# Patient Record
Sex: FEMALE | Race: WHITE | NOT HISPANIC OR LATINO | Employment: OTHER | RURAL
[De-identification: names, ages, dates, MRNs, and addresses within clinical notes are randomized per-mention and may not be internally consistent; named-entity substitution may affect disease eponyms.]

---

## 2017-02-22 DIAGNOSIS — K76.0 STEATOSIS OF LIVER: Primary | ICD-10-CM

## 2017-02-22 LAB
ALBUMIN SERPL-MCNC: 4.4 G/DL (ref 3.5–5)
ALP SERPL-CCNC: 127 U/L (ref 24–120)
ALT SERPL-CCNC: 97 U/L (ref 0–54)
AST SERPL-CCNC: 45 U/L (ref 7–45)
BILIRUB DIRECT SERPL-MCNC: 0 MG/DL (ref 0–0.3)
BILIRUB SERPL-MCNC: 0.9 MG/DL (ref 0.1–1)
PROT SERPL-MCNC: 7.5 G/DL (ref 6.3–8.7)

## 2017-03-03 DIAGNOSIS — Z86.010 HX OF COLONIC POLYPS: Primary | ICD-10-CM

## 2017-03-06 RX ORDER — POLYETHYLENE GLYCOL 3350, SODIUM CHLORIDE, SODIUM BICARBONATE, POTASSIUM CHLORIDE 420; 11.2; 5.72; 1.48 G/4L; G/4L; G/4L; G/4L
4000 POWDER, FOR SOLUTION ORAL SEE ADMIN INSTRUCTIONS
Qty: 4000 ML | Refills: 0 | Status: ON HOLD | OUTPATIENT
Start: 2017-03-06 | End: 2017-04-04

## 2017-03-10 ENCOUNTER — TELEPHONE (OUTPATIENT)
Dept: FAMILY MEDICINE CLINIC | Facility: CLINIC | Age: 67
End: 2017-03-10

## 2017-03-10 ENCOUNTER — OFFICE VISIT (OUTPATIENT)
Dept: FAMILY MEDICINE CLINIC | Facility: CLINIC | Age: 67
End: 2017-03-10

## 2017-03-10 VITALS
HEART RATE: 96 BPM | WEIGHT: 194 LBS | SYSTOLIC BLOOD PRESSURE: 126 MMHG | BODY MASS INDEX: 32.28 KG/M2 | OXYGEN SATURATION: 98 % | DIASTOLIC BLOOD PRESSURE: 82 MMHG | RESPIRATION RATE: 16 BRPM

## 2017-03-10 DIAGNOSIS — J40 BRONCHITIS: Primary | ICD-10-CM

## 2017-03-10 PROCEDURE — 99213 OFFICE O/P EST LOW 20 MIN: CPT | Performed by: FAMILY MEDICINE

## 2017-03-10 RX ORDER — PRAVASTATIN SODIUM 20 MG
20 TABLET ORAL NIGHTLY
COMMUNITY
Start: 2017-01-20 | End: 2018-10-16

## 2017-03-10 RX ORDER — AMOXICILLIN 500 MG/1
500 CAPSULE ORAL 3 TIMES DAILY
Qty: 30 CAPSULE | Refills: 0 | Status: ON HOLD | OUTPATIENT
Start: 2017-03-10 | End: 2017-04-04

## 2017-03-10 RX ORDER — AZELASTINE 1 MG/ML
2 SPRAY, METERED NASAL 2 TIMES DAILY
Qty: 1 EACH | Refills: 2 | Status: SHIPPED | OUTPATIENT
Start: 2017-03-10 | End: 2017-10-16 | Stop reason: SDUPTHER

## 2017-03-10 RX ORDER — PREDNISONE 10 MG/1
10 TABLET ORAL DAILY
Qty: 15 TABLET | Refills: 0 | Status: ON HOLD | OUTPATIENT
Start: 2017-03-10 | End: 2017-04-04

## 2017-03-10 RX ORDER — LEVOTHYROXINE SODIUM 0.1 MG/1
100 TABLET ORAL DAILY
Qty: 30 TABLET | Refills: 2 | Status: SHIPPED | OUTPATIENT
Start: 2017-03-10 | End: 2017-06-13 | Stop reason: SDUPTHER

## 2017-03-10 NOTE — TELEPHONE ENCOUNTER
Says she has been sick with the flu for 1 week. Now it has gone down to her chest.. Would like to be seen.

## 2017-04-03 ENCOUNTER — ANESTHESIA EVENT (OUTPATIENT)
Dept: GASTROENTEROLOGY | Facility: HOSPITAL | Age: 67
End: 2017-04-03

## 2017-04-04 ENCOUNTER — HOSPITAL ENCOUNTER (OUTPATIENT)
Facility: HOSPITAL | Age: 67
Setting detail: HOSPITAL OUTPATIENT SURGERY
Discharge: HOME | End: 2017-04-04
Attending: INTERNAL MEDICINE | Admitting: INTERNAL MEDICINE

## 2017-04-04 ENCOUNTER — ANESTHESIA (OUTPATIENT)
Dept: GASTROENTEROLOGY | Facility: HOSPITAL | Age: 67
End: 2017-04-04

## 2017-04-04 VITALS
OXYGEN SATURATION: 95 % | HEART RATE: 75 BPM | RESPIRATION RATE: 15 BRPM | WEIGHT: 204 LBS | SYSTOLIC BLOOD PRESSURE: 109 MMHG | DIASTOLIC BLOOD PRESSURE: 51 MMHG | TEMPERATURE: 97.2 F | HEIGHT: 65 IN | BODY MASS INDEX: 33.99 KG/M2

## 2017-04-04 DIAGNOSIS — Z86.010 HX OF COLONIC POLYPS: ICD-10-CM

## 2017-04-04 PROCEDURE — 45385 COLONOSCOPY W/LESION REMOVAL: CPT | Performed by: INTERNAL MEDICINE

## 2017-04-04 PROCEDURE — 88305 TISSUE EXAM BY PATHOLOGIST: CPT | Performed by: INTERNAL MEDICINE

## 2017-04-04 PROCEDURE — 25010000002 ONDANSETRON PER 1 MG: Performed by: NURSE ANESTHETIST, CERTIFIED REGISTERED

## 2017-04-04 PROCEDURE — 25010000002 PROPOFOL 10 MG/ML EMULSION: Performed by: NURSE ANESTHETIST, CERTIFIED REGISTERED

## 2017-04-04 RX ORDER — PROPOFOL 10 MG/ML
VIAL (ML) INTRAVENOUS AS NEEDED
Status: DISCONTINUED | OUTPATIENT
Start: 2017-04-04 | End: 2017-04-04 | Stop reason: SURG

## 2017-04-04 RX ORDER — ONDANSETRON 2 MG/ML
INJECTION INTRAMUSCULAR; INTRAVENOUS AS NEEDED
Status: DISCONTINUED | OUTPATIENT
Start: 2017-04-04 | End: 2017-04-04 | Stop reason: SURG

## 2017-04-04 RX ORDER — SODIUM CHLORIDE 9 MG/ML
100 INJECTION, SOLUTION INTRAVENOUS CONTINUOUS
Status: DISCONTINUED | OUTPATIENT
Start: 2017-04-04 | End: 2017-04-04 | Stop reason: HOSPADM

## 2017-04-04 RX ORDER — SODIUM CHLORIDE 0.9 % (FLUSH) 0.9 %
1-10 SYRINGE (ML) INJECTION AS NEEDED
Status: DISCONTINUED | OUTPATIENT
Start: 2017-04-04 | End: 2017-04-04 | Stop reason: HOSPADM

## 2017-04-04 RX ORDER — LIDOCAINE HYDROCHLORIDE 20 MG/ML
INJECTION, SOLUTION INFILTRATION; PERINEURAL AS NEEDED
Status: DISCONTINUED | OUTPATIENT
Start: 2017-04-04 | End: 2017-04-04 | Stop reason: SURG

## 2017-04-04 RX ORDER — ONDANSETRON 2 MG/ML
4 INJECTION INTRAMUSCULAR; INTRAVENOUS ONCE AS NEEDED
Status: DISCONTINUED | OUTPATIENT
Start: 2017-04-04 | End: 2017-04-04 | Stop reason: HOSPADM

## 2017-04-04 RX ADMIN — PROPOFOL 50 MG: 10 INJECTION, EMULSION INTRAVENOUS at 08:32

## 2017-04-04 RX ADMIN — SODIUM CHLORIDE 100 ML/HR: 9 INJECTION, SOLUTION INTRAVENOUS at 07:13

## 2017-04-04 RX ADMIN — PROPOFOL 50 MG: 10 INJECTION, EMULSION INTRAVENOUS at 08:30

## 2017-04-04 RX ADMIN — ONDANSETRON HYDROCHLORIDE 4 MG: 2 SOLUTION INTRAMUSCULAR; INTRAVENOUS at 08:20

## 2017-04-04 RX ADMIN — PROPOFOL 200 MG: 10 INJECTION, EMULSION INTRAVENOUS at 08:26

## 2017-04-04 RX ADMIN — PROPOFOL 50 MG: 10 INJECTION, EMULSION INTRAVENOUS at 08:35

## 2017-04-04 RX ADMIN — PROPOFOL 50 MG: 10 INJECTION, EMULSION INTRAVENOUS at 08:34

## 2017-04-04 RX ADMIN — LIDOCAINE HYDROCHLORIDE 50 MG: 20 INJECTION, SOLUTION INFILTRATION; PERINEURAL at 08:26

## 2017-04-04 NOTE — ANESTHESIA PREPROCEDURE EVALUATION
Anesthesia Evaluation     no history of anesthetic complications:  NPO Status: > 4 hours   Airway   Mallampati: III  TM distance: >3 FB  possible difficult intubation  Dental    (+) poor dentition    Pulmonary - normal exam   (+) asthma, shortness of breath, recent URI,   Cardiovascular - normal exam  Exercise tolerance: good (4-7 METS)    Rhythm: regular  Rate: normal    (+) hypertension well controlled,       Neuro/Psych  (+) dizziness/light headedness, psychiatric history,    GI/Hepatic/Renal/Endo    (+)  liver disease, hypothyroidism,   (-) hyperthyroidism    Musculoskeletal     Abdominal  - normal exam   Substance History - negative use     OB/GYN negative ob/gyn ROS         Other   (+) arthritis     ROS/Med Hx Other: Inner ear   Fatty liver  Antibiotic two weeks ago for bronchitis  Almost over       Phys Exam Other: Partials on top and bottom in place                            Anesthesia Plan    ASA 2     MAC     intravenous induction   Anesthetic plan and risks discussed with patient.

## 2017-04-04 NOTE — PLAN OF CARE
Problem: GI Endoscopy (Adult)  Goal: Signs and Symptoms of Listed Potential Problems Will be Absent or Manageable (GI Endoscopy)  Outcome: Outcome(s) achieved Date Met:  04/04/17

## 2017-04-04 NOTE — ANESTHESIA POSTPROCEDURE EVALUATION
Patient: Hannah Maki    Procedure Summary     Date Anesthesia Start Anesthesia Stop Room / Location    04/04/17 0820 0844  [facility] /  [facility]       Procedure Diagnosis Surgeon Provider    COLONOSCOPY WITH ANESTHESIA (N/A ) Hx of colonic polyps  (Hx of colonic polyps [Z86.010]) MD Aiden Eckert CRNA          Anesthesia Type: MAC  Last vitals  BP      Temp      Pulse     Resp      SpO2        Post Anesthesia Care and Evaluation    Patient location during evaluation: PHASE II  Patient participation: complete - patient participated  Level of consciousness: awake and alert  Pain score: 0  Pain management: adequate  Airway patency: patent  Anesthetic complications: No anesthetic complications    Cardiovascular status: acceptable, hemodynamically stable and stable  Respiratory status: acceptable  Hydration status: acceptable

## 2017-04-04 NOTE — H&P
[facility] Gastroenterology  Pre Procedure History & Physical    Chief Complaint:   Colon polyps    Subjective     HPI:   The patient has a history of colon polyps who presents for exam.    Past Medical History:   Past Medical History:   Diagnosis Date   • Anxiety    • Arthritis    • Asthma    • Colon polyp    • Diverticulosis    • Dysuria    • GERD (gastroesophageal reflux disease)    • Hyperlipidemia    • Hypertension    • Hyperthyroidism    • Rhinitis    • Tinnitus    • Vertigo        Past Surgical History:  [unfilled]    Family History:  Family History   Problem Relation Age of Onset   • Diabetes Brother    • Colon cancer Sister    • Colon cancer Sister        Social History:   reports that she has never smoked. She has never used smokeless tobacco. She reports that she does not drink alcohol or use illicit drugs.    Medications:   Prior to Admission medications    Medication Sig Start Date End Date Taking? Authorizing Provider   levothyroxine (SYNTHROID, LEVOTHROID) 100 MCG tablet Take 1 tablet by mouth Daily. 3/10/17  Yes Deni Henry MD   azelastine (ASTELIN) 0.1 % nasal spray 2 sprays into each nostril 2 (Two) Times a Day. Use in each nostril as directed 3/10/17   Deni Henry MD   budesonide-formoterol (SYMBICORT) 160-4.5 MCG/ACT inhaler Inhale into the lungs    Historical Provider, MD   esomeprazole (NexIUM) 20 MG capsule Take 20 mg by mouth Every Morning Before Breakfast.    Historical Provider, MD   HYDROcod Polst-CPM Polst ER (TUSSIONEX PENNKINETIC ER) 10-8 MG/5ML ER suspension Take 5 mL by mouth Every 12 (Twelve) Hours As Needed for cough. 3/10/17   Deni Henry MD   irbesartan (AVAPRO) 300 MG tablet Take 1 tablet by mouth Daily. 11/14/16   Deni Henry MD   montelukast (SINGULAIR) 10 MG tablet Take 10 mg by mouth nightly.    Historical Provider, MD   pravastatin (PRAVACHOL) 20 MG tablet  1/20/17   Historical Provider, MD   amoxicillin (AMOXIL) 500 MG capsule Take 1  "capsule by mouth 3 (Three) Times a Day. 3/10/17 4/4/17  Deni Henry MD   ezetimibe (ZETIA) 10 MG tablet  3/12/16 4/4/17  Historical Provider, MD   polyethylene glycol-electrolytes (NULYTELY WITH FLAVOR PACKS) 420 G solution Take 4,000 mL by mouth See Admin Instructions. 3/6/17 4/4/17  FADI Levine   predniSONE (DELTASONE) 10 MG tablet Take 1 tablet by mouth Daily. 30mg x 2 dasy then 20mg x 2 days then 10mg x 2 days then 5mg x 2 days 3/10/17 4/4/17  eDni Henry MD       Allergies:  Azithromycin; Augmentin  [amoxicillin-pot clavulanate]; Bactrim  [sulfamethoxazole-trimethoprim]; Cefuroxime; Codeine; Erythromycin; Gatifloxacin; Latex; Meperidine; and Tetracyclines & related    Objective     Blood pressure 163/89, pulse 90, temperature 97.2 °F (36.2 °C), temperature source Temporal Artery , resp. rate 18, height 65\" (165.1 cm), weight 204 lb (92.5 kg), last menstrual period 10/04/1981, SpO2 95 %.    Physical Exam   Constitutional: Pt is oriented to person, place, and in no distress.   HENT: Mouth/Throat: Oropharynx is clear.   Cardiovascular: Normal rate, regular rhythm.    Pulmonary/Chest: Effort normal. No respiratory distress. No  wheezes.   Abdominal: Soft. Non-distended.  Skin: Skin is warm and dry.   Psychiatric: Mood, memory, affect and judgment appear normal.     Assessment/Plan     Diagnosis:  Colon polyps    Anticipated Surgical Procedure:  Colonoscopy    The risks, benefits, and alternatives of this procedure have been discussed with the patient or the responsible party- the patient understands and agrees to proceed.      EMR Dragon/transcription disclaimer:  Much of this encounter note is electronic transcription/translation of spoken language to printed text.  The electronic translation of spoken language may be erroneous, or at times, nonsensical words or phrases may be inadvertently transcribed.  Although I have reviewed the note for such errors, some may still exist.  "

## 2017-04-04 NOTE — PLAN OF CARE
Problem: Patient Care Overview (Adult)  Goal: Plan of Care Review  Outcome: Outcome(s) achieved Date Met:  04/04/17 04/04/17 0932   Outcome Evaluation   Outcome Summary/Follow up Plan d/c criteria met   Coping/Psychosocial Response Interventions   Plan Of Care Reviewed With patient;spouse

## 2017-04-04 NOTE — PLAN OF CARE
Problem: GI Endoscopy (Adult)  Goal: Signs and Symptoms of Listed Potential Problems Will be Absent or Manageable (GI Endoscopy)  Outcome: Ongoing (interventions implemented as appropriate)    04/04/17 0833   GI Endoscopy   Problems Assessed (GI Endoscopy) all   Problems Present (GI Endoscopy) none         Problem: Patient Care Overview (Adult)  Goal: Plan of Care Review  Outcome: Ongoing (interventions implemented as appropriate)    04/04/17 0833   Patient Care Overview   Progress improving   Outcome Evaluation   Outcome Summary/Follow up Plan pt tolerating procedure well

## 2017-04-05 LAB
CYTO UR: NORMAL
LAB AP CASE REPORT: NORMAL
LAB AP CLINICAL INFORMATION: NORMAL
Lab: NORMAL
PATH REPORT.FINAL DX SPEC: NORMAL
PATH REPORT.GROSS SPEC: NORMAL

## 2017-04-11 ENCOUNTER — OFFICE VISIT (OUTPATIENT)
Dept: FAMILY MEDICINE CLINIC | Facility: CLINIC | Age: 67
End: 2017-04-11

## 2017-04-11 VITALS
HEIGHT: 65 IN | HEART RATE: 76 BPM | DIASTOLIC BLOOD PRESSURE: 84 MMHG | WEIGHT: 195 LBS | BODY MASS INDEX: 32.49 KG/M2 | RESPIRATION RATE: 16 BRPM | OXYGEN SATURATION: 98 % | SYSTOLIC BLOOD PRESSURE: 126 MMHG

## 2017-04-11 DIAGNOSIS — K21.9 GASTROESOPHAGEAL REFLUX DISEASE WITHOUT ESOPHAGITIS: ICD-10-CM

## 2017-04-11 DIAGNOSIS — R22.2 ABDOMINAL WALL MASS: ICD-10-CM

## 2017-04-11 DIAGNOSIS — E03.9 ACQUIRED HYPOTHYROIDISM: ICD-10-CM

## 2017-04-11 DIAGNOSIS — J45.20 MILD INTERMITTENT ASTHMA WITHOUT COMPLICATION: ICD-10-CM

## 2017-04-11 DIAGNOSIS — E78.2 MIXED HYPERLIPIDEMIA: Primary | ICD-10-CM

## 2017-04-11 PROCEDURE — 99214 OFFICE O/P EST MOD 30 MIN: CPT | Performed by: FAMILY MEDICINE

## 2017-04-11 NOTE — PROGRESS NOTES
Subjective   Hannah Maki is a 66 y.o. female.     Chief Complaint   Patient presents with   • Follow-up    on medical issues    History of Present Illness     she notes gerd symtoms are stable --denies any dysphgia or odynphagia ...she is toelraing synthroid witout heat or cold intolaences...tolerating pravachol without myalogias or muscle weakness  She notes an abdominal mass present for several mos     Current Outpatient Prescriptions:   •  azelastine (ASTELIN) 0.1 % nasal spray, 2 sprays into each nostril 2 (Two) Times a Day. Use in each nostril as directed, Disp: 1 each, Rfl: 2  •  budesonide-formoterol (SYMBICORT) 160-4.5 MCG/ACT inhaler, Inhale into the lungs, Disp: , Rfl:   •  esomeprazole (NexIUM) 20 MG capsule, Take 20 mg by mouth Every Morning Before Breakfast., Disp: , Rfl:   •  HYDROcod Polst-CPM Polst ER (TUSSIONEX PENNKINETIC ER) 10-8 MG/5ML ER suspension, Take 5 mL by mouth Every 12 (Twelve) Hours As Needed for cough., Disp: 115 mL, Rfl: 0  •  irbesartan (AVAPRO) 300 MG tablet, Take 1 tablet by mouth Daily., Disp: 30 tablet, Rfl: 5  •  levothyroxine (SYNTHROID, LEVOTHROID) 100 MCG tablet, Take 1 tablet by mouth Daily., Disp: 30 tablet, Rfl: 2  •  montelukast (SINGULAIR) 10 MG tablet, Take 10 mg by mouth nightly., Disp: , Rfl:   •  pravastatin (PRAVACHOL) 20 MG tablet, , Disp: , Rfl:   Allergies   Allergen Reactions   • Azithromycin    • Augmentin  [Amoxicillin-Pot Clavulanate]    • Bactrim  [Sulfamethoxazole-Trimethoprim]    • Cefuroxime    • Codeine    • Erythromycin    • Gatifloxacin    • Latex    • Meperidine    • Tetracyclines & Related        Past Medical History:   Diagnosis Date   • Anxiety    • Arthritis    • Asthma    • Colon polyp    • Diverticulosis    • Dysuria    • GERD (gastroesophageal reflux disease)    • Hyperlipidemia    • Hypertension    • Hyperthyroidism    • Rhinitis    • Tinnitus    • Vertigo      Past Surgical History:   Procedure Laterality Date   • APPENDECTOMY     •  "CHOLECYSTECTOMY     • COLONOSCOPY  11/18/2013     six polyps removed or destroyed, Diverticulosis  Recall 3 years   • COLONOSCOPY  11/18/2013   • COLONOSCOPY N/A 4/4/2017    Procedure: COLONOSCOPY WITH ANESTHESIA;  Surgeon: Lew Orona MD;  Location: [facility] ENDOSCOPY;  Service:    • HEMORRHOIDECTOMY     • HYSTERECTOMY     • HYSTERECTOMY     • NECK SURGERY     • NOSE SURGERY      nose bleeding artery    • RECTAL FISSURE INCISION AND DRAINAGE         Review of Systems   Constitutional: Negative.    HENT: Negative.    Eyes: Negative.    Respiratory: Negative.    Cardiovascular: Negative.    Gastrointestinal: Negative.    Endocrine: Negative.    Genitourinary: Negative.    Musculoskeletal: Negative.    Skin: Negative.    Allergic/Immunologic: Negative.    Neurological: Negative.    Hematological: Negative.    Psychiatric/Behavioral: Negative.        Objective  /84  Pulse 76  Resp 16  Ht 65\" (165.1 cm)  Wt 195 lb (88.5 kg)  LMP 10/04/1981  SpO2 98%  BMI 32.45 kg/m2  Physical Exam   Constitutional: She is oriented to person, place, and time. She appears well-developed and well-nourished.   HENT:   Head: Normocephalic and atraumatic.   Right Ear: External ear normal.   Left Ear: External ear normal.   Nose: Nose normal.   Mouth/Throat: Oropharynx is clear and moist.   Eyes: Conjunctivae and EOM are normal. Pupils are equal, round, and reactive to light.   Neck: Normal range of motion. Neck supple.   Cardiovascular: Normal rate, regular rhythm, normal heart sounds and intact distal pulses.    Pulmonary/Chest: Effort normal and breath sounds normal.   Abdominal: Soft. Bowel sounds are normal. She exhibits mass (noted 1cm mass right upper abdomen).   Musculoskeletal: Normal range of motion.   Neurological: She is alert and oriented to person, place, and time. She has normal reflexes.   Skin: Skin is warm and dry.   Psychiatric: She has a normal mood and affect. Her behavior is normal. Judgment and thought " content normal.   Nursing note and vitals reviewed.      Assessment/Plan   Hannah was seen today for follow-up.    Diagnoses and all orders for this visit:    Mixed hyperlipidemia    Gastroesophageal reflux disease without esophagitis    Acquired hypothyroidism    Mild intermittent asthma without complication    Abdominal wall mass  -     Ambulatory Referral to General Surgery        She will get labs soon--  Her colon is up to date  She will get her next mammogram next friday       Orders Placed This Encounter   Procedures   • Ambulatory Referral to General Surgery       Follow up: 6 month(s)

## 2017-04-17 ENCOUNTER — OFFICE VISIT (OUTPATIENT)
Dept: OTOLARYNGOLOGY | Facility: CLINIC | Age: 67
End: 2017-04-17

## 2017-04-17 VITALS
DIASTOLIC BLOOD PRESSURE: 84 MMHG | HEART RATE: 77 BPM | WEIGHT: 201 LBS | SYSTOLIC BLOOD PRESSURE: 120 MMHG | HEIGHT: 65 IN | BODY MASS INDEX: 33.49 KG/M2 | TEMPERATURE: 98.4 F

## 2017-04-17 DIAGNOSIS — R09.82 POST-NASAL DRIP: ICD-10-CM

## 2017-04-17 DIAGNOSIS — J45.20 MILD INTERMITTENT ASTHMA WITHOUT COMPLICATION: ICD-10-CM

## 2017-04-17 DIAGNOSIS — K21.9 GASTROESOPHAGEAL REFLUX DISEASE WITHOUT ESOPHAGITIS: ICD-10-CM

## 2017-04-17 DIAGNOSIS — J32.4 CHRONIC PANSINUSITIS: ICD-10-CM

## 2017-04-17 DIAGNOSIS — E03.9 ACQUIRED HYPOTHYROIDISM: ICD-10-CM

## 2017-04-17 DIAGNOSIS — J30.89 PERENNIAL ALLERGIC RHINITIS, UNSPECIFIED ALLERGIC RHINITIS TRIGGER: Primary | ICD-10-CM

## 2017-04-17 PROCEDURE — 99213 OFFICE O/P EST LOW 20 MIN: CPT | Performed by: PHYSICIAN ASSISTANT

## 2017-04-17 RX ORDER — MONTELUKAST SODIUM 10 MG/1
10 TABLET ORAL NIGHTLY
Qty: 30 TABLET | Refills: 11 | Status: SHIPPED | OUTPATIENT
Start: 2017-04-17 | End: 2018-05-16 | Stop reason: SDUPTHER

## 2017-04-17 NOTE — PATIENT INSTRUCTIONS
ALL ABOUT HEARTBURN AND THE LIFESTYLE CHANGES THAT HELP    Lifestyle Changes Can Help Relieve The Burning Pain In Your Tummy    What is Heartburn?  Heartburn occurs when the lining of the esophagus (the tube that connects the mouth to the stomach) is exposed to and irritated by stomach acid.  Heartburn often feels like a burning pain in the middle of your chest that moves up your throat.  You may also have the sensation that food has come back into your mouth, leaving a sour or bitter taste.  Almost everyone has heartburn once in a while.  But if you have heartburn 2 or more times per week, it can be a sign of a more serious problem call gastroesophogeal reflux disease, or GERD.    What causes Heartburn?  Heartburn usually occurs when the valve at the anton of the stomach (the lower esophageal sphincter or LES) doesn’t close the way it should.  If the LES is weak or opens at the wrong time, stomach acid can reflux (flow back) into the esophagus and cause heartburn.  Factors that can affect the LES include:    • Eating or drinking certain foods and beverages such as chocolate, peppermint, fried or fatty foods, coffee, or drinks that contain alcohol  • Having a hiatal hernia - a common physical condition where part of the stomach protrudes up through the diaphragm  • Lying down  • Smoking cigarettes  • Taking certain medications (be sure to tell your doctor about all medications or supplements you take)    What foods can make heartburn worse?  All foods cause the stomach to produce acid, although foods can affect people in different ways.  Following is a list of foods and beverages that may aggravate your symptoms.  You may want to eat them less often.    • Fried or fatty foods  • Heavy seasoning and spicy foods  • Coffee  • Onions  • Orange juice, grapefruit juice, and tomato juice  • Alcoholic beverages  • Chocolate  • Peppermint and spearmint    What else can I do to help control my heartburn?  Try these lifestyle  changes:  • Stop Smoking  • Lose weight - if you’re overweight  • Exercise to help control your weight (talk to your doctor first before starting any exercise program).  • Eat small, well-balanced meals  • Reduce abdominal pressure-don’t wear tight belts or tight clothing  • Avoid eating within 2 hours before bedtime  • Elevate your bed so the head is 6 to 8 inches higher than the foot.  This can help reduce acid reflux at night  • Let your doctor know about all the drugs and supplements you are taking.  Some of them may contribute to your heartburn.    What if these things don’t work?  Talk to your doctor, who can discuss many treatments with you.  Although there are several possible causes of heartburn associated with GERD, they all involve stomach acids.  So no matter what the cause may be, the medicines to reduce stomach acid can prevent heartburn.

## 2017-04-17 NOTE — PROGRESS NOTES
Patient Care Team:  Deni Henry MD as PCP - General  Deni Henry MD as PCP - Family Medicine  KAMLESH Duncan as Physician Assistant (Otolaryngology)  Sesar Dacosta MD as Consulting Physician (Otolaryngology)    Chief Complaint   Patient presents with   • Follow-up     allergies, sinus        Subjective     Hannah Maki is a 66 y.o. female who presents for evaluation.  HPI Comments: Patient presents for follow-up evaluation of allergy problems. The patient was last seen in the office six months ago and was advised to continue Astelin and Singulair and start an OTC antihistamine and nasal steroid. The patient reports she is doing well and continues to take her medications as directed with good results. She does state that she has had night time reflux off and on. She reports two sinus infections over the last six months and one of them was in conjunction with the flu. These have resolved.      Review of Systems  Review of Systems   Constitutional: Negative for activity change, appetite change, chills, diaphoresis, fatigue, fever and unexpected weight change.   HENT: Positive for postnasal drip. Negative for congestion, dental problem, drooling, ear discharge, ear pain, facial swelling, hearing loss, mouth sores, nosebleeds, rhinorrhea, sinus pressure, sneezing, sore throat, tinnitus, trouble swallowing and voice change.    Eyes: Negative.    Respiratory: Negative.    Cardiovascular: Negative.    Gastrointestinal: Negative.    Endocrine: Negative.    Skin: Negative.    Allergic/Immunologic: Positive for environmental allergies. Negative for food allergies and immunocompromised state.   Neurological: Negative.    Hematological: Negative.    Psychiatric/Behavioral: Negative.        History  Past Medical History:   Diagnosis Date   • Anxiety    • Arthritis    • Asthma    • Colon polyp    • Diverticulosis    • Dysuria    • GERD (gastroesophageal reflux disease)    • Hyperlipidemia     • Hypertension    • Hyperthyroidism    • Rhinitis    • Tinnitus    • Vertigo      Past Surgical History:   Procedure Laterality Date   • APPENDECTOMY     • CHOLECYSTECTOMY     • COLONOSCOPY  11/18/2013     six polyps removed or destroyed, Diverticulosis  Recall 3 years   • COLONOSCOPY  11/18/2013   • COLONOSCOPY N/A 4/4/2017    Procedure: COLONOSCOPY WITH ANESTHESIA;  Surgeon: Lew Orona MD;  Location: [facility] ENDOSCOPY;  Service:    • HEMORRHOIDECTOMY     • HYSTERECTOMY     • HYSTERECTOMY     • NECK SURGERY     • NOSE SURGERY      nose bleeding artery    • RECTAL FISSURE INCISION AND DRAINAGE       Family History   Problem Relation Age of Onset   • Diabetes Brother    • Colon cancer Sister    • Colon cancer Sister      Social History   Substance Use Topics   • Smoking status: Never Smoker   • Smokeless tobacco: Never Used   • Alcohol use No       Current Outpatient Prescriptions:   •  azelastine (ASTELIN) 0.1 % nasal spray, 2 sprays into each nostril 2 (Two) Times a Day. Use in each nostril as directed, Disp: 1 each, Rfl: 2  •  budesonide-formoterol (SYMBICORT) 160-4.5 MCG/ACT inhaler, Inhale into the lungs, Disp: , Rfl:   •  Coenzyme Q10 (CO Q 10 PO), Take  by mouth., Disp: , Rfl:   •  esomeprazole (NexIUM) 20 MG capsule, Take 20 mg by mouth Every Morning Before Breakfast., Disp: , Rfl:   •  irbesartan (AVAPRO) 300 MG tablet, Take 1 tablet by mouth Daily., Disp: 30 tablet, Rfl: 5  •  levothyroxine (SYNTHROID, LEVOTHROID) 100 MCG tablet, Take 1 tablet by mouth Daily., Disp: 30 tablet, Rfl: 2  •  montelukast (SINGULAIR) 10 MG tablet, Take 10 mg by mouth nightly., Disp: , Rfl:   •  pravastatin (PRAVACHOL) 20 MG tablet, , Disp: , Rfl:   Allergies:  Azithromycin; Augmentin  [amoxicillin-pot clavulanate]; Bactrim  [sulfamethoxazole-trimethoprim]; Cefuroxime; Codeine; Erythromycin; Gatifloxacin; Latex; Meperidine; and Tetracyclines & related    Objective     Vital Signs  Temp:  [98.4 °F (36.9 °C)] 98.4 °F (36.9  °C)  Heart Rate:  [77] 77  BP: (120)/(84) 120/84    Physical Exam:  Physical Exam   Constitutional: She is oriented to person, place, and time. She appears well-developed and well-nourished. No distress.   HENT:   Head: Normocephalic and atraumatic.   Right Ear: Hearing, tympanic membrane, external ear and ear canal normal.   Left Ear: Hearing, tympanic membrane, external ear and ear canal normal.   Nose: Nose normal. No mucosal edema, rhinorrhea, nasal deformity or septal deviation.   Mouth/Throat: Uvula is midline, oropharynx is clear and moist and mucous membranes are normal. No oral lesions. No trismus in the jaw. No dental abscesses or uvula swelling. No oropharyngeal exudate, posterior oropharyngeal edema, posterior oropharyngeal erythema or tonsillar abscesses.   Eyes: Conjunctivae and EOM are normal. Pupils are equal, round, and reactive to light. No scleral icterus.   Pulmonary/Chest: Effort normal.   Neurological: She is alert and oriented to person, place, and time. No cranial nerve deficit.   Skin: Skin is warm and dry. No rash noted. She is not diaphoretic. No erythema. No pallor.   Psychiatric: She has a normal mood and affect. Her behavior is normal. Judgment and thought content normal.   Vitals reviewed.      Results Review:   I reviewed the patient's new clinical results.    Assessment/Plan     Problems Addressed this Visit        Respiratory    Mild intermittent asthma    Chronic pansinusitis    Perennial allergic rhinitis - Primary       Digestive    Gastroesophageal reflux disease without esophagitis       Endocrine    Acquired hypothyroidism       Other    Post-nasal drip          My findings and recommendations were discussed and questions were answered.     Return in about 6 months (around 10/17/2017) for Recheck allergy/sinus.    KAMLESH Duncan  04/17/17  9:08 AM

## 2017-04-19 ENCOUNTER — TELEPHONE (OUTPATIENT)
Dept: SURGERY | Age: 67
End: 2017-04-19

## 2017-04-20 ENCOUNTER — OFFICE VISIT (OUTPATIENT)
Dept: SURGERY | Age: 67
End: 2017-04-20
Payer: COMMERCIAL

## 2017-04-20 VITALS
HEIGHT: 65 IN | TEMPERATURE: 99.6 F | BODY MASS INDEX: 33.29 KG/M2 | DIASTOLIC BLOOD PRESSURE: 76 MMHG | WEIGHT: 199.8 LBS | SYSTOLIC BLOOD PRESSURE: 126 MMHG

## 2017-04-20 DIAGNOSIS — R10.11 RIGHT UPPER QUADRANT ABDOMINAL PAIN: Primary | ICD-10-CM

## 2017-04-20 PROCEDURE — 99213 OFFICE O/P EST LOW 20 MIN: CPT | Performed by: SURGERY

## 2017-04-20 RX ORDER — HYDROCODONE POLISTIREX AND CHLORPHENIRAMINE POLISTIREX 10; 8 MG/5ML; MG/5ML
SUSPENSION, EXTENDED RELEASE ORAL
COMMUNITY
Start: 2017-03-10 | End: 2017-04-20

## 2017-04-20 RX ORDER — AMOXICILLIN 500 MG/1
CAPSULE ORAL
COMMUNITY
Start: 2017-03-10 | End: 2017-04-20

## 2017-04-20 RX ORDER — AZELASTINE 1 MG/ML
2 SPRAY, METERED NASAL
COMMUNITY
Start: 2017-03-10 | End: 2017-04-20

## 2017-04-20 RX ORDER — POLYETHYLENE GLYCOL-3350, SODIUM CHLORIDE, POTASSIUM CHLORIDE AND SODIUM BICARBONATE 420; 11.2; 5.72; 1.48 G/438.4G; G/438.4G; G/438.4G; G/438.4G
POWDER, FOR SOLUTION ORAL
COMMUNITY
Start: 2017-03-27 | End: 2018-04-23 | Stop reason: ALTCHOICE

## 2017-04-20 RX ORDER — LEVOTHYROXINE SODIUM 0.1 MG/1
TABLET ORAL
COMMUNITY
Start: 2017-04-12 | End: 2017-04-20

## 2017-04-20 RX ORDER — IRBESARTAN 300 MG/1
300 TABLET ORAL
COMMUNITY
Start: 2016-11-14 | End: 2017-04-20

## 2017-04-20 RX ORDER — PREDNISONE 10 MG/1
TABLET ORAL
COMMUNITY
Start: 2017-03-10 | End: 2018-04-23 | Stop reason: ALTCHOICE

## 2017-04-20 RX ORDER — UBIDECARENONE 60 MG
CAPSULE ORAL
COMMUNITY
End: 2018-04-23 | Stop reason: DRUGHIGH

## 2017-04-20 RX ORDER — PRAVASTATIN SODIUM 20 MG
TABLET ORAL
COMMUNITY
Start: 2017-01-20 | End: 2022-07-05

## 2017-04-20 RX ORDER — LEVOTHYROXINE SODIUM 0.1 MG/1
100 TABLET ORAL
COMMUNITY
Start: 2017-03-10

## 2017-04-20 RX ORDER — BUDESONIDE AND FORMOTEROL FUMARATE DIHYDRATE 160; 4.5 UG/1; UG/1
AEROSOL RESPIRATORY (INHALATION)
COMMUNITY
End: 2017-04-20

## 2017-04-20 RX ORDER — MONTELUKAST SODIUM 10 MG/1
10 TABLET ORAL
COMMUNITY
Start: 2017-04-17 | End: 2017-04-20

## 2017-04-21 ENCOUNTER — HOSPITAL ENCOUNTER (OUTPATIENT)
Dept: WOMENS IMAGING | Age: 67
Discharge: HOME | End: 2017-04-21
Payer: COMMERCIAL

## 2017-04-21 DIAGNOSIS — Z12.31 VISIT FOR SCREENING MAMMOGRAM: ICD-10-CM

## 2017-04-21 PROCEDURE — 77063 BREAST TOMOSYNTHESIS BI: CPT

## 2017-04-21 RX ORDER — IRBESARTAN 300 MG/1
TABLET ORAL
Qty: 30 TABLET | Refills: 5 | Status: SHIPPED | OUTPATIENT
Start: 2017-04-21 | End: 2017-05-15 | Stop reason: SDUPTHER

## 2017-04-24 ENCOUNTER — TELEPHONE (OUTPATIENT)
Dept: WOMENS IMAGING | Age: 67
End: 2017-04-24

## 2017-04-26 ENCOUNTER — HOSPITAL ENCOUNTER (OUTPATIENT)
Dept: WOMENS IMAGING | Age: 67
Discharge: HOME | End: 2017-04-26
Payer: COMMERCIAL

## 2017-04-26 DIAGNOSIS — N64.89 BREAST ASYMMETRY: ICD-10-CM

## 2017-04-26 PROCEDURE — G0279 TOMOSYNTHESIS, MAMMO: HCPCS

## 2017-04-28 ENCOUNTER — OFFICE VISIT (OUTPATIENT)
Dept: SURGERY | Age: 67
End: 2017-04-28
Payer: COMMERCIAL

## 2017-04-28 VITALS
WEIGHT: 198 LBS | HEART RATE: 84 BPM | DIASTOLIC BLOOD PRESSURE: 80 MMHG | BODY MASS INDEX: 32.99 KG/M2 | HEIGHT: 65 IN | SYSTOLIC BLOOD PRESSURE: 128 MMHG

## 2017-04-28 DIAGNOSIS — Z12.31 VISIT FOR SCREENING MAMMOGRAM: Primary | ICD-10-CM

## 2017-04-28 PROCEDURE — 99212 OFFICE O/P EST SF 10 MIN: CPT | Performed by: PHYSICIAN ASSISTANT

## 2017-05-02 ENCOUNTER — HOSPITAL ENCOUNTER (OUTPATIENT)
Dept: CT IMAGING | Age: 67
Discharge: HOME | End: 2017-05-02
Payer: COMMERCIAL

## 2017-05-02 DIAGNOSIS — R10.11 RIGHT UPPER QUADRANT ABDOMINAL PAIN: ICD-10-CM

## 2017-05-02 LAB
GFR NON-AFRICAN AMERICAN: >60
PERFORMED ON: NORMAL
POC CREATININE: 0.9 MG/DL (ref 0.3–1.3)
POC SAMPLE TYPE: NORMAL

## 2017-05-02 PROCEDURE — 74177 CT ABD & PELVIS W/CONTRAST: CPT

## 2017-05-02 PROCEDURE — 6360000004 HC RX CONTRAST MEDICATION: Performed by: SURGERY

## 2017-05-02 PROCEDURE — 82565 ASSAY OF CREATININE: CPT

## 2017-05-02 RX ADMIN — IOVERSOL 90 ML: 741 INJECTION INTRA-ARTERIAL; INTRAVENOUS at 09:20

## 2017-05-09 ENCOUNTER — TELEPHONE (OUTPATIENT)
Dept: SURGERY | Age: 67
End: 2017-05-09

## 2017-05-15 RX ORDER — IRBESARTAN 300 MG/1
300 TABLET ORAL DAILY
Qty: 30 TABLET | Refills: 5 | Status: SHIPPED | OUTPATIENT
Start: 2017-05-15 | End: 2017-11-07 | Stop reason: SDUPTHER

## 2017-05-22 ENCOUNTER — LAB (OUTPATIENT)
Dept: FAMILY MEDICINE CLINIC | Facility: CLINIC | Age: 67
End: 2017-05-22

## 2017-05-22 DIAGNOSIS — E78.5 HYPERLIPIDEMIA, UNSPECIFIED HYPERLIPIDEMIA TYPE: ICD-10-CM

## 2017-05-22 DIAGNOSIS — R73.9 HYPERGLYCEMIA: Primary | ICD-10-CM

## 2017-05-22 DIAGNOSIS — E03.9 HYPOTHYROIDISM, UNSPECIFIED TYPE: ICD-10-CM

## 2017-05-22 LAB
ALBUMIN SERPL-MCNC: 4.3 G/DL (ref 3.5–5)
ALBUMIN/GLOB SERPL: 1.3 G/DL (ref 1.1–2.5)
ALP SERPL-CCNC: 137 U/L (ref 24–120)
ALT SERPL-CCNC: 80 U/L (ref 0–54)
AST SERPL-CCNC: 42 U/L (ref 7–45)
BILIRUB SERPL-MCNC: 0.9 MG/DL (ref 0.1–1)
BUN SERPL-MCNC: 13 MG/DL (ref 5–21)
BUN/CREAT SERPL: 17.3 (ref 7–25)
CALCIUM SERPL-MCNC: 9.9 MG/DL (ref 8.4–10.4)
CHLORIDE SERPL-SCNC: 101 MMOL/L (ref 98–110)
CHOLEST SERPL-MCNC: 229 MG/DL (ref 130–200)
CO2 SERPL-SCNC: 23 MMOL/L (ref 24–31)
CREAT SERPL-MCNC: 0.75 MG/DL (ref 0.5–1.4)
GLOBULIN SER CALC-MCNC: 3.3 GM/DL
GLUCOSE SERPL-MCNC: 102 MG/DL (ref 70–100)
HBA1C MFR BLD: 5.6 %
HDLC SERPL-MCNC: 53 MG/DL
LDLC SERPL CALC-MCNC: 146 MG/DL (ref 0–99)
POTASSIUM SERPL-SCNC: 5.3 MMOL/L (ref 3.5–5.3)
PROT SERPL-MCNC: 7.6 G/DL (ref 6.3–8.7)
SODIUM SERPL-SCNC: 140 MMOL/L (ref 135–145)
TRIGL SERPL-MCNC: 149 MG/DL (ref 0–149)
TSH SERPL DL<=0.005 MIU/L-ACNC: 2.64 MIU/ML (ref 0.47–4.68)
VLDLC SERPL CALC-MCNC: 29.8 MG/DL

## 2017-06-13 RX ORDER — LEVOTHYROXINE SODIUM 0.1 MG/1
100 TABLET ORAL DAILY
Qty: 30 TABLET | Refills: 5 | Status: SHIPPED | OUTPATIENT
Start: 2017-06-13 | End: 2018-01-17 | Stop reason: SDUPTHER

## 2017-08-31 ENCOUNTER — OFFICE VISIT (OUTPATIENT)
Dept: FAMILY MEDICINE CLINIC | Facility: CLINIC | Age: 67
End: 2017-08-31

## 2017-08-31 ENCOUNTER — TELEPHONE (OUTPATIENT)
Dept: FAMILY MEDICINE CLINIC | Facility: CLINIC | Age: 67
End: 2017-08-31

## 2017-08-31 VITALS
BODY MASS INDEX: 32.65 KG/M2 | HEART RATE: 104 BPM | RESPIRATION RATE: 18 BRPM | SYSTOLIC BLOOD PRESSURE: 120 MMHG | WEIGHT: 196 LBS | OXYGEN SATURATION: 97 % | HEIGHT: 65 IN | DIASTOLIC BLOOD PRESSURE: 80 MMHG

## 2017-08-31 DIAGNOSIS — K60.2 RECTAL FISSURE: Primary | ICD-10-CM

## 2017-08-31 DIAGNOSIS — J32.9 SINUSITIS, UNSPECIFIED CHRONICITY, UNSPECIFIED LOCATION: ICD-10-CM

## 2017-08-31 PROCEDURE — 99213 OFFICE O/P EST LOW 20 MIN: CPT | Performed by: FAMILY MEDICINE

## 2017-08-31 RX ORDER — LIDOCAINE 50 MG/G
OINTMENT TOPICAL
Qty: 30 G | Refills: 1 | Status: SHIPPED | OUTPATIENT
Start: 2017-08-31 | End: 2018-10-16

## 2017-08-31 RX ORDER — AMOXICILLIN 500 MG/1
500 CAPSULE ORAL 3 TIMES DAILY
Qty: 21 CAPSULE | Refills: 0 | Status: SHIPPED | OUTPATIENT
Start: 2017-08-31 | End: 2017-09-12

## 2017-09-05 ENCOUNTER — OFFICE VISIT (OUTPATIENT)
Dept: FAMILY MEDICINE CLINIC | Facility: CLINIC | Age: 67
End: 2017-09-05

## 2017-09-05 VITALS
OXYGEN SATURATION: 96 % | DIASTOLIC BLOOD PRESSURE: 88 MMHG | BODY MASS INDEX: 32.65 KG/M2 | SYSTOLIC BLOOD PRESSURE: 134 MMHG | RESPIRATION RATE: 16 BRPM | HEIGHT: 65 IN | WEIGHT: 196 LBS | HEART RATE: 84 BPM

## 2017-09-05 DIAGNOSIS — K60.2 RECTAL FISSURE: Primary | ICD-10-CM

## 2017-09-05 PROCEDURE — 99213 OFFICE O/P EST LOW 20 MIN: CPT | Performed by: FAMILY MEDICINE

## 2017-09-05 NOTE — PROGRESS NOTES
Subjective   Hannah Maki is a 66 y.o. female.     Chief Complaint   Patient presents with   • Follow-up     1 wk        History of Present Illness     Hannah is noting getting some relief with the xylocain ointnment but still having pain wiht defecation and sitting--denies any bleeding now      Current Outpatient Prescriptions:   •  amoxicillin (AMOXIL) 500 MG capsule, Take 1 capsule by mouth 3 (Three) Times a Day., Disp: 21 capsule, Rfl: 0  •  azelastine (ASTELIN) 0.1 % nasal spray, 2 sprays into each nostril 2 (Two) Times a Day. Use in each nostril as directed, Disp: 1 each, Rfl: 2  •  Coenzyme Q10 (CO Q 10 PO), Take  by mouth., Disp: , Rfl:   •  esomeprazole (NexIUM) 20 MG capsule, Take 20 mg by mouth Every Morning Before Breakfast., Disp: , Rfl:   •  irbesartan (AVAPRO) 300 MG tablet, Take 1 tablet by mouth Daily., Disp: 30 tablet, Rfl: 5  •  levothyroxine (SYNTHROID, LEVOTHROID) 100 MCG tablet, Take 1 tablet by mouth Daily., Disp: 30 tablet, Rfl: 5  •  lidocaine (XYLOCAINE) 5 % ointment, Apply  topically Every 2 (Two) Hours As Needed for Mild Pain ., Disp: 30 g, Rfl: 1  •  montelukast (SINGULAIR) 10 MG tablet, Take 1 tablet by mouth Every Night., Disp: 30 tablet, Rfl: 11  •  pravastatin (PRAVACHOL) 20 MG tablet, , Disp: , Rfl:   Allergies   Allergen Reactions   • Azithromycin    • Augmentin  [Amoxicillin-Pot Clavulanate]    • Bactrim  [Sulfamethoxazole-Trimethoprim]    • Cefuroxime    • Codeine    • Erythromycin    • Gatifloxacin    • Latex    • Meperidine    • Tetracyclines & Related        Past Medical History:   Diagnosis Date   • Anxiety    • Arthritis    • Asthma    • Colon polyp    • Diverticulosis    • Dysuria    • GERD (gastroesophageal reflux disease)    • Hyperlipidemia    • Hypertension    • Hyperthyroidism    • Rhinitis    • Tinnitus    • Vertigo      Past Surgical History:   Procedure Laterality Date   • APPENDECTOMY     • CHOLECYSTECTOMY     • COLONOSCOPY  11/18/2013     six polyps removed or  "destroyed, Diverticulosis  Recall 3 years   • COLONOSCOPY  11/18/2013   • COLONOSCOPY N/A 4/4/2017    Procedure: COLONOSCOPY WITH ANESTHESIA;  Surgeon: Lew Orona MD;  Location: [facility] ENDOSCOPY;  Service:    • HEMORRHOIDECTOMY     • HYSTERECTOMY     • HYSTERECTOMY     • NECK SURGERY     • NOSE SURGERY      nose bleeding artery    • RECTAL FISSURE INCISION AND DRAINAGE         Review of Systems   Constitutional: Negative.    HENT: Negative.    Eyes: Negative.    Respiratory: Negative.    Gastrointestinal: Positive for rectal pain. Negative for blood in stool.   Endocrine: Negative.    Genitourinary: Negative.        Objective  /88  Pulse 84  Resp 16  Ht 65\" (165.1 cm)  Wt 196 lb (88.9 kg)  LMP 10/04/1981  SpO2 96%  BMI 32.62 kg/m2  Physical Exam   Constitutional: She appears well-developed and well-nourished.   HENT:   Head: Normocephalic.   Eyes: Pupils are equal, round, and reactive to light.   Neck: Normal range of motion.   Cardiovascular: Normal rate and normal heart sounds.    Pulmonary/Chest: Effort normal and breath sounds normal.   Abdominal: Soft.   Musculoskeletal: Normal range of motion.   Neurological: She is alert. She has normal reflexes.   Skin: Skin is warm.   Psychiatric: She has a normal mood and affect. Her behavior is normal.   Nursing note and vitals reviewed.      Assessment/Plan   Hannah was seen today for follow-up.    Diagnoses and all orders for this visit:    Rectal fissure  -     Ambulatory Referral to General Surgery                 Orders Placed This Encounter   Procedures   • Ambulatory Referral to General Surgery     Referral Priority:   Routine     Referral Type:   Consultation     Referral Reason:   Specialty Services Required     Referred to Provider:   Akanksha Goode MD     Requested Specialty:   General Surgery     Number of Visits Requested:   1       Follow up: 3 mos  "

## 2017-09-12 ENCOUNTER — OFFICE VISIT (OUTPATIENT)
Dept: OTOLARYNGOLOGY | Facility: CLINIC | Age: 67
End: 2017-09-12

## 2017-09-12 VITALS
HEART RATE: 85 BPM | DIASTOLIC BLOOD PRESSURE: 82 MMHG | RESPIRATION RATE: 20 BRPM | SYSTOLIC BLOOD PRESSURE: 121 MMHG | HEIGHT: 65 IN | TEMPERATURE: 98.2 F | BODY MASS INDEX: 32.82 KG/M2 | WEIGHT: 197 LBS

## 2017-09-12 DIAGNOSIS — M26.609 TMJ (TEMPOROMANDIBULAR JOINT DISORDER): ICD-10-CM

## 2017-09-12 DIAGNOSIS — R59.0 LYMPHADENOPATHY, PERIAURICULAR: Primary | ICD-10-CM

## 2017-09-12 DIAGNOSIS — J01.00 ACUTE MAXILLARY SINUSITIS, RECURRENCE NOT SPECIFIED: ICD-10-CM

## 2017-09-12 DIAGNOSIS — J30.9 ALLERGIC RHINITIS, UNSPECIFIED ALLERGIC RHINITIS TRIGGER, UNSPECIFIED RHINITIS SEASONALITY: ICD-10-CM

## 2017-09-12 PROCEDURE — 99214 OFFICE O/P EST MOD 30 MIN: CPT | Performed by: NURSE PRACTITIONER

## 2017-09-12 RX ORDER — HYDROCORTISONE ACETATE 25 MG/1
SUPPOSITORY RECTAL
COMMUNITY
Start: 2017-09-08 | End: 2018-10-16

## 2017-09-12 RX ORDER — CHOLESTYRAMINE 4 G/9G
POWDER, FOR SUSPENSION ORAL
COMMUNITY
Start: 2017-09-08 | End: 2018-04-18

## 2017-09-12 RX ORDER — CLINDAMYCIN HYDROCHLORIDE 300 MG/1
300 CAPSULE ORAL 3 TIMES DAILY
Qty: 30 CAPSULE | Refills: 0 | Status: SHIPPED | OUTPATIENT
Start: 2017-09-12 | End: 2017-09-22

## 2017-09-12 RX ORDER — FLUCONAZOLE 150 MG/1
150 TABLET ORAL ONCE
Qty: 1 TABLET | Refills: 1 | Status: SHIPPED | OUTPATIENT
Start: 2017-09-12 | End: 2017-09-12

## 2017-09-12 NOTE — PROGRESS NOTES
"PRIMARY CARE PROVIDER: Deni Henry MD  REFERRING PROVIDER: No ref. provider found    Chief Complaint   Patient presents with   • Facial Swelling     left preauricular area has been swollen since sunday and is not getting any better.       Subjective   History of Present Illness:  Hannah Maki is a  66 y.o. female who complains of swelling. The symptoms are localized to the left preauricular area. The patient has had moderate symptoms. The symptoms have been present for the last 2 days. There have been no identified factors that aggravate the symptoms. There have been no factors that have improved the symptoms. She reports she noticed the swelling begin 2 days ago along with a \"knot.\" She has mild tenderness when palpating the knot. She denies fever, chills, fatigue, or weight loss.     She does report a ongoing sinus infection. This has been present for the last 2 months. She has associated headaches, green nasal drainage, nasal congestion, and left maxillary facial pain and pressure. She was treated with Amoxicillin without improvement in symptoms.     Review of Systems:  Review of Systems   Constitutional: Negative for chills, fatigue, fever and unexpected weight change.   HENT: Positive for congestion, facial swelling, postnasal drip, rhinorrhea and sinus pressure. Negative for ear discharge, sore throat, trouble swallowing and voice change.    Respiratory: Negative for cough and shortness of breath.    Gastrointestinal: Positive for rectal pain (fissure).   Allergic/Immunologic: Positive for environmental allergies.   Neurological: Positive for facial asymmetry.   Psychiatric/Behavioral: The patient is nervous/anxious.    All other systems reviewed and are negative.      Past History:  Past Medical History:   Diagnosis Date   • Anxiety    • Arthritis    • Asthma    • Colon polyp    • Diverticulosis    • Dysuria    • GERD (gastroesophageal reflux disease)    • Hyperlipidemia    • Hypertension    • " Hyperthyroidism    • Rhinitis    • Tinnitus    • Vertigo      Past Surgical History:   Procedure Laterality Date   • APPENDECTOMY     • CHOLECYSTECTOMY     • COLONOSCOPY  11/18/2013     six polyps removed or destroyed, Diverticulosis  Recall 3 years   • COLONOSCOPY  11/18/2013   • COLONOSCOPY N/A 4/4/2017    Procedure: COLONOSCOPY WITH ANESTHESIA;  Surgeon: Lew Orona MD;  Location: [facility] ENDOSCOPY;  Service:    • HEMORRHOIDECTOMY     • HYSTERECTOMY     • HYSTERECTOMY     • NECK SURGERY     • NOSE SURGERY      nose bleeding artery    • RECTAL FISSURE INCISION AND DRAINAGE       Family History   Problem Relation Age of Onset   • Diabetes Brother    • Colon cancer Sister    • Colon cancer Sister      Social History   Substance Use Topics   • Smoking status: Never Smoker   • Smokeless tobacco: Never Used   • Alcohol use No     Allergies:  Azithromycin; Augmentin  [amoxicillin-pot clavulanate]; Bactrim  [sulfamethoxazole-trimethoprim]; Cefuroxime; Codeine; Erythromycin; Gatifloxacin; Latex; Meperidine; and Tetracyclines & related    Current Outpatient Prescriptions:   •  azelastine (ASTELIN) 0.1 % nasal spray, 2 sprays into each nostril 2 (Two) Times a Day. Use in each nostril as directed, Disp: 1 each, Rfl: 2  •  cholestyramine (QUESTRAN) 4 GM/DOSE powder, , Disp: , Rfl:   •  Coenzyme Q10 (CO Q 10 PO), Take  by mouth., Disp: , Rfl:   •  esomeprazole (NexIUM) 20 MG capsule, Take 20 mg by mouth Every Morning Before Breakfast., Disp: , Rfl:   •  HEMMOREX-HC 25 MG suppository, , Disp: , Rfl:   •  irbesartan (AVAPRO) 300 MG tablet, Take 1 tablet by mouth Daily., Disp: 30 tablet, Rfl: 5  •  levothyroxine (SYNTHROID, LEVOTHROID) 100 MCG tablet, Take 1 tablet by mouth Daily., Disp: 30 tablet, Rfl: 5  •  lidocaine (XYLOCAINE) 5 % ointment, Apply  topically Every 2 (Two) Hours As Needed for Mild Pain ., Disp: 30 g, Rfl: 1  •  montelukast (SINGULAIR) 10 MG tablet, Take 1 tablet by mouth Every Night., Disp: 30 tablet, Rfl:  "11  •  pravastatin (PRAVACHOL) 20 MG tablet, , Disp: , Rfl:   •  clindamycin (CLEOCIN) 300 MG capsule, Take 1 capsule by mouth 3 (Three) Times a Day for 10 days., Disp: 30 capsule, Rfl: 0      Objective     Vital Signs:    /82  Pulse 85  Temp 98.2 °F (36.8 °C)  Resp 20  Ht 65\" (165.1 cm)  Wt 197 lb (89.4 kg)  LMP 10/04/1981  BMI 32.78 kg/m2    Physical Exam:  Physical Exam  CONSTITUTIONAL: well nourished, well-developed, alert, oriented, anxious, in no acute distress   COMMUNICATION AND VOICE: able to communicate normally, normal voice quality  HEAD: normocephalic, no lesions, atraumatic,    FACE: appearance normal, no lesions, no deformities, facial motion symmetric, bilateral TMJ clicking on palpation, tenderness over left maxillary sinus  SALIVARY GLANDS: parotid glands with no tenderness, no swelling, no masses, submandibular glands with normal size, nontender, Stensen's duct patent with clear secretions  EYES: ocular motility normal, eyelids normal, orbits normal, no proptosis, conjunctiva normal , pupils equal, round   EARS:  Hearing: response to conversational voice normal bilaterally   External Ears: auricles without lesions  Otoscopic: tympanic membrane appearance normal, no lesions, no perforation, normal mobility, no fluid  NOSE:  External Nose: structure normal, no tenderness on palpation, no nasal discharge, no lesions, no evidence of trauma, nostrils patent   Intranasal Exam: nasal mucosa appears allergic with mucosal stranding, vestibule within normal limits, inferior turbinate normal, nasal septum midline   ORAL:  Lips: upper and lower lips without lesion   Teeth: dentition within normal limits for age   Gums: gingivae healthy   Oral Mucosa: oral mucosa normal, no mucosal lesions,   Floor of Mouth: Warthin’s duct patent, mucosa normal  Tongue: lingual mucosa normal without lesions, normal tongue mobility   Palate: soft and hard palates with normal mucosa and structure  Oropharynx: " oropharyngeal mucosa normal, tonsils normal in appearance   NECK: neck appearance normal, no masses or tenderness  THYROID: no overt thyromegaly, no tenderness, nodules or mass present on palpation, position midline   LYMPH NODES: left preauricular with 1cm mobile lymph node, no other palpable lymphadenopathy  CHEST/RESPIRATORY: respiratory effort normal, normal breath sounds   CARDIOVASCULAR: rate and rhythm normal, extremities without cyanosis or edema    NEUROLOGIC/PSYCHIATRIC: oriented to time, place and person, mood normal, affect appropriate, CN II-XII intact grossly      Assessment   Assessment:  1. Lymphadenopathy, periauricular    2. Acute maxillary sinusitis, recurrence not specified    3. Allergic rhinitis, unspecified allergic rhinitis trigger, unspecified rhinitis seasonality    4. TMJ (temporomandibular joint disorder)        Plan   Plan:    New Medications Ordered This Visit   Medications   • clindamycin (CLEOCIN) 300 MG capsule     Sig: Take 1 capsule by mouth 3 (Three) Times a Day for 10 days.     Dispense:  30 capsule     Refill:  0   • fluconazole (DIFLUCAN) 150 MG tablet     Sig: Take 1 tablet by mouth 1 (One) Time for 1 dose.     Dispense:  1 tablet     Refill:  1     Start Clindamycin and continue nasal sprays. If no improvement, will order a CT scan of the neck.     TMJ precautions discussed- handout given. She is going on vacation in 3 weeks and would like to follow-up prior to this.     She was instructed to call or return to the office sooner if symptoms worsen or persist.     Return in about 2 weeks (around 9/26/2017) for Recheck.    My findings and recommendations were discussed and questions were answered.     FADI Go

## 2017-09-22 ENCOUNTER — OFFICE VISIT (OUTPATIENT)
Dept: OTOLARYNGOLOGY | Facility: CLINIC | Age: 67
End: 2017-09-22

## 2017-09-22 VITALS
TEMPERATURE: 97.3 F | BODY MASS INDEX: 33.32 KG/M2 | SYSTOLIC BLOOD PRESSURE: 142 MMHG | HEIGHT: 65 IN | DIASTOLIC BLOOD PRESSURE: 86 MMHG | WEIGHT: 200 LBS | HEART RATE: 83 BPM

## 2017-09-22 DIAGNOSIS — J32.4 CHRONIC PANSINUSITIS: ICD-10-CM

## 2017-09-22 DIAGNOSIS — J30.89 PERENNIAL ALLERGIC RHINITIS, UNSPECIFIED ALLERGIC RHINITIS TRIGGER: ICD-10-CM

## 2017-09-22 DIAGNOSIS — K11.8 PAROTID MASS: Primary | ICD-10-CM

## 2017-09-22 DIAGNOSIS — R09.82 POST-NASAL DRIP: ICD-10-CM

## 2017-09-22 DIAGNOSIS — H92.02 OTALGIA OF LEFT EAR: ICD-10-CM

## 2017-09-22 DIAGNOSIS — K21.9 GASTROESOPHAGEAL REFLUX DISEASE WITHOUT ESOPHAGITIS: ICD-10-CM

## 2017-09-22 DIAGNOSIS — E03.9 ACQUIRED HYPOTHYROIDISM: ICD-10-CM

## 2017-09-22 PROCEDURE — 99213 OFFICE O/P EST LOW 20 MIN: CPT | Performed by: PHYSICIAN ASSISTANT

## 2017-09-22 RX ORDER — METHYLPREDNISOLONE 4 MG/1
TABLET ORAL
Qty: 1 EACH | Refills: 0 | Status: SHIPPED | OUTPATIENT
Start: 2017-09-22 | End: 2017-10-16

## 2017-09-22 NOTE — PROGRESS NOTES
YOB: 1950  Location: [facility]  Location Address: [address] [place] [address], [facility], [address] [place] [address]  Location Phone: [phone]    Chief Complaint   Patient presents with   • Follow-up     earache, swollen lymph node       History of Present Illness  Hannah Maki is a 66 y.o. female.  Hannah Maki is here for follow up of ENT complaints. The patient has had problems with swelling and pain.  The symptoms are localized to the left parotid/preauricular area. The patient has had moderate symptoms. The symptoms have been present for the last several months The symptoms are aggravated by  no identifiable factors. The symptoms are improved by no identifiable factors. The patient has been on two rounds of antibiotics and just finished Cleocin. This did not improve her symptoms.       Past Medical History:   Diagnosis Date   • Anxiety    • Arthritis    • Asthma    • Colon polyp    • Diverticulosis    • Dysuria    • GERD (gastroesophageal reflux disease)    • Hyperlipidemia    • Hypertension    • Hyperthyroidism    • Rhinitis    • Tinnitus    • Vertigo        Past Surgical History:   Procedure Laterality Date   • APPENDECTOMY     • CHOLECYSTECTOMY     • COLONOSCOPY  2013     six polyps removed or destroyed, Diverticulosis  Recall 3 years   • COLONOSCOPY  2013   • COLONOSCOPY N/A 2017    Procedure: COLONOSCOPY WITH ANESTHESIA;  Surgeon: Lew Orona MD;  Location: [facility] ENDOSCOPY;  Service:    • HEMORRHOIDECTOMY     • HYSTERECTOMY     • HYSTERECTOMY     • NECK SURGERY     • NOSE SURGERY      nose bleeding artery    • RECTAL FISSURE INCISION AND DRAINAGE           Current Outpatient Prescriptions:   •  azelastine (ASTELIN) 0.1 % nasal spray, 2 sprays into each nostril 2 (Two) Times a Day. Use in each nostril as directed, Disp: 1 each, Rfl: 2  •  cholestyramine (QUESTRAN) 4 GM/DOSE powder, , Disp: , Rfl:   •  clindamycin (CLEOCIN) 300 MG capsule, Take 1 capsule by mouth 3 (Three)  Times a Day for 10 days., Disp: 30 capsule, Rfl: 0  •  Coenzyme Q10 (CO Q 10 PO), Take  by mouth., Disp: , Rfl:   •  esomeprazole (NexIUM) 20 MG capsule, Take 20 mg by mouth Every Morning Before Breakfast., Disp: , Rfl:   •  HEMMOREX-HC 25 MG suppository, , Disp: , Rfl:   •  irbesartan (AVAPRO) 300 MG tablet, Take 1 tablet by mouth Daily., Disp: 30 tablet, Rfl: 5  •  levothyroxine (SYNTHROID, LEVOTHROID) 100 MCG tablet, Take 1 tablet by mouth Daily., Disp: 30 tablet, Rfl: 5  •  lidocaine (XYLOCAINE) 5 % ointment, Apply  topically Every 2 (Two) Hours As Needed for Mild Pain ., Disp: 30 g, Rfl: 1  •  montelukast (SINGULAIR) 10 MG tablet, Take 1 tablet by mouth Every Night., Disp: 30 tablet, Rfl: 11  •  pravastatin (PRAVACHOL) 20 MG tablet, , Disp: , Rfl:   •  MethylPREDNISolone (MEDROL) 4 MG tablet, Take as directed on package instructions., Disp: 1 each, Rfl: 0    Azithromycin; Augmentin  [amoxicillin-pot clavulanate]; Bactrim  [sulfamethoxazole-trimethoprim]; Cefuroxime; Codeine; Erythromycin; Gatifloxacin; Latex; Meperidine; and Tetracyclines & related    Family History   Problem Relation Age of Onset   • Diabetes Brother    • Colon cancer Sister    • Colon cancer Sister        Social History     Social History   • Marital status:      Spouse name: N/A   • Number of children: N/A   • Years of education: N/A     Occupational History   • retired      Social History Main Topics   • Smoking status: Never Smoker   • Smokeless tobacco: Never Used   • Alcohol use No   • Drug use: No   • Sexual activity: Not on file     Other Topics Concern   • Not on file     Social History Narrative       Review of Systems   Constitutional: Negative for activity change, appetite change, chills, diaphoresis, fatigue, fever and unexpected weight change.   HENT: Positive for congestion, ear pain, facial swelling, postnasal drip and sinus pressure. Negative for dental problem, drooling, ear discharge, hearing loss, mouth sores,  nosebleeds, rhinorrhea, sneezing, sore throat, tinnitus, trouble swallowing and voice change.    Eyes: Negative.    Respiratory: Negative.    Cardiovascular: Negative.    Gastrointestinal: Negative.    Endocrine: Negative.    Skin: Negative.    Allergic/Immunologic: Positive for environmental allergies. Negative for food allergies and immunocompromised state.   Neurological: Negative.    Hematological: Negative.    Psychiatric/Behavioral: Negative.        Vitals:    09/22/17 1100   BP: 142/86   Pulse: 83   Temp: 97.3 °F (36.3 °C)       Objective     Physical Exam  CONSTITUTIONAL: well nourished, alert, oriented, in no acute distress     COMMUNICATION AND VOICE: able to communicate normally, normal voice quality    HEAD: normocephalic, no lesions, atraumatic, no tenderness, no masses     FACE: appearance normal, no lesions, no tenderness, no deformities, facial motion symmetric    SALIVARY GLANDS: Left parotid gland preauricular area with 2cm tender nodule     EYES: ocular motility normal, eyelids normal, orbits normal, no proptosis, conjunctiva normal , pupils equal, round     EARS:  Hearing: response to conversational voice normal bilaterally   External Ears: auricles without lesions  Otoscopic: tympanic membrane appearance normal, no lesions, no perforation, normal mobility, no fluid    NOSE:  External Nose: structure normal, no tenderness on palpation, no nasal discharge, no lesions, no evidence of trauma, nostrils patent   Intranasal Exam: nasal mucosa inflammation, edema and drainage, vestibule within normal limits, inferior turbinate normal, nasal septum midline   Nasopharynx:     ORAL:  Lips: upper and lower lips without lesion   Teeth: dentition within normal limits for age   Gums: gingivae healthy   Oral Mucosa: oral mucosa normal, no mucosal lesions   Floor of Mouth: Warthin’s duct patent, mucosa normal  Tongue: lingual mucosa normal without lesions, normal tongue mobility   Palate: soft and hard palates  with normal mucosa and structure  Oropharynx: oropharyngeal mucosa erythema and postnasal drainage    HYPOPHARYNX:   LARYNX:    NECK: neck appearance normal, no mass,  noted without erythema or tenderness    THYROID: no overt thyromegaly, no tenderness, nodules or mass present on palpation, position midline     LYMPH NODES: no lymphadenopathy    CHEST/RESPIRATORY: respiratory effort normal, normal breath sounds     CARDIOVASCULAR: rate and rhythm normal, extremities without cyanosis or edema      NEUROLOGIC/PSYCHIATRIC: oriented to time, place and person, mood normal, affect appropriate, CN II-XII intact grossly    Assessment/Plan   Problems Addressed this Visit        Respiratory    Chronic pansinusitis    Perennial allergic rhinitis       Digestive    Gastroesophageal reflux disease without esophagitis       Endocrine    Acquired hypothyroidism    Relevant Medications    MethylPREDNISolone (MEDROL) 4 MG tablet       Nervous and Auditory    Otalgia of left ear    Relevant Medications    MethylPREDNISolone (MEDROL) 4 MG tablet    Other Relevant Orders    CT Soft Tissue Neck With & Without Contrast    US Guided Fine Needle Aspiration       Other    Post-nasal drip    Parotid mass - Primary    Relevant Medications    MethylPREDNISolone (MEDROL) 4 MG tablet    Other Relevant Orders    CT Soft Tissue Neck With & Without Contrast    US Guided Fine Needle Aspiration        * Surgery not found *  Orders Placed This Encounter   Procedures   • CT Soft Tissue Neck With & Without Contrast     Order Specific Question:   Reason for Exam:     Answer:   left parotid swelling, pain with nodular mass not improved with antibiotics   • US Guided Fine Needle Aspiration     Order Specific Question:   Are labs needed on on this specimen?     Answer:   Yes     Order Specific Question:   Which labs are required?     Answer:   Cytology for Malignant Cells (LAB13)     Order Specific Question:   Reason for Exam:     Answer:   left parotid  mass/lymphnode no improvement with medications     Return for Follow-up with Dr. Dacosta after imaging and FNA is done.       Patient Instructions   Will start medrol pack to decrease inflammation and pain. Will get CT soft tissue of the neck and FNA. Patient is to follow-up after imaging and FNA is done with Dr. Dacosta in the event surgery is needed.

## 2017-09-22 NOTE — PATIENT INSTRUCTIONS
Will start medrol pack to decrease inflammation and pain. Will get CT soft tissue of the neck and FNA. Patient is to follow-up after imaging and FNA is done with Dr. Dacosta in the event surgery is needed.

## 2017-09-26 ENCOUNTER — TELEPHONE (OUTPATIENT)
Dept: OTOLARYNGOLOGY | Facility: CLINIC | Age: 67
End: 2017-09-26

## 2017-09-26 DIAGNOSIS — Z98.890 STATUS POST FINE NEEDLE ASPIRATION: Primary | ICD-10-CM

## 2017-09-27 ENCOUNTER — LAB (OUTPATIENT)
Dept: LAB | Facility: HOSPITAL | Age: 67
End: 2017-09-27

## 2017-09-27 ENCOUNTER — HOSPITAL ENCOUNTER (OUTPATIENT)
Dept: ULTRASOUND IMAGING | Facility: HOSPITAL | Age: 67
Discharge: HOME | End: 2017-09-27

## 2017-09-27 ENCOUNTER — HOSPITAL ENCOUNTER (OUTPATIENT)
Dept: CT IMAGING | Facility: HOSPITAL | Age: 67
Discharge: HOME | End: 2017-09-27
Admitting: PHYSICIAN ASSISTANT

## 2017-09-27 VITALS — DIASTOLIC BLOOD PRESSURE: 87 MMHG | SYSTOLIC BLOOD PRESSURE: 159 MMHG | OXYGEN SATURATION: 94 % | HEART RATE: 84 BPM

## 2017-09-27 DIAGNOSIS — Z98.890 STATUS POST FINE NEEDLE ASPIRATION: ICD-10-CM

## 2017-09-27 LAB
CREAT BLDA-MCNC: 0.9 MG/DL (ref 0.6–1.3)
INR PPP: 0.9 (ref 0.91–1.09)
PROTHROMBIN TIME: 11.3 SECONDS (ref 10–13.8)

## 2017-09-27 PROCEDURE — 85610 PROTHROMBIN TIME: CPT | Performed by: NURSE PRACTITIONER

## 2017-09-27 PROCEDURE — 76536 US EXAM OF HEAD AND NECK: CPT

## 2017-09-27 PROCEDURE — 0 IOPAMIDOL 61 % SOLUTION: Performed by: PHYSICIAN ASSISTANT

## 2017-09-27 PROCEDURE — 70492 CT SFT TSUE NCK W/O & W/DYE: CPT

## 2017-09-27 PROCEDURE — 82565 ASSAY OF CREATININE: CPT

## 2017-09-27 RX ADMIN — IOPAMIDOL 100 ML: 612 INJECTION, SOLUTION INTRAVENOUS at 12:00

## 2017-09-27 NOTE — PROGRESS NOTES
I met with the patient, Hannah Maki 1950 in ultrasound.  She had a fine needle aspiration procedure with ultrasound scheduled for enlarged left lymph node.  The patient had a markedly enlarged lymph node in ENT clinic that had shrunk to approximately 1 cm in size on steroid therapy.  The patient had associated left ear pain with the enlarged node.  My concern was that given the small size of the node at this time, recent therapy and associated pain; a procedure at this time would have low diagnostic yield and increased pain for the patient.  I spoke to Dr. Tobias about this and she concurred.  Dr. Tobias attempted to contact Shaq WHITLOCK for ENT but he was unavailable.  I counseled with the patient about my concerns.  Together we made a plan for her to return for a repeat ultrasound in approximately two weeks (after her vacation) and have the lymph node reassessed at that time.  If an FNA is warranted at that time, we will be happy to assess adequacy on site and follow through with diagnostic procedures.  Mariluz, in ultrasound, was going to arrange that appointment with the patient.    When I returned to my office, I spoke to Malou in ENT by telephone at 13:33 and apprised her of the above plan.  Radha Sevilla MD

## 2017-10-16 ENCOUNTER — OFFICE VISIT (OUTPATIENT)
Dept: OTOLARYNGOLOGY | Facility: CLINIC | Age: 67
End: 2017-10-16

## 2017-10-16 VITALS
DIASTOLIC BLOOD PRESSURE: 82 MMHG | TEMPERATURE: 98.9 F | HEIGHT: 65 IN | BODY MASS INDEX: 33.66 KG/M2 | HEART RATE: 95 BPM | WEIGHT: 202 LBS | SYSTOLIC BLOOD PRESSURE: 152 MMHG

## 2017-10-16 DIAGNOSIS — J32.4 CHRONIC PANSINUSITIS: Primary | ICD-10-CM

## 2017-10-16 DIAGNOSIS — R09.82 POST-NASAL DRIP: ICD-10-CM

## 2017-10-16 DIAGNOSIS — J45.20 MILD INTERMITTENT ASTHMA WITHOUT COMPLICATION: ICD-10-CM

## 2017-10-16 DIAGNOSIS — K11.8 PAROTID MASS: ICD-10-CM

## 2017-10-16 DIAGNOSIS — E03.9 ACQUIRED HYPOTHYROIDISM: ICD-10-CM

## 2017-10-16 DIAGNOSIS — J30.89 PERENNIAL ALLERGIC RHINITIS: ICD-10-CM

## 2017-10-16 PROBLEM — H92.02 OTALGIA OF LEFT EAR: Status: RESOLVED | Noted: 2017-09-22 | Resolved: 2017-10-16

## 2017-10-16 PROCEDURE — 99214 OFFICE O/P EST MOD 30 MIN: CPT | Performed by: PHYSICIAN ASSISTANT

## 2017-10-16 RX ORDER — AZELASTINE 1 MG/ML
2 SPRAY, METERED NASAL 2 TIMES DAILY
Qty: 1 EACH | Refills: 11 | Status: SHIPPED | OUTPATIENT
Start: 2017-10-16 | End: 2018-03-15

## 2017-10-16 NOTE — PROGRESS NOTES
YOB: 1950  Location: [facility]  Location Address: [address] [place] [address], [facility], [address] [place] [address]  Location Phone: [phone]    Chief Complaint   Patient presents with   • Follow-up     sinus, CT results       History of Present Illness  Hannah Maki is a 66 y.o. female.  Hannah Maki is here for follow up of ENT complaints. The patient has had problems with swelling and pain.  The symptoms are localized to the left parotid/preauricular area. The patient has had a resolution of symptoms. The symptoms have been resolved for the last several days The symptoms are aggravated by  no identifiable factors. The symptoms are improved by no identifiable factors.     Study Result   Examination: CT neck and nasopharynx with and without contrast dated  2017.      Indication: Left parotid mass       Comparison: CT neck dated 9/50/15       Technique: Volumetric data was acquired from the level of the ventricles  to the level of the aortic arch following the intravenous injection of  contrast and reconstructed at 3 mm intervals in the axial, coronal and  sagittal plane.               Findings:  A 9 mm enhancing nodule in the superficial lobe of the left parotid lobe  is unchanged in appearance since 09/15/2015. This enhancing nodule is  immediately deep to the radiopaque marker.      Pharyngeal mucosal space of the nasopharynx, oropharynx and hypopharynx  is unremarkable without evidence of fluid collection, abscess, discrete  or enhancing mass.   Supraglottic, glottic and subglottic larynx is unremarkable.  Parapharyngeal spaces, carotid,  , submandibular and  perivertebral spaces are unremarkable bilaterally.      No evidence of pathologically enlarged lymph nodes in the visualized  cervical levels. Scattered non pathologically enlarged lymph nodes are  noted bilaterally       Visualized lung apices are unremarkable bilaterally.      Osseous structures show no suspicious lytic or  blastic lesion.  Multilevel degenerative changes with disk osteophyte complexes, facet  arthrosis, uncovertebral arthrosis, without significant spinal canal or  foraminal stenosis.          Visualized intracranial contents, orbits, paranasal sinuses and nasal  cavity, remaining nasopharynx, oropharynx and oral cavity, hypopharynx  and larynx, thyroid and salivary glands are otherwise unremarkable.   Normal contrast opacification in the vessels of the neck.      IMPRESSION:  Impression:      9 mm enhancing nodule in the left parotid lobe, stable since the  09/15/2015.  Differential considerations includes primary parotid neoplasm including  BMT and lymph node           This report was finalized on 09/27/2017 13:32 by Dr. Cynthia Tobias MD.     Study Result   Exam:   US HEAD NECK SOFT TISSUE-        Date:  09/27/2017       History:  Female, age  66 years;left parotid mass/lymph node no  improvement with medications; R22.0-Localized swelling, mass and lump,  head; H92.02-Otalgia, left ear      COMPARISON:  CT neck dated 09/27/2017.  Technique: Routine grayscale and color Doppler images were obtained  through the left parotid region.  Findings :  1.1 cm benign-appearing left parotid lymph node is present. The cortical  thickness is 2 mm. The fatty hilum is well visualized.          IMPRESSION:  Impression:      Benign-appearing left parotid lymph node.  This report was finalized on 09/27/2017 14:38 by Dr. Cynthia Tobias MD.     I met with the patient, Hannah Maki 1950 in ultrasound.  She had a fine needle aspiration procedure with ultrasound scheduled for enlarged left lymph node.  The patient had a markedly enlarged lymph node in ENT clinic that had shrunk to approximately 1 cm in size on steroid therapy.  The patient had associated left ear pain with the enlarged node.  My concern was that given the small size of the node at this time, recent therapy and associated pain; a procedure at this time would have  low diagnostic yield and increased pain for the patient.  I spoke to Dr. Tobias about this and she concurred.  Dr. Tobias attempted to contact Shaq WHITLOCK for ENT but he was unavailable.  I counseled with the patient about my concerns.  Together we made a plan for her to return for a repeat ultrasound in approximately two weeks (after her vacation) and have the lymph node reassessed at that time.  If an FNA is warranted at that time, we will be happy to assess adequacy on site and follow through with diagnostic procedures.  Mariluz, in ultrasound, was going to arrange that appointment with the patient.    When I returned to my office, I spoke to Malou in ENT by telephone at 13:33 and apprised her of the above plan.  Radha Sevilla MD     Past Medical History:   Diagnosis Date   • Anxiety    • Arthritis    • Asthma    • Colon polyp    • Diverticulosis    • Dysuria    • GERD (gastroesophageal reflux disease)    • Hyperlipidemia    • Hypertension    • Hyperthyroidism    • Rhinitis    • Tinnitus    • Vertigo        Past Surgical History:   Procedure Laterality Date   • APPENDECTOMY     • CHOLECYSTECTOMY     • COLONOSCOPY  11/18/2013     six polyps removed or destroyed, Diverticulosis  Recall 3 years   • COLONOSCOPY  11/18/2013   • COLONOSCOPY N/A 4/4/2017    Procedure: COLONOSCOPY WITH ANESTHESIA;  Surgeon: Lew Orona MD;  Location: [facility] ENDOSCOPY;  Service:    • HEMORRHOIDECTOMY     • HYSTERECTOMY     • HYSTERECTOMY     • NECK SURGERY     • NOSE SURGERY      nose bleeding artery    • RECTAL FISSURE INCISION AND DRAINAGE           Current Outpatient Prescriptions:   •  azelastine (ASTELIN) 0.1 % nasal spray, 2 sprays into each nostril 2 (Two) Times a Day. Use in each nostril as directed, Disp: 1 each, Rfl: 11  •  cholestyramine (QUESTRAN) 4 GM/DOSE powder, , Disp: , Rfl:   •  Coenzyme Q10 (CO Q 10 PO), Take  by mouth., Disp: , Rfl:   •  esomeprazole (NexIUM) 20 MG capsule, Take 20 mg by mouth Every Morning  Before Breakfast., Disp: , Rfl:   •  HEMMOREX-HC 25 MG suppository, , Disp: , Rfl:   •  irbesartan (AVAPRO) 300 MG tablet, Take 1 tablet by mouth Daily., Disp: 30 tablet, Rfl: 5  •  levothyroxine (SYNTHROID, LEVOTHROID) 100 MCG tablet, Take 1 tablet by mouth Daily., Disp: 30 tablet, Rfl: 5  •  lidocaine (XYLOCAINE) 5 % ointment, Apply  topically Every 2 (Two) Hours As Needed for Mild Pain ., Disp: 30 g, Rfl: 1  •  montelukast (SINGULAIR) 10 MG tablet, Take 1 tablet by mouth Every Night., Disp: 30 tablet, Rfl: 11  •  pravastatin (PRAVACHOL) 20 MG tablet, , Disp: , Rfl:     Azithromycin; Augmentin  [amoxicillin-pot clavulanate]; Bactrim  [sulfamethoxazole-trimethoprim]; Cefuroxime; Codeine; Erythromycin; Gatifloxacin; Latex; Meperidine; and Tetracyclines & related    Family History   Problem Relation Age of Onset   • Diabetes Brother    • Colon cancer Sister    • Colon cancer Sister        Social History     Social History   • Marital status:      Spouse name: N/A   • Number of children: N/A   • Years of education: N/A     Occupational History   • retired      Social History Main Topics   • Smoking status: Never Smoker   • Smokeless tobacco: Never Used   • Alcohol use No   • Drug use: No   • Sexual activity: Not on file     Other Topics Concern   • Not on file     Social History Narrative       Review of Systems   Constitutional: Negative for activity change, appetite change, chills, diaphoresis, fatigue, fever and unexpected weight change.   HENT: Positive for congestion, ear pain, facial swelling, postnasal drip and sinus pressure. Negative for dental problem, drooling, ear discharge, hearing loss, mouth sores, nosebleeds, rhinorrhea, sneezing, sore throat, tinnitus, trouble swallowing and voice change.    Eyes: Negative.    Respiratory: Negative.    Cardiovascular: Negative.    Gastrointestinal: Negative.    Endocrine: Negative.    Skin: Negative.    Allergic/Immunologic: Positive for environmental  allergies. Negative for food allergies and immunocompromised state.   Neurological: Negative.    Hematological: Negative.    Psychiatric/Behavioral: Negative.        Vitals:    10/16/17 0920   BP: 152/82   Pulse: 95   Temp: 98.9 °F (37.2 °C)       Objective     Physical Exam  CONSTITUTIONAL: well nourished, alert, oriented, in no acute distress     COMMUNICATION AND VOICE: able to communicate normally, normal voice quality    HEAD: normocephalic, no lesions, atraumatic, no tenderness, no masses     FACE: appearance normal, no lesions, no tenderness, no deformities, facial motion symmetric    SALIVARY GLANDS: no masses or tenderness to palpation    EYES: ocular motility normal, eyelids normal, orbits normal, no proptosis, conjunctiva normal , pupils equal, round     EARS:  Hearing: response to conversational voice normal bilaterally   External Ears: auricles without lesions  Otoscopic: tympanic membrane appearance normal, no lesions, no perforation, normal mobility, no fluid    NOSE:  External Nose: structure normal, no tenderness on palpation, no nasal discharge, no lesions, no evidence of trauma, nostrils patent   Intranasal Exam: nasal mucosa inflammation, edema and drainage, vestibule within normal limits, inferior turbinate normal, nasal septum midline   Nasopharynx:     ORAL:  Lips: upper and lower lips without lesion   Teeth: dentition within normal limits for age   Gums: gingivae healthy   Oral Mucosa: oral mucosa normal, no mucosal lesions   Floor of Mouth: Warthin’s duct patent, mucosa normal  Tongue: lingual mucosa normal without lesions, normal tongue mobility   Palate: soft and hard palates with normal mucosa and structure  Oropharynx: oropharyngeal mucosa erythema and postnasal drainage    HYPOPHARYNX:   LARYNX:    NECK: neck appearance normal, no mass,  noted without erythema or tenderness    THYROID: no overt thyromegaly, no tenderness, nodules or mass present on palpation, position midline     LYMPH  NODES: no lymphadenopathy    CHEST/RESPIRATORY: respiratory effort normal, normal breath sounds     CARDIOVASCULAR: rate and rhythm normal, extremities without cyanosis or edema      NEUROLOGIC/PSYCHIATRIC: oriented to time, place and person, mood normal, affect appropriate, CN II-XII intact grossly    Assessment/Plan   Problems Addressed this Visit        Respiratory    Mild intermittent asthma    Chronic pansinusitis - Primary    Relevant Medications    azelastine (ASTELIN) 0.1 % nasal spray    Perennial allergic rhinitis       Endocrine    Acquired hypothyroidism       Other    Post-nasal drip    Relevant Medications    azelastine (ASTELIN) 0.1 % nasal spray    Parotid mass        * Surgery not found *  No orders of the defined types were placed in this encounter.    Return in about 6 months (around 4/16/2018) for Recheck sinus/left parotid.       Patient Instructions   Continue medications as directed. If left parotid area becomes swollen and tender again advised to call for sooner follow-up appointment and medications, otherwise follow-up as directed.

## 2017-10-16 NOTE — PATIENT INSTRUCTIONS
Continue medications as directed. If left parotid area becomes swollen and tender again advised to call for sooner follow-up appointment and medications, otherwise follow-up as directed.

## 2017-10-18 ENCOUNTER — OFFICE VISIT (OUTPATIENT)
Dept: FAMILY MEDICINE CLINIC | Facility: CLINIC | Age: 67
End: 2017-10-18

## 2017-10-18 VITALS
BODY MASS INDEX: 33.32 KG/M2 | HEIGHT: 65 IN | SYSTOLIC BLOOD PRESSURE: 136 MMHG | RESPIRATION RATE: 16 BRPM | WEIGHT: 200 LBS | DIASTOLIC BLOOD PRESSURE: 80 MMHG | HEART RATE: 77 BPM | OXYGEN SATURATION: 98 %

## 2017-10-18 DIAGNOSIS — E03.9 ACQUIRED HYPOTHYROIDISM: ICD-10-CM

## 2017-10-18 DIAGNOSIS — Z23 NEED FOR VACCINATION: ICD-10-CM

## 2017-10-18 DIAGNOSIS — K76.0 STEATOSIS OF LIVER: ICD-10-CM

## 2017-10-18 DIAGNOSIS — K21.9 GASTROESOPHAGEAL REFLUX DISEASE WITHOUT ESOPHAGITIS: Primary | ICD-10-CM

## 2017-10-18 DIAGNOSIS — K21.9 GASTROESOPHAGEAL REFLUX DISEASE WITHOUT ESOPHAGITIS: ICD-10-CM

## 2017-10-18 DIAGNOSIS — E78.2 MIXED HYPERLIPIDEMIA: ICD-10-CM

## 2017-10-18 DIAGNOSIS — E78.2 MIXED HYPERLIPIDEMIA: Primary | ICD-10-CM

## 2017-10-18 PROCEDURE — 90686 IIV4 VACC NO PRSV 0.5 ML IM: CPT | Performed by: FAMILY MEDICINE

## 2017-10-18 PROCEDURE — G0008 ADMIN INFLUENZA VIRUS VAC: HCPCS | Performed by: FAMILY MEDICINE

## 2017-10-18 PROCEDURE — 99214 OFFICE O/P EST MOD 30 MIN: CPT | Performed by: FAMILY MEDICINE

## 2017-10-18 NOTE — PROGRESS NOTES
Subjective   Hannah Maki is a 66 y.o. female.     Chief Complaint   Patient presents with   • Follow-up     6 mo       History of Present Illness     she notes good bp control without cp or headache..her gerd symptoms are stable  without dysphagiza--she will go back to [facility] lipid clinic again in dec..she is tolerating synthroid witout heat or cold intoleances..sheis tolerating pravachol wiutout myalgias       Current Outpatient Prescriptions:   •  azelastine (ASTELIN) 0.1 % nasal spray, 2 sprays into each nostril 2 (Two) Times a Day. Use in each nostril as directed, Disp: 1 each, Rfl: 11  •  cholestyramine (QUESTRAN) 4 GM/DOSE powder, , Disp: , Rfl:   •  Coenzyme Q10 (CO Q 10 PO), Take  by mouth., Disp: , Rfl:   •  esomeprazole (NexIUM) 20 MG capsule, Take 20 mg by mouth Every Morning Before Breakfast., Disp: , Rfl:   •  HEMMOREX-HC 25 MG suppository, , Disp: , Rfl:   •  irbesartan (AVAPRO) 300 MG tablet, Take 1 tablet by mouth Daily., Disp: 30 tablet, Rfl: 5  •  levothyroxine (SYNTHROID, LEVOTHROID) 100 MCG tablet, Take 1 tablet by mouth Daily., Disp: 30 tablet, Rfl: 5  •  lidocaine (XYLOCAINE) 5 % ointment, Apply  topically Every 2 (Two) Hours As Needed for Mild Pain ., Disp: 30 g, Rfl: 1  •  montelukast (SINGULAIR) 10 MG tablet, Take 1 tablet by mouth Every Night., Disp: 30 tablet, Rfl: 11  •  pravastatin (PRAVACHOL) 20 MG tablet, , Disp: , Rfl:   Allergies   Allergen Reactions   • Azithromycin    • Augmentin  [Amoxicillin-Pot Clavulanate]    • Bactrim  [Sulfamethoxazole-Trimethoprim]    • Cefuroxime    • Codeine    • Erythromycin    • Gatifloxacin    • Latex    • Meperidine    • Tetracyclines & Related        Past Medical History:   Diagnosis Date   • Anxiety    • Arthritis    • Asthma    • Colon polyp    • Diverticulosis    • Dysuria    • GERD (gastroesophageal reflux disease)    • Hyperlipidemia    • Hypertension    • Hyperthyroidism    • Rhinitis    • Tinnitus    • Vertigo      Past Surgical History:  "  Procedure Laterality Date   • APPENDECTOMY     • CHOLECYSTECTOMY     • COLONOSCOPY  11/18/2013     six polyps removed or destroyed, Diverticulosis  Recall 3 years   • COLONOSCOPY  11/18/2013   • COLONOSCOPY N/A 4/4/2017    Procedure: COLONOSCOPY WITH ANESTHESIA;  Surgeon: Lew Orona MD;  Location: [facility] ENDOSCOPY;  Service:    • HEMORRHOIDECTOMY     • HYSTERECTOMY     • HYSTERECTOMY     • NECK SURGERY     • NOSE SURGERY      nose bleeding artery    • RECTAL FISSURE INCISION AND DRAINAGE         Review of Systems   Constitutional: Negative.    HENT: Negative.    Eyes: Negative.    Respiratory: Negative.    Cardiovascular: Negative.    Gastrointestinal: Negative.    Endocrine: Negative.    Genitourinary: Negative.    Musculoskeletal: Negative.    Skin: Negative.    Allergic/Immunologic: Negative.    Neurological: Negative.    Hematological: Negative.    Psychiatric/Behavioral: Negative.        Objective  /80  Pulse 77  Resp 16  Ht 65\" (165.1 cm)  Wt 200 lb (90.7 kg)  LMP 10/04/1981  SpO2 98%  BMI 33.28 kg/m2  Physical Exam   Constitutional: She is oriented to person, place, and time. She appears well-developed and well-nourished.   HENT:   Head: Normocephalic and atraumatic.   Right Ear: External ear normal.   Left Ear: External ear normal.   Nose: Nose normal.   Mouth/Throat: Oropharynx is clear and moist.   Eyes: Conjunctivae and EOM are normal. Pupils are equal, round, and reactive to light.   Neck: Normal range of motion. Neck supple.   Cardiovascular: Normal rate, regular rhythm, normal heart sounds and intact distal pulses.    Pulmonary/Chest: Effort normal and breath sounds normal.   Abdominal: Soft. Bowel sounds are normal.   Musculoskeletal: Normal range of motion.   Neurological: She is alert and oriented to person, place, and time. She has normal reflexes.   Skin: Skin is warm and dry.   Psychiatric: She has a normal mood and affect. Her behavior is normal. Judgment and thought " content normal.   Nursing note and vitals reviewed.      Assessment/Plan   Hannah was seen today for follow-up.    Diagnoses and all orders for this visit:    Mixed hyperlipidemia    Gastroesophageal reflux disease without esophagitis    Steatosis of liver    Acquired hypothyroidism    She will get labs next month           No orders of the defined types were placed in this encounter.      Follow up: 6 month(s)

## 2017-11-01 ENCOUNTER — TELEPHONE (OUTPATIENT)
Dept: FAMILY MEDICINE CLINIC | Facility: CLINIC | Age: 67
End: 2017-11-01

## 2017-11-01 RX ORDER — AMOXICILLIN 250 MG/1
250 CAPSULE ORAL 3 TIMES DAILY
Qty: 30 CAPSULE | Refills: 0 | Status: SHIPPED | OUTPATIENT
Start: 2017-11-01 | End: 2017-11-11

## 2017-11-01 NOTE — TELEPHONE ENCOUNTER
Pt called she has sore throat coughing with nasal and chest congestion she asked if u will call in meds for her [facility]

## 2017-11-07 RX ORDER — IRBESARTAN 300 MG/1
300 TABLET ORAL DAILY
Qty: 30 TABLET | Refills: 5 | Status: SHIPPED | OUTPATIENT
Start: 2017-11-07 | End: 2018-03-02 | Stop reason: SDUPTHER

## 2017-11-27 LAB
ALBUMIN SERPL-MCNC: 4.4 G/DL (ref 3.5–5)
ALBUMIN/GLOB SERPL: 1.5 G/DL (ref 1.1–2.5)
ALP SERPL-CCNC: 128 U/L (ref 24–120)
ALT SERPL-CCNC: 89 U/L (ref 0–54)
AST SERPL-CCNC: 41 U/L (ref 7–45)
BASOPHILS # BLD AUTO: 0.06 10*3/MM3 (ref 0–0.2)
BASOPHILS NFR BLD AUTO: 0.6 % (ref 0–2)
BILIRUB SERPL-MCNC: 0.8 MG/DL (ref 0.1–1)
BUN SERPL-MCNC: 15 MG/DL (ref 5–21)
BUN/CREAT SERPL: 21.4 (ref 7–25)
CALCIUM SERPL-MCNC: 10 MG/DL (ref 8.4–10.4)
CHLORIDE SERPL-SCNC: 102 MMOL/L (ref 98–110)
CHOLEST SERPL-MCNC: 214 MG/DL (ref 130–200)
CO2 SERPL-SCNC: 28 MMOL/L (ref 24–31)
CREAT SERPL-MCNC: 0.7 MG/DL (ref 0.5–1.4)
EOSINOPHIL # BLD AUTO: 0.24 10*3/MM3 (ref 0–0.7)
EOSINOPHIL NFR BLD AUTO: 2.3 % (ref 0–4)
ERYTHROCYTE [DISTWIDTH] IN BLOOD BY AUTOMATED COUNT: 13.2 % (ref 12–15)
GFR SERPLBLD CREATININE-BSD FMLA CKD-EPI: 102 ML/MIN/1.73
GFR SERPLBLD CREATININE-BSD FMLA CKD-EPI: 84 ML/MIN/1.73
GLOBULIN SER CALC-MCNC: 2.9 GM/DL
GLUCOSE SERPL-MCNC: 102 MG/DL (ref 70–100)
HCT VFR BLD AUTO: 45.4 % (ref 37–47)
HDLC SERPL-MCNC: 58 MG/DL
HGB BLD-MCNC: 14.9 G/DL (ref 12–16)
IMM GRANULOCYTES # BLD: 0.05 10*3/MM3 (ref 0–0.03)
IMM GRANULOCYTES NFR BLD: 0.5 % (ref 0–5)
LDLC SERPL CALC-MCNC: 130 MG/DL (ref 0–99)
LYMPHOCYTES # BLD AUTO: 4.27 10*3/MM3 (ref 0.72–4.86)
LYMPHOCYTES NFR BLD AUTO: 41.6 % (ref 15–45)
MCH RBC QN AUTO: 30.5 PG (ref 28–32)
MCHC RBC AUTO-ENTMCNC: 32.8 G/DL (ref 33–36)
MCV RBC AUTO: 93 FL (ref 82–98)
MONOCYTES # BLD AUTO: 0.83 10*3/MM3 (ref 0.19–1.3)
MONOCYTES NFR BLD AUTO: 8.1 % (ref 4–12)
NEUTROPHILS # BLD AUTO: 4.82 10*3/MM3 (ref 1.87–8.4)
NEUTROPHILS NFR BLD AUTO: 46.9 % (ref 39–78)
PLATELET # BLD AUTO: 296 10*3/MM3 (ref 130–400)
POTASSIUM SERPL-SCNC: 5.1 MMOL/L (ref 3.5–5.3)
PROT SERPL-MCNC: 7.3 G/DL (ref 6.3–8.7)
RBC # BLD AUTO: 4.88 10*6/MM3 (ref 4.2–5.4)
SODIUM SERPL-SCNC: 142 MMOL/L (ref 135–145)
T4 FREE SERPL-MCNC: 2.03 NG/DL (ref 0.78–2.19)
TRIGL SERPL-MCNC: 129 MG/DL (ref 0–149)
TSH SERPL DL<=0.005 MIU/L-ACNC: 2.26 MIU/ML (ref 0.47–4.68)
VLDLC SERPL CALC-MCNC: 25.8 MG/DL
WBC # BLD AUTO: 10.27 10*3/MM3 (ref 4.8–10.8)

## 2017-12-05 ENCOUNTER — OFFICE VISIT (OUTPATIENT)
Dept: OTOLARYNGOLOGY | Facility: CLINIC | Age: 67
End: 2017-12-05

## 2017-12-05 VITALS
RESPIRATION RATE: 16 BRPM | SYSTOLIC BLOOD PRESSURE: 175 MMHG | WEIGHT: 203.6 LBS | HEART RATE: 64 BPM | HEIGHT: 65 IN | DIASTOLIC BLOOD PRESSURE: 89 MMHG | TEMPERATURE: 98.1 F | OXYGEN SATURATION: 98 % | BODY MASS INDEX: 33.92 KG/M2

## 2017-12-05 DIAGNOSIS — J32.9 CHRONIC SINUSITIS, UNSPECIFIED LOCATION: ICD-10-CM

## 2017-12-05 DIAGNOSIS — R04.0 EPISTAXIS: Primary | ICD-10-CM

## 2017-12-05 DIAGNOSIS — J30.9 ALLERGIC RHINITIS, UNSPECIFIED CHRONICITY, UNSPECIFIED SEASONALITY, UNSPECIFIED TRIGGER: ICD-10-CM

## 2017-12-05 PROCEDURE — 99213 OFFICE O/P EST LOW 20 MIN: CPT | Performed by: NURSE PRACTITIONER

## 2017-12-05 NOTE — PROGRESS NOTES
YOB: 1950  Location: [facility]  Location Address: [address] [place] [address], [facility], [address] [place] [address]  Location Phone: [phone]    Chief Complaint   Patient presents with   • Sinusitis       History of Present Illness  Nikole Mccoy is a 67 y.o. female.  Nikole Mccoy is here for evaluation of ENT complaints. The patient has had problems with sinusitis and sinus pressure  The symptoms are not localized to a particular location. The patient has had moderate symptoms. The symptoms have been present for the last several months The symptoms are aggravated by  no identifiable factors. The symptoms are improved by no identifiable factors.  Positive for sinus problems which seems to be improving with meds.  She has been concerned about having a recent nosebleed due to her history of nosebleeds.  She was prior packed x 5 with a ligation of a vessel for bleeding.  He has recently been evaluated by an opthamologist for eye problems.    Referring Physician: TIM LE   Patient Name: NIKOLE MCCOY   Patient ID: [id number]   YOB: 1950    Sex: Female   Accession: [id number]   Study Date: Sep 27, 2017 11:40 AM    Report Date: 2017    Report Status: Finalized   Author: MARIVEL CALIXTO   Interpretation Approver: MARIVEL CALIXTO   Findings  Examination: CT neck and nasopharynx with and without contrast dated  2017.    Indication: Left parotid mass     Comparison: CT neck dated 9/50/15     Technique: Volumetric data was acquired from the level of the ventricles  to the level of the aortic arch following the intravenous injection of  contrast and reconstructed at 3 mm intervals in the axial, coronal and  sagittal plane.         Findings:  A 9 mm enhancing nodule in the superficial lobe of the left parotid lobe  is unchanged in appearance since 09/15/2015. This enhancing nodule is  immediately deep to the radiopaque marker.    Pharyngeal mucosal space of the nasopharynx,  oropharynx and hypopharynx  is unremarkable without evidence of fluid collection, abscess, discrete  or enhancing mass.   Supraglottic, glottic and subglottic larynx is unremarkable.  Parapharyngeal spaces, carotid, , submandibular and  perivertebral spaces are unremarkable bilaterally.    No evidence of pathologically enlarged lymph nodes in the visualized  cervical levels. Scattered non pathologically enlarged lymph nodes are  noted bilaterally     Visualized lung apices are unremarkable bilaterally.    Osseous structures show no suspicious lytic or blastic lesion.  Multilevel degenerative changes with disk osteophyte complexes, facet  arthrosis, uncovertebral arthrosis, without significant spinal canal or  foraminal stenosis.      Visualized intracranial contents, orbits, paranasal sinuses and nasal  cavity, remaining nasopharynx, oropharynx and oral cavity, hypopharynx  and larynx, thyroid and salivary glands are otherwise unremarkable.   Normal contrast opacification in the vessels of the neck.   Report Conclusions  IMPRESSION:   Impression:    9 mm enhancing nodule in the left parotid lobe, stable since the  09/15/2015.  Differential considerations includes primary parotid neoplasm including  BMT and lymph node             Past Medical History:   Diagnosis Date   • Anxiety    • Arthritis    • Asthma    • Colon polyp    • Diverticulosis    • Dysuria    • GERD (gastroesophageal reflux disease)    • Hyperlipidemia    • Hypertension    • Hyperthyroidism    • Rhinitis    • Tinnitus    • Vertigo        Past Surgical History:   Procedure Laterality Date   • APPENDECTOMY     • CHOLECYSTECTOMY     • COLONOSCOPY  11/18/2013     six polyps removed or destroyed, Diverticulosis  Recall 3 years   • COLONOSCOPY  11/18/2013   • COLONOSCOPY N/A 4/4/2017    Procedure: COLONOSCOPY WITH ANESTHESIA;  Surgeon: Lew Orona MD;  Location: [facility] END[facility]COPY;  Service:    • HEMORRHOIDECTOMY     • HYSTERECTOMY     •  HYSTERECTOMY     • NECK SURGERY     • NOSE SURGERY      nose bleeding artery    • RECTAL FISSURE INCISION AND DRAINAGE           Current Outpatient Prescriptions:   •  azelastine (ASTELIN) 0.1 % nasal spray, 2 sprays into each nostril 2 (Two) Times a Day. Use in each nostril as directed, Disp: 1 each, Rfl: 11  •  cholestyramine (QUESTRAN) 4 GM/DOSE powder, , Disp: , Rfl:   •  Coenzyme Q10 (CO Q 10 PO), Take  by mouth., Disp: , Rfl:   •  esomeprazole (NexIUM) 20 MG capsule, Take 20 mg by mouth Every Morning Before Breakfast., Disp: , Rfl:   •  HEMMOREX-HC 25 MG suppository, , Disp: , Rfl:   •  irbesartan (AVAPRO) 300 MG tablet, Take 1 tablet by mouth Daily., Disp: 30 tablet, Rfl: 5  •  levothyroxine (SYNTHROID, LEVOTHROID) 100 MCG tablet, Take 1 tablet by mouth Daily., Disp: 30 tablet, Rfl: 5  •  lidocaine (XYLOCAINE) 5 % ointment, Apply  topically Every 2 (Two) Hours As Needed for Mild Pain ., Disp: 30 g, Rfl: 1  •  pravastatin (PRAVACHOL) 20 MG tablet, , Disp: , Rfl:   •  montelukast (SINGULAIR) 10 MG tablet, Take 1 tablet by mouth Every Night., Disp: 30 tablet, Rfl: 11  •  mupirocin (BACTROBAN) 2 % ointment, Apply intranasally bid, Disp: 22 g, Rfl: 3    Azithromycin; Augmentin  [amoxicillin-pot clavulanate]; Bactrim  [sulfamethoxazole-trimethoprim]; Cefuroxime; Codeine; Erythromycin; Gatifloxacin; Latex; Meperidine; and Tetracyclines & related    Family History   Problem Relation Age of Onset   • Diabetes Brother    • Colon cancer Sister    • Colon cancer Sister        Social History     Social History   • Marital status:      Spouse name: N/A   • Number of children: N/A   • Years of education: N/A     Occupational History   • retired      Social History Main Topics   • Smoking status: Never Smoker   • Smokeless tobacco: Never Used   • Alcohol use No   • Drug use: No   • Sexual activity: Not on file     Other Topics Concern   • Not on file     Social History Narrative       Review of Systems    Constitutional: Negative.    HENT:        SEE HPI   Eyes: Negative.    Respiratory: Negative.    Cardiovascular: Negative.    Gastrointestinal: Negative.    Endocrine: Negative.    Genitourinary: Negative.    Musculoskeletal: Negative.    Skin: Negative.    Allergic/Immunologic: Negative.    Neurological: Negative.    Hematological: Negative.    Psychiatric/Behavioral: Negative.        Vitals:    12/05/17 1203   BP: 175/89   Pulse: 64   Resp: 16   Temp: 98.1 °F (36.7 °C)   SpO2: 98%       Objective     Physical Exam  CONSTITUTIONAL: well nourished, alert, oriented, in no acute distress     COMMUNICATION AND VOICE: able to communicate normally, normal voice quality    HEAD: normocephalic, no lesions, atraumatic, no tenderness, no masses     FACE: appearance normal, no lesions, no tenderness, no deformities, facial motion symmetric    SALIVARY GLANDS: parotid glands with no tenderness, no swelling, no masses, submandibular glands with normal size, nontender    EYES: ocular motility normal, eyelids normal, orbits normal, no proptosis, conjunctiva normal , pupils equal, round     EARS:  Hearing: response to conversational voice normal bilaterally   External Ears: auricles without lesions  Otoscopic: tympanic membrane appearance normal, no lesions, no perforation, normal mobility, no fluid    NOSE:  External Nose: structure normal, no tenderness on palpation, no nasal discharge, no lesions, no evidence of trauma, nostrils patent   Intranasal Exam: nasal mucosa normal, vestibule within normal limits, inferior turbinate hypertrophy, nasal septum midline - no lesions or obvious source of bleeding.      ORAL:  Lips: upper and lower lips without lesion   Teeth: dentition within normal limits for age   Gums: gingivae healthy   Oral Mucosa: oral mucosa normal, no mucosal lesions   Floor of Mouth: Warthin’s duct patent, mucosa normal  Tongue: lingual mucosa normal without lesions, normal tongue mobility   Palate: soft and hard  palates with normal mucosa and structure  Oropharynx: oropharyngeal mucosa normal    NECK: neck appearance normal, no mass,  noted without erythema or tenderness    THYROID: no overt thyromegaly, no tenderness, nodules or mass present on palpation, position midline     LYMPH NODES: no lymphadenopathy    CHEST/RESPIRATORY: respiratory effort normal, normal breath sounds     CARDIOVASCULAR: rate and rhythm normal, extremities without cyanosis or edema      NEUROLOGIC/PSYCHIATRIC: oriented to time, place and person, mood normal, affect appropriate, CN II-XII intact grossly    Assessment/Plan   Hannah was seen today for sinusitis.    Diagnoses and all orders for this visit:    Epistaxis    Chronic sinusitis, unspecified location    Allergic rhinitis, unspecified chronicity, unspecified seasonality, unspecified trigger    Other orders  -     mupirocin (BACTROBAN) 2 % ointment; Apply intranasally bid      * Surgery not found *  No orders of the defined types were placed in this encounter.    Return if symptoms worsen or fail to improve.       Patient Instructions   Call for problems or worsening symptoms    Intranasal moisture  Nosebleed  Nosebleeds are common. A nosebleed can be caused by many things, including:  · Getting hit hard in the nose.  · Infections.  · Dryness in your nose.  · A dry climate.  · Medicines.  · Picking your nose.  · Your home heating and cooling systems.  HOME CARE   · Try controlling your nosebleed by pinching your nostrils gently. Do this for at least 10 minutes.  · Avoid blowing or sniffing your nose for a number of hours after having a nosebleed.  · Do not put gauze inside of your nose yourself. If your nose was packed by your doctor, try to keep the pack inside of your nose until your doctor removes it.    If a gauze pack was used and it starts to fall out, gently replace it or cut off the end of it.    If a balloon catheter was used to pack your nose, do not cut or remove it unless told by  your doctor.  · Avoid lying down while you are having a nosebleed. Sit up and lean forward.  · Use a nasal spray decongestant to help with a nosebleed as told by your doctor.  · Do not use petroleum jelly or mineral oil in your nose. These can drip into your lungs.  · Keep your house humid by using:    Less air conditioning.    A humidifier.  · Aspirin and blood thinners make bleeding more likely. If you are prescribed these medicines and you have nosebleeds, ask your doctor if you should stop taking the medicines or adjust the dose. Do not stop medicines unless told by your doctor.  · Resume your normal activities as you are able. Avoid straining, lifting, or bending at your waist for several days.  · If your nosebleed was caused by dryness in your nose, use over-the-counter saline nasal spray or gel. If you must use a lubricant:    Choose one that is water-soluble.    Use it only as needed.    Do not use it within several hours of lying down.  · Keep all follow-up visits as told by your doctor. This is important.  GET HELP IF:  · You have a fever.  · You get frequent nosebleeds.  · You are getting nosebleeds more often.  GET HELP RIGHT AWAY IF:  · Your nosebleed lasts longer than 20 minutes.  · Your nosebleed occurs after an injury to your face, and your nose looks crooked or broken.  · You have unusual bleeding from other parts of your body.  · You have unusual bruising on other parts of your body.  · You feel light-headed or dizzy.  · You become sweaty.  · You throw up (vomit) blood.  · You have a nosebleed after a head injury.     This information is not intended to replace advice given to you by your health care provider. Make sure you discuss any questions you have with your health care provider.     Document Released: 09/26/2009 Document Revised: 01/08/2016 Document Reviewed: 08/03/2015  [employer] Interactive Patient Education ©2017 [employer]

## 2017-12-05 NOTE — PATIENT INSTRUCTIONS
Call for problems or worsening symptoms    Intranasal moisture  Nosebleed  Nosebleeds are common. A nosebleed can be caused by many things, including:  · Getting hit hard in the nose.  · Infections.  · Dryness in your nose.  · A dry climate.  · Medicines.  · Picking your nose.  · Your home heating and cooling systems.  HOME CARE   · Try controlling your nosebleed by pinching your nostrils gently. Do this for at least 10 minutes.  · Avoid blowing or sniffing your nose for a number of hours after having a nosebleed.  · Do not put gauze inside of your nose yourself. If your nose was packed by your doctor, try to keep the pack inside of your nose until your doctor removes it.    If a gauze pack was used and it starts to fall out, gently replace it or cut off the end of it.    If a balloon catheter was used to pack your nose, do not cut or remove it unless told by your doctor.  · Avoid lying down while you are having a nosebleed. Sit up and lean forward.  · Use a nasal spray decongestant to help with a nosebleed as told by your doctor.  · Do not use petroleum jelly or mineral oil in your nose. These can drip into your lungs.  · Keep your house humid by using:    Less air conditioning.    A humidifier.  · Aspirin and blood thinners make bleeding more likely. If you are prescribed these medicines and you have nosebleeds, ask your doctor if you should stop taking the medicines or adjust the dose. Do not stop medicines unless told by your doctor.  · Resume your normal activities as you are able. Avoid straining, lifting, or bending at your waist for several days.  · If your nosebleed was caused by dryness in your nose, use over-the-counter saline nasal spray or gel. If you must use a lubricant:    Choose one that is water-soluble.    Use it only as needed.    Do not use it within several hours of lying down.  · Keep all follow-up visits as told by your doctor. This is important.  GET HELP IF:  · You have a fever.  · You get  frequent nosebleeds.  · You are getting nosebleeds more often.  GET HELP RIGHT AWAY IF:  · Your nosebleed lasts longer than 20 minutes.  · Your nosebleed occurs after an injury to your face, and your nose looks crooked or broken.  · You have unusual bleeding from other parts of your body.  · You have unusual bruising on other parts of your body.  · You feel light-headed or dizzy.  · You become sweaty.  · You throw up (vomit) blood.  · You have a nosebleed after a head injury.     This information is not intended to replace advice given to you by your health care provider. Make sure you discuss any questions you have with your health care provider.     Document Released: 09/26/2009 Document Revised: 01/08/2016 Document Reviewed: 08/03/2015  [employer] Interactive Patient Education ©2017 [employer]

## 2018-01-17 RX ORDER — LEVOTHYROXINE SODIUM 0.1 MG/1
100 TABLET ORAL DAILY
Qty: 30 TABLET | Refills: 5 | Status: SHIPPED | OUTPATIENT
Start: 2018-01-17 | End: 2018-07-19 | Stop reason: SDUPTHER

## 2018-02-21 ENCOUNTER — APPOINTMENT (OUTPATIENT)
Dept: GENERAL RADIOLOGY | Facility: HOSPITAL | Age: 68
End: 2018-02-21

## 2018-02-21 ENCOUNTER — HOSPITAL ENCOUNTER (EMERGENCY)
Facility: HOSPITAL | Age: 68
Discharge: HOME | End: 2018-02-21
Attending: EMERGENCY MEDICINE | Admitting: EMERGENCY MEDICINE

## 2018-02-21 VITALS
SYSTOLIC BLOOD PRESSURE: 150 MMHG | BODY MASS INDEX: 33.32 KG/M2 | OXYGEN SATURATION: 95 % | DIASTOLIC BLOOD PRESSURE: 61 MMHG | WEIGHT: 200 LBS | RESPIRATION RATE: 19 BRPM | HEIGHT: 65 IN | HEART RATE: 80 BPM | TEMPERATURE: 96.9 F

## 2018-02-21 DIAGNOSIS — R07.9 CHEST PAIN, UNSPECIFIED TYPE: Primary | ICD-10-CM

## 2018-02-21 LAB
ALBUMIN SERPL-MCNC: 4.3 G/DL (ref 3.5–5)
ALBUMIN/GLOB SERPL: 1.3 G/DL (ref 1.1–2.5)
ALP SERPL-CCNC: 106 U/L (ref 24–120)
ALT SERPL W P-5'-P-CCNC: 73 U/L (ref 0–54)
ANION GAP SERPL CALCULATED.3IONS-SCNC: 14 MMOL/L (ref 4–13)
AST SERPL-CCNC: 40 U/L (ref 7–45)
BASOPHILS # BLD AUTO: 0.06 10*3/MM3 (ref 0–0.2)
BASOPHILS NFR BLD AUTO: 0.6 % (ref 0–2)
BILIRUB SERPL-MCNC: 0.7 MG/DL (ref 0.1–1)
BUN BLD-MCNC: 13 MG/DL (ref 5–21)
BUN/CREAT SERPL: 19.4 (ref 7–25)
CALCIUM SPEC-SCNC: 9.3 MG/DL (ref 8.4–10.4)
CHLORIDE SERPL-SCNC: 103 MMOL/L (ref 98–110)
CO2 SERPL-SCNC: 27 MMOL/L (ref 24–31)
CREAT BLD-MCNC: 0.67 MG/DL (ref 0.5–1.4)
DEPRECATED RDW RBC AUTO: 39.8 FL (ref 40–54)
EOSINOPHIL # BLD AUTO: 0.23 10*3/MM3 (ref 0–0.7)
EOSINOPHIL NFR BLD AUTO: 2.3 % (ref 0–4)
ERYTHROCYTE [DISTWIDTH] IN BLOOD BY AUTOMATED COUNT: 12.5 % (ref 12–15)
GFR SERPL CREATININE-BSD FRML MDRD: 88 ML/MIN/1.73
GLOBULIN UR ELPH-MCNC: 3.4 GM/DL
GLUCOSE BLD-MCNC: 163 MG/DL (ref 70–100)
HCT VFR BLD AUTO: 43.7 % (ref 37–47)
HGB BLD-MCNC: 14.8 G/DL (ref 12–16)
HOLD SPECIMEN: NORMAL
HOLD SPECIMEN: NORMAL
IMM GRANULOCYTES # BLD: 0.04 10*3/MM3 (ref 0–0.03)
IMM GRANULOCYTES NFR BLD: 0.4 % (ref 0–5)
LYMPHOCYTES # BLD AUTO: 3.75 10*3/MM3 (ref 0.72–4.86)
LYMPHOCYTES NFR BLD AUTO: 37.6 % (ref 15–45)
MCH RBC QN AUTO: 29.6 PG (ref 28–32)
MCHC RBC AUTO-ENTMCNC: 33.9 G/DL (ref 33–36)
MCV RBC AUTO: 87.4 FL (ref 82–98)
MONOCYTES # BLD AUTO: 0.88 10*3/MM3 (ref 0.19–1.3)
MONOCYTES NFR BLD AUTO: 8.8 % (ref 4–12)
NEUTROPHILS # BLD AUTO: 5.01 10*3/MM3 (ref 1.87–8.4)
NEUTROPHILS NFR BLD AUTO: 50.3 % (ref 39–78)
NRBC BLD MANUAL-RTO: 0 /100 WBC (ref 0–0)
PLATELET # BLD AUTO: 304 10*3/MM3 (ref 130–400)
PMV BLD AUTO: 10 FL (ref 6–12)
POTASSIUM BLD-SCNC: 4.1 MMOL/L (ref 3.5–5.3)
PROT SERPL-MCNC: 7.7 G/DL (ref 6.3–8.7)
RBC # BLD AUTO: 5 10*6/MM3 (ref 4.2–5.4)
SODIUM BLD-SCNC: 144 MMOL/L (ref 135–145)
TROPONIN I SERPL-MCNC: <0.012 NG/ML (ref 0–0.03)
TROPONIN I SERPL-MCNC: <0.012 NG/ML (ref 0–0.03)
WBC NRBC COR # BLD: 9.97 10*3/MM3 (ref 4.8–10.8)
WHOLE BLOOD HOLD SPECIMEN: NORMAL
WHOLE BLOOD HOLD SPECIMEN: NORMAL

## 2018-02-21 PROCEDURE — 84484 ASSAY OF TROPONIN QUANT: CPT | Performed by: EMERGENCY MEDICINE

## 2018-02-21 PROCEDURE — 85025 COMPLETE CBC W/AUTO DIFF WBC: CPT | Performed by: EMERGENCY MEDICINE

## 2018-02-21 PROCEDURE — 93010 ELECTROCARDIOGRAM REPORT: CPT | Performed by: INTERNAL MEDICINE

## 2018-02-21 PROCEDURE — 93005 ELECTROCARDIOGRAM TRACING: CPT

## 2018-02-21 PROCEDURE — 99284 EMERGENCY DEPT VISIT MOD MDM: CPT

## 2018-02-21 PROCEDURE — 80053 COMPREHEN METABOLIC PANEL: CPT | Performed by: EMERGENCY MEDICINE

## 2018-02-21 PROCEDURE — 71045 X-RAY EXAM CHEST 1 VIEW: CPT

## 2018-02-21 PROCEDURE — 93005 ELECTROCARDIOGRAM TRACING: CPT | Performed by: EMERGENCY MEDICINE

## 2018-02-21 RX ORDER — ASPIRIN 81 MG/1
324 TABLET, CHEWABLE ORAL ONCE
Status: COMPLETED | OUTPATIENT
Start: 2018-02-21 | End: 2018-02-21

## 2018-02-21 RX ORDER — SODIUM CHLORIDE 0.9 % (FLUSH) 0.9 %
10 SYRINGE (ML) INJECTION AS NEEDED
Status: DISCONTINUED | OUTPATIENT
Start: 2018-02-21 | End: 2018-02-21 | Stop reason: HOSPADM

## 2018-02-21 RX ORDER — ALUMINA, MAGNESIA, AND SIMETHICONE 2400; 2400; 240 MG/30ML; MG/30ML; MG/30ML
15 SUSPENSION ORAL ONCE
Status: COMPLETED | OUTPATIENT
Start: 2018-02-21 | End: 2018-02-21

## 2018-02-21 RX ADMIN — ALUMINUM HYDROXIDE, MAGNESIUM HYDROXIDE, AND DIMETHICONE 15 ML: 400; 400; 40 SUSPENSION ORAL at 15:39

## 2018-02-21 RX ADMIN — LIDOCAINE HYDROCHLORIDE 15 ML: 20 SOLUTION ORAL; TOPICAL at 15:40

## 2018-02-21 RX ADMIN — ASPIRIN 81 MG 324 MG: 81 TABLET ORAL at 14:16

## 2018-02-21 NOTE — ED PROVIDER NOTES
"Subjective   HPI Comments: Patient c/o chest pain described as dull and midsternal and feels like heartburn but not same as she has had before.  First hit last night while sitting at home.  Was hit first with feeling of \"sick and weak all over\" then developed chest pain.  Lasted 3 hours last night and then went away.  Hit again this afternoon and still going on so came to be checked.  Had negative heart cath many years ago.  Has had negative stress tests..    Patient is a 67 y.o. female presenting with chest pain.   History provided by:  Patient   used: No    Chest Pain   Pain location:  Substernal area  Pain quality: dull    Pain radiates to:  Does not radiate  Pain severity:  Moderate  Onset quality:  Sudden  Duration:  1 hour  Timing:  Constant  Progression:  Unchanged  Chronicity:  Recurrent  Context: not breathing, not drug use, not eating, not intercourse, not lifting, not movement, not raising an arm, not at rest, not stress and not trauma    Relieved by:  Nothing  Worsened by:  Nothing  Ineffective treatments:  None tried  Associated symptoms: heartburn    Associated symptoms: no abdominal pain, no AICD problem, no altered mental status, no anorexia, no anxiety, no back pain, no claudication, no cough, no diaphoresis, no dizziness, no dysphagia, no fatigue, no fever, no headache, no lower extremity edema, no nausea and no near-syncope    Risk factors: no aortic disease, no birth control, no coronary artery disease, no diabetes mellitus, no Jarad-Danlos syndrome, no high cholesterol, no hypertension, no immobilization, not male, no Marfan's syndrome, not obese, not pregnant, no prior DVT/PE, no smoking and no surgery        Review of Systems   Constitutional: Negative.  Negative for diaphoresis, fatigue and fever.   HENT: Negative.  Negative for trouble swallowing.    Respiratory: Negative.  Negative for cough.    Cardiovascular: Positive for chest pain. Negative for claudication and " near-syncope.   Gastrointestinal: Positive for heartburn. Negative for abdominal pain, anorexia and nausea.   Genitourinary: Negative.    Musculoskeletal: Negative.  Negative for back pain.   Skin: Negative.    Neurological: Negative.  Negative for dizziness and headaches.   Hematological: Negative.    Psychiatric/Behavioral: Negative.    All other systems reviewed and are negative.      Past Medical History:   Diagnosis Date   • Anxiety    • Arthritis    • Asthma    • Colon polyp    • Diverticulosis    • Dysuria    • GERD (gastroesophageal reflux disease)    • Hyperlipidemia    • Hypertension    • Hyperthyroidism    • Rhinitis    • Tinnitus    • Vertigo        Allergies   Allergen Reactions   • Azithromycin    • Augmentin  [Amoxicillin-Pot Clavulanate]    • Bactrim  [Sulfamethoxazole-Trimethoprim]    • Cefuroxime    • Codeine    • Erythromycin    • Gatifloxacin    • Latex    • Meperidine    • Tetracyclines & Related        Past Surgical History:   Procedure Laterality Date   • APPENDECTOMY     • CHOLECYSTECTOMY     • COLONOSCOPY  11/18/2013     six polyps removed or destroyed, Diverticulosis  Recall 3 years   • COLONOSCOPY  11/18/2013   • COLONOSCOPY N/A 4/4/2017    Procedure: COLONOSCOPY WITH ANESTHESIA;  Surgeon: Lew Orona MD;  Location: [facility] ENDOSCOPY;  Service:    • HEMORRHOIDECTOMY     • HYSTERECTOMY     • HYSTERECTOMY     • NECK SURGERY     • NOSE SURGERY      nose bleeding artery    • RECTAL FISSURE INCISION AND DRAINAGE         Family History   Problem Relation Age of Onset   • Diabetes Brother    • Colon cancer Sister    • Colon cancer Sister        Social History     Social History   • Marital status:      Spouse name: N/A   • Number of children: N/A   • Years of education: N/A     Occupational History   • retired      Social History Main Topics   • Smoking status: Never Smoker   • Smokeless tobacco: Never Used   • Alcohol use No   • Drug use: No   • Sexual activity: Not Asked     Other  Topics Concern   • None     Social History Narrative       Prior to Admission medications    Medication Sig Start Date End Date Taking? Authorizing Provider   Coenzyme Q10 (CO Q 10 PO) Take  by mouth.   Yes Historical Provider, MD   esomeprazole (NexIUM) 20 MG capsule Take 20 mg by mouth Every Morning Before Breakfast.   Yes Historical Provider, MD   irbesartan (AVAPRO) 300 MG tablet Take 1 tablet by mouth Daily. 11/7/17  Yes Deni Henry MD   levothyroxine (SYNTHROID, LEVOTHROID) 100 MCG tablet Take 1 tablet by mouth Daily. 1/17/18  Yes Deni Henry MD   montelukast (SINGULAIR) 10 MG tablet Take 1 tablet by mouth Every Night. 4/17/17  Yes KAMLESH Duncan   mupirocin (BACTROBAN) 2 % ointment Apply intranasally bid 12/5/17  Yes FADI Solares   pravastatin (PRAVACHOL) 20 MG tablet  1/20/17  Yes Historical Provider, MD   azelastine (ASTELIN) 0.1 % nasal spray 2 sprays into each nostril 2 (Two) Times a Day. Use in each nostril as directed 10/16/17   KAMLESH Duncan   cholestyramine (QUESTRAN) 4 GM/DOSE powder  9/8/17   Historical Provider, MD   HEMMOREX-HC 25 MG suppository  9/8/17   Historical Provider, MD   lidocaine (XYLOCAINE) 5 % ointment Apply  topically Every 2 (Two) Hours As Needed for Mild Pain . 8/31/17   Deni Henry MD       Medications   sodium chloride 0.9 % flush 10 mL (not administered)   aspirin chewable tablet 324 mg (324 mg Oral Given 2/21/18 1416)   aluminum-magnesium hydroxide-simethicone (MAALOX MAX) 400-400-40 MG/5ML suspension 15 mL (15 mL Oral Given 2/21/18 1539)   lidocaine viscous (XYLOCAINE) 2 % mouth solution 15 mL (15 mL Mouth/Throat Given 2/21/18 1540)       Vitals:    02/21/18 1838   BP: 150/61   Pulse: 80   Resp: 19   Temp: 96.9 °F (36.1 °C)   SpO2: 95%         Objective   Physical Exam   Constitutional: She is oriented to person, place, and time. She appears well-developed and well-nourished.   HENT:   Head: Normocephalic and atraumatic.    Eyes: EOM are normal. Pupils are equal, round, and reactive to light.   Neck: Normal range of motion. Neck supple.   Cardiovascular: Normal rate and regular rhythm.    Pulmonary/Chest: Effort normal and breath sounds normal.   Abdominal: Soft. Bowel sounds are normal.   Musculoskeletal: Normal range of motion.   Neurological: She is alert and oriented to person, place, and time.   Skin: Skin is warm and dry.   Psychiatric: She has a normal mood and affect. Her behavior is normal.   Nursing note and vitals reviewed.      Procedures         Lab Results (last 24 hours)     Procedure Component Value Units Date/Time    CBC & Differential [[id number]] Collected:  02/21/18 1426    Specimen:  Blood Updated:  02/21/18 1442    Narrative:       The following orders were created for panel order CBC & Differential.  Procedure                               Abnormality         Status                     ---------                               -----------         ------                     CBC Auto Differential[[id number]]        Abnormal            Final result                 Please view results for these tests on the individual orders.    Comprehensive Metabolic Panel [[id number]]  (Abnormal) Collected:  02/21/18 1426    Specimen:  Blood Updated:  02/21/18 1459     Glucose 163 (H) mg/dL      BUN 13 mg/dL       Specimen hemolyzed. Results may be affected.        Creatinine 0.67 mg/dL      Sodium 144 mmol/L      Potassium 4.1 mmol/L       Specimen hemolyzed.  Results may be affected.        Chloride 103 mmol/L      CO2 27.0 mmol/L      Calcium 9.3 mg/dL      Total Protein 7.7 g/dL      Albumin 4.30 g/dL      ALT (SGPT) 73 (H) U/L       Specimen hemolyzed.  Results may be affected.        AST (SGOT) 40 U/L       Specimen hemolyzed.  Results may be affected.        Alkaline Phosphatase 106 U/L       Specimen hemolyzed. Results may be affected.        Total Bilirubin 0.7 mg/dL      eGFR Non African Amer 88 mL/min/1.73      Globulin  3.4 gm/dL      A/G Ratio 1.3 g/dL      BUN/Creatinine Ratio 19.4     Anion Gap 14.0 (H) mmol/L     Troponin [[id number]]  (Normal) Collected:  02/21/18 1426    Specimen:  Blood Updated:  02/21/18 1505     Troponin I <0.012 ng/mL     CBC Auto Differential [[id number]]  (Abnormal) Collected:  02/21/18 1426    Specimen:  Blood Updated:  02/21/18 1442     WBC 9.97 10*3/mm3      RBC 5.00 10*6/mm3      Hemoglobin 14.8 g/dL      Hematocrit 43.7 %      MCV 87.4 fL      MCH 29.6 pg      MCHC 33.9 g/dL      RDW 12.5 %      RDW-SD 39.8 (L) fl      MPV 10.0 fL      Platelets 304 10*3/mm3      Neutrophil % 50.3 %      Lymphocyte % 37.6 %      Monocyte % 8.8 %      Eosinophil % 2.3 %      Basophil % 0.6 %      Immature Grans % 0.4 %      Neutrophils, Absolute 5.01 10*3/mm3      Lymphocytes, Absolute 3.75 10*3/mm3      Monocytes, Absolute 0.88 10*3/mm3      Eosinophils, Absolute 0.23 10*3/mm3      Basophils, Absolute 0.06 10*3/mm3      Immature Grans, Absolute 0.04 (H) 10*3/mm3      nRBC 0.0 /100 WBC     Troponin [[id number]]  (Normal) Collected:  02/21/18 1734    Specimen:  Blood Updated:  02/21/18 1804     Troponin I <0.012 ng/mL           XR Chest 1 View   Final Result   1. No acute disease.           This report was finalized on 02/21/2018 14:11 by Dr. Miko Moreno MD.          ED Course  ED Course   Comment By Time   Told patient testing was negative with no signs of MI but I could not tell her for sure what caused her pain.  Most likely is GI but offered to keep overnight and get stress test in AM but she does not want to do that.  Will follow up as OP. Deni Rai Jr., MD 02/21 2037          MDM  Number of Diagnoses or Management Options  Chest pain, unspecified type: new and requires workup     Amount and/or Complexity of Data Reviewed  Clinical lab tests: ordered and reviewed  Tests in the radiology section of CPT®: ordered and reviewed  Tests in the medicine section of CPT®: reviewed and ordered  Decide to obtain  previous medical records or to obtain history from someone other than the patient: yes    Risk of Complications, Morbidity, and/or Mortality  Presenting problems: moderate  Diagnostic procedures: moderate  Management options: moderate    Patient Progress  Patient progress: stable      Final diagnoses:   Chest pain, unspecified type          Deni Rai Jr., MD  02/21/18 2037

## 2018-02-22 ENCOUNTER — TRANSCRIBE ORDERS (OUTPATIENT)
Dept: CARDIOLOGY | Facility: HOSPITAL | Age: 68
End: 2018-02-22

## 2018-02-22 DIAGNOSIS — R07.9 CHEST PAIN, UNSPECIFIED TYPE: Primary | ICD-10-CM

## 2018-02-23 ENCOUNTER — HOSPITAL ENCOUNTER (OUTPATIENT)
Dept: CARDIOLOGY | Facility: HOSPITAL | Age: 68
Discharge: HOME | End: 2018-02-23
Attending: EMERGENCY MEDICINE | Admitting: EMERGENCY MEDICINE

## 2018-02-23 VITALS — WEIGHT: 200 LBS | HEIGHT: 65 IN | BODY MASS INDEX: 33.32 KG/M2

## 2018-02-23 DIAGNOSIS — R07.9 CHEST PAIN, UNSPECIFIED TYPE: ICD-10-CM

## 2018-02-23 PROCEDURE — 93018 CV STRESS TEST I&R ONLY: CPT | Performed by: INTERNAL MEDICINE

## 2018-02-23 PROCEDURE — 93017 CV STRESS TEST TRACING ONLY: CPT

## 2018-02-27 LAB
BH CV STRESS BP STAGE 1: NORMAL
BH CV STRESS BP STAGE 2: NORMAL
BH CV STRESS DURATION MIN STAGE 1: 3
BH CV STRESS DURATION MIN STAGE 2: 1
BH CV STRESS DURATION SEC STAGE 1: 0
BH CV STRESS DURATION SEC STAGE 2: 21
BH CV STRESS GRADE STAGE 1: 10
BH CV STRESS GRADE STAGE 2: 12
BH CV STRESS HR STAGE 1: 120
BH CV STRESS HR STAGE 2: 136
BH CV STRESS METS STAGE 1: 5
BH CV STRESS METS STAGE 2: 7.5
BH CV STRESS PROTOCOL 1: NORMAL
BH CV STRESS RECOVERY BP: NORMAL MMHG
BH CV STRESS RECOVERY HR: 90 BPM
BH CV STRESS SPEED STAGE 1: 1.7
BH CV STRESS SPEED STAGE 2: 2.5
BH CV STRESS STAGE 1: 1
BH CV STRESS STAGE 2: 2
MAXIMAL PREDICTED HEART RATE: 155 BPM
PERCENT MAX PREDICTED HR: 87.74 %
STRESS BASELINE BP: NORMAL MMHG
STRESS BASELINE HR: 84 BPM
STRESS PERCENT HR: 103 %
STRESS POST ESTIMATED WORKLOAD: 7.5 METS
STRESS POST EXERCISE DUR MIN: 4 MIN
STRESS POST EXERCISE DUR SEC: 21 SEC
STRESS POST PEAK BP: NORMAL MMHG
STRESS POST PEAK HR: 136 BPM
STRESS TARGET HR: 132 BPM

## 2018-02-28 ENCOUNTER — TELEPHONE (OUTPATIENT)
Dept: FAMILY MEDICINE CLINIC | Facility: CLINIC | Age: 68
End: 2018-02-28

## 2018-02-28 DIAGNOSIS — R94.39 ABNORMAL STRESS ECG: Primary | ICD-10-CM

## 2018-02-28 NOTE — TELEPHONE ENCOUNTER
----- Message from Nevaeh Leonard MA sent at 2/28/2018  3:32 PM CST -----  Yes but not dr aragon.      We have referred to dr castillo

## 2018-03-02 RX ORDER — IRBESARTAN 300 MG/1
300 TABLET ORAL DAILY
Qty: 30 TABLET | Refills: 5 | Status: SHIPPED | OUTPATIENT
Start: 2018-03-02 | End: 2018-03-05 | Stop reason: SDUPTHER

## 2018-03-05 RX ORDER — IRBESARTAN 300 MG/1
300 TABLET ORAL DAILY
Qty: 90 TABLET | Refills: 3 | Status: SHIPPED | OUTPATIENT
Start: 2018-03-05 | End: 2018-03-15 | Stop reason: SDUPTHER

## 2018-03-13 RX ORDER — IRBESARTAN 300 MG/1
TABLET ORAL
Qty: 30 TABLET | Refills: 5 | Status: SHIPPED | OUTPATIENT
Start: 2018-03-13 | End: 2019-04-29 | Stop reason: ALTCHOICE

## 2018-03-15 ENCOUNTER — OFFICE VISIT (OUTPATIENT)
Dept: CARDIOLOGY | Facility: CLINIC | Age: 68
End: 2018-03-15

## 2018-03-15 VITALS
DIASTOLIC BLOOD PRESSURE: 80 MMHG | OXYGEN SATURATION: 96 % | BODY MASS INDEX: 33.82 KG/M2 | HEART RATE: 97 BPM | HEIGHT: 65 IN | WEIGHT: 203 LBS | SYSTOLIC BLOOD PRESSURE: 130 MMHG

## 2018-03-15 DIAGNOSIS — R07.89 ATYPICAL CHEST PAIN: Primary | ICD-10-CM

## 2018-03-15 DIAGNOSIS — R06.09 DYSPNEA ON EXERTION: ICD-10-CM

## 2018-03-15 DIAGNOSIS — R94.39 EQUIVOCAL STRESS TEST: ICD-10-CM

## 2018-03-15 DIAGNOSIS — I10 ESSENTIAL HYPERTENSION: ICD-10-CM

## 2018-03-15 DIAGNOSIS — E78.2 MIXED HYPERLIPIDEMIA: ICD-10-CM

## 2018-03-15 PROCEDURE — 93000 ELECTROCARDIOGRAM COMPLETE: CPT | Performed by: INTERNAL MEDICINE

## 2018-03-15 PROCEDURE — 99204 OFFICE O/P NEW MOD 45 MIN: CPT | Performed by: INTERNAL MEDICINE

## 2018-03-15 NOTE — PROGRESS NOTES
Subjective:     Encounter Date:03/15/2018      Patient ID: Hannah Maki is a 67 y.o. female with history of hypertension, hyperlipidemia was referred for further evaluation after recent equivocal stress test.    Chief Complaint: Equivocal stress test    Chest Pain    This is a new problem. The onset quality is gradual. The problem occurs rarely. The problem has been resolved. The pain is present in the substernal region. The pain is at a severity of 6/10. Quality: aching. The pain does not radiate. Associated symptoms include shortness of breath. Pertinent negatives include no abdominal pain, back pain, claudication, cough, dizziness, fever, headaches, hemoptysis, irregular heartbeat, nausea, numbness, orthopnea, palpitations, PND, syncope or vomiting. The pain is aggravated by nothing. She has tried nothing for the symptoms. Risk factors include obesity.   Her past medical history is significant for hyperlipidemia and hypertension.   Pertinent negatives for past medical history include no CAD and no seizures.   Her family medical history is significant for CAD. Prior diagnostic workup includes exercise treadmill test.   Shortness of Breath   This is a chronic problem. The problem occurs intermittently. The problem has been waxing and waning. Associated symptoms include chest pain. Pertinent negatives include no abdominal pain, claudication, fever, headaches, hemoptysis, leg swelling, neck pain, orthopnea, PND, rash, syncope, vomiting or wheezing. The symptoms are aggravated by exercise. She has tried nothing for the symptoms. There is no history of CAD or a heart failure.     This is a 67-year-old female who has a history of hypertension hyperlipidemia, was recently having chest discomfort and shortness of breath and was referred for treadmill stress test that was equivocal.  She had clinical symptoms that were reproduced and borderline EKG changes.  She was then referred here for further evaluation.  Her  chest discomfort has now largely resolved although previously it was as described above.  The patient also notes, probably more worrisome for the patient, has been the onset of shortness of breath which has been somewhat chronic and intermittent but seems to be occurring more often and more severe lately.  Her shortness of breath also did not accompany her chest discomfort.  She notes shortness of breath with minimal exertion.  She denies orthopnea, PND, edema, lightheadedness, dizziness, syncope.  She says that in general her blood pressure and cholesterol are well controlled.  She has not had any trouble with her medications.      The following portions of the patient's history were reviewed and updated as appropriate: allergies, current medications, past family history, past medical history, past social history, past surgical history and problem list.     Past Medical History:   Diagnosis Date   • Anxiety    • Arthritis    • Asthma    • Colon polyp    • Diverticulosis    • Dysuria    • GERD (gastroesophageal reflux disease)    • Hyperlipidemia    • Hypertension    • Hyperthyroidism    • Rhinitis    • Tinnitus    • Vertigo      Past Surgical History:   Procedure Laterality Date   • APPENDECTOMY     • CHOLECYSTECTOMY     • COLONOSCOPY  11/18/2013     six polyps removed or destroyed, Diverticulosis  Recall 3 years   • COLONOSCOPY  11/18/2013   • COLONOSCOPY N/A 4/4/2017    Procedure: COLONOSCOPY WITH ANESTHESIA;  Surgeon: Lew Orona MD;  Location: [facility] ENDOSCOPY;  Service:    • HEMORRHOIDECTOMY     • HYSTERECTOMY     • HYSTERECTOMY     • NECK SURGERY     • NOSE SURGERY      nose bleeding artery    • RECTAL FISSURE INCISION AND DRAINAGE         Current Outpatient Prescriptions:   •  cholestyramine (QUESTRAN) 4 GM/DOSE powder, , Disp: , Rfl:   •  Coenzyme Q10 (CO Q 10 PO), Take  by mouth., Disp: , Rfl:   •  esomeprazole (NexIUM) 20 MG capsule, Take 20 mg by mouth Every Morning Before Breakfast., Disp: , Rfl:    •  HEMMOREX-HC 25 MG suppository, , Disp: , Rfl:   •  irbesartan (AVAPRO) 300 MG tablet, TAKE ONE TABLET BY MOUTH ONCE DAILY, Disp: 30 tablet, Rfl: 5  •  levothyroxine (SYNTHROID, LEVOTHROID) 100 MCG tablet, Take 1 tablet by mouth Daily., Disp: 30 tablet, Rfl: 5  •  lidocaine (XYLOCAINE) 5 % ointment, Apply  topically Every 2 (Two) Hours As Needed for Mild Pain ., Disp: 30 g, Rfl: 1  •  montelukast (SINGULAIR) 10 MG tablet, Take 1 tablet by mouth Every Night., Disp: 30 tablet, Rfl: 11  •  mupirocin (BACTROBAN) 2 % ointment, Apply intranasally bid, Disp: 22 g, Rfl: 3  •  pravastatin (PRAVACHOL) 20 MG tablet, Take 20 mg by mouth Every Night., Disp: , Rfl:     Allergies   Allergen Reactions   • Augmentin [Amoxicillin-Pot Clavulanate] Rash   • Azithromycin Nausea Only   • Bactrim [Sulfamethoxazole-Trimethoprim] Nausea And Vomiting   • Cefuroxime Nausea Only   • Codeine GI Intolerance   • Erythromycin Rash   • Gatifloxacin Nausea Only   • Latex Other (See Comments)     Patient says it pulls her skin off.   • Meperidine Nausea And Vomiting   • Tetracyclines & Related Nausea And Vomiting     Social History   Substance Use Topics   • Smoking status: Never Smoker   • Smokeless tobacco: Never Used   • Alcohol use No     Family History   Problem Relation Age of Onset   • Diabetes Brother    • Heart disease Brother    • Colon cancer Sister    • Colon cancer Sister    • Heart disease Father    • Heart attack Father      Review of Systems   Constitution: Negative for chills, fever, night sweats and weight loss.   HENT: Negative for congestion and hearing loss.    Eyes: Negative for blurred vision and pain.   Cardiovascular: Positive for chest pain and dyspnea on exertion. Negative for claudication, irregular heartbeat, leg swelling, orthopnea, palpitations, paroxysmal nocturnal dyspnea and syncope.   Respiratory: Positive for shortness of breath. Negative for cough, hemoptysis and wheezing.    Endocrine: Negative for cold  intolerance, heat intolerance, polydipsia and polyuria.   Hematologic/Lymphatic: Negative for adenopathy and bleeding problem. Does not bruise/bleed easily.   Skin: Negative for color change, poor wound healing and rash.   Musculoskeletal: Negative for arthritis, back pain, joint pain, joint swelling, myalgias and neck pain.   Gastrointestinal: Negative for abdominal pain, change in bowel habit, constipation, diarrhea, heartburn, hematochezia, melena, nausea and vomiting.   Genitourinary: Negative for bladder incontinence, dysuria, frequency, hematuria and nocturia.   Neurological: Negative for dizziness, focal weakness, headaches, light-headedness, loss of balance, numbness and seizures.   Psychiatric/Behavioral: Negative for altered mental status, memory loss and substance abuse.   Allergic/Immunologic: Negative for hives and persistent infections.         ECG 12 Lead  Date/Time: 3/15/2018 1:10 PM  Performed by: REMINGTON CREWS  Authorized by: REMINGTON CREWS   Comparison: compared with previous ECG from 2/21/2018  Similar to previous ECG  Rhythm: sinus rhythm  Rate: normal  Conduction: conduction normal  ST Segments: ST segments normal  T Waves: T waves normal  QRS axis: normal  Other: no other findings  Clinical impression: normal ECG               Objective:     Physical Exam   Constitutional: She is oriented to person, place, and time. Vital signs are normal. She appears well-developed and well-nourished. She is cooperative.  Non-toxic appearance. No distress.   HENT:   Head: Normocephalic and atraumatic.   Right Ear: External ear normal.   Left Ear: External ear normal.   Nose: Nose normal.   Mouth/Throat: Uvula is midline, oropharynx is clear and moist and mucous membranes are normal. Mucous membranes are not pale, not dry and not cyanotic. No oropharyngeal exudate.   Eyes: EOM and lids are normal. Pupils are equal, round, and reactive to light.   Neck: Normal range of motion. Neck supple. No  "hepatojugular reflux and no JVD present. Carotid bruit is not present. No tracheal deviation and no edema present. No thyroid mass and no thyromegaly present.   Cardiovascular: Normal rate, regular rhythm, S1 normal, S2 normal, normal heart sounds, intact distal pulses and normal pulses.   No extrasystoles are present. PMI is not displaced.  Exam reveals no gallop and no friction rub.    No murmur heard.  Pulses:       Radial pulses are 2+ on the right side, and 2+ on the left side.        Femoral pulses are 2+ on the right side, and 2+ on the left side.       Dorsalis pedis pulses are 2+ on the right side, and 2+ on the left side.        Posterior tibial pulses are 2+ on the right side, and 2+ on the left side.   Pulmonary/Chest: Effort normal and breath sounds normal. No accessory muscle usage. No respiratory distress. She has no wheezes. She has no rales. She exhibits no tenderness.   Abdominal: Soft. Normal appearance and bowel sounds are normal. She exhibits no distension, no abdominal bruit and no pulsatile midline mass. There is no hepatosplenomegaly. There is no tenderness.   Musculoskeletal: Normal range of motion. She exhibits no edema, tenderness or deformity.   Lymphadenopathy:     She has no cervical adenopathy.   Neurological: She is oriented to person, place, and time. She has normal strength. No cranial nerve deficit.   Skin: Skin is warm, dry and intact. No rash noted. She is not diaphoretic. No cyanosis or erythema. Nails show no clubbing.   Psychiatric: She has a normal mood and affect. Her speech is normal and behavior is normal. Thought content normal.   Vitals reviewed.    /80 (BP Location: Left arm, Patient Position: Sitting)   Pulse 97   Ht 165.1 cm (65\")   Wt 92.1 kg (203 lb)   LMP 10/04/1981   SpO2 96%   BMI 33.78 kg/m²     Data/Lab Review:     Lipid panel 11/27/17:    Total Cholesterol 130 - 200 mg/dL 214     Triglycerides 0 - 149 mg/dL 129    HDL Cholesterol >=50 mg/dL 58  "   VLDL Cholesterol mg/dL 25.8    LDL Cholesterol  0 - 99 mg/dL 130       Treadmill stress test on 2/23/18:  · Normal baseline ECG noted.  · The patient reported chest pain, shortness of breath and exercise-limiting angina during the stress test.  · An upward-sloping ST segment depression of 0.5 mm was noted during stress (not clinically significant changes).  · Findings consistent with an equivocal ECG stress test. If further concern for ischemia, consider alternative stress modality with imaging.      Assessment:          Diagnosis Plan   1. Atypical chest pain  Adult Stress Echo W/ Cont or Stress Agent if Necessary Per Protocol    ECG 12 Lead   2. Equivocal stress test  Adult Stress Echo W/ Cont or Stress Agent if Necessary Per Protocol   3. Essential hypertension     4. Mixed hyperlipidemia     5. Dyspnea on exertion  Adult Stress Echo W/ Cont or Stress Agent if Necessary Per Protocol          Plan:       1.  Atypical chest pain: The patient was having atypical chest pain coupled with shortness of breath and dyspnea on exertion.  Her stress test is noted above and is thought to be equivocal.  I think that given her risk factors of age, hypertension, hyperlipidemia, obesity, along with her symptoms that are otherwise yet not differentiated, and alternative stress test is reasonable.  I do not think that she will be able to tolerate a full Daniel protocol as her exercise tolerance was somewhat limited on her last stress test.  Therefore, we will order a dobutamine stress test to further investigate.    2.  Equivocal stress test: As noted for the reasons above, we are ordering an alternative stress modality.    3.  Essential hypertension: The patient's blood pressure remains well-controlled.  Continue current medications.    4.  Mixed hyperlipidemia: The patient's LDL cholesterol is noted above.  She does remain on statin therapy and her lipids are followed by her primary care physician.    5.  Dyspnea on exertion:  The patient does not have any history of COPD or congestive heart failure.  Her lungs are clear on examination today.  She thinks that her dyspnea on exertion may be related to obesity, and while this may be the case, I think that coupled with her chest discomfort, it is reasonable to consider anginal equivalent symptoms.  For this reason, we are ordering an alternative modality stress test.    Follow-up: Pending the results the patient's stress test

## 2018-03-20 ENCOUNTER — TELEPHONE (OUTPATIENT)
Dept: SURGERY | Age: 68
End: 2018-03-20

## 2018-03-20 NOTE — TELEPHONE ENCOUNTER
Called patient and gave her the following appt info:    Patient is scheduled to have her mammogram on 4/23/18 at [facility], she will see Beth Campa the same day at 11:00.

## 2018-03-23 ENCOUNTER — HOSPITAL ENCOUNTER (OUTPATIENT)
Dept: CARDIOLOGY | Facility: HOSPITAL | Age: 68
Discharge: HOME | End: 2018-03-23
Attending: INTERNAL MEDICINE | Admitting: INTERNAL MEDICINE

## 2018-03-23 VITALS
DIASTOLIC BLOOD PRESSURE: 81 MMHG | WEIGHT: 203.04 LBS | BODY MASS INDEX: 33.83 KG/M2 | HEIGHT: 65 IN | RESPIRATION RATE: 22 BRPM | HEART RATE: 90 BPM | SYSTOLIC BLOOD PRESSURE: 122 MMHG

## 2018-03-23 DIAGNOSIS — R06.09 DYSPNEA ON EXERTION: ICD-10-CM

## 2018-03-23 DIAGNOSIS — R07.89 ATYPICAL CHEST PAIN: ICD-10-CM

## 2018-03-23 DIAGNOSIS — R94.39 EQUIVOCAL STRESS TEST: ICD-10-CM

## 2018-03-23 LAB
BH CV STRESS BP STAGE 1: NORMAL
BH CV STRESS BP STAGE 2: NORMAL
BH CV STRESS DOSE DOBUTAMINE STAGE 1: 10
BH CV STRESS DOSE DOBUTAMINE STAGE 2: 20
BH CV STRESS DURATION MIN STAGE 1: 3
BH CV STRESS DURATION MIN STAGE 2: 3
BH CV STRESS DURATION SEC STAGE 1: 0
BH CV STRESS DURATION SEC STAGE 2: 27
BH CV STRESS HR STAGE 1: 109
BH CV STRESS HR STAGE 2: 137
BH CV STRESS PROTOCOL 1: NORMAL
BH CV STRESS RECOVERY BP: NORMAL MMHG
BH CV STRESS RECOVERY HR: 79 BPM
BH CV STRESS STAGE 1: 1
BH CV STRESS STAGE 2: 2
MAXIMAL PREDICTED HEART RATE: 155 BPM
PERCENT MAX PREDICTED HR: 88.39 %
STRESS BASELINE BP: NORMAL MMHG
STRESS BASELINE HR: 80 BPM
STRESS PERCENT HR: 104 %
STRESS POST EXERCISE DUR MIN: 6 MIN
STRESS POST EXERCISE DUR SEC: 27 SEC
STRESS POST PEAK BP: NORMAL MMHG
STRESS POST PEAK HR: 137 BPM
STRESS TARGET HR: 132 BPM

## 2018-03-23 PROCEDURE — 93350 STRESS TTE ONLY: CPT

## 2018-03-23 PROCEDURE — 93352 ADMIN ECG CONTRAST AGENT: CPT | Performed by: INTERNAL MEDICINE

## 2018-03-23 PROCEDURE — 93350 STRESS TTE ONLY: CPT | Performed by: INTERNAL MEDICINE

## 2018-03-23 PROCEDURE — 25010000002 PERFLUTREN 6.52 MG/ML SUSPENSION: Performed by: INTERNAL MEDICINE

## 2018-03-23 PROCEDURE — 93018 CV STRESS TEST I&R ONLY: CPT | Performed by: INTERNAL MEDICINE

## 2018-03-23 PROCEDURE — 25010000003 DOBUTAMINE PER 250 MG: Performed by: INTERNAL MEDICINE

## 2018-03-23 PROCEDURE — 93017 CV STRESS TEST TRACING ONLY: CPT

## 2018-03-23 RX ORDER — DOBUTAMINE HYDROCHLORIDE 100 MG/100ML
10 INJECTION INTRAVENOUS
Status: DISCONTINUED | OUTPATIENT
Start: 2018-03-23 | End: 2018-03-24 | Stop reason: HOSPADM

## 2018-03-23 RX ADMIN — Medication 10 MCG/KG/MIN: at 10:15

## 2018-03-23 RX ADMIN — PERFLUTREN 8.48 MG: 6.52 INJECTION, SUSPENSION INTRAVENOUS at 10:05

## 2018-04-16 ENCOUNTER — OFFICE VISIT (OUTPATIENT)
Dept: OTOLARYNGOLOGY | Facility: CLINIC | Age: 68
End: 2018-04-16

## 2018-04-16 VITALS
SYSTOLIC BLOOD PRESSURE: 130 MMHG | BODY MASS INDEX: 34.49 KG/M2 | HEIGHT: 65 IN | DIASTOLIC BLOOD PRESSURE: 84 MMHG | TEMPERATURE: 98 F | HEART RATE: 80 BPM | WEIGHT: 207 LBS

## 2018-04-16 DIAGNOSIS — R09.82 POST-NASAL DRIP: ICD-10-CM

## 2018-04-16 DIAGNOSIS — K11.8 PAROTID MASS: ICD-10-CM

## 2018-04-16 DIAGNOSIS — K21.9 GASTROESOPHAGEAL REFLUX DISEASE WITHOUT ESOPHAGITIS: ICD-10-CM

## 2018-04-16 DIAGNOSIS — J32.4 CHRONIC PANSINUSITIS: Primary | ICD-10-CM

## 2018-04-16 DIAGNOSIS — E03.9 ACQUIRED HYPOTHYROIDISM: ICD-10-CM

## 2018-04-16 PROCEDURE — 99214 OFFICE O/P EST MOD 30 MIN: CPT | Performed by: PHYSICIAN ASSISTANT

## 2018-04-16 RX ORDER — MECLIZINE HYDROCHLORIDE 25 MG/1
25 TABLET ORAL 3 TIMES DAILY PRN
Qty: 30 TABLET | Refills: 3 | Status: SHIPPED | OUTPATIENT
Start: 2018-04-16 | End: 2019-11-18 | Stop reason: SDUPTHER

## 2018-04-16 RX ORDER — AZELASTINE 1 MG/ML
2 SPRAY, METERED NASAL 2 TIMES DAILY
Qty: 30 ML | Refills: 11 | Status: SHIPPED | OUTPATIENT
Start: 2018-04-16 | End: 2018-10-16

## 2018-04-16 RX ORDER — FEXOFENADINE HCL 180 MG/1
180 TABLET ORAL DAILY
Qty: 30 TABLET | Refills: 3 | Status: SHIPPED | OUTPATIENT
Start: 2018-04-16 | End: 2018-05-16

## 2018-04-16 NOTE — PROGRESS NOTES
YOB: 1950  Location: [facility]  Location Address: [address] [place] [address], [facility], [address] [place] [address]  Location Phone: [phone]    Chief Complaint   Patient presents with   • Follow-up     sinus, left parotid       History of Present Illness  Hannah Maki is a 67 y.o. female.  Hannah Maki is here for follow-up evaluation of ENT complaints. The patient has had problems with dizziness, sinusitis, sinus pressure, postnasal drip and allergy  The symptoms are not localized to a particular location. The patient has had moderate symptoms. The symptoms have been present for the last several months The symptoms are aggravated by  no identifiable factors. The symptoms are improved by medications.  Positive for sinus problems which seems to be improving with meds.      Examination: CT neck and nasopharynx with and without contrast dated  2017.    Indication: Left parotid mass     Comparison: CT neck dated 9/50/15     Technique: Volumetric data was acquired from the level of the ventricles  to the level of the aortic arch following the intravenous injection of  contrast and reconstructed at 3 mm intervals in the axial, coronal and  sagittal plane.         Findings:  A 9 mm enhancing nodule in the superficial lobe of the left parotid lobe  is unchanged in appearance since 09/15/2015. This enhancing nodule is  immediately deep to the radiopaque marker.    Pharyngeal mucosal space of the nasopharynx, oropharynx and hypopharynx  is unremarkable without evidence of fluid collection, abscess, discrete  or enhancing mass.   Supraglottic, glottic and subglottic larynx is unremarkable.  Parapharyngeal spaces, carotid, , submandibular and  perivertebral spaces are unremarkable bilaterally.    No evidence of pathologically enlarged lymph nodes in the visualized  cervical levels. Scattered non pathologically enlarged lymph nodes are  noted bilaterally     Visualized lung apices are unremarkable  bilaterally.    Osseous structures show no suspicious lytic or blastic lesion.  Multilevel degenerative changes with disk osteophyte complexes, facet  arthrosis, uncovertebral arthrosis, without significant spinal canal or  foraminal stenosis.      Visualized intracranial contents, orbits, paranasal sinuses and nasal  cavity, remaining nasopharynx, oropharynx and oral cavity, hypopharynx  and larynx, thyroid and salivary glands are otherwise unremarkable.   Normal contrast opacification in the vessels of the neck.   Report Conclusions  IMPRESSION:   Impression:    9 mm enhancing nodule in the left parotid lobe, stable since the  09/15/2015.  Differential considerations includes primary parotid neoplasm including  BMT and lymph node             Past Medical History:   Diagnosis Date   • Anxiety    • Arthritis    • Asthma    • Chronic pansinusitis 10/14/2016   • Colon polyp    • Diverticulosis    • Dysuria    • GERD (gastroesophageal reflux disease)    • Hyperlipidemia    • Hypertension    • Hyperthyroidism    • Perennial allergic rhinitis 10/14/2016   • Rhinitis    • Tinnitus    • Vertigo        Past Surgical History:   Procedure Laterality Date   • APPENDECTOMY     • CHOLECYSTECTOMY     • COLONOSCOPY  11/18/2013     six polyps removed or destroyed, Diverticulosis  Recall 3 years   • COLONOSCOPY  11/18/2013   • COLONOSCOPY N/A 4/4/2017    Procedure: COLONOSCOPY WITH ANESTHESIA;  Surgeon: Lew Orona MD;  Location: [facility];  Service:    • HEMORRHOIDECTOMY     • HYSTERECTOMY     • HYSTERECTOMY     • NECK SURGERY     • NOSE SURGERY      nose bleeding artery    • RECTAL FISSURE INCISION AND DRAINAGE           Current Outpatient Prescriptions:   •  cholestyramine (QUESTRAN) 4 GM/DOSE powder, , Disp: , Rfl:   •  Coenzyme Q10 (CO Q 10 PO), Take  by mouth., Disp: , Rfl:   •  esomeprazole (NexIUM) 20 MG capsule, Take 20 mg by mouth Every Morning Before Breakfast., Disp: , Rfl:   •  HEMMOREX-HC 25 MG  suppository, , Disp: , Rfl:   •  irbesartan (AVAPRO) 300 MG tablet, TAKE ONE TABLET BY MOUTH ONCE DAILY, Disp: 30 tablet, Rfl: 5  •  levothyroxine (SYNTHROID, LEVOTHROID) 100 MCG tablet, Take 1 tablet by mouth Daily., Disp: 30 tablet, Rfl: 5  •  lidocaine (XYLOCAINE) 5 % ointment, Apply  topically Every 2 (Two) Hours As Needed for Mild Pain ., Disp: 30 g, Rfl: 1  •  montelukast (SINGULAIR) 10 MG tablet, Take 1 tablet by mouth Every Night., Disp: 30 tablet, Rfl: 11  •  mupirocin (BACTROBAN) 2 % ointment, Apply intranasally bid, Disp: 22 g, Rfl: 3  •  pravastatin (PRAVACHOL) 20 MG tablet, Take 20 mg by mouth Every Night., Disp: , Rfl:   •  azelastine (ASTELIN) 0.1 % nasal spray, 2 sprays into each nostril 2 (Two) Times a Day. Use in each nostril as directed, Disp: 30 mL, Rfl: 11  •  fexofenadine (ALLEGRA) 180 MG tablet, Take 1 tablet by mouth Daily for 30 days. 1 by mouth every day as needed for allergy symptoms, Disp: 30 tablet, Rfl: 3  •  meclizine (ANTIVERT) 25 MG tablet, Take 1 tablet by mouth 3 (Three) Times a Day As Needed for dizziness (vertigo). Take one by mouth three times a day as needed for vertigo, Disp: 30 tablet, Rfl: 3    Augmentin [amoxicillin-pot clavulanate]; Azithromycin; Bactrim [sulfamethoxazole-trimethoprim]; Cefuroxime; Codeine; Erythromycin; Gatifloxacin; Latex; Meperidine; and Tetracyclines & related    Family History   Problem Relation Age of Onset   • Diabetes Brother    • Heart disease Brother    • Colon cancer Sister    • Colon cancer Sister    • Heart disease Father    • Heart attack Father        Social History     Social History   • Marital status:      Spouse name: N/A   • Number of children: N/A   • Years of education: N/A     Occupational History   • retired      Social History Main Topics   • Smoking status: Never Smoker   • Smokeless tobacco: Never Used   • Alcohol use No   • Drug use: No   • Sexual activity: Defer     Other Topics Concern   • Not on file     Social History  Narrative   • No narrative on file       Review of Systems   Constitutional: Negative.  Negative for activity change, appetite change, chills, diaphoresis, fatigue, fever and unexpected weight change.   HENT: Positive for congestion, postnasal drip and sinus pressure. Negative for dental problem, drooling, ear discharge, ear pain, facial swelling, hearing loss, mouth sores, nosebleeds, rhinorrhea, sneezing, sore throat, tinnitus, trouble swallowing and voice change.         Parotid mass   Eyes: Negative.    Respiratory: Negative.    Cardiovascular: Negative.    Gastrointestinal: Negative.    Endocrine: Negative.    Genitourinary: Negative.    Musculoskeletal: Negative.    Skin: Negative.    Allergic/Immunologic: Positive for environmental allergies. Negative for food allergies and immunocompromised state.   Neurological: Negative.    Hematological: Negative.    Psychiatric/Behavioral: Negative.        Vitals:    04/16/18 0907   BP: 130/84   Pulse: 80   Temp: 98 °F (36.7 °C)       Objective     Physical Exam  CONSTITUTIONAL: well nourished, alert, oriented, in no acute distress     COMMUNICATION AND VOICE: able to communicate normally, normal voice quality    HEAD: normocephalic, no lesions, atraumatic, no tenderness, no masses     FACE: appearance normal, no lesions, no tenderness, no deformities, facial motion symmetric    SALIVARY GLANDS: parotid glands with no tenderness, no swelling, no masses, submandibular glands with normal size, nontender    EYES: ocular motility normal, eyelids normal, orbits normal, no proptosis, conjunctiva normal , pupils equal, round     EARS:  Hearing: response to conversational voice normal bilaterally   External Ears: auricles without lesions  Otoscopic: tympanic membrane appearance normal, no lesions, no perforation, normal mobility, mild effusions    NOSE:  External Nose: structure normal, no tenderness on palpation, no nasal discharge, no lesions, no evidence of trauma, nostrils  patent   Intranasal Exam: nasal mucosa edema and erythema, vestibule within normal limits, inferior turbinate hypertrophy, nasal septum midline      ORAL:  Lips: upper and lower lips without lesion   Teeth: dentition within normal limits for age   Gums: gingivae healthy   Oral Mucosa: oral mucosa normal, no mucosal lesions   Floor of Mouth: Warthin’s duct patent, mucosa normal  Tongue: lingual mucosa normal without lesions, normal tongue mobility   Palate: soft and hard palates with normal mucosa and structure  Oropharynx: oropharyngeal mucosa erythema and postnasal drainage    NECK: neck appearance normal, no mass,  noted without erythema or tenderness    THYROID: no overt thyromegaly, no tenderness, nodules or mass present on palpation, position midline     LYMPH NODES: no lymphadenopathy    CHEST/RESPIRATORY: respiratory effort normal, normal breath sounds     CARDIOVASCULAR: rate and rhythm normal, extremities without cyanosis or edema      NEUROLOGIC/PSYCHIATRIC: oriented to time, place and person, mood normal, affect appropriate, CN II-XII intact grossly    Assessment/Plan   Hannah was seen today for follow-up.    Diagnoses and all orders for this visit:    Chronic pansinusitis    Gastroesophageal reflux disease without esophagitis    Acquired hypothyroidism    Parotid mass    Post-nasal drip    Other orders  -     azelastine (ASTELIN) 0.1 % nasal spray; 2 sprays into each nostril 2 (Two) Times a Day. Use in each nostril as directed  -     fexofenadine (ALLEGRA) 180 MG tablet; Take 1 tablet by mouth Daily for 30 days. 1 by mouth every day as needed for allergy symptoms  -     meclizine (ANTIVERT) 25 MG tablet; Take 1 tablet by mouth 3 (Three) Times a Day As Needed for dizziness (vertigo). Take one by mouth three times a day as needed for vertigo      * Surgery not found *  No orders of the defined types were placed in this encounter.    Return in about 6 months (around 10/16/2018) for Recheck sinus and  parotid.       Patient Instructions   Will start astelin as needed, allegra and antivert as needed, continue singulair. Call if symptoms worsen for antibiotic.    ###### BMI  #####   MyPlate from [employer]  The general, healthful diet is based on the 2010 Dietary Guidelines for Americans. The amount of food you need to eat from each food group depends on your age, sex, and level of physical activity and can be individualized by a dietitian. Go to ChooseMyPlate.gov for more information.  What do I need to know about the MyPlate plan?  · Enjoy your food, but eat less.  · Avoid oversized portions.  ¨ ½ of your plate should include fruits and vegetables.  ¨ ¼ of your plate should be grains.  ¨ ¼ of your plate should be protein.  Grains   · Make at least half of your grains whole grains.  · For a 2,000 calorie daily food plan, eat 6 oz every day.  · 1 oz is about 1 slice bread, 1 cup cereal, or ½ cup cooked rice, cereal, or pasta.  Vegetables   · Make half your plate fruits and vegetables.  · For a 2,000 calorie daily food plan, eat 2½ cups every day.  · 1 cup is about 1 cup raw or cooked vegetables or vegetable juice or 2 cups raw leafy greens.  Fruits   · Make half your plate fruits and vegetables.  · For a 2,000 calorie daily food plan, eat 2 cups every day.  · 1 cup is about 1 cup fruit or 100% fruit juice or ½ cup dried fruit.  Protein   · For a 2,000 calorie daily food plan, eat 5½ oz every day.  · 1 oz is about 1 oz meat, poultry, or fish, ¼ cup cooked beans, 1 egg, 1 Tbsp peanut butter, or ½ oz nuts or seeds.  Dairy   · Switch to fat-free or low-fat (1%) milk.  · For a 2,000 calorie daily food plan, eat 3 cups every day.  · 1 cup is about 1 cup milk or yogurt or soy milk (soy beverage), 1½ oz natural cheese, or 2 oz processed cheese.  Fats, Oils, and Empty Calories   · Only small amounts of oils are recommended.  · Empty calories are calories from solid fats or added sugars.  · Compare sodium in foods like soup,  bread, and frozen meals. Choose the foods with lower numbers.  · Drink water instead of sugary drinks.  What foods can I eat?  Grains   Whole grains such as whole wheat, quinoa, millet, and bulgur. Bread, rolls, and pasta made from whole grains. Brown or wild rice. Hot or cold cereals made from whole grains and without added sugar.  Vegetables   All fresh vegetables, especially fresh red, dark green, or orange vegetables. Peas and beans. Low-sodium frozen or canned vegetables prepared without added salt. Low-sodium vegetable juices.  Fruits   All fresh, frozen, and dried fruits. Canned fruit packed in water or fruit juice without added sugar. Fruit juices without added sugar.  Meats and Other Protein Sources   Boiled, baked, or grilled lean meat trimmed of fat. Skinless poultry. Fresh seafood and shellfish. Canned seafood packed in water. Unsalted nuts and unsalted nut butters. Tofu. Dried beans and pea. Eggs.  Dairy   Low-fat or fat-free milk, yogurt, and cheeses.  Sweets and Desserts   Frozen desserts made from low-fat milk.  Fats and Oils   Olive, peanut, and canola oils and margarine. Salad dressing and mayonnaise made from these oils.  Other   Soups and casseroles made from allowed ingredients and without added fat or salt.  The items listed above may not be a complete list of recommended foods or beverages. Contact your dietitian for more options.   What foods are not recommended?  Grains   Sweetened, low-fiber cereals. Packaged baked goods. Snack crackers and chips. Cheese crackers, butter crackers, and biscuits. Frozen waffles, sweet breads, doughnuts, pastries, packaged baking mixes, pancakes, cakes, and cookies.  Vegetables   Regular canned or frozen vegetables or vegetables prepared with salt. Canned tomatoes. Canned tomato sauce. Fried vegetables. Vegetables in cream sauce or cheese sauce.  Fruits   Fruits packed in syrup or made with added sugar.  Meats and Other Protein Sources   Marbled or fatty meats  such as ribs. Poultry with skin. Fried meats, poultry, eggs, or fish. Sausages, hot dogs, and deli meats such as pastrami, bologna, or salami.  Dairy   Whole milk, cream, cheeses made from whole milk, sour cream. Ice cream or yogurt made from whole milk or with added sugar.  Beverages   For adults, no more than one alcoholic drink per day. Regular soft drinks or other sugary beverages. Juice drinks.  Sweets and Desserts   Sugary or fatty desserts, candy, and other sweets.  Fats and Oils   Solid shortening or partially hydrogenated oils. Solid margarine. Margarine that contains trans fats. Butter.  The items listed above may not be a complete list of foods and beverages to avoid. Contact your dietitian for more information.   This information is not intended to replace advice given to you by your health care provider. Make sure you discuss any questions you have with your health care provider.  Document Released: 01/06/2009 Document Revised: 05/25/2017 Document Reviewed: 11/26/2014  [employer] Interactive Patient Education © 2017 [employer]     Calorie Counting for Weight Loss  Calories are units of energy. Your body needs a certain amount of calories from food to keep you going throughout the day. When you eat more calories than your body needs, your body stores the extra calories as fat. When you eat fewer calories than your body needs, your body burns fat to get the energy it needs.  Calorie counting means keeping track of how many calories you eat and drink each day. Calorie counting can be helpful if you need to lose weight. If you make sure to eat fewer calories than your body needs, you should lose weight. Ask your health care provider what a healthy weight is for you.  For calorie counting to work, you will need to eat the right number of calories in a day in order to lose a healthy amount of weight per week. A dietitian can help you determine how many calories you need in a day and will give you suggestions  on how to reach your calorie goal.  · A healthy amount of weight to lose per week is usually 1-2 lb (0.5-0.9 kg). This usually means that your daily calorie intake should be reduced by 500-750 calories.  · Eating 1,200 - 1,500 calories per day can help most women lose weight.  · Eating 1,500 - 1,800 calories per day can help most men lose weight.  What is my plan?  My goal is to have __________ calories per day.  If I have this many calories per day, I should lose around __________ pounds per week.  What do I need to know about calorie counting?  In order to meet your daily calorie goal, you will need to:  · Find out how many calories are in each food you would like to eat. Try to do this before you eat.  · Decide how much of the food you plan to eat.  · Write down what you ate and how many calories it had. Doing this is called keeping a food log.  To successfully lose weight, it is important to balance calorie counting with a healthy lifestyle that includes regular activity. Aim for 150 minutes of moderate exercise (such as walking) or 75 minutes of vigorous exercise (such as running) each week.  Where do I find calorie information?     The number of calories in a food can be found on a Nutrition Facts label. If a food does not have a Nutrition Facts label, try to look up the calories online or ask your dietitian for help.  Remember that calories are listed per serving. If you choose to have more than one serving of a food, you will have to multiply the calories per serving by the amount of servings you plan to eat. For example, the label on a package of bread might say that a serving size is 1 slice and that there are 90 calories in a serving. If you eat 1 slice, you will have eaten 90 calories. If you eat 2 slices, you will have eaten 180 calories.  How do I keep a food log?  Immediately after each meal, record the following information in your food log:  · What you ate. Don't forget to include toppings, sauces,  "and other extras on the food.  · How much you ate. This can be measured in cups, ounces, or number of items.  · How many calories each food and drink had.  · The total number of calories in the meal.  Keep your food log near you, such as in a small notebook in your pocket, or use a mobile maggy or website. Some programs will calculate calories for you and show you how many calories you have left for the day to meet your goal.  What are some calorie counting tips?  · Use your calories on foods and drinks that will fill you up and not leave you hungry:  ¨ Some examples of foods that fill you up are nuts and nut butters, vegetables, lean proteins, and high-fiber foods like whole grains. High-fiber foods are foods with more than 5 g fiber per serving.  ¨ Drinks such as sodas, specialty coffee drinks, alcohol, and juices have a lot of calories, yet do not fill you up.  · Eat nutritious foods and avoid empty calories. Empty calories are calories you get from foods or beverages that do not have many vitamins or protein, such as candy, sweets, and soda. It is better to have a nutritious high-calorie food (such as an avocado) than a food with few nutrients (such as a bag of chips).  · Know how many calories are in the foods you eat most often. This will help you calculate calorie counts faster.  · Pay attention to calories in drinks. Low-calorie drinks include water and unsweetened drinks.  · Pay attention to nutrition labels for \"low fat\" or \"fat free\" foods. These foods sometimes have the same amount of calories or more calories than the full fat versions. They also often have added sugar, starch, or salt, to make up for flavor that was removed with the fat.  · Find a way of tracking calories that works for you. Get creative. Try different apps or programs if writing down calories does not work for you.  What are some portion control tips?  · Know how many calories are in a serving. This will help you know how many servings " of a certain food you can have.  · Use a measuring cup to measure serving sizes. You could also try weighing out portions on a kitchen scale. With time, you will be able to estimate serving sizes for some foods.  · Take some time to put servings of different foods on your favorite plates, bowls, and cups so you know what a serving looks like.  · Try not to eat straight from a bag or box. Doing this can lead to overeating. Put the amount you would like to eat in a cup or on a plate to make sure you are eating the right portion.  · Use smaller plates, glasses, and bowls to prevent overeating.  · Try not to multitask (for example, watch TV or use your computer) while eating. If it is time to eat, sit down at a table and enjoy your food. This will help you to know when you are full. It will also help you to be aware of what you are eating and how much you are eating.  What are tips for following this plan?  Reading food labels   · Check the calorie count compared to the serving size. The serving size may be smaller than what you are used to eating.  · Check the source of the calories. Make sure the food you are eating is high in vitamins and protein and low in saturated and trans fats.  Shopping   · Read nutrition labels while you shop. This will help you make healthy decisions before you decide to purchase your food.  · Make a grocery list and stick to it.  Cooking   · Try to cook your favorite foods in a healthier way. For example, try baking instead of frying.  · Use low-fat dairy products.  Meal planning   · Use more fruits and vegetables. Half of your plate should be fruits and vegetables.  · Include lean proteins like poultry and fish.  How do I count calories when eating out?  · Ask for smaller portion sizes.  · Consider sharing an entree and sides instead of getting your own entree.  · If you get your own entree, eat only half. Ask for a box at the beginning of your meal and put the rest of your entree in it so  you are not tempted to eat it.  · If calories are listed on the menu, choose the lower calorie options.  · Choose dishes that include vegetables, fruits, whole grains, low-fat dairy products, and lean protein.  · Choose items that are boiled, broiled, grilled, or steamed. Stay away from items that are buttered, battered, fried, or served with cream sauce. Items labeled “crispy” are usually fried, unless stated otherwise.  · Choose water, low-fat milk, unsweetened iced tea, or other drinks without added sugar. If you want an alcoholic beverage, choose a lower calorie option such as a glass of wine or light beer.  · Ask for dressings, sauces, and syrups on the side. These are usually high in calories, so you should limit the amount you eat.  · If you want a salad, choose a garden salad and ask for grilled meats. Avoid extra toppings like alberto, cheese, or fried items. Ask for the dressing on the side, or ask for olive oil and vinegar or lemon to use as dressing.  · Estimate how many servings of a food you are given. For example, a serving of cooked rice is ½ cup or about the size of half a baseball. Knowing serving sizes will help you be aware of how much food you are eating at restaurants. The list below tells you how big or small some common portion sizes are based on everyday objects:  ¨ 1 oz--4 stacked dice.  ¨ 3 oz--1 deck of cards.  ¨ 1 tsp--1 die.  ¨ 1 Tbsp--½ a ping-pong ball.  ¨ 2 Tbsp--1 ping-pong ball.  ¨ ½ cup--½ baseball.  ¨ 1 cup--1 baseball.  Summary  · Calorie counting means keeping track of how many calories you eat and drink each day. If you eat fewer calories than your body needs, you should lose weight.  · A healthy amount of weight to lose per week is usually 1-2 lb (0.5-0.9 kg). This usually means reducing your daily calorie intake by 500-750 calories.  · The number of calories in a food can be found on a Nutrition Facts label. If a food does not have a Nutrition Facts label, try to look up the  calories online or ask your dietitian for help.  · Use your calories on foods and drinks that will fill you up, and not on foods and drinks that will leave you hungry.  · Use smaller plates, glasses, and bowls to prevent overeating.  This information is not intended to replace advice given to you by your health care provider. Make sure you discuss any questions you have with your health care provider.  Document Released: 12/18/2006 Document Revised: 11/17/2017 Document Reviewed: 11/17/2017  [employer] Patient Education © 2017 [employer]     Exercising to Lose Weight  Exercising can help you to lose weight. In order to lose weight through exercise, you need to do vigorous-intensity exercise. You can tell that you are exercising with vigorous intensity if you are breathing very hard and fast and cannot hold a conversation while exercising.  Moderate-intensity exercise helps to maintain your current weight. You can tell that you are exercising at a moderate level if you have a higher heart rate and faster breathing, but you are still able to hold a conversation.  How often should I exercise?  Choose an activity that you enjoy and set realistic goals. Your health care provider can help you to make an activity plan that works for you. Exercise regularly as directed by your health care provider. This may include:  · Doing resistance training twice each week, such as:  ¨ Push-ups.  ¨ Sit-ups.  ¨ Lifting weights.  ¨ Using resistance bands.  · Doing a given intensity of exercise for a given amount of time. Choose from these options:  ¨ 150 minutes of moderate-intensity exercise every week.  ¨ 75 minutes of vigorous-intensity exercise every week.  ¨ A mix of moderate-intensity and vigorous-intensity exercise every week.  Children, pregnant women, people who are out of shape, people who are overweight, and older adults may need to consult a health care provider for individual recommendations. If you have any sort  of medical condition, be sure to consult your health care provider before starting a new exercise program.  What are some activities that can help me to lose weight?  · Walking at a rate of at least 4.5 miles an hour.  · Jogging or running at a rate of 5 miles per hour.  · Biking at a rate of at least 10 miles per hour.  · Lap swimming.  · Roller-skating or in-line skating.  · Cross-country skiing.  · Vigorous competitive sports, such as football, basketball, and soccer.  · Jumping rope.  · Aerobic dancing.  How can I be more active in my day-to-day activities?  · Use the stairs instead of the elevator.  · Take a walk during your lunch break.  · If you drive, park your car farther away from work or school.  · If you take public transportation, get off one stop early and walk the rest of the way.  · Make all of your phone calls while standing up and walking around.  · Get up, stretch, and walk around every 30 minutes throughout the day.  What guidelines should I follow while exercising?  · Do not exercise so much that you hurt yourself, feel dizzy, or get very short of breath.  · Consult your health care provider prior to starting a new exercise program.  · Wear comfortable clothes and shoes with good support.  · Drink plenty of water while you exercise to prevent dehydration or heat stroke. Body water is lost during exercise and must be replaced.  · Work out until you breathe faster and your heart beats faster.  This information is not intended to replace advice given to you by your health care provider. Make sure you discuss any questions you have with your health care provider.  Document Released: 01/20/2012 Document Revised: 05/25/2017 Document Reviewed: 05/21/2015  [employer] Interactive Patient Education © 2017 [employer]

## 2018-04-16 NOTE — PATIENT INSTRUCTIONS
Will start astelin as needed, allegra and antivert as needed, continue singulair. Call if symptoms worsen for antibiotic.    ###### BMI  #####   MyPlate from [employer]  The general, healthful diet is based on the 2010 Dietary Guidelines for Americans. The amount of food you need to eat from each food group depends on your age, sex, and level of physical activity and can be individualized by a dietitian. Go to ChooseMyPlate.gov for more information.  What do I need to know about the MyPlate plan?  · Enjoy your food, but eat less.  · Avoid oversized portions.  ¨ ½ of your plate should include fruits and vegetables.  ¨ ¼ of your plate should be grains.  ¨ ¼ of your plate should be protein.  Grains   · Make at least half of your grains whole grains.  · For a 2,000 calorie daily food plan, eat 6 oz every day.  · 1 oz is about 1 slice bread, 1 cup cereal, or ½ cup cooked rice, cereal, or pasta.  Vegetables   · Make half your plate fruits and vegetables.  · For a 2,000 calorie daily food plan, eat 2½ cups every day.  · 1 cup is about 1 cup raw or cooked vegetables or vegetable juice or 2 cups raw leafy greens.  Fruits   · Make half your plate fruits and vegetables.  · For a 2,000 calorie daily food plan, eat 2 cups every day.  · 1 cup is about 1 cup fruit or 100% fruit juice or ½ cup dried fruit.  Protein   · For a 2,000 calorie daily food plan, eat 5½ oz every day.  · 1 oz is about 1 oz meat, poultry, or fish, ¼ cup cooked beans, 1 egg, 1 Tbsp peanut butter, or ½ oz nuts or seeds.  Dairy   · Switch to fat-free or low-fat (1%) milk.  · For a 2,000 calorie daily food plan, eat 3 cups every day.  · 1 cup is about 1 cup milk or yogurt or soy milk (soy beverage), 1½ oz natural cheese, or 2 oz processed cheese.  Fats, Oils, and Empty Calories   · Only small amounts of oils are recommended.  · Empty calories are calories from solid fats or added sugars.  · Compare sodium in foods like soup, bread, and frozen meals. Choose the foods  with lower numbers.  · Drink water instead of sugary drinks.  What foods can I eat?  Grains   Whole grains such as whole wheat, quinoa, millet, and bulgur. Bread, rolls, and pasta made from whole grains. Brown or wild rice. Hot or cold cereals made from whole grains and without added sugar.  Vegetables   All fresh vegetables, especially fresh red, dark green, or orange vegetables. Peas and beans. Low-sodium frozen or canned vegetables prepared without added salt. Low-sodium vegetable juices.  Fruits   All fresh, frozen, and dried fruits. Canned fruit packed in water or fruit juice without added sugar. Fruit juices without added sugar.  Meats and Other Protein Sources   Boiled, baked, or grilled lean meat trimmed of fat. Skinless poultry. Fresh seafood and shellfish. Canned seafood packed in water. Unsalted nuts and unsalted nut butters. Tofu. Dried beans and pea. Eggs.  Dairy   Low-fat or fat-free milk, yogurt, and cheeses.  Sweets and Desserts   Frozen desserts made from low-fat milk.  Fats and Oils   Olive, peanut, and canola oils and margarine. Salad dressing and mayonnaise made from these oils.  Other   Soups and casseroles made from allowed ingredients and without added fat or salt.  The items listed above may not be a complete list of recommended foods or beverages. Contact your dietitian for more options.   What foods are not recommended?  Grains   Sweetened, low-fiber cereals. Packaged baked goods. Snack crackers and chips. Cheese crackers, butter crackers, and biscuits. Frozen waffles, sweet breads, doughnuts, pastries, packaged baking mixes, pancakes, cakes, and cookies.  Vegetables   Regular canned or frozen vegetables or vegetables prepared with salt. Canned tomatoes. Canned tomato sauce. Fried vegetables. Vegetables in cream sauce or cheese sauce.  Fruits   Fruits packed in syrup or made with added sugar.  Meats and Other Protein Sources   Marbled or fatty meats such as ribs. Poultry with skin. Fried  meats, poultry, eggs, or fish. Sausages, hot dogs, and deli meats such as pastrami, bologna, or salami.  Dairy   Whole milk, cream, cheeses made from whole milk, sour cream. Ice cream or yogurt made from whole milk or with added sugar.  Beverages   For adults, no more than one alcoholic drink per day. Regular soft drinks or other sugary beverages. Juice drinks.  Sweets and Desserts   Sugary or fatty desserts, candy, and other sweets.  Fats and Oils   Solid shortening or partially hydrogenated oils. Solid margarine. Margarine that contains trans fats. Butter.  The items listed above may not be a complete list of foods and beverages to avoid. Contact your dietitian for more information.   This information is not intended to replace advice given to you by your health care provider. Make sure you discuss any questions you have with your health care provider.  Document Released: 01/06/2009 Document Revised: 05/25/2017 Document Reviewed: 11/26/2014  [employer] Interactive Patient Education © 2017 [employer]     Calorie Counting for Weight Loss  Calories are units of energy. Your body needs a certain amount of calories from food to keep you going throughout the day. When you eat more calories than your body needs, your body stores the extra calories as fat. When you eat fewer calories than your body needs, your body burns fat to get the energy it needs.  Calorie counting means keeping track of how many calories you eat and drink each day. Calorie counting can be helpful if you need to lose weight. If you make sure to eat fewer calories than your body needs, you should lose weight. Ask your health care provider what a healthy weight is for you.  For calorie counting to work, you will need to eat the right number of calories in a day in order to lose a healthy amount of weight per week. A dietitian can help you determine how many calories you need in a day and will give you suggestions on how to reach your calorie  goal.  · A healthy amount of weight to lose per week is usually 1-2 lb (0.5-0.9 kg). This usually means that your daily calorie intake should be reduced by 500-750 calories.  · Eating 1,200 - 1,500 calories per day can help most women lose weight.  · Eating 1,500 - 1,800 calories per day can help most men lose weight.  What is my plan?  My goal is to have __________ calories per day.  If I have this many calories per day, I should lose around __________ pounds per week.  What do I need to know about calorie counting?  In order to meet your daily calorie goal, you will need to:  · Find out how many calories are in each food you would like to eat. Try to do this before you eat.  · Decide how much of the food you plan to eat.  · Write down what you ate and how many calories it had. Doing this is called keeping a food log.  To successfully lose weight, it is important to balance calorie counting with a healthy lifestyle that includes regular activity. Aim for 150 minutes of moderate exercise (such as walking) or 75 minutes of vigorous exercise (such as running) each week.  Where do I find calorie information?     The number of calories in a food can be found on a Nutrition Facts label. If a food does not have a Nutrition Facts label, try to look up the calories online or ask your dietitian for help.  Remember that calories are listed per serving. If you choose to have more than one serving of a food, you will have to multiply the calories per serving by the amount of servings you plan to eat. For example, the label on a package of bread might say that a serving size is 1 slice and that there are 90 calories in a serving. If you eat 1 slice, you will have eaten 90 calories. If you eat 2 slices, you will have eaten 180 calories.  How do I keep a food log?  Immediately after each meal, record the following information in your food log:  · What you ate. Don't forget to include toppings, sauces, and other extras on the  "food.  · How much you ate. This can be measured in cups, ounces, or number of items.  · How many calories each food and drink had.  · The total number of calories in the meal.  Keep your food log near you, such as in a small notebook in your pocket, or use a mobile maggy or website. Some programs will calculate calories for you and show you how many calories you have left for the day to meet your goal.  What are some calorie counting tips?  · Use your calories on foods and drinks that will fill you up and not leave you hungry:  ¨ Some examples of foods that fill you up are nuts and nut butters, vegetables, lean proteins, and high-fiber foods like whole grains. High-fiber foods are foods with more than 5 g fiber per serving.  ¨ Drinks such as sodas, specialty coffee drinks, alcohol, and juices have a lot of calories, yet do not fill you up.  · Eat nutritious foods and avoid empty calories. Empty calories are calories you get from foods or beverages that do not have many vitamins or protein, such as candy, sweets, and soda. It is better to have a nutritious high-calorie food (such as an avocado) than a food with few nutrients (such as a bag of chips).  · Know how many calories are in the foods you eat most often. This will help you calculate calorie counts faster.  · Pay attention to calories in drinks. Low-calorie drinks include water and unsweetened drinks.  · Pay attention to nutrition labels for \"low fat\" or \"fat free\" foods. These foods sometimes have the same amount of calories or more calories than the full fat versions. They also often have added sugar, starch, or salt, to make up for flavor that was removed with the fat.  · Find a way of tracking calories that works for you. Get creative. Try different apps or programs if writing down calories does not work for you.  What are some portion control tips?  · Know how many calories are in a serving. This will help you know how many servings of a certain food you " can have.  · Use a measuring cup to measure serving sizes. You could also try weighing out portions on a kitchen scale. With time, you will be able to estimate serving sizes for some foods.  · Take some time to put servings of different foods on your favorite plates, bowls, and cups so you know what a serving looks like.  · Try not to eat straight from a bag or box. Doing this can lead to overeating. Put the amount you would like to eat in a cup or on a plate to make sure you are eating the right portion.  · Use smaller plates, glasses, and bowls to prevent overeating.  · Try not to multitask (for example, watch TV or use your computer) while eating. If it is time to eat, sit down at a table and enjoy your food. This will help you to know when you are full. It will also help you to be aware of what you are eating and how much you are eating.  What are tips for following this plan?  Reading food labels   · Check the calorie count compared to the serving size. The serving size may be smaller than what you are used to eating.  · Check the source of the calories. Make sure the food you are eating is high in vitamins and protein and low in saturated and trans fats.  Shopping   · Read nutrition labels while you shop. This will help you make healthy decisions before you decide to purchase your food.  · Make a grocery list and stick to it.  Cooking   · Try to cook your favorite foods in a healthier way. For example, try baking instead of frying.  · Use low-fat dairy products.  Meal planning   · Use more fruits and vegetables. Half of your plate should be fruits and vegetables.  · Include lean proteins like poultry and fish.  How do I count calories when eating out?  · Ask for smaller portion sizes.  · Consider sharing an entree and sides instead of getting your own entree.  · If you get your own entree, eat only half. Ask for a box at the beginning of your meal and put the rest of your entree in it so you are not tempted to  eat it.  · If calories are listed on the menu, choose the lower calorie options.  · Choose dishes that include vegetables, fruits, whole grains, low-fat dairy products, and lean protein.  · Choose items that are boiled, broiled, grilled, or steamed. Stay away from items that are buttered, battered, fried, or served with cream sauce. Items labeled “crispy” are usually fried, unless stated otherwise.  · Choose water, low-fat milk, unsweetened iced tea, or other drinks without added sugar. If you want an alcoholic beverage, choose a lower calorie option such as a glass of wine or light beer.  · Ask for dressings, sauces, and syrups on the side. These are usually high in calories, so you should limit the amount you eat.  · If you want a salad, choose a garden salad and ask for grilled meats. Avoid extra toppings like alberto, cheese, or fried items. Ask for the dressing on the side, or ask for olive oil and vinegar or lemon to use as dressing.  · Estimate how many servings of a food you are given. For example, a serving of cooked rice is ½ cup or about the size of half a baseball. Knowing serving sizes will help you be aware of how much food you are eating at restaurants. The list below tells you how big or small some common portion sizes are based on everyday objects:  ¨ 1 oz--4 stacked dice.  ¨ 3 oz--1 deck of cards.  ¨ 1 tsp--1 die.  ¨ 1 Tbsp--½ a ping-pong ball.  ¨ 2 Tbsp--1 ping-pong ball.  ¨ ½ cup--½ baseball.  ¨ 1 cup--1 baseball.  Summary  · Calorie counting means keeping track of how many calories you eat and drink each day. If you eat fewer calories than your body needs, you should lose weight.  · A healthy amount of weight to lose per week is usually 1-2 lb (0.5-0.9 kg). This usually means reducing your daily calorie intake by 500-750 calories.  · The number of calories in a food can be found on a Nutrition Facts label. If a food does not have a Nutrition Facts label, try to look up the calories online or ask  your dietitian for help.  · Use your calories on foods and drinks that will fill you up, and not on foods and drinks that will leave you hungry.  · Use smaller plates, glasses, and bowls to prevent overeating.  This information is not intended to replace advice given to you by your health care provider. Make sure you discuss any questions you have with your health care provider.  Document Released: 12/18/2006 Document Revised: 11/17/2017 Document Reviewed: 11/17/2017  [employer] Interactive Patient Education © 2017 [employer]     Exercising to Lose Weight  Exercising can help you to lose weight. In order to lose weight through exercise, you need to do vigorous-intensity exercise. You can tell that you are exercising with vigorous intensity if you are breathing very hard and fast and cannot hold a conversation while exercising.  Moderate-intensity exercise helps to maintain your current weight. You can tell that you are exercising at a moderate level if you have a higher heart rate and faster breathing, but you are still able to hold a conversation.  How often should I exercise?  Choose an activity that you enjoy and set realistic goals. Your health care provider can help you to make an activity plan that works for you. Exercise regularly as directed by your health care provider. This may include:  · Doing resistance training twice each week, such as:  ¨ Push-ups.  ¨ Sit-ups.  ¨ Lifting weights.  ¨ Using resistance bands.  · Doing a given intensity of exercise for a given amount of time. Choose from these options:  ¨ 150 minutes of moderate-intensity exercise every week.  ¨ 75 minutes of vigorous-intensity exercise every week.  ¨ A mix of moderate-intensity and vigorous-intensity exercise every week.  Children, pregnant women, people who are out of shape, people who are overweight, and older adults may need to consult a health care provider for individual recommendations. If you have any sort of medical condition,  be sure to consult your health care provider before starting a new exercise program.  What are some activities that can help me to lose weight?  · Walking at a rate of at least 4.5 miles an hour.  · Jogging or running at a rate of 5 miles per hour.  · Biking at a rate of at least 10 miles per hour.  · Lap swimming.  · Roller-skating or in-line skating.  · Cross-country skiing.  · Vigorous competitive sports, such as football, basketball, and soccer.  · Jumping rope.  · Aerobic dancing.  How can I be more active in my day-to-day activities?  · Use the stairs instead of the elevator.  · Take a walk during your lunch break.  · If you drive, park your car farther away from work or school.  · If you take public transportation, get off one stop early and walk the rest of the way.  · Make all of your phone calls while standing up and walking around.  · Get up, stretch, and walk around every 30 minutes throughout the day.  What guidelines should I follow while exercising?  · Do not exercise so much that you hurt yourself, feel dizzy, or get very short of breath.  · Consult your health care provider prior to starting a new exercise program.  · Wear comfortable clothes and shoes with good support.  · Drink plenty of water while you exercise to prevent dehydration or heat stroke. Body water is lost during exercise and must be replaced.  · Work out until you breathe faster and your heart beats faster.  This information is not intended to replace advice given to you by your health care provider. Make sure you discuss any questions you have with your health care provider.  Document Released: 01/20/2012 Document Revised: 05/25/2017 Document Reviewed: 05/21/2015  [employer] Interactive Patient Education © 2017 [employer]

## 2018-04-18 ENCOUNTER — OFFICE VISIT (OUTPATIENT)
Dept: FAMILY MEDICINE CLINIC | Facility: CLINIC | Age: 68
End: 2018-04-18

## 2018-04-18 VITALS
SYSTOLIC BLOOD PRESSURE: 126 MMHG | RESPIRATION RATE: 16 BRPM | DIASTOLIC BLOOD PRESSURE: 82 MMHG | HEIGHT: 65 IN | TEMPERATURE: 99 F | HEART RATE: 82 BPM | WEIGHT: 199 LBS | OXYGEN SATURATION: 98 % | BODY MASS INDEX: 33.15 KG/M2

## 2018-04-18 DIAGNOSIS — E03.9 ACQUIRED HYPOTHYROIDISM: ICD-10-CM

## 2018-04-18 DIAGNOSIS — K21.9 GASTROESOPHAGEAL REFLUX DISEASE WITHOUT ESOPHAGITIS: ICD-10-CM

## 2018-04-18 DIAGNOSIS — I10 ESSENTIAL HYPERTENSION: ICD-10-CM

## 2018-04-18 DIAGNOSIS — E78.2 MIXED HYPERLIPIDEMIA: Primary | ICD-10-CM

## 2018-04-18 LAB
ALBUMIN SERPL-MCNC: 4.4 G/DL (ref 3.5–5)
ALBUMIN/GLOB SERPL: 1.4 G/DL (ref 1.1–2.5)
ALP SERPL-CCNC: 134 U/L (ref 24–120)
ALT SERPL-CCNC: 73 U/L (ref 0–54)
AST SERPL-CCNC: 33 U/L (ref 7–45)
BASOPHILS # BLD AUTO: 0.1 10*3/MM3 (ref 0–0.2)
BASOPHILS NFR BLD AUTO: 0.8 % (ref 0–2)
BILIRUB SERPL-MCNC: 0.9 MG/DL (ref 0.1–1)
BUN SERPL-MCNC: 17 MG/DL (ref 5–21)
BUN/CREAT SERPL: 23.9 (ref 7–25)
CALCIUM SERPL-MCNC: 10 MG/DL (ref 8.4–10.4)
CHLORIDE SERPL-SCNC: 102 MMOL/L (ref 98–110)
CHOLEST SERPL-MCNC: 212 MG/DL (ref 130–200)
CO2 SERPL-SCNC: 26 MMOL/L (ref 24–31)
CREAT SERPL-MCNC: 0.71 MG/DL (ref 0.5–1.4)
EOSINOPHIL # BLD AUTO: 0.28 10*3/MM3 (ref 0–0.7)
EOSINOPHIL NFR BLD AUTO: 2.3 % (ref 0–4)
ERYTHROCYTE [DISTWIDTH] IN BLOOD BY AUTOMATED COUNT: 12.7 % (ref 12–15)
GFR SERPLBLD CREATININE-BSD FMLA CKD-EPI: 100 ML/MIN/1.73
GFR SERPLBLD CREATININE-BSD FMLA CKD-EPI: 83 ML/MIN/1.73
GLOBULIN SER CALC-MCNC: 3.1 GM/DL
GLUCOSE SERPL-MCNC: 104 MG/DL (ref 70–100)
HCT VFR BLD AUTO: 45.4 % (ref 37–47)
HDLC SERPL-MCNC: 59 MG/DL
HGB BLD-MCNC: 15.1 G/DL (ref 12–16)
IMM GRANULOCYTES # BLD: 0.09 10*3/MM3 (ref 0–0.03)
IMM GRANULOCYTES NFR BLD: 0.7 % (ref 0–5)
LDLC SERPL CALC-MCNC: 113 MG/DL (ref 0–99)
LYMPHOCYTES # BLD AUTO: 4.64 10*3/MM3 (ref 0.72–4.86)
LYMPHOCYTES NFR BLD AUTO: 38.6 % (ref 15–45)
MCH RBC QN AUTO: 29.9 PG (ref 28–32)
MCHC RBC AUTO-ENTMCNC: 33.3 G/DL (ref 33–36)
MCV RBC AUTO: 89.9 FL (ref 82–98)
MONOCYTES # BLD AUTO: 0.88 10*3/MM3 (ref 0.19–1.3)
MONOCYTES NFR BLD AUTO: 7.3 % (ref 4–12)
NEUTROPHILS # BLD AUTO: 6.03 10*3/MM3 (ref 1.87–8.4)
NEUTROPHILS NFR BLD AUTO: 50.3 % (ref 39–78)
NRBC BLD AUTO-RTO: 0 /100 WBC (ref 0–0)
PLATELET # BLD AUTO: 315 10*3/MM3 (ref 130–400)
POTASSIUM SERPL-SCNC: 4.8 MMOL/L (ref 3.5–5.3)
PROT SERPL-MCNC: 7.5 G/DL (ref 6.3–8.7)
RBC # BLD AUTO: 5.05 10*6/MM3 (ref 4.2–5.4)
SODIUM SERPL-SCNC: 143 MMOL/L (ref 135–145)
TRIGL SERPL-MCNC: 202 MG/DL (ref 0–149)
TSH SERPL DL<=0.005 MIU/L-ACNC: 1.82 MIU/ML (ref 0.47–4.68)
VLDLC SERPL CALC-MCNC: 40.4 MG/DL
WBC # BLD AUTO: 12.02 10*3/MM3 (ref 4.8–10.8)

## 2018-04-18 PROCEDURE — 99214 OFFICE O/P EST MOD 30 MIN: CPT | Performed by: FAMILY MEDICINE

## 2018-04-18 NOTE — PROGRESS NOTES
Subjective   Hannah Maki is a 67 y.o. female.     Chief Complaint   Patient presents with   • Follow-up     6 mo  mixed hyperlidemia        History of Present Illness     Hannah is taking pravachol--no longer goes to the lipid clinic but she is noting having myaglais she is certain its coming from it---her bp remains stqable wituout cp or ha...she is toleqatging synthroid without heat or cold intolerance..her gerd symptoms are stable      Current Outpatient Prescriptions:   •  azelastine (ASTELIN) 0.1 % nasal spray, 2 sprays into each nostril 2 (Two) Times a Day. Use in each nostril as directed, Disp: 30 mL, Rfl: 11  •  Coenzyme Q10 (CO Q 10 PO), Take  by mouth., Disp: , Rfl:   •  esomeprazole (NexIUM) 20 MG capsule, Take 20 mg by mouth Every Morning Before Breakfast., Disp: , Rfl:   •  fexofenadine (ALLEGRA) 180 MG tablet, Take 1 tablet by mouth Daily for 30 days. 1 by mouth every day as needed for allergy symptoms, Disp: 30 tablet, Rfl: 3  •  HEMMOREX-HC 25 MG suppository, , Disp: , Rfl:   •  irbesartan (AVAPRO) 300 MG tablet, TAKE ONE TABLET BY MOUTH ONCE DAILY, Disp: 30 tablet, Rfl: 5  •  levothyroxine (SYNTHROID, LEVOTHROID) 100 MCG tablet, Take 1 tablet by mouth Daily., Disp: 30 tablet, Rfl: 5  •  lidocaine (XYLOCAINE) 5 % ointment, Apply  topically Every 2 (Two) Hours As Needed for Mild Pain ., Disp: 30 g, Rfl: 1  •  meclizine (ANTIVERT) 25 MG tablet, Take 1 tablet by mouth 3 (Three) Times a Day As Needed for dizziness (vertigo). Take one by mouth three times a day as needed for vertigo, Disp: 30 tablet, Rfl: 3  •  montelukast (SINGULAIR) 10 MG tablet, Take 1 tablet by mouth Every Night., Disp: 30 tablet, Rfl: 11  •  mupirocin (BACTROBAN) 2 % ointment, Apply intranasally bid, Disp: 22 g, Rfl: 3  •  pravastatin (PRAVACHOL) 20 MG tablet, Take 20 mg by mouth Every Night., Disp: , Rfl:   Allergies   Allergen Reactions   • Augmentin [Amoxicillin-Pot Clavulanate] Rash   • Azithromycin Nausea Only   • Bactrim  "[Sulfamethoxazole-Trimethoprim] Nausea And Vomiting   • Cefuroxime Nausea Only   • Codeine GI Intolerance   • Erythromycin Rash   • Gatifloxacin Nausea Only   • Latex Other (See Comments)     Patient says it pulls her skin off.   • Meperidine Nausea And Vomiting   • Tetracyclines & Related Nausea And Vomiting       Past Medical History:   Diagnosis Date   • Anxiety    • Arthritis    • Asthma    • Chronic pansinusitis 10/14/2016   • Colon polyp    • Diverticulosis    • Dysuria    • GERD (gastroesophageal reflux disease)    • Hyperlipidemia    • Hypertension    • Hyperthyroidism    • Perennial allergic rhinitis 10/14/2016   • Rhinitis    • Tinnitus    • Vertigo      Past Surgical History:   Procedure Laterality Date   • APPENDECTOMY     • CHOLECYSTECTOMY     • COLONOSCOPY  11/18/2013     six polyps removed or destroyed, Diverticulosis  Recall 3 years   • COLONOSCOPY  11/18/2013   • COLONOSCOPY N/A 4/4/2017    Procedure: COLONOSCOPY WITH ANESTHESIA;  Surgeon: Lew Orona MD;  Location: [facility] ENDOSCOPY;  Service:    • HEMORRHOIDECTOMY     • HYSTERECTOMY     • HYSTERECTOMY     • NECK SURGERY     • NOSE SURGERY      nose bleeding artery    • RECTAL FISSURE INCISION AND DRAINAGE         Review of Systems   Constitutional: Negative.    HENT: Negative.    Eyes: Negative.    Respiratory: Negative.    Cardiovascular: Negative.    Gastrointestinal: Negative.    Endocrine: Negative.    Genitourinary: Negative.    Musculoskeletal: Positive for myalgias.   Skin: Negative.    Allergic/Immunologic: Negative.    Neurological: Negative.    Hematological: Negative.    Psychiatric/Behavioral: Negative.        Objective  /82   Pulse 82   Temp 99 °F (37.2 °C)   Resp 16   Ht 165.1 cm (65\")   Wt 90.3 kg (199 lb)   LMP 10/04/1981   SpO2 98%   BMI 33.12 kg/m²   Physical Exam   Constitutional: She is oriented to person, place, and time. She appears well-developed and well-nourished.   HENT:   Head: Normocephalic and " atraumatic.   Right Ear: External ear normal.   Left Ear: External ear normal.   Nose: Nose normal.   Mouth/Throat: Oropharynx is clear and moist.   Eyes: EOM are normal. Pupils are equal, round, and reactive to light.   Neck: Normal range of motion. Neck supple.   Cardiovascular: Normal rate, regular rhythm, normal heart sounds and intact distal pulses.    Pulmonary/Chest: Effort normal and breath sounds normal.   Abdominal: Soft. Bowel sounds are normal.   Musculoskeletal: Normal range of motion.   Neurological: She is alert and oriented to person, place, and time. She has normal reflexes.   Skin: Skin is warm. Capillary refill takes less than 2 seconds.   Psychiatric: She has a normal mood and affect. Her behavior is normal. Judgment and thought content normal.   Nursing note and vitals reviewed.      Assessment/Plan   Hannah was seen today for follow-up.    Diagnoses and all orders for this visit:    Mixed hyperlipidemia  -     CBC w AUTO Differential  -     Comprehensive metabolic panel  -     Lipid panel    Essential hypertension    Gastroesophageal reflux disease without esophagitis    Acquired hypothyroidism  -     TSH      She notes mammo is next month--her colon is up to date   Patient's Body mass index is 33.12 kg/m². BMI is above normal parameters. Follow-up plan includes:  exercise counseling and nutrition counseling.       Orders Placed This Encounter   Procedures   • Comprehensive metabolic panel   • Lipid panel   • TSH   • CBC w AUTO Differential     Order Specific Question:   Manual Differential     Answer:   No       Follow up: 6 month(s)

## 2018-04-20 ENCOUNTER — TELEPHONE (OUTPATIENT)
Dept: OTOLARYNGOLOGY | Facility: CLINIC | Age: 68
End: 2018-04-20

## 2018-04-20 RX ORDER — AMOXICILLIN 500 MG/1
500 CAPSULE ORAL 2 TIMES DAILY
Qty: 20 CAPSULE | Refills: 0 | Status: SHIPPED | OUTPATIENT
Start: 2018-04-20 | End: 2018-06-06

## 2018-04-20 NOTE — TELEPHONE ENCOUNTER
PT called and does still have a sinus infection, asked her what Antibiotic she can take without any issues and she stated she usually gets Amoxicillin. Ok per Aiden to send it in.

## 2018-04-23 ENCOUNTER — OFFICE VISIT (OUTPATIENT)
Dept: SURGERY | Age: 68
End: 2018-04-23
Payer: MEDICARE

## 2018-04-23 ENCOUNTER — HOSPITAL ENCOUNTER (OUTPATIENT)
Dept: WOMENS IMAGING | Age: 68
Discharge: HOME | End: 2018-04-23
Payer: MEDICARE

## 2018-04-23 VITALS — SYSTOLIC BLOOD PRESSURE: 126 MMHG | DIASTOLIC BLOOD PRESSURE: 84 MMHG | HEART RATE: 84 BPM

## 2018-04-23 DIAGNOSIS — Z12.39 SCREENING BREAST EXAMINATION: Primary | ICD-10-CM

## 2018-04-23 DIAGNOSIS — Z12.31 VISIT FOR SCREENING MAMMOGRAM: ICD-10-CM

## 2018-04-23 PROCEDURE — 1036F TOBACCO NON-USER: CPT | Performed by: PHYSICIAN ASSISTANT

## 2018-04-23 PROCEDURE — 77063 BREAST TOMOSYNTHESIS BI: CPT

## 2018-04-23 PROCEDURE — G8417 CALC BMI ABV UP PARAM F/U: HCPCS | Performed by: PHYSICIAN ASSISTANT

## 2018-04-23 PROCEDURE — 4040F PNEUMOC VAC/ADMIN/RCVD: CPT | Performed by: PHYSICIAN ASSISTANT

## 2018-04-23 PROCEDURE — 3017F COLORECTAL CA SCREEN DOC REV: CPT | Performed by: PHYSICIAN ASSISTANT

## 2018-04-23 PROCEDURE — G8400 PT W/DXA NO RESULTS DOC: HCPCS | Performed by: PHYSICIAN ASSISTANT

## 2018-04-23 PROCEDURE — 1123F ACP DISCUSS/DSCN MKR DOCD: CPT | Performed by: PHYSICIAN ASSISTANT

## 2018-04-23 PROCEDURE — G8427 DOCREV CUR MEDS BY ELIG CLIN: HCPCS | Performed by: PHYSICIAN ASSISTANT

## 2018-04-23 PROCEDURE — 1090F PRES/ABSN URINE INCON ASSESS: CPT | Performed by: PHYSICIAN ASSISTANT

## 2018-04-23 PROCEDURE — 99212 OFFICE O/P EST SF 10 MIN: CPT | Performed by: PHYSICIAN ASSISTANT

## 2018-04-23 RX ORDER — MECLIZINE HYDROCHLORIDE 25 MG/1
25 TABLET ORAL
COMMUNITY
Start: 2018-04-16

## 2018-04-23 RX ORDER — AMOXICILLIN 500 MG/1
500 CAPSULE ORAL 3 TIMES DAILY
COMMUNITY
Start: 2018-04-20 | End: 2022-07-05

## 2018-04-23 RX ORDER — IRBESARTAN 300 MG/1
TABLET ORAL
COMMUNITY
Start: 2018-03-12 | End: 2022-07-05

## 2018-04-23 RX ORDER — FEXOFENADINE HCL 180 MG/1
180 TABLET ORAL
COMMUNITY
Start: 2018-04-16 | End: 2018-05-16

## 2018-05-16 RX ORDER — MONTELUKAST SODIUM 10 MG/1
10 TABLET ORAL NIGHTLY
Qty: 30 TABLET | Refills: 5 | Status: SHIPPED | OUTPATIENT
Start: 2018-05-16 | End: 2018-10-17 | Stop reason: SDUPTHER

## 2018-06-06 ENCOUNTER — TELEPHONE (OUTPATIENT)
Dept: FAMILY MEDICINE CLINIC | Facility: CLINIC | Age: 68
End: 2018-06-06

## 2018-06-06 RX ORDER — AMOXICILLIN 250 MG/1
250 CAPSULE ORAL 3 TIMES DAILY
Qty: 21 CAPSULE | Refills: 0 | Status: SHIPPED | OUTPATIENT
Start: 2018-06-06 | End: 2018-06-13

## 2018-06-06 NOTE — TELEPHONE ENCOUNTER
Hannah called and said that she has had a couple of tick bites and has a spot on her leg that is red and irritated.  She asked if she needed to be seen or just get abx sent to pharmacy?

## 2018-07-19 RX ORDER — LEVOTHYROXINE SODIUM 0.1 MG/1
100 TABLET ORAL DAILY
Qty: 30 TABLET | Refills: 5 | Status: SHIPPED | OUTPATIENT
Start: 2018-07-19 | End: 2019-01-15 | Stop reason: SDUPTHER

## 2018-08-07 ENCOUNTER — TELEPHONE (OUTPATIENT)
Dept: FAMILY MEDICINE CLINIC | Facility: CLINIC | Age: 68
End: 2018-08-07

## 2018-08-07 RX ORDER — AMOXICILLIN 500 MG/1
500 TABLET, FILM COATED ORAL EVERY 8 HOURS SCHEDULED
Qty: 30 TABLET | Refills: 0 | Status: SHIPPED | OUTPATIENT
Start: 2018-08-07 | End: 2018-08-17

## 2018-08-07 NOTE — TELEPHONE ENCOUNTER
Pt called she has coughing congestion and sore throat and a lot of drainage she wants to know if u will call in [facility] [phone]

## 2018-10-16 ENCOUNTER — OFFICE VISIT (OUTPATIENT)
Dept: FAMILY MEDICINE CLINIC | Facility: CLINIC | Age: 68
End: 2018-10-16

## 2018-10-16 VITALS
WEIGHT: 207 LBS | OXYGEN SATURATION: 96 % | SYSTOLIC BLOOD PRESSURE: 130 MMHG | RESPIRATION RATE: 16 BRPM | BODY MASS INDEX: 34.49 KG/M2 | DIASTOLIC BLOOD PRESSURE: 80 MMHG | HEIGHT: 65 IN | HEART RATE: 82 BPM

## 2018-10-16 DIAGNOSIS — K21.9 GASTROESOPHAGEAL REFLUX DISEASE WITHOUT ESOPHAGITIS: ICD-10-CM

## 2018-10-16 DIAGNOSIS — M25.571 ARTHRALGIA OF BOTH ANKLES: ICD-10-CM

## 2018-10-16 DIAGNOSIS — I10 ESSENTIAL HYPERTENSION: Primary | ICD-10-CM

## 2018-10-16 DIAGNOSIS — E03.9 ACQUIRED HYPOTHYROIDISM: ICD-10-CM

## 2018-10-16 DIAGNOSIS — M25.572 ARTHRALGIA OF BOTH ANKLES: ICD-10-CM

## 2018-10-16 DIAGNOSIS — E78.2 MIXED HYPERLIPIDEMIA: ICD-10-CM

## 2018-10-16 PROCEDURE — 99214 OFFICE O/P EST MOD 30 MIN: CPT | Performed by: FAMILY MEDICINE

## 2018-10-16 RX ORDER — METHYLPREDNISOLONE 4 MG/1
TABLET ORAL
Qty: 21 TABLET | Refills: 0 | Status: SHIPPED | OUTPATIENT
Start: 2018-10-16 | End: 2019-04-15

## 2018-10-16 NOTE — PROGRESS NOTES
Subjective   Hannah Maki is a 67 y.o. female.     Chief Complaint   Patient presents with   • Follow-up     6 mo   hyperlipidemia        History of Present Illness     she nots gerd controlled--is not aking meds for hyperlpidemia--bp is stable witout cp or ha...is noting joint pain and would like something for this--toelratting thyroid replacement without heat or cold intolerances      Current Outpatient Prescriptions:   •  esomeprazole (NexIUM) 20 MG capsule, Take 20 mg by mouth Every Morning Before Breakfast., Disp: , Rfl:   •  irbesartan (AVAPRO) 300 MG tablet, TAKE ONE TABLET BY MOUTH ONCE DAILY, Disp: 30 tablet, Rfl: 5  •  levothyroxine (SYNTHROID, LEVOTHROID) 100 MCG tablet, Take 1 tablet by mouth Daily., Disp: 30 tablet, Rfl: 5  •  meclizine (ANTIVERT) 25 MG tablet, Take 1 tablet by mouth 3 (Three) Times a Day As Needed for dizziness (vertigo). Take one by mouth three times a day as needed for vertigo, Disp: 30 tablet, Rfl: 3  •  montelukast (SINGULAIR) 10 MG tablet, Take 1 tablet by mouth Every Night., Disp: 30 tablet, Rfl: 5  •  mupirocin (BACTROBAN) 2 % ointment, Apply intranasally bid, Disp: 22 g, Rfl: 3  •  MethylPREDNISolone (MEDROL, HOLLY,) 4 MG tablet, Take as directed on package instructions., Disp: 21 tablet, Rfl: 0  Allergies   Allergen Reactions   • Augmentin [Amoxicillin-Pot Clavulanate] Rash   • Azithromycin Nausea Only   • Bactrim [Sulfamethoxazole-Trimethoprim] Nausea And Vomiting   • Cefuroxime Nausea Only   • Codeine GI Intolerance   • Erythromycin Rash   • Gatifloxacin Nausea Only   • Latex Other (See Comments)     Patient says it pulls her skin off.   • Meperidine Nausea And Vomiting   • Tetracyclines & Related Nausea And Vomiting       Past Medical History:   Diagnosis Date   • Anxiety    • Arthritis    • Asthma    • Chronic pansinusitis 10/14/2016   • Colon polyp    • Diverticulosis    • Dysuria    • GERD (gastroesophageal reflux disease)    • Hyperlipidemia    • Hypertension    •  "Hyperthyroidism    • Perennial allergic rhinitis 10/14/2016   • Rhinitis    • Tinnitus    • Vertigo      Past Surgical History:   Procedure Laterality Date   • APPENDECTOMY     • CHOLECYSTECTOMY     • COLONOSCOPY  11/18/2013     six polyps removed or destroyed, Diverticulosis  Recall 3 years   • COLONOSCOPY  11/18/2013   • COLONOSCOPY N/A 4/4/2017    Procedure: COLONOSCOPY WITH ANESTHESIA;  Surgeon: Lew Orona MD;  Location: [facility] ENDOSCOPY;  Service:    • HEMORRHOIDECTOMY     • HYSTERECTOMY     • HYSTERECTOMY     • NECK SURGERY     • NOSE SURGERY      nose bleeding artery    • RECTAL FISSURE INCISION AND DRAINAGE         Review of Systems   Constitutional: Negative.    HENT: Negative.    Eyes: Negative.    Respiratory: Negative.    Cardiovascular: Negative.    Gastrointestinal: Negative.    Endocrine: Negative.    Genitourinary: Negative.    Musculoskeletal: Positive for arthralgias.   Skin: Negative.    Allergic/Immunologic: Negative.    Neurological: Negative.    Hematological: Negative.    Psychiatric/Behavioral: Negative.        Objective  /80   Pulse 82   Resp 16   Ht 165.1 cm (65\")   Wt 93.9 kg (207 lb)   LMP 10/04/1981   SpO2 96%   BMI 34.45 kg/m²   Physical Exam   Constitutional: She is oriented to person, place, and time. She appears well-developed and well-nourished.   HENT:   Head: Normocephalic and atraumatic.   Right Ear: External ear normal.   Left Ear: External ear normal.   Nose: Nose normal.   Mouth/Throat: Oropharynx is clear and moist.   Eyes: Pupils are equal, round, and reactive to light. Conjunctivae and EOM are normal.   Neck: Normal range of motion. Neck supple.   Cardiovascular: Normal rate, regular rhythm, normal heart sounds and intact distal pulses.    Pulmonary/Chest: Effort normal and breath sounds normal.   Abdominal: Soft. Bowel sounds are normal.   Musculoskeletal: Normal range of motion.   Neurological: She is alert and oriented to person, place, and time. "   Skin: Skin is warm. Capillary refill takes less than 2 seconds.   Psychiatric: She has a normal mood and affect. Her behavior is normal. Judgment and thought content normal.   Nursing note and vitals reviewed.      Assessment/Plan   Hannah was seen today for follow-up.    Diagnoses and all orders for this visit:    Essential hypertension  -     CBC w AUTO Differential  -     Comprehensive metabolic panel    Mixed hyperlipidemia  -     Lipid Panel w/ Chol/HDL Ratio    Gastroesophageal reflux disease without esophagitis    Acquired hypothyroidism  -     TSH    Arthralgia of both ankles    Other orders  -     MethylPREDNISolone (MEDROL, HOLLY,) 4 MG tablet; Take as directed on package instructions.             call next week and let me know how joints are    Orders Placed This Encounter   Procedures   • Comprehensive metabolic panel   • Lipid Panel w/ Chol/HDL Ratio   • TSH   • CBC w AUTO Differential     Order Specific Question:   Manual Differential     Answer:   No       Follow up: 6 month(s)

## 2018-10-17 ENCOUNTER — OFFICE VISIT (OUTPATIENT)
Dept: OTOLARYNGOLOGY | Facility: CLINIC | Age: 68
End: 2018-10-17

## 2018-10-17 VITALS
WEIGHT: 205 LBS | HEIGHT: 65 IN | BODY MASS INDEX: 34.16 KG/M2 | TEMPERATURE: 98.1 F | SYSTOLIC BLOOD PRESSURE: 132 MMHG | DIASTOLIC BLOOD PRESSURE: 80 MMHG

## 2018-10-17 DIAGNOSIS — K11.8 PAROTID MASS: ICD-10-CM

## 2018-10-17 DIAGNOSIS — J30.89 PERENNIAL ALLERGIC RHINITIS: ICD-10-CM

## 2018-10-17 DIAGNOSIS — R09.82 POST-NASAL DRIP: Primary | ICD-10-CM

## 2018-10-17 DIAGNOSIS — J32.4 CHRONIC PANSINUSITIS: ICD-10-CM

## 2018-10-17 DIAGNOSIS — J45.20 MILD INTERMITTENT ASTHMA WITHOUT COMPLICATION: ICD-10-CM

## 2018-10-17 DIAGNOSIS — E03.9 ACQUIRED HYPOTHYROIDISM: ICD-10-CM

## 2018-10-17 DIAGNOSIS — K21.9 GASTROESOPHAGEAL REFLUX DISEASE WITHOUT ESOPHAGITIS: ICD-10-CM

## 2018-10-17 LAB
ALBUMIN SERPL-MCNC: 4.7 G/DL (ref 3.5–5)
ALBUMIN/GLOB SERPL: 1.5 G/DL (ref 1.1–2.5)
ALP SERPL-CCNC: 128 U/L (ref 24–120)
ALT SERPL-CCNC: 85 U/L (ref 0–54)
AST SERPL-CCNC: 45 U/L (ref 7–45)
BASOPHILS # BLD AUTO: 0.09 10*3/MM3 (ref 0–0.2)
BASOPHILS NFR BLD AUTO: 1 % (ref 0–2)
BILIRUB SERPL-MCNC: 0.9 MG/DL (ref 0.1–1)
BUN SERPL-MCNC: 13 MG/DL (ref 5–21)
BUN/CREAT SERPL: 19.4 (ref 7–25)
CALCIUM SERPL-MCNC: 10.4 MG/DL (ref 8.4–10.4)
CHLORIDE SERPL-SCNC: 101 MMOL/L (ref 98–110)
CHOLEST SERPL-MCNC: 288 MG/DL (ref 130–200)
CHOLEST/HDLC SERPL: 5.05 {RATIO}
CO2 SERPL-SCNC: 26 MMOL/L (ref 24–31)
CREAT SERPL-MCNC: 0.67 MG/DL (ref 0.5–1.4)
EOSINOPHIL # BLD AUTO: 0.2 10*3/MM3 (ref 0–0.7)
EOSINOPHIL NFR BLD AUTO: 2.2 % (ref 0–4)
ERYTHROCYTE [DISTWIDTH] IN BLOOD BY AUTOMATED COUNT: 12.8 % (ref 12–15)
GLOBULIN SER CALC-MCNC: 3.2 GM/DL
GLUCOSE SERPL-MCNC: 104 MG/DL (ref 70–100)
HCT VFR BLD AUTO: 46 % (ref 37–47)
HDLC SERPL-MCNC: 57 MG/DL
HGB BLD-MCNC: 15.3 G/DL (ref 12–16)
IMM GRANULOCYTES # BLD: 0.06 10*3/MM3 (ref 0–0.03)
IMM GRANULOCYTES NFR BLD: 0.7 % (ref 0–5)
LDLC SERPL CALC-MCNC: 198 MG/DL (ref 0–99)
LYMPHOCYTES # BLD AUTO: 3.91 10*3/MM3 (ref 0.72–4.86)
LYMPHOCYTES NFR BLD AUTO: 42.5 % (ref 15–45)
MCH RBC QN AUTO: 30.4 PG (ref 28–32)
MCHC RBC AUTO-ENTMCNC: 33.3 G/DL (ref 33–36)
MCV RBC AUTO: 91.5 FL (ref 82–98)
MONOCYTES # BLD AUTO: 0.65 10*3/MM3 (ref 0.19–1.3)
MONOCYTES NFR BLD AUTO: 7.1 % (ref 4–12)
NEUTROPHILS # BLD AUTO: 4.29 10*3/MM3 (ref 1.87–8.4)
NEUTROPHILS NFR BLD AUTO: 46.5 % (ref 39–78)
NRBC BLD AUTO-RTO: 0 /100 WBC (ref 0–0)
PLATELET # BLD AUTO: 307 10*3/MM3 (ref 130–400)
POTASSIUM SERPL-SCNC: 5.1 MMOL/L (ref 3.5–5.3)
PROT SERPL-MCNC: 7.9 G/DL (ref 6.3–8.7)
RBC # BLD AUTO: 5.03 10*6/MM3 (ref 4.2–5.4)
SODIUM SERPL-SCNC: 140 MMOL/L (ref 135–145)
TRIGL SERPL-MCNC: 165 MG/DL (ref 0–149)
TSH SERPL DL<=0.005 MIU/L-ACNC: 1.85 MIU/ML (ref 0.47–4.68)
VLDLC SERPL CALC-MCNC: 33 MG/DL
WBC # BLD AUTO: 9.2 10*3/MM3 (ref 4.8–10.8)

## 2018-10-17 PROCEDURE — 99214 OFFICE O/P EST MOD 30 MIN: CPT | Performed by: PHYSICIAN ASSISTANT

## 2018-10-17 RX ORDER — MONTELUKAST SODIUM 10 MG/1
10 TABLET ORAL NIGHTLY
Qty: 30 TABLET | Refills: 11 | Status: SHIPPED | OUTPATIENT
Start: 2018-10-17 | End: 2018-11-19 | Stop reason: SDUPTHER

## 2018-10-17 NOTE — PATIENT INSTRUCTIONS
Continue treatment as directed, call if symptoms get worse.      MyPlate from [employer]  The general, healthful diet is based on the 2010 Dietary Guidelines for Americans. The amount of food you need to eat from each food group depends on your age, sex, and level of physical activity and can be individualized by a dietitian. Go to ChooseMyPlate.gov for more information.  What do I need to know about the MyPlate plan?  · Enjoy your food, but eat less.  · Avoid oversized portions.  ? ½ of your plate should include fruits and vegetables.  ? ¼ of your plate should be grains.  ? ¼ of your plate should be protein.  Grains  · Make at least half of your grains whole grains.  · For a 2,000 calorie daily food plan, eat 6 oz every day.  · 1 oz is about 1 slice bread, 1 cup cereal, or ½ cup cooked rice, cereal, or pasta.  Vegetables  · Make half your plate fruits and vegetables.  · For a 2,000 calorie daily food plan, eat 2½ cups every day.  · 1 cup is about 1 cup raw or cooked vegetables or vegetable juice or 2 cups raw leafy greens.  Fruits  · Make half your plate fruits and vegetables.  · For a 2,000 calorie daily food plan, eat 2 cups every day.  · 1 cup is about 1 cup fruit or 100% fruit juice or ½ cup dried fruit.  Protein  · For a 2,000 calorie daily food plan, eat 5½ oz every day.  · 1 oz is about 1 oz meat, poultry, or fish, ¼ cup cooked beans, 1 egg, 1 Tbsp peanut butter, or ½ oz nuts or seeds.  Dairy  · Switch to fat-free or low-fat (1%) milk.  · For a 2,000 calorie daily food plan, eat 3 cups every day.  · 1 cup is about 1 cup milk or yogurt or soy milk (soy beverage), 1½ oz natural cheese, or 2 oz processed cheese.  Fats, Oils, and Empty Calories  · Only small amounts of oils are recommended.  · Empty calories are calories from solid fats or added sugars.  · Compare sodium in foods like soup, bread, and frozen meals. Choose the foods with lower numbers.  · Drink water instead of sugary drinks.  What foods can I  eat?  Grains  Whole grains such as whole wheat, quinoa, millet, and bulgur. Bread, rolls, and pasta made from whole grains. Brown or wild rice. Hot or cold cereals made from whole grains and without added sugar.  Vegetables  All fresh vegetables, especially fresh red, dark green, or orange vegetables. Peas and beans. Low-sodium frozen or canned vegetables prepared without added salt. Low-sodium vegetable juices.  Fruits  All fresh, frozen, and dried fruits. Canned fruit packed in water or fruit juice without added sugar. Fruit juices without added sugar.  Meats and Other Protein Sources  Boiled, baked, or grilled lean meat trimmed of fat. Skinless poultry. Fresh seafood and shellfish. Canned seafood packed in water. Unsalted nuts and unsalted nut butters. Tofu. Dried beans and pea. Eggs.  Dairy  Low-fat or fat-free milk, yogurt, and cheeses.  Sweets and Desserts  Frozen desserts made from low-fat milk.  Fats and Oils  Olive, peanut, and canola oils and margarine. Salad dressing and mayonnaise made from these oils.  Other  Soups and casseroles made from allowed ingredients and without added fat or salt.  The items listed above may not be a complete list of recommended foods or beverages. Contact your dietitian for more options.  What foods are not recommended?  Grains  Sweetened, low-fiber cereals. Packaged baked goods. Snack crackers and chips. Cheese crackers, butter crackers, and biscuits. Frozen waffles, sweet breads, doughnuts, pastries, packaged baking mixes, pancakes, cakes, and cookies.  Vegetables  Regular canned or frozen vegetables or vegetables prepared with salt. Canned tomatoes. Canned tomato sauce. Fried vegetables. Vegetables in cream sauce or cheese sauce.  Fruits  Fruits packed in syrup or made with added sugar.  Meats and Other Protein Sources  Marbled or fatty meats such as ribs. Poultry with skin. Fried meats, poultry, eggs, or fish. Sausages, hot dogs, and deli meats such as pastrami, bologna,  or salami.  Dairy  Whole milk, cream, cheeses made from whole milk, sour cream. Ice cream or yogurt made from whole milk or with added sugar.  Beverages  For adults, no more than one alcoholic drink per day. Regular soft drinks or other sugary beverages. Juice drinks.  Sweets and Desserts  Sugary or fatty desserts, candy, and other sweets.  Fats and Oils  Solid shortening or partially hydrogenated oils. Solid margarine. Margarine that contains trans fats. Butter.  The items listed above may not be a complete list of foods and beverages to avoid. Contact your dietitian for more information.  This information is not intended to replace advice given to you by your health care provider. Make sure you discuss any questions you have with your health care provider.  Document Released: 01/06/2009 Document Revised: 05/25/2017 Document Reviewed: 11/26/2014  [employer] Patient Education © 2018 [employer]     Calorie Counting for Weight Loss  Calories are units of energy. Your body needs a certain amount of calories from food to keep you going throughout the day. When you eat more calories than your body needs, your body stores the extra calories as fat. When you eat fewer calories than your body needs, your body burns fat to get the energy it needs.  Calorie counting means keeping track of how many calories you eat and drink each day. Calorie counting can be helpful if you need to lose weight. If you make sure to eat fewer calories than your body needs, you should lose weight. Ask your health care provider what a healthy weight is for you.  For calorie counting to work, you will need to eat the right number of calories in a day in order to lose a healthy amount of weight per week. A dietitian can help you determine how many calories you need in a day and will give you suggestions on how to reach your calorie goal.  · A healthy amount of weight to lose per week is usually 1-2 lb (0.5-0.9 kg). This usually means  that your daily calorie intake should be reduced by 500-750 calories.  · Eating 1,200 - 1,500 calories per day can help most women lose weight.  · Eating 1,500 - 1,800 calories per day can help most men lose weight.    What do I need to know about calorie counting?  In order to meet your daily calorie goal, you will need to:  · Find out how many calories are in each food you would like to eat. Try to do this before you eat.  · Decide how much of the food you plan to eat.  · Write down what you ate and how many calories it had. Doing this is called keeping a food log.    To successfully lose weight, it is important to balance calorie counting with a healthy lifestyle that includes regular activity. Aim for 150 minutes of moderate exercise (such as walking) or 75 minutes of vigorous exercise (such as running) each week.  Where do I find calorie information?    The number of calories in a food can be found on a Nutrition Facts label. If a food does not have a Nutrition Facts label, try to look up the calories online or ask your dietitian for help.  Remember that calories are listed per serving. If you choose to have more than one serving of a food, you will have to multiply the calories per serving by the amount of servings you plan to eat. For example, the label on a package of bread might say that a serving size is 1 slice and that there are 90 calories in a serving. If you eat 1 slice, you will have eaten 90 calories. If you eat 2 slices, you will have eaten 180 calories.  How do I keep a food log?  Immediately after each meal, record the following information in your food log:  · What you ate. Don't forget to include toppings, sauces, and other extras on the food.  · How much you ate. This can be measured in cups, ounces, or number of items.  · How many calories each food and drink had.  · The total number of calories in the meal.    Keep your food log near you, such as in a small notebook in your pocket, or use a  "mobile maggy or website. Some programs will calculate calories for you and show you how many calories you have left for the day to meet your goal.  What are some calorie counting tips?  · Use your calories on foods and drinks that will fill you up and not leave you hungry:  ? Some examples of foods that fill you up are nuts and nut butters, vegetables, lean proteins, and high-fiber foods like whole grains. High-fiber foods are foods with more than 5 g fiber per serving.  ? Drinks such as sodas, specialty coffee drinks, alcohol, and juices have a lot of calories, yet do not fill you up.  · Eat nutritious foods and avoid empty calories. Empty calories are calories you get from foods or beverages that do not have many vitamins or protein, such as candy, sweets, and soda. It is better to have a nutritious high-calorie food (such as an avocado) than a food with few nutrients (such as a bag of chips).  · Know how many calories are in the foods you eat most often. This will help you calculate calorie counts faster.  · Pay attention to calories in drinks. Low-calorie drinks include water and unsweetened drinks.  · Pay attention to nutrition labels for \"low fat\" or \"fat free\" foods. These foods sometimes have the same amount of calories or more calories than the full fat versions. They also often have added sugar, starch, or salt, to make up for flavor that was removed with the fat.  · Find a way of tracking calories that works for you. Get creative. Try different apps or programs if writing down calories does not work for you.  What are some portion control tips?  · Know how many calories are in a serving. This will help you know how many servings of a certain food you can have.  · Use a measuring cup to measure serving sizes. You could also try weighing out portions on a kitchen scale. With time, you will be able to estimate serving sizes for some foods.  · Take some time to put servings of different foods on your favorite " plates, bowls, and cups so you know what a serving looks like.  · Try not to eat straight from a bag or box. Doing this can lead to overeating. Put the amount you would like to eat in a cup or on a plate to make sure you are eating the right portion.  · Use smaller plates, glasses, and bowls to prevent overeating.  · Try not to multitask (for example, watch TV or use your computer) while eating. If it is time to eat, sit down at a table and enjoy your food. This will help you to know when you are full. It will also help you to be aware of what you are eating and how much you are eating.  What are tips for following this plan?  Reading food labels  · Check the calorie count compared to the serving size. The serving size may be smaller than what you are used to eating.  · Check the source of the calories. Make sure the food you are eating is high in vitamins and protein and low in saturated and trans fats.  Shopping  · Read nutrition labels while you shop. This will help you make healthy decisions before you decide to purchase your food.  · Make a grocery list and stick to it.  Cooking  · Try to cook your favorite foods in a healthier way. For example, try baking instead of frying.  · Use low-fat dairy products.  Meal planning  · Use more fruits and vegetables. Half of your plate should be fruits and vegetables.  · Include lean proteins like poultry and fish.  How do I count calories when eating out?  · Ask for smaller portion sizes.  · Consider sharing an entree and sides instead of getting your own entree.  · If you get your own entree, eat only half. Ask for a box at the beginning of your meal and put the rest of your entree in it so you are not tempted to eat it.  · If calories are listed on the menu, choose the lower calorie options.  · Choose dishes that include vegetables, fruits, whole grains, low-fat dairy products, and lean protein.  · Choose items that are boiled, broiled, grilled, or steamed. Stay away  from items that are buttered, battered, fried, or served with cream sauce. Items labeled “crispy” are usually fried, unless stated otherwise.  · Choose water, low-fat milk, unsweetened iced tea, or other drinks without added sugar. If you want an alcoholic beverage, choose a lower calorie option such as a glass of wine or light beer.  · Ask for dressings, sauces, and syrups on the side. These are usually high in calories, so you should limit the amount you eat.  · If you want a salad, choose a garden salad and ask for grilled meats. Avoid extra toppings like alberto, cheese, or fried items. Ask for the dressing on the side, or ask for olive oil and vinegar or lemon to use as dressing.  · Estimate how many servings of a food you are given. For example, a serving of cooked rice is ½ cup or about the size of half a baseball. Knowing serving sizes will help you be aware of how much food you are eating at restaurants. The list below tells you how big or small some common portion sizes are based on everyday objects:  ? 1 oz--4 stacked dice.  ? 3 oz--1 deck of cards.  ? 1 tsp--1 die.  ? 1 Tbsp--½ a ping-pong ball.  ? 2 Tbsp--1 ping-pong ball.  ? ½ cup--½ baseball.  ? 1 cup--1 baseball.  Summary  · Calorie counting means keeping track of how many calories you eat and drink each day. If you eat fewer calories than your body needs, you should lose weight.  · A healthy amount of weight to lose per week is usually 1-2 lb (0.5-0.9 kg). This usually means reducing your daily calorie intake by 500-750 calories.  · The number of calories in a food can be found on a Nutrition Facts label. If a food does not have a Nutrition Facts label, try to look up the calories online or ask your dietitian for help.  · Use your calories on foods and drinks that will fill you up, and not on foods and drinks that will leave you hungry.  · Use smaller plates, glasses, and bowls to prevent overeating.  This information is not intended to replace advice  given to you by your health care provider. Make sure you discuss any questions you have with your health care provider.  Document Released: 12/18/2006 Document Revised: 11/17/2017 Document Reviewed: 11/17/2017  [employer] Interactive Patient Education © 2018 [employer]     Exercising to Lose Weight  Exercising can help you to lose weight. In order to lose weight through exercise, you need to do vigorous-intensity exercise. You can tell that you are exercising with vigorous intensity if you are breathing very hard and fast and cannot hold a conversation while exercising.  Moderate-intensity exercise helps to maintain your current weight. You can tell that you are exercising at a moderate level if you have a higher heart rate and faster breathing, but you are still able to hold a conversation.  How often should I exercise?  Choose an activity that you enjoy and set realistic goals. Your health care provider can help you to make an activity plan that works for you. Exercise regularly as directed by your health care provider. This may include:  · Doing resistance training twice each week, such as:  ? Push-ups.  ? Sit-ups.  ? Lifting weights.  ? Using resistance bands.  · Doing a given intensity of exercise for a given amount of time. Choose from these options:  ? 150 minutes of moderate-intensity exercise every week.  ? 75 minutes of vigorous-intensity exercise every week.  ? A mix of moderate-intensity and vigorous-intensity exercise every week.    Children, pregnant women, people who are out of shape, people who are overweight, and older adults may need to consult a health care provider for individual recommendations. If you have any sort of medical condition, be sure to consult your health care provider before starting a new exercise program.  What are some activities that can help me to lose weight?  · Walking at a rate of at least 4.5 miles an hour.  · Jogging or running at a rate of 5 miles per hour.  · Biking at  a rate of at least 10 miles per hour.  · Lap swimming.  · Roller-skating or in-line skating.  · Cross-country skiing.  · Vigorous competitive sports, such as football, basketball, and soccer.  · Jumping rope.  · Aerobic dancing.  How can I be more active in my day-to-day activities?  · Use the stairs instead of the elevator.  · Take a walk during your lunch break.  · If you drive, park your car farther away from work or school.  · If you take public transportation, get off one stop early and walk the rest of the way.  · Make all of your phone calls while standing up and walking around.  · Get up, stretch, and walk around every 30 minutes throughout the day.  What guidelines should I follow while exercising?  · Do not exercise so much that you hurt yourself, feel dizzy, or get very short of breath.  · Consult your health care provider prior to starting a new exercise program.  · Wear comfortable clothes and shoes with good support.  · Drink plenty of water while you exercise to prevent dehydration or heat stroke. Body water is lost during exercise and must be replaced.  · Work out until you breathe faster and your heart beats faster.  This information is not intended to replace advice given to you by your health care provider. Make sure you discuss any questions you have with your health care provider.  Document Released: 01/20/2012 Document Revised: 05/25/2017 Document Reviewed: 05/21/2015  [employer] Interactive Patient Education © 2018 [employer]

## 2018-10-17 NOTE — PROGRESS NOTES
YOB: 1950  Location: [facility]  Location Address: [address] [place] [address], [facility], [address] [place] [address]  Location Phone: [phone]    Chief Complaint   Patient presents with   • Follow-up     sinus       History of Present Illness  Hannah Maki is a 67 y.o. female.  Hannah Maki is here for follow-up evaluation of ENT complaints. The patient has had problems with dizziness, sinusitis, sinus pressure, postnasal drip and allergy.  The symptoms are not localized to a particular location. The patient has had relatively stable symptoms. The symptoms have been present for the last several months The symptoms are aggravated by  no identifiable factors. The symptoms are improved by medications.  Positive for sinus problems which seems to be improving with meds.      Examination: CT neck and nasopharynx with and without contrast dated  2017.    Indication: Left parotid mass     Comparison: CT neck dated 9/50/15     Technique: Volumetric data was acquired from the level of the ventricles  to the level of the aortic arch following the intravenous injection of  contrast and reconstructed at 3 mm intervals in the axial, coronal and  sagittal plane.         Findings:  A 9 mm enhancing nodule in the superficial lobe of the left parotid lobe  is unchanged in appearance since 09/15/2015. This enhancing nodule is  immediately deep to the radiopaque marker.    Pharyngeal mucosal space of the nasopharynx, oropharynx and hypopharynx  is unremarkable without evidence of fluid collection, abscess, discrete  or enhancing mass.   Supraglottic, glottic and subglottic larynx is unremarkable.  Parapharyngeal spaces, carotid, , submandibular and  perivertebral spaces are unremarkable bilaterally.    No evidence of pathologically enlarged lymph nodes in the visualized  cervical levels. Scattered non pathologically enlarged lymph nodes are  noted bilaterally     Visualized lung apices are unremarkable  bilaterally.    Osseous structures show no suspicious lytic or blastic lesion.  Multilevel degenerative changes with disk osteophyte complexes, facet  arthrosis, uncovertebral arthrosis, without significant spinal canal or  foraminal stenosis.      Visualized intracranial contents, orbits, paranasal sinuses and nasal  cavity, remaining nasopharynx, oropharynx and oral cavity, hypopharynx  and larynx, thyroid and salivary glands are otherwise unremarkable.   Normal contrast opacification in the vessels of the neck.   Report Conclusions  IMPRESSION:   Impression:    9 mm enhancing nodule in the left parotid lobe, stable since the  09/15/2015.  Differential considerations includes primary parotid neoplasm including  BMT and lymph node             Past Medical History:   Diagnosis Date   • Anxiety    • Arthritis    • Asthma    • Chronic pansinusitis 10/14/2016   • Colon polyp    • Diverticulosis    • Dysuria    • GERD (gastroesophageal reflux disease)    • Hyperlipidemia    • Hypertension    • Hyperthyroidism    • Perennial allergic rhinitis 10/14/2016   • Rhinitis    • Tinnitus    • Vertigo        Past Surgical History:   Procedure Laterality Date   • APPENDECTOMY     • CHOLECYSTECTOMY     • COLONOSCOPY  11/18/2013     six polyps removed or destroyed, Diverticulosis  Recall 3 years   • COLONOSCOPY  11/18/2013   • COLONOSCOPY N/A 4/4/2017    Procedure: COLONOSCOPY WITH ANESTHESIA;  Surgeon: Lew Orona MD;  Location: [facility] END[facility]COPY;  Service:    • HEMORRHOIDECTOMY     • HYSTERECTOMY     • HYSTERECTOMY     • NECK SURGERY     • NOSE SURGERY      nose bleeding artery    • RECTAL FISSURE INCISION AND DRAINAGE           Current Outpatient Prescriptions:   •  esomeprazole (NexIUM) 20 MG capsule, Take 20 mg by mouth Every Morning Before Breakfast., Disp: , Rfl:   •  irbesartan (AVAPRO) 300 MG tablet, TAKE ONE TABLET BY MOUTH ONCE DAILY, Disp: 30 tablet, Rfl: 5  •  levothyroxine (SYNTHROID, LEVOTHROID) 100 MCG tablet,  Take 1 tablet by mouth Daily., Disp: 30 tablet, Rfl: 5  •  meclizine (ANTIVERT) 25 MG tablet, Take 1 tablet by mouth 3 (Three) Times a Day As Needed for dizziness (vertigo). Take one by mouth three times a day as needed for vertigo, Disp: 30 tablet, Rfl: 3  •  MethylPREDNISolone (MEDROL, HOLLY,) 4 MG tablet, Take as directed on package instructions., Disp: 21 tablet, Rfl: 0  •  montelukast (SINGULAIR) 10 MG tablet, Take 1 tablet by mouth Every Night., Disp: 30 tablet, Rfl: 11  •  mupirocin (BACTROBAN) 2 % ointment, Apply intranasally bid, Disp: 22 g, Rfl: 3    Augmentin [amoxicillin-pot clavulanate]; Azithromycin; Bactrim [sulfamethoxazole-trimethoprim]; Cefuroxime; Codeine; Erythromycin; Gatifloxacin; Latex; Meperidine; and Tetracyclines & related    Family History   Problem Relation Age of Onset   • Diabetes Brother    • Heart disease Brother    • Colon cancer Sister    • Colon cancer Sister    • Heart disease Father    • Heart attack Father        Social History     Social History   • Marital status:      Spouse name: N/A   • Number of children: N/A   • Years of education: N/A     Occupational History   • retired      Social History Main Topics   • Smoking status: Never Smoker   • Smokeless tobacco: Never Used   • Alcohol use No   • Drug use: No   • Sexual activity: Defer     Other Topics Concern   • Not on file     Social History Narrative   • No narrative on file       Review of Systems   Constitutional: Negative.  Negative for activity change, appetite change, chills, diaphoresis, fatigue, fever and unexpected weight change.   HENT: Positive for congestion, postnasal drip and sinus pressure. Negative for dental problem, drooling, ear discharge, ear pain, facial swelling, hearing loss, mouth sores, nosebleeds, rhinorrhea, sneezing, sore throat, tinnitus, trouble swallowing and voice change.         Parotid mass   Eyes: Negative.    Respiratory: Negative.    Cardiovascular: Negative.    Gastrointestinal:  Negative.    Endocrine: Negative.    Genitourinary: Negative.    Musculoskeletal: Negative.    Skin: Negative.    Allergic/Immunologic: Positive for environmental allergies. Negative for food allergies and immunocompromised state.   Neurological: Negative.    Hematological: Negative.    Psychiatric/Behavioral: Negative.        Vitals:    10/17/18 0914   BP: 132/80   Temp: 98.1 °F (36.7 °C)       Objective     Physical Exam  CONSTITUTIONAL: well nourished, alert, oriented, in no acute distress     COMMUNICATION AND VOICE: able to communicate normally, normal voice quality    HEAD: normocephalic, no lesions, atraumatic, no tenderness, no masses     FACE: appearance normal, no lesions, no tenderness, no deformities, facial motion symmetric    SALIVARY GLANDS: parotid glands with no tenderness, no swelling, no masses, submandibular glands with normal size, nontender    EYES: ocular motility normal, eyelids normal, orbits normal, no proptosis, conjunctiva normal , pupils equal, round     EARS:  Hearing: response to conversational voice normal bilaterally   External Ears: auricles without lesions  Otoscopic: tympanic membrane appearance dull, no lesions, no perforation, normal mobility, mild effusions    NOSE:  External Nose: structure normal, no tenderness on palpation, no nasal discharge, no lesions, no evidence of trauma, nostrils patent   Intranasal Exam: nasal mucosa edema and erythema, vestibule within normal limits, inferior turbinate hypertrophy, nasal septum midline      ORAL:  Lips: upper and lower lips without lesion   Teeth: dentition within normal limits for age   Gums: gingivae healthy   Oral Mucosa: oral mucosa normal, no mucosal lesions   Floor of Mouth: Warthin’s duct patent, mucosa normal  Tongue: lingual mucosa normal without lesions, normal tongue mobility   Palate: soft and hard palates with normal mucosa and structure  Oropharynx: oropharyngeal mucosa erythema and postnasal drainage    NECK: neck  appearance normal, no mass,  noted without erythema or tenderness    THYROID: no overt thyromegaly, no tenderness, nodules or mass present on palpation, position midline     LYMPH NODES: no lymphadenopathy    CHEST/RESPIRATORY: respiratory effort normal, normal breath sounds     CARDIOVASCULAR: rate and rhythm normal, extremities without cyanosis or edema      NEUROLOGIC/PSYCHIATRIC: oriented to time, place and person, mood normal, affect appropriate, CN II-XII intact grossly    Assessment/Plan   Hannah was seen today for follow-up.    Diagnoses and all orders for this visit:    Post-nasal drip    Parotid mass    Chronic pansinusitis    Mild intermittent asthma without complication    Perennial allergic rhinitis    Gastroesophageal reflux disease without esophagitis    Acquired hypothyroidism    Other orders  -     montelukast (SINGULAIR) 10 MG tablet; Take 1 tablet by mouth Every Night.      * Surgery not found *  No orders of the defined types were placed in this encounter.    Return in about 6 months (around 4/17/2019) for Recheck sinus and parotid.       Patient Instructions   Continue treatment as directed, call if symptoms get worse.      MyPlate from [employer]  The general, healthful diet is based on the 2010 Dietary Guidelines for Americans. The amount of food you need to eat from each food group depends on your age, sex, and level of physical activity and can be individualized by a dietitian. Go to ChooseMyPlate.gov for more information.  What do I need to know about the MyPlate plan?  · Enjoy your food, but eat less.  · Avoid oversized portions.  ? ½ of your plate should include fruits and vegetables.  ? ¼ of your plate should be grains.  ? ¼ of your plate should be protein.  Grains  · Make at least half of your grains whole grains.  · For a 2,000 calorie daily food plan, eat 6 oz every day.  · 1 oz is about 1 slice bread, 1 cup cereal, or ½ cup cooked rice, cereal, or pasta.  Vegetables  · Make half your plate  fruits and vegetables.  · For a 2,000 calorie daily food plan, eat 2½ cups every day.  · 1 cup is about 1 cup raw or cooked vegetables or vegetable juice or 2 cups raw leafy greens.  Fruits  · Make half your plate fruits and vegetables.  · For a 2,000 calorie daily food plan, eat 2 cups every day.  · 1 cup is about 1 cup fruit or 100% fruit juice or ½ cup dried fruit.  Protein  · For a 2,000 calorie daily food plan, eat 5½ oz every day.  · 1 oz is about 1 oz meat, poultry, or fish, ¼ cup cooked beans, 1 egg, 1 Tbsp peanut butter, or ½ oz nuts or seeds.  Dairy  · Switch to fat-free or low-fat (1%) milk.  · For a 2,000 calorie daily food plan, eat 3 cups every day.  · 1 cup is about 1 cup milk or yogurt or soy milk (soy beverage), 1½ oz natural cheese, or 2 oz processed cheese.  Fats, Oils, and Empty Calories  · Only small amounts of oils are recommended.  · Empty calories are calories from solid fats or added sugars.  · Compare sodium in foods like soup, bread, and frozen meals. Choose the foods with lower numbers.  · Drink water instead of sugary drinks.  What foods can I eat?  Grains  Whole grains such as whole wheat, quinoa, millet, and bulgur. Bread, rolls, and pasta made from whole grains. Brown or wild rice. Hot or cold cereals made from whole grains and without added sugar.  Vegetables  All fresh vegetables, especially fresh red, dark green, or orange vegetables. Peas and beans. Low-sodium frozen or canned vegetables prepared without added salt. Low-sodium vegetable juices.  Fruits  All fresh, frozen, and dried fruits. Canned fruit packed in water or fruit juice without added sugar. Fruit juices without added sugar.  Meats and Other Protein Sources  Boiled, baked, or grilled lean meat trimmed of fat. Skinless poultry. Fresh seafood and shellfish. Canned seafood packed in water. Unsalted nuts and unsalted nut butters. Tofu. Dried beans and pea. Eggs.  Dairy  Low-fat or fat-free milk, yogurt, and  cheeses.  Sweets and Desserts  Frozen desserts made from low-fat milk.  Fats and Oils  Olive, peanut, and canola oils and margarine. Salad dressing and mayonnaise made from these oils.  Other  Soups and casseroles made from allowed ingredients and without added fat or salt.  The items listed above may not be a complete list of recommended foods or beverages. Contact your dietitian for more options.  What foods are not recommended?  Grains  Sweetened, low-fiber cereals. Packaged baked goods. Snack crackers and chips. Cheese crackers, butter crackers, and biscuits. Frozen waffles, sweet breads, doughnuts, pastries, packaged baking mixes, pancakes, cakes, and cookies.  Vegetables  Regular canned or frozen vegetables or vegetables prepared with salt. Canned tomatoes. Canned tomato sauce. Fried vegetables. Vegetables in cream sauce or cheese sauce.  Fruits  Fruits packed in syrup or made with added sugar.  Meats and Other Protein Sources  Marbled or fatty meats such as ribs. Poultry with skin. Fried meats, poultry, eggs, or fish. Sausages, hot dogs, and deli meats such as pastrami, bologna, or salami.  Dairy  Whole milk, cream, cheeses made from whole milk, sour cream. Ice cream or yogurt made from whole milk or with added sugar.  Beverages  For adults, no more than one alcoholic drink per day. Regular soft drinks or other sugary beverages. Juice drinks.  Sweets and Desserts  Sugary or fatty desserts, candy, and other sweets.  Fats and Oils  Solid shortening or partially hydrogenated oils. Solid margarine. Margarine that contains trans fats. Butter.  The items listed above may not be a complete list of foods and beverages to avoid. Contact your dietitian for more information.  This information is not intended to replace advice given to you by your health care provider. Make sure you discuss any questions you have with your health care provider.  Document Released: 01/06/2009 Document Revised: 05/25/2017 Document  Reviewed: 11/26/2014  [employer] Patient Education © 2018 [employer]     Calorie Counting for Weight Loss  Calories are units of energy. Your body needs a certain amount of calories from food to keep you going throughout the day. When you eat more calories than your body needs, your body stores the extra calories as fat. When you eat fewer calories than your body needs, your body burns fat to get the energy it needs.  Calorie counting means keeping track of how many calories you eat and drink each day. Calorie counting can be helpful if you need to lose weight. If you make sure to eat fewer calories than your body needs, you should lose weight. Ask your health care provider what a healthy weight is for you.  For calorie counting to work, you will need to eat the right number of calories in a day in order to lose a healthy amount of weight per week. A dietitian can help you determine how many calories you need in a day and will give you suggestions on how to reach your calorie goal.  · A healthy amount of weight to lose per week is usually 1-2 lb (0.5-0.9 kg). This usually means that your daily calorie intake should be reduced by 500-750 calories.  · Eating 1,200 - 1,500 calories per day can help most women lose weight.  · Eating 1,500 - 1,800 calories per day can help most men lose weight.    What do I need to know about calorie counting?  In order to meet your daily calorie goal, you will need to:  · Find out how many calories are in each food you would like to eat. Try to do this before you eat.  · Decide how much of the food you plan to eat.  · Write down what you ate and how many calories it had. Doing this is called keeping a food log.    To successfully lose weight, it is important to balance calorie counting with a healthy lifestyle that includes regular activity. Aim for 150 minutes of moderate exercise (such as walking) or 75 minutes of vigorous exercise (such as running) each week.  Where do I  find calorie information?    The number of calories in a food can be found on a Nutrition Facts label. If a food does not have a Nutrition Facts label, try to look up the calories online or ask your dietitian for help.  Remember that calories are listed per serving. If you choose to have more than one serving of a food, you will have to multiply the calories per serving by the amount of servings you plan to eat. For example, the label on a package of bread might say that a serving size is 1 slice and that there are 90 calories in a serving. If you eat 1 slice, you will have eaten 90 calories. If you eat 2 slices, you will have eaten 180 calories.  How do I keep a food log?  Immediately after each meal, record the following information in your food log:  · What you ate. Don't forget to include toppings, sauces, and other extras on the food.  · How much you ate. This can be measured in cups, ounces, or number of items.  · How many calories each food and drink had.  · The total number of calories in the meal.    Keep your food log near you, such as in a small notebook in your pocket, or use a mobile maggy or website. Some programs will calculate calories for you and show you how many calories you have left for the day to meet your goal.  What are some calorie counting tips?  · Use your calories on foods and drinks that will fill you up and not leave you hungry:  ? Some examples of foods that fill you up are nuts and nut butters, vegetables, lean proteins, and high-fiber foods like whole grains. High-fiber foods are foods with more than 5 g fiber per serving.  ? Drinks such as sodas, specialty coffee drinks, alcohol, and juices have a lot of calories, yet do not fill you up.  · Eat nutritious foods and avoid empty calories. Empty calories are calories you get from foods or beverages that do not have many vitamins or protein, such as candy, sweets, and soda. It is better to have a nutritious high-calorie food (such as an  "avocado) than a food with few nutrients (such as a bag of chips).  · Know how many calories are in the foods you eat most often. This will help you calculate calorie counts faster.  · Pay attention to calories in drinks. Low-calorie drinks include water and unsweetened drinks.  · Pay attention to nutrition labels for \"low fat\" or \"fat free\" foods. These foods sometimes have the same amount of calories or more calories than the full fat versions. They also often have added sugar, starch, or salt, to make up for flavor that was removed with the fat.  · Find a way of tracking calories that works for you. Get creative. Try different apps or programs if writing down calories does not work for you.  What are some portion control tips?  · Know how many calories are in a serving. This will help you know how many servings of a certain food you can have.  · Use a measuring cup to measure serving sizes. You could also try weighing out portions on a kitchen scale. With time, you will be able to estimate serving sizes for some foods.  · Take some time to put servings of different foods on your favorite plates, bowls, and cups so you know what a serving looks like.  · Try not to eat straight from a bag or box. Doing this can lead to overeating. Put the amount you would like to eat in a cup or on a plate to make sure you are eating the right portion.  · Use smaller plates, glasses, and bowls to prevent overeating.  · Try not to multitask (for example, watch TV or use your computer) while eating. If it is time to eat, sit down at a table and enjoy your food. This will help you to know when you are full. It will also help you to be aware of what you are eating and how much you are eating.  What are tips for following this plan?  Reading food labels  · Check the calorie count compared to the serving size. The serving size may be smaller than what you are used to eating.  · Check the source of the calories. Make sure the food you are " eating is high in vitamins and protein and low in saturated and trans fats.  Shopping  · Read nutrition labels while you shop. This will help you make healthy decisions before you decide to purchase your food.  · Make a grocery list and stick to it.  Cooking  · Try to cook your favorite foods in a healthier way. For example, try baking instead of frying.  · Use low-fat dairy products.  Meal planning  · Use more fruits and vegetables. Half of your plate should be fruits and vegetables.  · Include lean proteins like poultry and fish.  How do I count calories when eating out?  · Ask for smaller portion sizes.  · Consider sharing an entree and sides instead of getting your own entree.  · If you get your own entree, eat only half. Ask for a box at the beginning of your meal and put the rest of your entree in it so you are not tempted to eat it.  · If calories are listed on the menu, choose the lower calorie options.  · Choose dishes that include vegetables, fruits, whole grains, low-fat dairy products, and lean protein.  · Choose items that are boiled, broiled, grilled, or steamed. Stay away from items that are buttered, battered, fried, or served with cream sauce. Items labeled “crispy” are usually fried, unless stated otherwise.  · Choose water, low-fat milk, unsweetened iced tea, or other drinks without added sugar. If you want an alcoholic beverage, choose a lower calorie option such as a glass of wine or light beer.  · Ask for dressings, sauces, and syrups on the side. These are usually high in calories, so you should limit the amount you eat.  · If you want a salad, choose a garden salad and ask for grilled meats. Avoid extra toppings like alberto, cheese, or fried items. Ask for the dressing on the side, or ask for olive oil and vinegar or lemon to use as dressing.  · Estimate how many servings of a food you are given. For example, a serving of cooked rice is ½ cup or about the size of half a baseball. Knowing  serving sizes will help you be aware of how much food you are eating at restaurants. The list below tells you how big or small some common portion sizes are based on everyday objects:  ? 1 oz--4 stacked dice.  ? 3 oz--1 deck of cards.  ? 1 tsp--1 die.  ? 1 Tbsp--½ a ping-pong ball.  ? 2 Tbsp--1 ping-pong ball.  ? ½ cup--½ baseball.  ? 1 cup--1 baseball.  Summary  · Calorie counting means keeping track of how many calories you eat and drink each day. If you eat fewer calories than your body needs, you should lose weight.  · A healthy amount of weight to lose per week is usually 1-2 lb (0.5-0.9 kg). This usually means reducing your daily calorie intake by 500-750 calories.  · The number of calories in a food can be found on a Nutrition Facts label. If a food does not have a Nutrition Facts label, try to look up the calories online or ask your dietitian for help.  · Use your calories on foods and drinks that will fill you up, and not on foods and drinks that will leave you hungry.  · Use smaller plates, glasses, and bowls to prevent overeating.  This information is not intended to replace advice given to you by your health care provider. Make sure you discuss any questions you have with your health care provider.  Document Released: 12/18/2006 Document Revised: 11/17/2017 Document Reviewed: 11/17/2017  [employer] Interactive Patient Education © 2018 [employer]     Exercising to Lose Weight  Exercising can help you to lose weight. In order to lose weight through exercise, you need to do vigorous-intensity exercise. You can tell that you are exercising with vigorous intensity if you are breathing very hard and fast and cannot hold a conversation while exercising.  Moderate-intensity exercise helps to maintain your current weight. You can tell that you are exercising at a moderate level if you have a higher heart rate and faster breathing, but you are still able to hold a conversation.  How often should I  exercise?  Choose an activity that you enjoy and set realistic goals. Your health care provider can help you to make an activity plan that works for you. Exercise regularly as directed by your health care provider. This may include:  · Doing resistance training twice each week, such as:  ? Push-ups.  ? Sit-ups.  ? Lifting weights.  ? Using resistance bands.  · Doing a given intensity of exercise for a given amount of time. Choose from these options:  ? 150 minutes of moderate-intensity exercise every week.  ? 75 minutes of vigorous-intensity exercise every week.  ? A mix of moderate-intensity and vigorous-intensity exercise every week.    Children, pregnant women, people who are out of shape, people who are overweight, and older adults may need to consult a health care provider for individual recommendations. If you have any sort of medical condition, be sure to consult your health care provider before starting a new exercise program.  What are some activities that can help me to lose weight?  · Walking at a rate of at least 4.5 miles an hour.  · Jogging or running at a rate of 5 miles per hour.  · Biking at a rate of at least 10 miles per hour.  · Lap swimming.  · Roller-skating or in-line skating.  · Cross-country skiing.  · Vigorous competitive sports, such as football, basketball, and soccer.  · Jumping rope.  · Aerobic dancing.  How can I be more active in my day-to-day activities?  · Use the stairs instead of the elevator.  · Take a walk during your lunch break.  · If you drive, park your car farther away from work or school.  · If you take public transportation, get off one stop early and walk the rest of the way.  · Make all of your phone calls while standing up and walking around.  · Get up, stretch, and walk around every 30 minutes throughout the day.  What guidelines should I follow while exercising?  · Do not exercise so much that you hurt yourself, feel dizzy, or get very short of breath.  · Consult  your health care provider prior to starting a new exercise program.  · Wear comfortable clothes and shoes with good support.  · Drink plenty of water while you exercise to prevent dehydration or heat stroke. Body water is lost during exercise and must be replaced.  · Work out until you breathe faster and your heart beats faster.  This information is not intended to replace advice given to you by your health care provider. Make sure you discuss any questions you have with your health care provider.  Document Released: 01/20/2012 Document Revised: 05/25/2017 Document Reviewed: 05/21/2015  [employer] Interactive Patient Education © 2018 [employer]

## 2018-10-29 ENCOUNTER — OFFICE VISIT (OUTPATIENT)
Dept: FAMILY MEDICINE CLINIC | Facility: CLINIC | Age: 68
End: 2018-10-29

## 2018-10-29 VITALS
HEIGHT: 65 IN | OXYGEN SATURATION: 94 % | SYSTOLIC BLOOD PRESSURE: 132 MMHG | HEART RATE: 74 BPM | TEMPERATURE: 99.4 F | RESPIRATION RATE: 16 BRPM | DIASTOLIC BLOOD PRESSURE: 82 MMHG | WEIGHT: 205 LBS | BODY MASS INDEX: 34.16 KG/M2

## 2018-10-29 DIAGNOSIS — J32.9 SINUSITIS, UNSPECIFIED CHRONICITY, UNSPECIFIED LOCATION: Primary | ICD-10-CM

## 2018-10-29 PROCEDURE — 99213 OFFICE O/P EST LOW 20 MIN: CPT | Performed by: FAMILY MEDICINE

## 2018-10-29 RX ORDER — AMOXICILLIN 500 MG/1
CAPSULE ORAL
Qty: 30 CAPSULE | Refills: 0 | Status: SHIPPED | OUTPATIENT
Start: 2018-10-29 | End: 2019-01-07

## 2018-10-29 NOTE — PROGRESS NOTES
Subjective   Hannah Maki is a 68 y.o. female.     Chief Complaint   Patient presents with   • Sinus Problem     sinus pressure in left eye & ear        History of Present Illness     she notes sinus pain and presssure wtih post nasl drip with low grade temp..      Current Outpatient Prescriptions:   •  amoxicillin (AMOXIL) 500 MG capsule, Tid x 10 days, Disp: 30 capsule, Rfl: 0  •  esomeprazole (NexIUM) 20 MG capsule, Take 20 mg by mouth Every Morning Before Breakfast., Disp: , Rfl:   •  irbesartan (AVAPRO) 300 MG tablet, TAKE ONE TABLET BY MOUTH ONCE DAILY, Disp: 30 tablet, Rfl: 5  •  levothyroxine (SYNTHROID, LEVOTHROID) 100 MCG tablet, Take 1 tablet by mouth Daily., Disp: 30 tablet, Rfl: 5  •  meclizine (ANTIVERT) 25 MG tablet, Take 1 tablet by mouth 3 (Three) Times a Day As Needed for dizziness (vertigo). Take one by mouth three times a day as needed for vertigo, Disp: 30 tablet, Rfl: 3  •  MethylPREDNISolone (MEDROL, HOLLY,) 4 MG tablet, Take as directed on package instructions., Disp: 21 tablet, Rfl: 0  •  montelukast (SINGULAIR) 10 MG tablet, Take 1 tablet by mouth Every Night., Disp: 30 tablet, Rfl: 11  •  mupirocin (BACTROBAN) 2 % ointment, Apply intranasally bid, Disp: 22 g, Rfl: 3  Allergies   Allergen Reactions   • Augmentin [Amoxicillin-Pot Clavulanate] Rash   • Azithromycin Nausea Only   • Bactrim [Sulfamethoxazole-Trimethoprim] Nausea And Vomiting   • Cefuroxime Nausea Only   • Codeine GI Intolerance   • Erythromycin Rash   • Gatifloxacin Nausea Only   • Latex Other (See Comments)     Patient says it pulls her skin off.   • Meperidine Nausea And Vomiting   • Tetracyclines & Related Nausea And Vomiting       Past Medical History:   Diagnosis Date   • Anxiety    • Arthritis    • Asthma    • Chronic pansinusitis 10/14/2016   • Colon polyp    • Diverticulosis    • Dysuria    • GERD (gastroesophageal reflux disease)    • Hyperlipidemia    • Hypertension    • Hyperthyroidism    • Perennial allergic  "rhinitis 10/14/2016   • Rhinitis    • Tinnitus    • Vertigo      Past Surgical History:   Procedure Laterality Date   • APPENDECTOMY     • CHOLECYSTECTOMY     • COLONOSCOPY  11/18/2013     six polyps removed or destroyed, Diverticulosis  Recall 3 years   • COLONOSCOPY  11/18/2013   • COLONOSCOPY N/A 4/4/2017    Procedure: COLONOSCOPY WITH ANESTHESIA;  Surgeon: Lew Orona MD;  Location: [facility] ENDOSCOPY;  Service:    • HEMORRHOIDECTOMY     • HYSTERECTOMY     • HYSTERECTOMY     • NECK SURGERY     • NOSE SURGERY      nose bleeding artery    • RECTAL FISSURE INCISION AND DRAINAGE         Review of Systems   Constitutional: Negative.    HENT: Positive for ear pain, postnasal drip, rhinorrhea, sinus pain and sinus pressure.    Eyes: Negative.    Respiratory: Negative.    Cardiovascular: Negative.    Gastrointestinal: Negative.    Endocrine: Negative.    Genitourinary: Negative.    Musculoskeletal: Negative.    Skin: Negative.    Allergic/Immunologic: Negative.    Neurological: Negative.    Hematological: Negative.    Psychiatric/Behavioral: Negative.        Objective  /82   Pulse 74   Temp 99.4 °F (37.4 °C)   Resp 16   Ht 165.1 cm (65\")   Wt 93 kg (205 lb)   LMP 10/04/1981   SpO2 94%   BMI 34.11 kg/m²   Physical Exam   Constitutional: She is oriented to person, place, and time. She appears well-developed and well-nourished.   HENT:   Head: Normocephalic and atraumatic.   Right Ear: External ear normal.   Left Ear: External ear normal.   Nose: Nose normal.   Mouth/Throat: Oropharynx is clear and moist.   Noted post nasal drip   Eyes: Pupils are equal, round, and reactive to light. Conjunctivae and EOM are normal.   Neck: Normal range of motion. Neck supple.   Cardiovascular: Normal rate, regular rhythm, normal heart sounds and intact distal pulses.    Pulmonary/Chest: Effort normal and breath sounds normal.   Abdominal: Soft. Bowel sounds are normal.   Musculoskeletal: Normal range of motion. "   Neurological: She is alert and oriented to person, place, and time.   Skin: Skin is warm. Capillary refill takes less than 2 seconds.   Psychiatric: She has a normal mood and affect. Her behavior is normal. Judgment and thought content normal.   Nursing note and vitals reviewed.      Assessment/Plan   Hannah was seen today for sinus problem.    Diagnoses and all orders for this visit:    Sinusitis, unspecified chronicity, unspecified location    Other orders  -     amoxicillin (AMOXIL) 500 MG capsule; Tid x 10 days        Keep me informed         No orders of the defined types were placed in this encounter.      Follow up--next apptr

## 2018-11-19 RX ORDER — MONTELUKAST SODIUM 10 MG/1
10 TABLET ORAL NIGHTLY
Qty: 30 TABLET | Refills: 11 | Status: SHIPPED | OUTPATIENT
Start: 2018-11-19 | End: 2019-11-18 | Stop reason: SDUPTHER

## 2018-12-27 ENCOUNTER — TELEPHONE (OUTPATIENT)
Dept: FAMILY MEDICINE CLINIC | Facility: CLINIC | Age: 68
End: 2018-12-27

## 2018-12-27 RX ORDER — AMOXICILLIN 250 MG/1
250 CAPSULE ORAL 3 TIMES DAILY
Qty: 30 CAPSULE | Refills: 0 | Status: SHIPPED | OUTPATIENT
Start: 2018-12-27 | End: 2019-01-07

## 2018-12-27 NOTE — TELEPHONE ENCOUNTER
She dont know if she can take it or not she would rather just have the amoxil like she has had in the past

## 2018-12-27 NOTE — TELEPHONE ENCOUNTER
Pt called guzman that she has couging congestion sore throat she is asking for meds to be called in [facility]? [phone]

## 2019-01-07 ENCOUNTER — TELEPHONE (OUTPATIENT)
Dept: FAMILY MEDICINE CLINIC | Facility: CLINIC | Age: 69
End: 2019-01-07

## 2019-01-07 RX ORDER — AMOXICILLIN 500 MG/1
500 CAPSULE ORAL 3 TIMES DAILY
Qty: 42 CAPSULE | Refills: 0 | OUTPATIENT
Start: 2019-01-07 | End: 2019-03-11

## 2019-01-07 RX ORDER — AMOXICILLIN 500 MG/1
1000 CAPSULE ORAL 3 TIMES DAILY
Qty: 42 CAPSULE | Refills: 0 | Status: SHIPPED | OUTPATIENT
Start: 2019-01-07 | End: 2019-01-07

## 2019-01-07 NOTE — TELEPHONE ENCOUNTER
Hannah called & said that she just finished abx but still have a productive cough w/ green mucus.  She is req to have something called in for [facility].

## 2019-01-15 ENCOUNTER — TELEPHONE (OUTPATIENT)
Dept: OTOLARYNGOLOGY | Facility: CLINIC | Age: 69
End: 2019-01-15

## 2019-01-15 RX ORDER — LEVOTHYROXINE SODIUM 0.1 MG/1
100 TABLET ORAL DAILY
Qty: 30 TABLET | Refills: 5 | Status: SHIPPED | OUTPATIENT
Start: 2019-01-15 | End: 2019-07-16 | Stop reason: SDUPTHER

## 2019-03-11 ENCOUNTER — TELEPHONE (OUTPATIENT)
Dept: FAMILY MEDICINE CLINIC | Facility: CLINIC | Age: 69
End: 2019-03-11

## 2019-03-11 RX ORDER — AMOXICILLIN 250 MG/1
250 CAPSULE ORAL 3 TIMES DAILY
Qty: 20 CAPSULE | Refills: 0 | Status: SHIPPED | OUTPATIENT
Start: 2019-03-11 | End: 2019-04-15

## 2019-04-01 ENCOUNTER — OFFICE VISIT (OUTPATIENT)
Dept: RETAIL CLINIC | Facility: CLINIC | Age: 69
End: 2019-04-01

## 2019-04-01 DIAGNOSIS — M89.8X1 PAIN OF LEFT SCAPULA: Primary | ICD-10-CM

## 2019-04-02 NOTE — PROGRESS NOTES
Upon gathering history from patient, she states the pain starts in her left posterior scapula and radiates up into the left side of her neck.  She describes the pain as constant dull pain.  She currently rates it an 8/10 and states she has gotten hardly any relief today and is taking Tylenol extra strength every 6 hours.  She states she has a history of fracturing that scapula several years ago and a history of neck surgery.  Recommended she go to [facility] this afternoon for evaluation, as this could require additional work up that we cannot provide at this office.  Patient voiced understanding and in agreement.  She states her  will take her there now.

## 2019-04-07 ENCOUNTER — HOSPITAL ENCOUNTER (EMERGENCY)
Facility: HOSPITAL | Age: 69
Discharge: HOME | End: 2019-04-08
Admitting: EMERGENCY MEDICINE

## 2019-04-07 DIAGNOSIS — M54.2 NECK PAIN: Primary | ICD-10-CM

## 2019-04-07 PROCEDURE — 25010000002 KETOROLAC TROMETHAMINE PER 15 MG: Performed by: PHYSICIAN ASSISTANT

## 2019-04-07 PROCEDURE — 93005 ELECTROCARDIOGRAM TRACING: CPT

## 2019-04-07 PROCEDURE — 96372 THER/PROPH/DIAG INJ SC/IM: CPT

## 2019-04-07 PROCEDURE — 25010000002 ORPHENADRINE CITRATE PER 60 MG: Performed by: PHYSICIAN ASSISTANT

## 2019-04-07 PROCEDURE — 93010 ELECTROCARDIOGRAM REPORT: CPT | Performed by: INTERNAL MEDICINE

## 2019-04-07 PROCEDURE — 99283 EMERGENCY DEPT VISIT LOW MDM: CPT

## 2019-04-07 RX ORDER — KETOROLAC TROMETHAMINE 30 MG/ML
30 INJECTION, SOLUTION INTRAMUSCULAR; INTRAVENOUS ONCE
Status: COMPLETED | OUTPATIENT
Start: 2019-04-07 | End: 2019-04-07

## 2019-04-07 RX ORDER — ORPHENADRINE CITRATE 30 MG/ML
60 INJECTION INTRAMUSCULAR; INTRAVENOUS ONCE
Status: COMPLETED | OUTPATIENT
Start: 2019-04-07 | End: 2019-04-07

## 2019-04-07 RX ADMIN — ORPHENADRINE CITRATE 60 MG: 30 INJECTION INTRAMUSCULAR; INTRAVENOUS at 23:16

## 2019-04-07 RX ADMIN — KETOROLAC TROMETHAMINE 30 MG: 30 INJECTION, SOLUTION INTRAMUSCULAR; INTRAVENOUS at 23:15

## 2019-04-08 VITALS
HEART RATE: 75 BPM | TEMPERATURE: 98.4 F | RESPIRATION RATE: 17 BRPM | DIASTOLIC BLOOD PRESSURE: 60 MMHG | BODY MASS INDEX: 34.16 KG/M2 | HEIGHT: 65 IN | WEIGHT: 205 LBS | OXYGEN SATURATION: 94 % | SYSTOLIC BLOOD PRESSURE: 131 MMHG

## 2019-04-08 RX ORDER — MELOXICAM 15 MG/1
15 TABLET ORAL DAILY
Qty: 15 TABLET | Refills: 0 | Status: SHIPPED | OUTPATIENT
Start: 2019-04-08 | End: 2019-08-05

## 2019-04-08 NOTE — ED PROVIDER NOTES
Subjective   68-year-old  female with acute on chronic neck pain.  Patient reports that she did have a cervical fusion in the 1990s.  States that she has had intermittent pain since then however she has had significant worsening pain over the last 2 weeks.  Patient denies any new traumatic injury.  She describes midline neck pain with radiation to her left shoulder.  She denies any weakness, numbness in her upper extremities.  She does report increased pain with movement of her neck with shooting sensations.  Patient states she was seen at the [facility] last week and they prescribed her Medrol Dosepak, muscle relaxers and gave her a shot of Toradol.  States this did not improve her symptoms.        History provided by:  Patient   used: No        Review of Systems   Constitutional: Negative for chills and fever.   HENT: Negative for congestion and sore throat.    Respiratory: Negative for cough and shortness of breath.    Cardiovascular: Negative for chest pain and palpitations.   Gastrointestinal: Negative for abdominal pain, nausea and vomiting.   Genitourinary: Negative for dysuria and hematuria.   Musculoskeletal: Positive for neck pain. Negative for arthralgias.   Skin: Negative for rash and wound.   Neurological: Negative for dizziness, weakness and headaches.   Hematological: Negative for adenopathy. Does not bruise/bleed easily.       Past Medical History:   Diagnosis Date   • Anxiety    • Arthritis    • Asthma    • Chronic pansinusitis 10/14/2016   • Colon polyp    • Diverticulosis    • Dysuria    • GERD (gastroesophageal reflux disease)    • Hyperlipidemia    • Hypertension    • Hyperthyroidism    • Perennial allergic rhinitis 10/14/2016   • Rhinitis    • Tinnitus    • Vertigo        Allergies   Allergen Reactions   • Augmentin [Amoxicillin-Pot Clavulanate] Rash   • Azithromycin Nausea Only   • Bactrim [Sulfamethoxazole-Trimethoprim] Nausea And Vomiting   • Cefuroxime  Nausea Only   • Codeine GI Intolerance   • Erythromycin Rash   • Gatifloxacin Nausea Only   • Latex Other (See Comments)     Patient says it pulls her skin off.   • Meperidine Nausea And Vomiting   • Tetracyclines & Related Nausea And Vomiting       Past Surgical History:   Procedure Laterality Date   • APPENDECTOMY     • CHOLECYSTECTOMY     • COLONOSCOPY  11/18/2013     six polyps removed or destroyed, Diverticulosis  Recall 3 years   • COLONOSCOPY  11/18/2013   • COLONOSCOPY N/A 4/4/2017    Procedure: COLONOSCOPY WITH ANESTHESIA;  Surgeon: Lew Orona MD;  Location: [facility] ENDOSCOPY;  Service:    • HEMORRHOIDECTOMY     • HYSTERECTOMY     • HYSTERECTOMY     • NECK SURGERY     • NOSE SURGERY      nose bleeding artery    • RECTAL FISSURE INCISION AND DRAINAGE         Family History   Problem Relation Age of Onset   • Diabetes Brother    • Heart disease Brother    • Colon cancer Sister    • Colon cancer Sister    • Heart disease Father    • Heart attack Father        Social History     Socioeconomic History   • Marital status:      Spouse name: Not on file   • Number of children: Not on file   • Years of education: Not on file   • Highest education level: Not on file   Occupational History   • Occupation: retired   Tobacco Use   • Smoking status: Never Smoker   • Smokeless tobacco: Never Used   Substance and Sexual Activity   • Alcohol use: No   • Drug use: No   • Sexual activity: Defer       Lab Results (last 24 hours)     ** No results found for the last 24 hours. **          Objective   Physical Exam   Constitutional: She is oriented to person, place, and time. She appears well-developed and well-nourished. No distress.   HENT:   Head: Normocephalic and atraumatic.   Right Ear: External ear normal.   Left Ear: External ear normal.   Mouth/Throat: Oropharynx is clear and moist.   Eyes: Conjunctivae and EOM are normal. Pupils are equal, round, and reactive to light.   Neck: Spinous process tenderness and  "muscular tenderness present. Decreased range of motion present.   Midline neck tenderness with muscular tenderness of the left trapezius and posterior scapular area.  Positive Spurling's test.    5 out of 5 strength in the bilateral upper extremities.  Normal  strength.   Cardiovascular: Normal rate, regular rhythm, normal heart sounds and intact distal pulses. Exam reveals no friction rub.   No murmur heard.  Pulmonary/Chest: Effort normal and breath sounds normal. No respiratory distress. She has no wheezes. She has no rales. She exhibits no tenderness.   Abdominal: Soft. Bowel sounds are normal. She exhibits no distension and no mass. There is no tenderness. There is no rebound and no guarding.   Neurological: She is alert and oriented to person, place, and time.   Skin: Skin is warm and dry. Capillary refill takes less than 2 seconds. She is not diaphoretic.   Psychiatric: She has a normal mood and affect. Her behavior is normal.   Nursing note and vitals reviewed.      Procedures         No orders to display       /73 (BP Location: Right arm, Patient Position: Sitting)   Pulse 82   Temp 98.5 °F (36.9 °C) (Oral)   Resp 18   Ht 165.1 cm (65\")   Wt 93 kg (205 lb)   LMP 10/04/1981   SpO2 98%   BMI 34.11 kg/m²     ED Course         Medications   orphenadrine (NORFLEX) injection 60 mg (60 mg Intramuscular Given 4/7/19 3770)   ketorolac (TORADOL) injection 30 mg (30 mg Intramuscular Given 4/7/19 9300)            MDM  Number of Diagnoses or Management Options  Neck pain:   Diagnosis management comments: This 68-year-old female with acute on chronic worsening neck pain with radiation to the left shoulder.  Has been ongoing for 2 weeks.  Worsened with neck movement.  Denies chest pain, shortness of breath, leg swelling.  Seems very radicular and presentation.  Positive Spurling's test.  There is no weakness or associated numbness or tingling.  Symptoms improved with NSAIDs and muscle relaxer here in the " emergency department.  Will have patient follow-up with neurosurgery as soon as possible for further evaluation and management of her symptoms.  She is agreeable to plan at this time       Amount and/or Complexity of Data Reviewed  Tests in the medicine section of CPT®: reviewed    Risk of Complications, Morbidity, and/or Mortality  Presenting problems: low  Diagnostic procedures: low  Management options: low    Patient Progress  Patient progress: improved      Final diagnoses:   Neck pain          Khris Walters PA-C  04/08/19 0010

## 2019-04-08 NOTE — DISCHARGE INSTRUCTIONS
As discussed please follow-up with the neurosurgeon, Dr. Burns's office for further evaluation and management of your symptoms.  You will likely need further testing.  Return to the ER if you develop any new, worsening or other concerning symptoms such as those listed below.      Contact a health care provider if:  Your condition does not improve with treatment.  Get help right away if:  Your pain gets much worse and cannot be controlled with medicines.  You have weakness or numbness in your hand, arm, face, or leg.  You have a high fever.  You have a stiff, rigid neck.  You lose control of your bowels or your bladder (have incontinence).  You have trouble with walking, balance, or speaking.

## 2019-04-09 ENCOUNTER — HOSPITAL ENCOUNTER (OUTPATIENT)
Dept: GENERAL RADIOLOGY | Facility: HOSPITAL | Age: 69
Discharge: HOME | End: 2019-04-09
Admitting: NURSE PRACTITIONER

## 2019-04-09 ENCOUNTER — OFFICE VISIT (OUTPATIENT)
Dept: NEUROSURGERY | Facility: CLINIC | Age: 69
End: 2019-04-09

## 2019-04-09 VITALS
SYSTOLIC BLOOD PRESSURE: 160 MMHG | WEIGHT: 212.2 LBS | DIASTOLIC BLOOD PRESSURE: 70 MMHG | HEIGHT: 65 IN | BODY MASS INDEX: 35.35 KG/M2

## 2019-04-09 DIAGNOSIS — M54.2 CERVICALGIA: ICD-10-CM

## 2019-04-09 DIAGNOSIS — Z78.9 NON-SMOKER: ICD-10-CM

## 2019-04-09 DIAGNOSIS — M25.512 ACUTE PAIN OF LEFT SHOULDER: ICD-10-CM

## 2019-04-09 DIAGNOSIS — M54.2 CERVICALGIA: Primary | ICD-10-CM

## 2019-04-09 PROCEDURE — 72052 X-RAY EXAM NECK SPINE 6/>VWS: CPT

## 2019-04-09 PROCEDURE — 99214 OFFICE O/P EST MOD 30 MIN: CPT | Performed by: NURSE PRACTITIONER

## 2019-04-09 PROCEDURE — 73030 X-RAY EXAM OF SHOULDER: CPT

## 2019-04-09 RX ORDER — TIZANIDINE 4 MG/1
TABLET ORAL
Refills: 0 | COMMUNITY
Start: 2019-04-01 | End: 2019-08-05

## 2019-04-09 NOTE — PATIENT INSTRUCTIONS

## 2019-04-09 NOTE — PROGRESS NOTES
Chief complaint:   Chief Complaint   Patient presents with   • Neck Pain     Hannah has been referred here today through the [facility] ER for her severe neck pain, she states it started on 04/01/19 and this pain also goes into her left shoulder and left arm. She also went to the [facility] and they only gave her a steroid pack and muscle relaxers but nothing is touching this.  She has not had any physical therapy.  She did have a prevous neck surgery in the 90's she thinks by Dr. Box, but did well had no problems.        Subjective     HPI: This is a 68-year-old male patient who was referred to us by Dr. Deni Lee for neck and left shoulder and arm pain.  She is here to be evaluated today and she says that this started on April 1.  There is no accident or injury associated with this.  She states the pain seemed to start in the top of her left shoulder blade and has radiated up to her neck but then she can also get some pain radiating down into her left arm down to her elbow.  Denies any numbness or tingling.  Rates the pain on a scale 0-10 at a 7.  She says it does interfere with activities of daily living.  She is right-hand dominant.  She is retired.  She is .  Denies any tobacco, alcohol, or illicit drug use.  The pain is constant.  She says there is no modifying factors for it.  Denies any bowel or bladder incontinence.  He has not done any recent physical therapy or pain management injections.  She has tried chiropractor with minimal relief.  Past medical history significant for hypertension, hypothyroidism, acid reflux.    Review of Systems   Constitutional: Positive for unexpected weight change.   HENT: Positive for postnasal drip, sinus pressure, tinnitus and trouble swallowing.    Respiratory: Positive for shortness of breath.    Cardiovascular: Positive for leg swelling.   Gastrointestinal: Positive for nausea.   Musculoskeletal: Positive for joint swelling and neck pain.   Allergic/Immunologic:  "Positive for environmental allergies and food allergies.   Neurological: Positive for dizziness.   Hematological: Bruises/bleeds easily.   All other systems reviewed and are negative.       Past Medical History:   Diagnosis Date   • Anxiety    • Arthritis    • Asthma    • Chronic pansinusitis 10/14/2016   • Colon polyp    • Diverticulosis    • Dysuria    • GERD (gastroesophageal reflux disease)    • Hyperlipidemia    • Hypertension    • Hyperthyroidism    • Perennial allergic rhinitis 10/14/2016   • Rhinitis    • Tinnitus    • Vertigo      Past Surgical History:   Procedure Laterality Date   • APPENDECTOMY     • CHOLECYSTECTOMY     • COLONOSCOPY  11/18/2013     six polyps removed or destroyed, Diverticulosis  Recall 3 years   • COLONOSCOPY  11/18/2013   • COLONOSCOPY N/A 4/4/2017    Procedure: COLONOSCOPY WITH ANESTHESIA;  Surgeon: Lew Orona MD;  Location: [facility] ENDOSCOPY;  Service:    • HEMORRHOIDECTOMY     • HYSTERECTOMY     • HYSTERECTOMY     • NECK SURGERY     • NOSE SURGERY      nose bleeding artery    • RECTAL FISSURE INCISION AND DRAINAGE       Family History   Problem Relation Age of Onset   • Diabetes Brother    • Heart disease Brother    • Colon cancer Sister    • Colon cancer Sister    • Heart disease Father    • Heart attack Father      Social History     Tobacco Use   • Smoking status: Never Smoker   • Smokeless tobacco: Never Used   Substance Use Topics   • Alcohol use: No   • Drug use: No       (Not in a hospital admission)  Allergies:  Augmentin [amoxicillin-pot clavulanate]; Azithromycin; Bactrim [sulfamethoxazole-trimethoprim]; Cefuroxime; Codeine; Erythromycin; Gatifloxacin; Latex; Meperidine; and Tetracyclines & related    Objective      Vital Signs  /70 (BP Location: Right arm, Patient Position: Sitting)   Ht 165.1 cm (65\")   Wt 96.3 kg (212 lb 3.2 oz)   LMP 10/04/1981   BMI 35.31 kg/m²     Physical Exam   Constitutional: She is oriented to person, place, and time. She appears " well-developed and well-nourished.   HENT:   Head: Normocephalic.   Eyes: Conjunctivae, EOM and lids are normal. Pupils are equal, round, and reactive to light.   Neck: Normal range of motion.   Cardiovascular: Normal rate, regular rhythm and normal heart sounds.   Pulmonary/Chest: Effort normal and breath sounds normal.   Abdominal: Normal appearance.   Musculoskeletal: Normal range of motion.        Cervical back: She exhibits pain.   Neurological: She is alert and oriented to person, place, and time. She has normal strength. She displays normal reflexes. No cranial nerve deficit or sensory deficit. Gait normal. GCS eye subscore is 4. GCS verbal subscore is 5. GCS motor subscore is 6.   Reflex Scores:       Bicep reflexes are 3+ on the right side and 3+ on the left side.       Brachioradialis reflexes are 3+ on the right side and 3+ on the left side.       Patellar reflexes are 2+ on the right side and 2+ on the left side.  No Mantilla's or clonus   Skin: Skin is warm.   Psychiatric: She has a normal mood and affect. Her speech is normal and behavior is normal. Thought content normal. Cognition and memory are normal.       Results Review: No new imaging          Assessment/Plan:   For first line conservative care of cervical pain, I would like to send Ms. Maki for a dedicated course of physician directed physical therapy consisting of 2-3 times a week for 4-6 weeks.  I am also going to send her for set of x-rays of her cervical spine and her left shoulder.    Return for reassessment with me after 6-8 weeks after physical therapy.     Should Ms. Maki not have any improvement from physical therapy, I think it would be prudent to send the patient for an MRI of the cervical spine spine to see if there is anything from a surgical standpoint that needs to be addressed.     I advised the patient to call and return sooner for new or worsening complaints of weakness, paresthesias, gait disturbances, or any additional  concerns.  Treatment options discussed in detail with Hannah and the patient voiced understanding.  Ms. Maki agrees with this plan of care.    BMI shows the patient is Obese. BMI chart was given to the patient. Patient is a non-smoker    Hannah was seen today for neck pain.    Diagnoses and all orders for this visit:    Cervicalgia  -     XR Spine Cervical Complete With Obli Flex Ext; Future  -     Ambulatory Referral to Physical Therapy Evaluate and treat (2-3 days a week for 4-6 weeks )    Acute pain of left shoulder  -     XR Shoulder 2+ View Left; Future  -     Ambulatory Referral to Physical Therapy Evaluate and treat (2-3 days a week for 4-6 weeks )    Adult BMI 35.0-35.9 kg/sq m    Non-smoker          I discussed the patients findings and my recommendations with patient    Abdelrahman Orona, FADI  04/09/19  10:57 AM

## 2019-04-15 ENCOUNTER — OFFICE VISIT (OUTPATIENT)
Dept: FAMILY MEDICINE CLINIC | Facility: CLINIC | Age: 69
End: 2019-04-15

## 2019-04-15 VITALS
BODY MASS INDEX: 35.49 KG/M2 | HEIGHT: 65 IN | SYSTOLIC BLOOD PRESSURE: 132 MMHG | OXYGEN SATURATION: 98 % | RESPIRATION RATE: 16 BRPM | HEART RATE: 76 BPM | WEIGHT: 213 LBS | DIASTOLIC BLOOD PRESSURE: 86 MMHG

## 2019-04-15 DIAGNOSIS — M54.2 CERVICALGIA: ICD-10-CM

## 2019-04-15 DIAGNOSIS — E03.9 ACQUIRED HYPOTHYROIDISM: ICD-10-CM

## 2019-04-15 DIAGNOSIS — I10 ESSENTIAL HYPERTENSION: Primary | ICD-10-CM

## 2019-04-15 DIAGNOSIS — K21.9 GASTROESOPHAGEAL REFLUX DISEASE WITHOUT ESOPHAGITIS: ICD-10-CM

## 2019-04-15 DIAGNOSIS — Z79.899 OTHER LONG TERM (CURRENT) DRUG THERAPY: ICD-10-CM

## 2019-04-15 PROCEDURE — 99214 OFFICE O/P EST MOD 30 MIN: CPT | Performed by: FAMILY MEDICINE

## 2019-04-15 RX ORDER — TRAMADOL HYDROCHLORIDE 50 MG/1
50 TABLET ORAL EVERY 6 HOURS PRN
Qty: 25 TABLET | Refills: 0 | Status: SHIPPED | OUTPATIENT
Start: 2019-04-15 | End: 2019-08-05

## 2019-04-15 RX ORDER — VALSARTAN 160 MG/1
160 TABLET ORAL DAILY
Qty: 30 TABLET | Refills: 5 | Status: SHIPPED | OUTPATIENT
Start: 2019-04-15 | End: 2019-04-29

## 2019-04-15 NOTE — PROGRESS NOTES
Subjective   Hannah Maki is a 68 y.o. female.     Chief Complaint   Patient presents with   • Follow-up     6 mo   htn   hyperlipidemia        History of Present Illness     she has been seeing dr cruz for some neck pain--she has htn and thinks her bp has been up with the pain---she notes synthroid is tolerated witut heat or cold intolances...her gerd symptoms are stable witou dysphagia..      Current Outpatient Medications:   •  esomeprazole (NexIUM) 20 MG capsule, Take 20 mg by mouth Every Morning Before Breakfast., Disp: , Rfl:   •  irbesartan (AVAPRO) 300 MG tablet, TAKE ONE TABLET BY MOUTH ONCE DAILY, Disp: 30 tablet, Rfl: 5  •  levothyroxine (SYNTHROID, LEVOTHROID) 100 MCG tablet, Take 1 tablet by mouth Daily., Disp: 30 tablet, Rfl: 5  •  meclizine (ANTIVERT) 25 MG tablet, Take 1 tablet by mouth 3 (Three) Times a Day As Needed for dizziness (vertigo). Take one by mouth three times a day as needed for vertigo, Disp: 30 tablet, Rfl: 3  •  meloxicam (MOBIC) 15 MG tablet, Take 1 tablet by mouth Daily., Disp: 15 tablet, Rfl: 0  •  montelukast (SINGULAIR) 10 MG tablet, Take 1 tablet by mouth Every Night., Disp: 30 tablet, Rfl: 11  •  mupirocin (BACTROBAN) 2 % ointment, Apply intranasally bid, Disp: 22 g, Rfl: 3  •  tiZANidine (ZANAFLEX) 4 MG tablet, , Disp: , Rfl: 0  •  traMADol (ULTRAM) 50 MG tablet, Take 1 tablet by mouth Every 6 (Six) Hours As Needed for Moderate Pain ., Disp: 25 tablet, Rfl: 0  Allergies   Allergen Reactions   • Augmentin [Amoxicillin-Pot Clavulanate] Rash   • Azithromycin Nausea Only   • Bactrim [Sulfamethoxazole-Trimethoprim] Nausea And Vomiting   • Cefuroxime Nausea Only   • Codeine GI Intolerance   • Erythromycin Rash   • Gatifloxacin Nausea Only   • Latex Other (See Comments)     Patient says it pulls her skin off.   • Meperidine Nausea And Vomiting   • Tetracyclines & Related Nausea And Vomiting       Past Medical History:   Diagnosis Date   • Anxiety    • Arthritis    • Asthma    •  "Chronic pansinusitis 10/14/2016   • Colon polyp    • Diverticulosis    • Dysuria    • GERD (gastroesophageal reflux disease)    • Hyperlipidemia    • Hypertension    • Hyperthyroidism    • Perennial allergic rhinitis 10/14/2016   • Rhinitis    • Tinnitus    • Vertigo      Past Surgical History:   Procedure Laterality Date   • APPENDECTOMY     • CHOLECYSTECTOMY     • COLONOSCOPY  11/18/2013     six polyps removed or destroyed, Diverticulosis  Recall 3 years   • COLONOSCOPY  11/18/2013   • COLONOSCOPY N/A 4/4/2017    Procedure: COLONOSCOPY WITH ANESTHESIA;  Surgeon: Lew Orona MD;  Location: [facility] ENDOSCOPY;  Service:    • HEMORRHOIDECTOMY     • HYSTERECTOMY     • HYSTERECTOMY     • NECK SURGERY     • NOSE SURGERY      nose bleeding artery    • RECTAL FISSURE INCISION AND DRAINAGE         Review of Systems   Constitutional: Negative.    HENT: Negative.    Eyes: Negative.    Respiratory: Negative.    Cardiovascular: Negative.    Gastrointestinal: Negative.    Endocrine: Negative.    Genitourinary: Negative.    Musculoskeletal: Negative.    Skin: Negative.    Allergic/Immunologic: Negative.    Neurological: Negative.    Hematological: Negative.    Psychiatric/Behavioral: Negative.        Objective  /86   Pulse 76   Resp 16   Ht 165.1 cm (65\")   Wt 96.6 kg (213 lb)   LMP 10/04/1981   SpO2 98%   BMI 35.45 kg/m²   Physical Exam   Constitutional: She is oriented to person, place, and time. She appears well-developed and well-nourished.   HENT:   Head: Normocephalic and atraumatic.   Right Ear: External ear normal.   Left Ear: External ear normal.   Nose: Nose normal.   Mouth/Throat: Oropharynx is clear and moist.   Eyes: Conjunctivae and EOM are normal. Pupils are equal, round, and reactive to light.   Neck: Normal range of motion. Neck supple.   Cardiovascular: Normal rate, regular rhythm, normal heart sounds and intact distal pulses.   Pulmonary/Chest: Effort normal and breath sounds normal. "   Abdominal: Soft. Bowel sounds are normal.   Musculoskeletal: Normal range of motion.   Neurological: She is alert and oriented to person, place, and time.   Skin: Skin is warm and dry. Capillary refill takes less than 2 seconds.   Psychiatric: She has a normal mood and affect. Her behavior is normal. Judgment and thought content normal.   Nursing note and vitals reviewed.      Assessment/Plan   Hannah was seen today for follow-up.    Diagnoses and all orders for this visit:    Essential hypertension  -     CBC w AUTO Differential  -     Comprehensive metabolic panel  -     Lipid Panel With / Chol / HDL Ratio  -     TSH    Acquired hypothyroidism  -     Hemoglobin A1c    Gastroesophageal reflux disease without esophagitis    Cervicalgia    Other long term (current) drug therapy   -     Hemoglobin A1c    Other orders  -     traMADol (ULTRAM) 50 MG tablet; Take 1 tablet by mouth Every 6 (Six) Hours As Needed for Moderate Pain .      Patient's Body mass index is 35.45 kg/m². BMI is above normal parameters. Recommendations include: nutrition counseling.           Orders Placed This Encounter   Procedures   • Comprehensive metabolic panel   • Lipid Panel With / Chol / HDL Ratio   • TSH   • Hemoglobin A1c   • CBC w AUTO Differential     Order Specific Question:   Manual Differential     Answer:   No     Current outpatient and discharge medications have been reconciled for the patient.  Reviewed by: Deni Henry MD  Follow up: 6 month(s)

## 2019-04-16 ENCOUNTER — TELEPHONE (OUTPATIENT)
Dept: FAMILY MEDICINE CLINIC | Facility: CLINIC | Age: 69
End: 2019-04-16

## 2019-04-16 LAB
ALBUMIN SERPL-MCNC: 4.7 G/DL (ref 3.5–5.2)
ALBUMIN/GLOB SERPL: 1.6 G/DL
ALP SERPL-CCNC: 132 U/L (ref 39–117)
ALT SERPL-CCNC: 103 U/L (ref 1–33)
AST SERPL-CCNC: 45 U/L (ref 1–32)
BASOPHILS # BLD AUTO: 0.16 10*3/MM3 (ref 0–0.2)
BASOPHILS NFR BLD AUTO: 1.2 % (ref 0–1.5)
BILIRUB SERPL-MCNC: 0.5 MG/DL (ref 0.2–1.2)
BUN SERPL-MCNC: 14 MG/DL (ref 8–23)
BUN/CREAT SERPL: 17.5 (ref 7–25)
CALCIUM SERPL-MCNC: 10.1 MG/DL (ref 8.6–10.5)
CHLORIDE SERPL-SCNC: 101 MMOL/L (ref 98–107)
CHOLEST SERPL-MCNC: 250 MG/DL (ref 0–200)
CHOLEST/HDLC SERPL: 4.17 {RATIO}
CO2 SERPL-SCNC: 26.3 MMOL/L (ref 22–29)
CREAT SERPL-MCNC: 0.8 MG/DL (ref 0.57–1)
EOSINOPHIL # BLD AUTO: 0.29 10*3/MM3 (ref 0–0.4)
EOSINOPHIL NFR BLD AUTO: 2.3 % (ref 0.3–6.2)
ERYTHROCYTE [DISTWIDTH] IN BLOOD BY AUTOMATED COUNT: 13.1 % (ref 12.3–15.4)
GLOBULIN SER CALC-MCNC: 2.9 GM/DL
GLUCOSE SERPL-MCNC: 106 MG/DL (ref 65–99)
HBA1C MFR BLD: 6 % (ref 4.8–5.6)
HCT VFR BLD AUTO: 47.9 % (ref 34–46.6)
HDLC SERPL-MCNC: 60 MG/DL (ref 40–60)
HGB BLD-MCNC: 15.8 G/DL (ref 12–15.9)
IMM GRANULOCYTES # BLD AUTO: 0.15 10*3/MM3 (ref 0–0.05)
IMM GRANULOCYTES NFR BLD AUTO: 1.2 % (ref 0–0.5)
LDLC SERPL CALC-MCNC: 158 MG/DL (ref 0–100)
LYMPHOCYTES # BLD AUTO: 4.63 10*3/MM3 (ref 0.7–3.1)
LYMPHOCYTES NFR BLD AUTO: 35.9 % (ref 19.6–45.3)
MCH RBC QN AUTO: 31.4 PG (ref 26.6–33)
MCHC RBC AUTO-ENTMCNC: 33 G/DL (ref 31.5–35.7)
MCV RBC AUTO: 95.2 FL (ref 79–97)
MONOCYTES # BLD AUTO: 1 10*3/MM3 (ref 0.1–0.9)
MONOCYTES NFR BLD AUTO: 7.8 % (ref 5–12)
NEUTROPHILS # BLD AUTO: 6.65 10*3/MM3 (ref 1.4–7)
NEUTROPHILS NFR BLD AUTO: 51.6 % (ref 42.7–76)
NRBC BLD AUTO-RTO: 0.1 /100 WBC (ref 0–0)
PLATELET # BLD AUTO: 273 10*3/MM3 (ref 140–450)
POTASSIUM SERPL-SCNC: 4.5 MMOL/L (ref 3.5–5.2)
PROT SERPL-MCNC: 7.6 G/DL (ref 6–8.5)
RBC # BLD AUTO: 5.03 10*6/MM3 (ref 3.77–5.28)
SODIUM SERPL-SCNC: 141 MMOL/L (ref 136–145)
TRIGL SERPL-MCNC: 161 MG/DL (ref 0–150)
TSH SERPL DL<=0.005 MIU/L-ACNC: 2.14 MIU/ML (ref 0.27–4.2)
VLDLC SERPL CALC-MCNC: 32.2 MG/DL
WBC # BLD AUTO: 12.88 10*3/MM3 (ref 3.4–10.8)

## 2019-04-16 RX ORDER — LOSARTAN POTASSIUM 100 MG/1
100 TABLET ORAL DAILY
Qty: 90 TABLET | Refills: 1 | Status: SHIPPED | OUTPATIENT
Start: 2019-04-16 | End: 2019-10-14 | Stop reason: ALTCHOICE

## 2019-04-16 NOTE — ED NOTES
"ED Call Back Questions    1. How are you doing since leaving the Emergency Department?    Stated she was in horrible pain, and that she wasn't given anything for pain, no xrays, says does not want a call back  2. Do you have any questions about your discharge instructions? No     3. Have you filled your new prescriptions yet? Yes   a. Do you have any questions about those medications? No     4. Were you able to make a follow-up appointment with the physician? Yes     5. Do you have a primary care physician? Yes   a. If No, would you like for me to set you up with one? N/A  i. If Yes, “I will have our ED  give you a call right back at this number to work with you on the best time for an appointment.”    6. We are always looking to get better at what we do. Do you have any suggestions for what we can do to be even better? No   a. If Yes, \"Thank you for sharing your concerns. I apologize. I will follow up with our manager and patient . Would you like someone to call you back?\" N/A    7. Is there anything else I can do for you? No     "

## 2019-04-16 NOTE — PROGRESS NOTES
Pt informed her liver enzymes are elevated again. Her chol is elevated,but improved.She says she has seen Dr. Orona for her colonoscopy. Will call them and see if she can go ahead and make an appt. Will call back if they need a referral.

## 2019-04-16 NOTE — TELEPHONE ENCOUNTER
Hannah called & said that the pharmacy told her that the valsartan you gave her to replace the avapro has also had recalls on it.  She is requesting a different medication that has not had recalls on it.

## 2019-04-22 ENCOUNTER — HOSPITAL ENCOUNTER (OUTPATIENT)
Dept: PHYSICAL THERAPY | Facility: HOSPITAL | Age: 69
Setting detail: THERAPIES SERIES
Discharge: HOME | End: 2019-04-22

## 2019-04-22 DIAGNOSIS — M54.2 CERVICALGIA: Primary | ICD-10-CM

## 2019-04-22 DIAGNOSIS — M54.12 CERVICAL RADICULAR PAIN: ICD-10-CM

## 2019-04-22 PROCEDURE — 97161 PT EVAL LOW COMPLEX 20 MIN: CPT | Performed by: PHYSICAL THERAPIST

## 2019-04-22 NOTE — THERAPY EVALUATION
Outpatient Physical Therapy Ortho Initial Evaluation  [facility]     Patient Name: Hannah Maki  : 1950  MRN: [id number]  Today's Date: 2019      Visit Date: 2019    Patient Active Problem List   Diagnosis   • Mixed hyperlipidemia   • Acquired hypothyroidism   • Mild intermittent asthma   • Gastroesophageal reflux disease without esophagitis   • Steatosis of liver   • Chronic pansinusitis   • Perennial allergic rhinitis   • Post-nasal drip   • Bronchitis   • Rectal fissure   • Sinusitis   • Parotid mass   • Atypical chest pain   • Equivocal stress test   • Hypertension   • Dyspnea on exertion   • Arthralgia of both ankles   • Cervicalgia   • Acute pain of left shoulder   • Adult BMI 35.0-35.9 kg/sq m   • Non-smoker        Past Medical History:   Diagnosis Date   • Anxiety    • Arthritis    • Asthma    • Chronic pansinusitis 10/14/2016   • Colon polyp    • Diverticulosis    • Dysuria    • GERD (gastroesophageal reflux disease)    • Hyperlipidemia    • Hypertension    • Hyperthyroidism    • Perennial allergic rhinitis 10/14/2016   • Rhinitis    • Tinnitus    • Vertigo         Past Surgical History:   Procedure Laterality Date   • APPENDECTOMY     • CHOLECYSTECTOMY     • COLONOSCOPY  2013     six polyps removed or destroyed, Diverticulosis  Recall 3 years   • COLONOSCOPY  2013   • COLONOSCOPY N/A 2017    Procedure: COLONOSCOPY WITH ANESTHESIA;  Surgeon: Lew Orona MD;  Location: [facility];  Service:    • HEMORRHOIDECTOMY     • HYSTERECTOMY     • HYSTERECTOMY     • NECK SURGERY     • NOSE SURGERY      nose bleeding artery    • RECTAL FISSURE INCISION AND DRAINAGE         Visit Dx:     ICD-10-CM ICD-9-CM   1. Cervicalgia M54.2 723.1   2. Cervical radicular pain M54.12 723.4         Patient History     Row Name 19 0852             History    Chief Complaint  Pain  -WJ      Date Current Problem(s) Began  19  -W      Brief Description of Current Complaint   Pt reports that her neck and shoulder blade pain started on April the 1st of this year. Pt reports getting muscle relaxers and a shot at [facility], however this did not help her pain. She reports having a cervical fusion in the 90s. Her pain has prevented her in performing tasks such as gardening and arts/crafts which she enjoys in her spare time. The pain has gradually imprved since it began 3 weeks ago.  -WJ      Previous treatment for THIS PROBLEM  Injections;Pain Management;Chiropractor  -WJ      Patient/Caregiver Goals  Relieve pain;Know what to do to help the symptoms  -WJ      Current Tobacco Use  no  -WJ      Patient's Rating of General Health  Fair  -WJ      Hand Dominance  right-handed  -WJ      Patient seeing anyone else for problem(s)?  Yes Chiropractor   -WJ      What clinical tests have you had for this problem?  X-ray  -WJ      Related/Recent Hospitalizations  No  -WJ         Pain     Pain Location  Neck;Shoulder  -WJ      Pain at Present  3  -WJ      Pain at Best  0 2 times in 3 weeks  -WJ      Pain at Worst  10  -WJ      Pain Frequency  Constant/continuous  -WJ      Pain Description  Sharp;Shooting;Pressure;Aching;Dull;Numbness;Tingling  -WJ      What Performance Factors Make the Current Problem(s) WORSE?  any movement, laying down trying to sleep, and activity within the past 2 weeks  -WJ      What Performance Factors Make the Current Problem(s) BETTER?  ice pack  -WJ      Is your sleep disturbed?  Yes  -WJ         Fall Risk Assessment    Any falls in the past year:  No  -WJ         Services    Prior Rehab/Home Health Experiences  Yes  -WJ      When was the prior experience with Rehab/Home Health  1990's  -WJ         Daily Activities    Primary Language  English  -WJ      Are you able to read  Yes  -WJ      Are you able to write  Yes  -WJ      How does patient learn best?  Demonstration  -WJ      Teaching needs identified  Home Exercise Program;Management of Condition  -WJ      Patient is concerned  about/has problems with  Reaching over head;Repetitive movements of the hand, arm, shoulder  -WJ      Does patient have problems with the following?  Panic Attack  -WJ      Barriers to learning  None  -WJ      Pt Participated in POC and Goals  Yes  -WJ         Safety    Are you being hurt, hit, or frightened by anyone at home or in your life?  No  -WJ      Are you being neglected by a caregiver  No  -WJ        User Key  (r) = Recorded By, (t) = Taken By, (c) = Cosigned By    Initials Name Provider Type    WJ Shailesh Keith, PT DPT Physical Therapist          PT Ortho     Row Name 04/22/19 0900       Posture/Observations    Alignment Options  Forward head;Thoracic kyphosis;Rounded shoulders R shoulder depressed compared to L   -WJ    Forward Head  Moderate  -WJ    Thoracic Kyphosis  Moderate  -WJ    Rounded Shoulders  Moderate  -WJ       Quarter Clearing    Quarter Clearing  Upper Quarter Clearing  -WJ       DTR- Upper Quarter Clearing    Biceps (C5/6)  Bilateral:;2- Normal response  -WJ    Brachioradialis (C6)  Bilateral:;2- Normal response  -WJ    Triceps (C7)  Bilateral:;2- Normal response  -WJ       Sensory Screen for Light Touch- Upper Quarter Clearing    C4 (posterior shoulder)  Bilateral:;Intact  -WJ    C5 (lateral upper arm)  Bilateral:;Intact  -WJ    C6 (tip of thumb)  Bilateral:;Intact  -WJ    C7 (tip of 3rd finger)  Bilateral:;Intact  -WJ    C8 (tip of 5th finger)  Bilateral:;Intact  -WJ    T1 (medial lower arm)  Bilateral:;Intact  -WJ       Myotomal Screen- Upper Quarter Clearing    Shoulder flexion (C5)  Right:;5 (Normal);Left:;4+ (Good +)  -WJ    Elbow flexion/wrist extension (C6)  Right:;5 (Normal);Left:;4- (Good -)  -WJ    Elbow extension/wrist flexion (C7)  Right:;5 (Normal);Left:;4- (Good -)  -WJ    Finger flexion/ (C8)  Bilateral:;WNL  -WJ       Cervical/Shoulder ROM Screen    Cervical flexion  Impaired  -WJ    Cervical extension  Impaired  -WJ    Cervical rotation  Impaired  -WJ       Special  Tests/Palpation    Special Tests/Palpation  Cervical/Thoracic  -WJ       Cervical Palpation    Cervical Palpation- Location?  Suboccipital;Levator scapula;Upper traps;Middle traps;Rhomboids  -WJ    Suboccipital  Bilateral:;Tender;Guarded/taut  -WJ    Levator Scapula  Bilateral:;Tender;Guarded/taut  -WJ    Upper Traps  Bilateral:;Tender;Guarded/taut  -WJ    Middle Traps  Bilateral:;Tender;Guarded/taut  -WJ    Rhomboids  Bilateral:;Tender;Guarded/taut  -WJ       Cervical Accessory Motions    Cervical Accessory Motions Tested?  Yes  -WJ    PA glide- C6  Hypomobile;Bilateral pain  -WJ    PA glide- C7  Hypomobile;Bilateral pain  -WJ       Thoracic Accessory Motions    Thoracic Accessory Motions Tested?  Yes  -WJ    Pa glide- Upper thoracic  Hypomobile  -WJ    Pa glide- Middle thoracic  Hypomobile;Bilateral pain  -WJ    Pa glide- Lower thoracic  Hypomobile  -WJ       Cervical/Thoracic Special Tests    Spurlings (Foraminal Compression)  Positive  -WJ    Cervical Compression (Forarminal Compression vs. Facet Pain)  Positive  -WJ    Cervical Distraction (Foraminal Compression vs. Facet Pain)  Negative  -WJ       General ROM    Head/Neck/Trunk  Neck Extension;Neck Flexion;Neck Lt Rotation;Neck Rt Rotation  -WJ       Head/Neck/Trunk    Neck Extension AROM  22  -WJ    Neck Flexion AROM  54  -WJ    Neck Lt Rotation AROM  40  -WJ    Neck Rt Rotation AROM  38  -WJ       MMT (Manual Muscle Testing)    General MMT Comments  BUE ROM WFL, nettie reported with shoulder flexion and abduction   -WJ       Sensation    Sensation WNL?  WNL  -WJ       Pathomechanics    Upper Extremity Pathomechanics  Limited thoracic extension  -WJ    Spine Pathomechanics  Limited upper thoracic motion with cervical ROM  -WJ      User Key  (r) = Recorded By, (t) = Taken By, (c) = Cosigned By    Initials Name Provider Type    WJ Shailesh Keith, PT DPT Physical Therapist                      Therapy Education  Education Details: Educated pt on anatomy,  posture/body mechiancs, PT POC and HEP. HEP included unweighted cervical rotations.  Given: HEP, Symptoms/condition management, Pain management, Posture/body mechanics  Program: New  How Provided: Verbal, Demonstration, Written  Provided to: Patient  Level of Understanding: Teach back education performed, Verbalized, Demonstrated     PT OP Goals     Row Name 04/22/19 0900          PT Short Term Goals    STG Date to Achieve  05/13/19  -WJ     STG 1  Pt to demonstrate B cervical rotation of 55 deg.  -WJ     STG 1 Progress  New  -WJ     STG 2  Pt to demonstrate cervical extension of 40 deg.  -WJ     STG 2 Progress  New  -WJ     STG 3  Pt demosntrates improvements in sitting and standing posture with an increased awreness and ability to correct.  -WJ     STG 3 Progress  New  -WJ     STG 4  Pt reports no peripheral symptoms for 1 week.  -WJ     STG 4 Progress  New  -WJ        Long Term Goals    LTG Date to Achieve  06/03/19  -WJ     LTG 1  Pt to demonstrate independence with HEP.  -WJ     LTG 1 Progress  New  -WJ     LTG 2  Pt to demonstrate gross UE and postural strength of 5/5.  -WJ     LTG 2 Progress  New  -WJ     LTG 3  Pt to demnstrate B cervical ROM of 70 deg.  -WJ     LTG 3 Progress  New  -WJ     LTG 4  Pt is able to perform gardening tasks for 1 hour without an increase in symptoms.  -WJ     LTG 4 Progress  New  -W        Time Calculation    PT Goal Re-Cert Due Date  05/22/19  -W       User Key  (r) = Recorded By, (t) = Taken By, (c) = Cosigned By    Initials Name Provider Type    WJ Shailesh Keith, PT DPT Physical Therapist          PT Assessment/Plan     Row Name 04/22/19 0900          PT Assessment    Functional Limitations  Limitation in home management;Limitations in community activities;Performance in leisure activities  -WJ     Impairments  Pain;Posture;Poor body mechanics;Range of motion;Muscle strength;Impaired muscle endurance  -WJ     Assessment Comments  Mrs. Maki presents to the clinic today  with a cheif complaint of neck pain that radiates down her L arm and into the scapula region. This pain started about 3 weeks ago and has made it increaslily difficult to turn her neck and use her L arm for repetitive tasks. Evalution revealed limited cervical mobility, full shouler ROM however pain with overhead movement. Tests indicate radicular involvement in a C5-C6, C6-C7 pattern, which support the diagnostic imaging conducted that reveled degenerative changes. Her poor posture and spinal mobility contribute to her symptoms as increased strain is placed on the cervical spine. Skilled physical therapy is indicated to address these deficits and improve her funcitonal mobility. Thank you for this referral.  -WJ     Please refer to paper survey for additional self-reported information  Yes  -WJ     Rehab Potential  Good  -WJ     Patient/caregiver participated in establishment of treatment plan and goals  Yes  -WJ     Patient would benefit from skilled therapy intervention  Yes  -WJ        PT Plan    PT Frequency  2x/week  -WJ     Predicted Duration of Therapy Intervention (Therapy Eval)  6 weeks  -WJ     Planned CPT's?  PT EVAL LOW COMPLEXITY: 11120;PT RE-EVAL: 49127;PT THER PROC EA 15 MIN: 75160;PT THER ACT EA 15 MIN: 76529;PT MANUAL THERAPY EA 15 MIN: 45042;PT NEUROMUSC RE-EDUCATION EA 15 MIN: 36385;PT SELF CARE/HOME MGMT/TRAIN EA 15: 94546;PT HOT OR COLD PACK TREAT MCARE;PT ELECTRICAL STIM UNATTEND: ;PT ELECTRICAL STIM ATTD EA 15 MIN: 53699  -WJ     PT Plan Comments  We will initially address the soft tissue restrictions present in the cervical spine. We will work on improving her cervical ROM along with the mobility of her thoracic spine. An emphasis on posture will be made and we will progress with postural strengthening as tolerated. A home program will be provided and we will work towards independence upon discharge.  -WJ       User Key  (r) = Recorded By, (t) = Taken By, (c) = Cosigned By    Initials  Name Provider Type    WJ Shailesh Keith, PT DPT Physical Therapist                                        Time Calculation:     Start Time: 0850  Stop Time: 0945  Time Calculation (min): 55 min  Total Timed Code Minutes- PT: 0 minute(s)     Therapy Charges for Today     Code Description Service Date Service Provider Modifiers Qty    [id number] HC PT EVAL LOW COMPLEXITY 4 4/22/2019 Shailesh Keith, PT DPT GP 1                    Shailesh Keith, PT DPT  4/22/2019

## 2019-04-24 ENCOUNTER — OFFICE VISIT (OUTPATIENT)
Dept: SURGERY | Age: 69
End: 2019-04-24
Payer: MEDICARE

## 2019-04-24 ENCOUNTER — HOSPITAL ENCOUNTER (OUTPATIENT)
Dept: WOMENS IMAGING | Age: 69
Discharge: HOME | End: 2019-04-24
Payer: MEDICARE

## 2019-04-24 VITALS — WEIGHT: 212 LBS | HEIGHT: 65 IN | TEMPERATURE: 97.2 F | BODY MASS INDEX: 35.32 KG/M2

## 2019-04-24 DIAGNOSIS — Z12.39 SCREENING BREAST EXAMINATION: Primary | ICD-10-CM

## 2019-04-24 DIAGNOSIS — Z12.39 SCREENING BREAST EXAMINATION: ICD-10-CM

## 2019-04-24 PROCEDURE — 1123F ACP DISCUSS/DSCN MKR DOCD: CPT | Performed by: PHYSICIAN ASSISTANT

## 2019-04-24 PROCEDURE — G8400 PT W/DXA NO RESULTS DOC: HCPCS | Performed by: PHYSICIAN ASSISTANT

## 2019-04-24 PROCEDURE — 77067 SCR MAMMO BI INCL CAD: CPT

## 2019-04-24 PROCEDURE — 1090F PRES/ABSN URINE INCON ASSESS: CPT | Performed by: PHYSICIAN ASSISTANT

## 2019-04-24 PROCEDURE — G8417 CALC BMI ABV UP PARAM F/U: HCPCS | Performed by: PHYSICIAN ASSISTANT

## 2019-04-24 PROCEDURE — 99213 OFFICE O/P EST LOW 20 MIN: CPT | Performed by: PHYSICIAN ASSISTANT

## 2019-04-24 PROCEDURE — 1036F TOBACCO NON-USER: CPT | Performed by: PHYSICIAN ASSISTANT

## 2019-04-24 PROCEDURE — 4040F PNEUMOC VAC/ADMIN/RCVD: CPT | Performed by: PHYSICIAN ASSISTANT

## 2019-04-24 PROCEDURE — G8427 DOCREV CUR MEDS BY ELIG CLIN: HCPCS | Performed by: PHYSICIAN ASSISTANT

## 2019-04-24 PROCEDURE — 3017F COLORECTAL CA SCREEN DOC REV: CPT | Performed by: PHYSICIAN ASSISTANT

## 2019-04-25 ENCOUNTER — HOSPITAL ENCOUNTER (OUTPATIENT)
Dept: PHYSICAL THERAPY | Facility: HOSPITAL | Age: 69
Setting detail: THERAPIES SERIES
Discharge: HOME | End: 2019-04-25

## 2019-04-25 DIAGNOSIS — M54.2 CERVICALGIA: Primary | ICD-10-CM

## 2019-04-25 PROCEDURE — 97140 MANUAL THERAPY 1/> REGIONS: CPT

## 2019-04-25 PROCEDURE — 97110 THERAPEUTIC EXERCISES: CPT

## 2019-04-25 NOTE — THERAPY TREATMENT NOTE
Outpatient Physical Therapy Ortho Treatment Note  [facility]     Patient Name: Hannah Maki  : 1950  MRN: [id number]  Today's Date: 2019      Visit Date: 2019    Visit Dx:    ICD-10-CM ICD-9-CM   1. Cervicalgia M54.2 723.1       Patient Active Problem List   Diagnosis   • Mixed hyperlipidemia   • Acquired hypothyroidism   • Mild intermittent asthma   • Gastroesophageal reflux disease without esophagitis   • Steatosis of liver   • Chronic pansinusitis   • Perennial allergic rhinitis   • Post-nasal drip   • Bronchitis   • Rectal fissure   • Sinusitis   • Parotid mass   • Atypical chest pain   • Equivocal stress test   • Hypertension   • Dyspnea on exertion   • Arthralgia of both ankles   • Cervicalgia   • Acute pain of left shoulder   • Adult BMI 35.0-35.9 kg/sq m   • Non-smoker        Past Medical History:   Diagnosis Date   • Abnormal liver enzymes    • Achilles bursitis    • Anxiety    • Arthralgia    • Arthritis    • Asthma    • Chronic pansinusitis 10/14/2016   • Colon polyp    • Diverticulosis    • Dysuria    • Fatty liver    • GERD (gastroesophageal reflux disease)    • Hyperlipidemia    • Hypertension    • Hyperthyroidism    • Non-cardiac chest pain    • Palpitations    • Perennial allergic rhinitis 10/14/2016   • Rhinitis    • Tinnitus    • Trigeminal neuralgia    • Vertigo         Past Surgical History:   Procedure Laterality Date   • APPENDECTOMY     • CHOLECYSTECTOMY     • COLONOSCOPY  2013     six polyps removed or destroyed, Diverticulosis  Recall 3 years   • COLONOSCOPY  2013   • COLONOSCOPY N/A 2017    Procedure: COLONOSCOPY WITH ANESTHESIA;  Surgeon: Lew Orona MD;  Location: [facility] ENDOSCOPY;  Service:    • HEMORRHOIDECTOMY     • HYSTERECTOMY     • HYSTERECTOMY     • NECK SURGERY     • NOSE SURGERY      nose bleeding artery    • RECTAL FISSURE INCISION AND DRAINAGE                         PT Assessment/Plan     Row Name 19 0844          PT  Assessment    Assessment Comments  Today was Mrs. Maki's first visit since the eval, therefore no goals have been met at this time. She is very limited by pain at this time. We worked on reducing soft tissue restrictions as tolerated. She is very guarded but also tender to palpation. Pt. reported pain w/ blue ridged roller but could tolerate the pink one. In prone, her symptoms were exacerbated w/ her arrms down by her side and allieviated when she raised them up by her head. Pt. could not tolerate supine on towel roll due to pain. During STM she had multiple tender points that sent sharp pain into her L arm.   -AF        PT Plan    PT Plan Comments  Continue to address muscle guarding in cervical and thoracic spine. Start treatment w/ STM and try more pec stretches.   -AF       User Key  (r) = Recorded By, (t) = Taken By, (c) = Cosigned By    Initials Name Provider Type    AF Tanya Loco, CLAIRE Physical Therapy Assistant            Exercises     Row Name 04/25/19 0888             Subjective Comments    Subjective Comments  Pt. reports having a bad night lastnight. She was unable to get good sleep becuase she was constantly waking up due to pain in her neck that radiates down her L UE. She had a mammogram yesterday and she thinks holding her arms up from it is what made her pain so bad.   -AF         Subjective Pain    Able to rate subjective pain?  yes  -AF      Pre-Treatment Pain Level  4  -AF      Post-Treatment Pain Level  2  -AF         Total Minutes    48340 - PT Therapeutic Exercise Minutes  30  -AF      60793 - PT Manual Therapy Minutes  16  -AF         Exercise 1    Exercise Name 1  thoracic towel stretch  -AF      Cueing 1  Verbal;Tactile  -AF      Time 1  2 min  -AF      Additional Comments  seated; supine attempted  -AF         Exercise 2    Exercise Name 2  cervical rotations L & R  -AF      Cueing 2  Verbal  -AF      Sets 2  2  -AF      Time 2  1 min  -AF      Additional Comments  seated B UE  supported on pillow & raised table  -AF         Exercise 3    Exercise Name 3  cervical flex/ext  -AF      Cueing 3  Verbal  -AF      Sets 3  2  -AF      Time 3  1 min  -AF      Additional Comments  seated B UE supported on pillow & raised table  -AF         Exercise 4    Exercise Name 4  chin tucks  -AF      Cueing 4  Verbal;Demo  -AF      Sets 4  2  -AF      Reps 4  10  -AF        User Key  (r) = Recorded By, (t) = Taken By, (c) = Cosigned By    Initials Name Provider Type    AF [place], Tanya, PTA Physical Therapy Assistant                      Manual Rx (last 36 hours)      Manual Treatments     Row Name 04/25/19 0844             Total Minutes    41133 - PT Manual Therapy Minutes  16  -AF         Manual Rx 1    Manual Rx 1 Location  thoracic   -AF      Manual Rx 1 Type  IASTM w/ pink roller  -AF      Manual Rx 1 Grade  light  -AF      Manual Rx 1 Duration  5 min  -AF         Manual Rx 2    Manual Rx 2 Location  thoraic  -AF      Manual Rx 2 Type  extension mobilizations  -AF      Manual Rx 2 Duration  3min   -AF         Manual Rx 3    Manual Rx 3 Location  B UT & LS  -AF      Manual Rx 3 Type  STM  -AF      Manual Rx 3 Duration  8  -AF        User Key  (r) = Recorded By, (t) = Taken By, (c) = Cosigned By    Initials Name Provider Type    AF [place], Tanya, PTA Physical Therapy Assistant          PT OP Goals     Row Name 04/25/19 0844          PT Short Term Goals    STG Date to Achieve  05/13/19  -AF     STG 1  Pt to demonstrate B cervical rotation of 55 deg.  -AF     STG 1 Progress  Ongoing  -AF     STG 1 Progress Comments  Pt. performed cervical rotations R & L w/ B UE supported on pillow and raised table for 1 min x 2.   -AF     STG 2  Pt to demonstrate cervical extension of 40 deg.  -AF     STG 2 Progress  Ongoing  -AF     STG 3  Pt demosntrates improvements in sitting and standing posture with an increased awreness and ability to correct.  -AF     STG 3 Progress  Ongoing  -AF     STG 4  Pt reports no  peripheral symptoms for 1 week.  -AF     STG 4 Progress  Ongoing  -AF        Long Term Goals    LTG Date to Achieve  06/03/19  -AF     LTG 1  Pt to demonstrate independence with HEP.  -AF     LTG 1 Progress  Ongoing  -AF     LTG 2  Pt to demonstrate gross UE and postural strength of 5/5.  -AF     LTG 2 Progress  Ongoing  -AF     LTG 3  Pt to demnstrate B cervical ROM of 70 deg.  -AF     LTG 3 Progress  Ongoing  -AF     LTG 4  Pt is able to perform gardening tasks for 1 hour without an increase in symptoms.  -AF     LTG 4 Progress  Ongoing  -AF        Time Calculation    PT Goal Re-Cert Due Date  05/22/19  -AF       User Key  (r) = Recorded By, (t) = Taken By, (c) = Cosigned By    Initials Name Provider Type    AF Tanya Loco PTA Physical Therapy Assistant          Therapy Education  Education Details: seated thoracic towel stretch  Given: HEP  Program: New  How Provided: Verbal, Demonstration  Provided to: Patient  Level of Understanding: Verbalized, Demonstrated              Time Calculation:   Start Time: 0844  Stop Time: 0930  Time Calculation (min): 46 min  Total Timed Code Minutes- PT: 46 minute(s)  Therapy Charges for Today     Code Description Service Date Service Provider Modifiers Qty    97359111399 HC PT THER PROC EA 15 MIN 4/25/2019 [place], Tanya, PTA GP 2    36009894180 HC PT MANUAL THERAPY EA 15 MIN 4/25/2019 [place] Tanya, PTA GP 1                    Tanya Loco PTA  4/25/2019

## 2019-04-29 ENCOUNTER — LAB (OUTPATIENT)
Dept: LAB | Facility: HOSPITAL | Age: 69
End: 2019-04-29

## 2019-04-29 ENCOUNTER — OFFICE VISIT (OUTPATIENT)
Dept: GASTROENTEROLOGY | Facility: CLINIC | Age: 69
End: 2019-04-29

## 2019-04-29 VITALS
OXYGEN SATURATION: 100 % | BODY MASS INDEX: 35.32 KG/M2 | HEIGHT: 65 IN | SYSTOLIC BLOOD PRESSURE: 150 MMHG | HEART RATE: 96 BPM | WEIGHT: 212 LBS | TEMPERATURE: 98.8 F | DIASTOLIC BLOOD PRESSURE: 80 MMHG

## 2019-04-29 DIAGNOSIS — D12.6 ADENOMATOUS POLYP OF COLON, UNSPECIFIED PART OF COLON: ICD-10-CM

## 2019-04-29 DIAGNOSIS — R79.89 ABNORMAL LFTS: ICD-10-CM

## 2019-04-29 DIAGNOSIS — R79.9 ABNORMAL FINDING OF BLOOD CHEMISTRY: ICD-10-CM

## 2019-04-29 DIAGNOSIS — Z80.0 FAMILY HISTORY OF GI MALIGNANCY: ICD-10-CM

## 2019-04-29 DIAGNOSIS — K76.0 STEATOSIS OF LIVER: Primary | ICD-10-CM

## 2019-04-29 PROBLEM — K63.5 COLON POLYP: Status: ACTIVE | Noted: 2019-04-29

## 2019-04-29 LAB
ALBUMIN SERPL-MCNC: 4.7 G/DL (ref 3.5–5)
ALP SERPL-CCNC: 125 U/L (ref 24–120)
ALT SERPL W P-5'-P-CCNC: 89 U/L (ref 0–54)
AST SERPL-CCNC: 64 U/L (ref 7–45)
BILIRUB CONJ SERPL-MCNC: 0 MG/DL (ref 0–0.3)
BILIRUB INDIRECT SERPL-MCNC: 0.7 MG/DL (ref 0–1.1)
BILIRUB SERPL-MCNC: 0.7 MG/DL (ref 0.1–1)
HAV IGM SERPL QL IA: NEGATIVE
HBV CORE IGM SERPL QL IA: NEGATIVE
HBV SURFACE AG SERPL QL IA: NEGATIVE
HCV AB SER DONR QL: NEGATIVE
HCV S/C RATIO: 0.02 (ref 0–0.99)
INR PPP: 0.93 (ref 0.91–1.09)
IRON 24H UR-MRATE: 78 MCG/DL (ref 42–180)
IRON SATN MFR SERPL: 23 % (ref 20–45)
PROT SERPL-MCNC: 8.2 G/DL (ref 6.3–8.7)
PROTHROMBIN TIME: 12.7 SECONDS (ref 11.9–14.6)
TIBC SERPL-MCNC: 338 MCG/DL (ref 225–420)

## 2019-04-29 PROCEDURE — 99214 OFFICE O/P EST MOD 30 MIN: CPT | Performed by: INTERNAL MEDICINE

## 2019-04-29 PROCEDURE — 83550 IRON BINDING TEST: CPT | Performed by: INTERNAL MEDICINE

## 2019-04-29 PROCEDURE — 36415 COLL VENOUS BLD VENIPUNCTURE: CPT | Performed by: INTERNAL MEDICINE

## 2019-04-29 PROCEDURE — 82103 ALPHA-1-ANTITRYPSIN TOTAL: CPT | Performed by: INTERNAL MEDICINE

## 2019-04-29 PROCEDURE — 80076 HEPATIC FUNCTION PANEL: CPT | Performed by: INTERNAL MEDICINE

## 2019-04-29 PROCEDURE — 86038 ANTINUCLEAR ANTIBODIES: CPT | Performed by: INTERNAL MEDICINE

## 2019-04-29 PROCEDURE — 83516 IMMUNOASSAY NONANTIBODY: CPT | Performed by: INTERNAL MEDICINE

## 2019-04-29 PROCEDURE — 82390 ASSAY OF CERULOPLASMIN: CPT | Performed by: INTERNAL MEDICINE

## 2019-04-29 PROCEDURE — 80074 ACUTE HEPATITIS PANEL: CPT | Performed by: INTERNAL MEDICINE

## 2019-04-29 PROCEDURE — 85610 PROTHROMBIN TIME: CPT | Performed by: INTERNAL MEDICINE

## 2019-04-29 PROCEDURE — 83540 ASSAY OF IRON: CPT | Performed by: INTERNAL MEDICINE

## 2019-04-29 NOTE — PROGRESS NOTES
[facility] Gastroenterology    Chief Complaint   Patient presents with   • Elevated Hepatic Enzymes       Subjective     HPI    Hannah Maki is a 68 y.o. female who presents with a chief complaint of abnormal LFTs.    She is referred for evaluation of abnormal LFTs.  I do note that she has had mild elevations in her AST and ALT dating back to 2014 as best I can tell under chart review.  They have fluctuated from upper 40s to approximately 100.  She had a CT scan in 2015 which noted steatosis.  She does not drink any alcohol.  She has gained weight since she was in her 30s.  She states in her 20s she weighed 89 pounds.  She gained more weight over the last few years.  She has had take steroids intermittently for arthralgias.  She does have a history of hyperlipidemia and her last cholesterol was 250.  She was a borderline diabetic previously but she states lately her hemoglobin A1c was within normal limits.    She denies a family history of liver disease.  She has no exposures.  She is not on any over-the-counter herbal medications.      Past Medical History:   Diagnosis Date   • Abnormal liver enzymes    • Achilles bursitis    • Anxiety    • Arthralgia    • Arthritis    • Asthma    • Chronic pansinusitis 10/14/2016   • Colon polyp    • Diverticulosis    • Dysuria    • Fatty liver    • GERD (gastroesophageal reflux disease)    • Hyperlipidemia    • Hypertension    • Hyperthyroidism    • Non-cardiac chest pain    • Palpitations    • Perennial allergic rhinitis 10/14/2016   • Rhinitis    • Tinnitus    • Trigeminal neuralgia    • Vertigo        Past Surgical History:   Procedure Laterality Date   • APPENDECTOMY     • CATARACT EXTRACTION WITH INTRAOCULAR LENS IMPLANT     • CHOLECYSTECTOMY     • COLONOSCOPY  11/18/2013     six polyps removed or destroyed, Diverticulosis  Recall 3 years   • COLONOSCOPY  11/18/2013   • COLONOSCOPY N/A 4/4/2017    Procedure: COLONOSCOPY WITH ANESTHESIA;  Surgeon: Lew Orona MD;   Location: [facility] ENDOSCOPY;  Service:    • HEMORRHOIDECTOMY     • HYSTERECTOMY     • HYSTERECTOMY     • NECK SURGERY     • NOSE SURGERY      nose bleeding artery    • RECTAL FISSURE INCISION AND DRAINAGE           Current Outpatient Medications:   •  esomeprazole (NexIUM) 20 MG capsule, Take 20 mg by mouth Every Morning Before Breakfast., Disp: , Rfl:   •  levothyroxine (SYNTHROID, LEVOTHROID) 100 MCG tablet, Take 1 tablet by mouth Daily., Disp: 30 tablet, Rfl: 5  •  losartan (COZAAR) 100 MG tablet, Take 1 tablet by mouth Daily., Disp: 90 tablet, Rfl: 1  •  meclizine (ANTIVERT) 25 MG tablet, Take 1 tablet by mouth 3 (Three) Times a Day As Needed for dizziness (vertigo). Take one by mouth three times a day as needed for vertigo, Disp: 30 tablet, Rfl: 3  •  montelukast (SINGULAIR) 10 MG tablet, Take 1 tablet by mouth Every Night., Disp: 30 tablet, Rfl: 11  •  mupirocin (BACTROBAN) 2 % ointment, Apply intranasally bid, Disp: 22 g, Rfl: 3  •  meloxicam (MOBIC) 15 MG tablet, Take 1 tablet by mouth Daily., Disp: 15 tablet, Rfl: 0  •  tiZANidine (ZANAFLEX) 4 MG tablet, , Disp: , Rfl: 0  •  traMADol (ULTRAM) 50 MG tablet, Take 1 tablet by mouth Every 6 (Six) Hours As Needed for Moderate Pain ., Disp: 25 tablet, Rfl: 0    Allergies   Allergen Reactions   • Ceftin [Cefuroxime Axetil] Other (See Comments)     Pt does not recall   • Augmentin [Amoxicillin-Pot Clavulanate] Rash   • Azithromycin Nausea Only   • Bactrim [Sulfamethoxazole-Trimethoprim] Nausea And Vomiting   • Cefuroxime Nausea Only   • Codeine GI Intolerance   • Demerol [Meperidine] Nausea And Vomiting   • Erythromycin Rash   • Latex Other (See Comments)     Patient says it pulls her skin off.   • Tequin [Gatifloxacin] Nausea Only   • Tetracyclines & Related Nausea And Vomiting       Social History     Socioeconomic History   • Marital status:      Spouse name: Not on file   • Number of children: Not on file   • Years of education: Not on file   • Highest  education level: Not on file   Occupational History   • Occupation: retired   Tobacco Use   • Smoking status: Never Smoker   • Smokeless tobacco: Never Used   Substance and Sexual Activity   • Alcohol use: No   • Drug use: No   • Sexual activity: Defer       Family History   Problem Relation Age of Onset   • Diabetes Brother    • Heart disease Brother    • Colon cancer Sister 57   • Colon cancer Sister 55   • Heart disease Father    • Heart attack Father        Review of Systems  General no fever chills or sweats weight stable  Gastrointestinal: Not present-abdominal pain, constipation, diarrhea, dysphagia, hematemesis, melena, odynophagia, nausea, vomiting, pyrosis, regurgitation, hematochezia,    Objective     Vitals:    04/29/19 1502   BP: 150/80   Pulse: 96   Temp: 98.8 °F (37.1 °C)   SpO2: 100%       Physical Exam  No acute distress. Vital signs as documented. Skin warm and dry and without overt rashes. EOMI, sclera anicteric.  Neck without JVD or masses. Lungs clear to auscultation bilaterally, no rales. Heart exam notable for regular rhythm, normal sounds and absence of loud murmurs, rubs or gallops. Abdomen is soft, nontender, non distended, normal bowel sounds and without evidence of organomegaly, masses, or abdominal aortic enlargement. Extremities nonedematous, no cyanosis. Neuro alert, moves extremities.        Assessment/Plan   Problem List Items Addressed This Visit        Digestive    Steatosis of liver - Primary    Colon polyp       Other    Abnormal LFTs    Relevant Orders    Mitochondrial Antibodies, M2    Nuclear Antigen Antibody, IFA    Protime-INR    Ceruloplasmin    Hepatic Function Panel    Anti-Smooth Muscle Antibody Titer    Alpha - 1 - Antitrypsin    Hepatitis Panel, Acute    Iron Profile    US Liver    US Liver    Family history of GI malignancy    Overview     2 sisters with colon cancer in mid 50s           Other Visit Diagnoses     Abnormal finding of blood chemistry         Relevant  Orders    Iron Profile            I long discussion with her.  It does appear that her chronic elevation LFTs are most likely due to fatty liver disease.  I discussed how fatty liver can lead to cirrhosis, disability and pre-mature death.  How it also can be a sign of increased risk for cardiovascular disease.  I discussed the importance of getting rid of fat in the liver by controlling lipids and glucose, avoiding etoh helps, and gradual weight loss to ideal body weight is very important.    To investigate I do recommend we check liver serologies as above.  I discussed that I recommend we make sure nothing else is going on besides a fatty liver.  I also suggest an ultrasound of her liver.  We will check elastography.  I did tell her that elastography can overestimate the amount of fibrosis present in those with underlying fatty liver.  So is not a very accurate study.  Typically does not underestimate.  So it may give us a baseline if it is a low baseline.  I did discuss with her the option of a liver biopsy to give me much better picture of how much fibrosis she has.  We talked about the risk of liver biopsy and she is not anxious to pursue liver biopsy.  I do not think it is absolutely needed in the management at this point.    She is going to work on her weight.  Her goal is to lose 5 to 10 pounds prior to her next office visit.  We are going to see her back in the office in 3 months.    Of note I did tell her it would be okay from my standpoint of her primary care 1 to place her on lipid-lowering medicines.  She can take a statin as long as her LFTs are monitored and I do not increased more than 2 times her baseline.  Her baseline has remained below 100 so I think as long as LFTs stayed below 120 on statins it would be reasonable to use due to the benefits versus the risks.    Lastly, with her strong family history of colon cancer in her 2 sisters and with a personal history of colon polyps, I suggest a  surveillance colonoscopy examination next year.  That would be 3 years after last colonoscopy.  I suggest one sooner if she should develop any symptoms which she does not have at this point.  Continue ongoing management by primary care provider and other specialists.     Patient's Body mass index is 35.28 kg/m². BMI is above normal parameters. Recommendations include: nutrition counseling.        EMR Dragon/transcription disclaimer:  Much of this encounter note is electronic transcription/translation of spoken language to printed text.  The electronic translation of spoken language may be erroneous, or at times, nonsensical words or phrases may be inadvertently transcribed.  Although I have reviewed the note for such errors, some may still exist.    Lew Orona MD  4:25 PM  04/29/19

## 2019-04-30 ENCOUNTER — HOSPITAL ENCOUNTER (OUTPATIENT)
Dept: PHYSICAL THERAPY | Facility: HOSPITAL | Age: 69
Setting detail: THERAPIES SERIES
Discharge: HOME | End: 2019-04-30

## 2019-04-30 DIAGNOSIS — M54.2 CERVICALGIA: Primary | ICD-10-CM

## 2019-04-30 PROCEDURE — 97110 THERAPEUTIC EXERCISES: CPT

## 2019-04-30 PROCEDURE — 97140 MANUAL THERAPY 1/> REGIONS: CPT

## 2019-05-01 ENCOUNTER — OFFICE VISIT (OUTPATIENT)
Dept: VASCULAR SURGERY | Age: 69
End: 2019-05-01
Payer: MEDICARE

## 2019-05-01 VITALS
OXYGEN SATURATION: 94 % | DIASTOLIC BLOOD PRESSURE: 91 MMHG | SYSTOLIC BLOOD PRESSURE: 142 MMHG | RESPIRATION RATE: 18 BRPM | TEMPERATURE: 97.3 F | HEART RATE: 88 BPM

## 2019-05-01 DIAGNOSIS — I83.819 VARICOSE VEINS WITH PAIN: Primary | ICD-10-CM

## 2019-05-01 LAB
A1AT SERPL-MCNC: 169 MG/DL (ref 90–200)
ACTIN IGG SERPL-ACNC: 33 UNITS (ref 0–19)
CERULOPLASMIN SERPL-MCNC: 30.6 MG/DL (ref 19–39)
DEPRECATED MITOCHONDRIA M2 IGG SER-ACNC: <20 UNITS (ref 0–20)

## 2019-05-01 PROCEDURE — 1036F TOBACCO NON-USER: CPT | Performed by: PHYSICIAN ASSISTANT

## 2019-05-01 PROCEDURE — 3017F COLORECTAL CA SCREEN DOC REV: CPT | Performed by: PHYSICIAN ASSISTANT

## 2019-05-01 PROCEDURE — 1090F PRES/ABSN URINE INCON ASSESS: CPT | Performed by: PHYSICIAN ASSISTANT

## 2019-05-01 PROCEDURE — G8417 CALC BMI ABV UP PARAM F/U: HCPCS | Performed by: PHYSICIAN ASSISTANT

## 2019-05-01 PROCEDURE — G8400 PT W/DXA NO RESULTS DOC: HCPCS | Performed by: PHYSICIAN ASSISTANT

## 2019-05-01 PROCEDURE — 99213 OFFICE O/P EST LOW 20 MIN: CPT | Performed by: PHYSICIAN ASSISTANT

## 2019-05-01 PROCEDURE — 4040F PNEUMOC VAC/ADMIN/RCVD: CPT | Performed by: PHYSICIAN ASSISTANT

## 2019-05-01 PROCEDURE — G8427 DOCREV CUR MEDS BY ELIG CLIN: HCPCS | Performed by: PHYSICIAN ASSISTANT

## 2019-05-01 PROCEDURE — 1123F ACP DISCUSS/DSCN MKR DOCD: CPT | Performed by: PHYSICIAN ASSISTANT

## 2019-05-01 NOTE — PROGRESS NOTES
Patient Care Team:  Danielle Chapa MD as PCP - General (Family Medicine)  Alexandra Jaramillo MD (Gastroenterology)  Juli Camarillo MD (Otolaryngology)  Sally Coon MD as Consulting Physician (General Surgery)  Milana Quijano (Neurosurgery)  Addison Baez Case (Dermatology)      History and Physical:    Mrs. Linda Issa is a 80 yo female who presents today for evaluation of a varicose vein on the left inner thigh that hurts mainly when she and her  are having intercourse. This has become so painful that she and her  have refrained from having intercourse. She has a few spider veins on her lower legs but reports that these do not bother her. Differential diagnosis for leg pain includes but is not limited to: varicose veins, peripheral vascular disease, arthritic pain, DVT, lumbar disc disease, and neurogenic pain. Griffin Yadav is a 79 y.o. female with the [facility]:  Patient Active Problem List    Diagnosis Date Noted    Varicose veins with pain 05/01/2019     Current Outpatient Medications   Medication Sig Dispense Refill    amoxicillin (AMOXIL) 500 MG capsule       irbesartan (AVAPRO) 300 MG tablet       Coenzyme Q10 100 MG CHEW Take 100 mg by mouth      meclizine (ANTIVERT) 25 MG tablet Take 25 mg by mouth      esomeprazole (NEXIUM) 20 MG delayed release capsule Take 20 mg by mouth      pravastatin (PRAVACHOL) 20 MG tablet       levothyroxine (SYNTHROID) 100 MCG tablet Take 100 mcg by mouth      montelukast (SINGULAIR) 10 MG tablet Take 10 mg by mouth nightly.  azelastine (ASTELIN) 137 MCG/SPRAY nasal spray 1 spray by Nasal route 2 times daily. Use in each nostril as directed      fluticasone (VERAMYST) 27.5 MCG/SPRAY nasal spray 2 sprays by Nasal route daily. No current facility-administered medications for this visit. Allergies: Latex; Augmentin [amoxicillin-pot clavulanate]; Bactrim [sulfamethoxazole-trimethoprim];  Ceftin [cefuroxime]; Codeine; Demerol hcl [meperidine]; Erythromycin; Tequin [gatifloxacin]; Tetracyclines & related; and Zithromax [azithromycin]  Past Medical History:   Diagnosis Date    Asthma     Fatty liver     GERD (gastroesophageal reflux disease)     Hyperlipidemia     Hypertension     Hypothyroidism     Osteoarthritis     Pre-diabetes     Seasonal allergic rhinitis     Sinus problem     Thyroid disease     Wears glasses      Past Surgical History:   Procedure Laterality Date    ARTERY SURGERY      ligation- due to nose bleed    BREAST BIOPSY  2014    LEft    CHOLECYSTECTOMY      HEMORRHOID SURGERY      HYSTERECTOMY      partial-age 36    NECK SURGERY      fusion of disc    OVARY REMOVAL      both     Family History   Problem Relation Age of Onset    Diabetes Mother     Heart Disease Father     Colon Cancer Sister     Cancer Sister         Pancreatic    Diabetes Brother      Social History     Tobacco Use    Smoking status: Never Smoker    Smokeless tobacco: Never Used   Substance Use Topics    Alcohol use: No       Old records obtained from the referring provider. These records have been reviewed and summarized. Review of Systems    Constitutional - no significant activity change, appetite change, or unexpected weight change. No fever or chills. No diaphoresis or significant fatigue. HENT - no significant rhinorrhea or epistaxis. No tinnitus or significant hearing loss. Eyes - no sudden vision change or amaurosis. Respiratory - no significant shortness of breath, wheezing, or stridor. No apnea, cough, or chest tightness associated with shortness of breath. Cardiovascular - no chest pain, syncope, or significant dizziness. No palpitations. She denies leg swelling. No claudication. Gastrointestinal - no abdominal swelling or pain. No blood in stool. No severe constipation, diarrhea, nausea, or vomiting.   Genitourinary - No difficulty urinating, dysuria, frequency, or urgency. No flank pain or hematuria. Musculoskeletal - no back pain, gait disturbance, or myalgia. Skin - no color change, rash, pallor, or new wound. Neurologic - no dizziness, facial asymmetry, or light headedness. No seizures. No speech difficulty or lateralizing weakness. Hematologic - no easy bruising or excessive bleeding. Psychiatric - no severe anxiety or nervousness. No confusion. All other review of systems are negative. Physical Exam    BP (!) 142/91 Comment: left  Pulse 88   Temp 97.3 °F (36.3 °C)   Resp 18   SpO2 94%     Constitutional - well developed, well nourished. No diaphoresis or acute distress. HENT - head normocephalic. Right external ear canal appears normal.  Left external ear canal appears normal.  Septum appears midline. Eyes - conjunctiva normal.  EOMS normal.  No exudate. No icterus. Neck- ROM appears normal, no tracheal deviation. Cardiovascular - Regular rate and rhythm. Heart sounds are normal.  No murmur, rub, or gallop. Carotid pulses are 2+ to palpation bilaterally without bruit. Extremities - Radial and brachial pulses are 2+ to palpation bilaterally. DP and PT pulses are palpable. No cyanosis or clubbing. No signs atheroembolic event. She has a varicose vein on the left inner thigh. She has a few spider veins bilaterally on the lower legs. Pulmonary - effort appears normal.  No respiratory distress. Lungs - Breath sounds normal. No wheezes or rales. GI - Abdomen - soft, non tender, bowel sounds X 4 quadrants. No guarding or rebound tenderness. No distension or palpable mass. Genitourinary - deferred. Musculoskeletal - ROM appears normal.  Neurologic - alert and oriented X 3. Physiologic. Skin - warm, dry, and intact. No rash, erythema, or pallor. Psychiatric - mood, affect, and behavior appear normal.  Judgment and thought processes appear normal.      Assessment    1.  Varicose veins with pain          Plan        Recommend support hose  20-30 mmHg compression (she doesn't want to wear support hose)  Recommend leg elevation above the level of the heart  Since there is only one varicose vein that is painful, I will d/w Dr. Minh Carney and call the patient with further recommendation    Addendum:  I discussed the case with Dr. Minh Carney. She recommends we obtain a  BLE venous scan for reflux. I spoke with the patient over the phone and she is agreeable to get the scan. We will call her with the results and discuss further recommendations.

## 2019-05-02 ENCOUNTER — HOSPITAL ENCOUNTER (OUTPATIENT)
Dept: PHYSICAL THERAPY | Facility: HOSPITAL | Age: 69
Setting detail: THERAPIES SERIES
Discharge: HOME | End: 2019-05-02

## 2019-05-02 DIAGNOSIS — M54.2 CERVICALGIA: Primary | ICD-10-CM

## 2019-05-02 DIAGNOSIS — M54.12 CERVICAL RADICULAR PAIN: ICD-10-CM

## 2019-05-02 PROCEDURE — 97110 THERAPEUTIC EXERCISES: CPT

## 2019-05-02 PROCEDURE — 97140 MANUAL THERAPY 1/> REGIONS: CPT

## 2019-05-02 NOTE — THERAPY TREATMENT NOTE
Outpatient Physical Therapy Ortho Treatment Note  [facility]     Patient Name: Hannah Maki  : 1950  MRN: [id number]  Today's Date: 2019      Visit Date: 2019    Visit Dx:    ICD-10-CM ICD-9-CM   1. Cervicalgia M54.2 723.1   2. Cervical radicular pain M54.12 723.4       Patient Active Problem List   Diagnosis   • Mixed hyperlipidemia   • Acquired hypothyroidism   • Mild intermittent asthma   • Gastroesophageal reflux disease without esophagitis   • Steatosis of liver   • Chronic pansinusitis   • Perennial allergic rhinitis   • Post-nasal drip   • Bronchitis   • Rectal fissure   • Sinusitis   • Parotid mass   • Atypical chest pain   • Equivocal stress test   • Hypertension   • Dyspnea on exertion   • Arthralgia of both ankles   • Cervicalgia   • Acute pain of left shoulder   • Adult BMI 35.0-35.9 kg/sq m   • Non-smoker   • Abnormal LFTs   • Colon polyp   • Family history of GI malignancy        Past Medical History:   Diagnosis Date   • Abnormal liver enzymes    • Achilles bursitis    • Anxiety    • Arthralgia    • Arthritis    • Asthma    • Chronic pansinusitis 10/14/2016   • Colon polyp    • Diverticulosis    • Dysuria    • Fatty liver    • GERD (gastroesophageal reflux disease)    • Hyperlipidemia    • Hypertension    • Hyperthyroidism    • Non-cardiac chest pain    • Palpitations    • Perennial allergic rhinitis 10/14/2016   • Rhinitis    • Tinnitus    • Trigeminal neuralgia    • Vertigo         Past Surgical History:   Procedure Laterality Date   • APPENDECTOMY     • CATARACT EXTRACTION WITH INTRAOCULAR LENS IMPLANT     • CHOLECYSTECTOMY     • COLONOSCOPY  2013     six polyps removed or destroyed, Diverticulosis  Recall 3 years   • COLONOSCOPY  2013   • COLONOSCOPY N/A 2017    Procedure: COLONOSCOPY WITH ANESTHESIA;  Surgeon: Lew Orona MD;  Location: [facility];  Service:    • HEMORRHOIDECTOMY     • HYSTERECTOMY     • HYSTERECTOMY     • NECK SURGERY     •  NOSE SURGERY      nose bleeding artery    • RECTAL FISSURE INCISION AND DRAINAGE                         PT Assessment/Plan     Row Name 05/02/19 0873          PT Assessment    Assessment Comments  She continues to have radicular symptoms into her upper left arm, even though they have improved since the start of therapy.  She continues to have hypomobility in her upper thoracic spine, which places more strain on her cervical spine.  Her symptoms were increased following cervical retractions today, and even though cervical distraction did lessen her pain it did not centralize her symptoms.  -AARTI        PT Plan    PT Plan Comments  Continue to focus on improving mobility and postural strength in order to improve stress being placed on the neck.  -AARTI       User Key  (r) = Recorded By, (t) = Taken By, (c) = Cosigned By    Initials Name Provider Type    Sebastien Marlow, PTA Physical Therapy Assistant            Exercises     Row Name 05/02/19 1113             Subjective Comments    Subjective Comments  Patient reports her  was massaging her neck last night when he fell asleep.  This caused him to push on her neck, and her neck is hurting a  little more this morning.  She reports since Tuesday her pain has eased up in the upper arm.  However, after last night she does have a little more pain down her left arm a little past the elbow.  -AARTI         Subjective Pain    Able to rate subjective pain?  yes  -AARTI      Pre-Treatment Pain Level  3  -AARTI      Post-Treatment Pain Level  4  -AARTI         Total Minutes    43495 - PT Therapeutic Exercise Minutes  17  -AARTI      09349 - PT Manual Therapy Minutes  26  -AARTI         Exercise 1    Exercise Name 1  chin tucks  -AARTI      Cueing 1  Verbal;Demo  -AARTI      Sets 1  1  -AARTI      Reps 1  10  -AARTI      Additional Comments  this did increase pain down into tarah arm and it remained elevated even post treatment   -AARTI        User Key  (r) = Recorded By, (t) = Taken By, (c) = Cosigned By     Initials Name Provider Type    Sebastien Marlow PTA Physical Therapy Assistant                      Manual Rx (last 36 hours)      Manual Treatments     Row Name 05/02/19 0847             Total Minutes    91664 - PT Manual Therapy Minutes  26  -AARTI         Manual Rx 1    Manual Rx 1 Location  prone thoracic   -AARTI      Manual Rx 1 Type  STM with pink foam roller   -AARTI      Manual Rx 1 Grade  min-mod   -AARTI      Manual Rx 1 Duration  5  -AARTI         Manual Rx 2    Manual Rx 2 Location  B UT & LS in prone   -AARTI      Manual Rx 2 Type  STM   -AARTI      Manual Rx 2 Grade  min-mod   -AARTI      Manual Rx 2 Duration  5  -AARTI         Manual Rx 3    Manual Rx 3 Location  prone CT and upper thoracic   -AARTI      Manual Rx 3 Type  extension mobilizations   -AARTI      Manual Rx 3 Grade  2, she did have one cavitation in upper thoracic at the start of mobilizations   -AARTI      Manual Rx 3 Duration  8  -AARTI         Manual Rx 4    Manual Rx 4 Location  sidelying (L) scapular muscles   -AARTI      Manual Rx 4 Type  STM   -AARTI      Manual Rx 4 Grade  min-mod   -AARTI      Manual Rx 4 Duration  3  -AARTI         Manual Rx 5    Manual Rx 5 Location  cervical distraction   -AARTI      Manual Rx 5 Grade  1-2, this was performed after chin tuck increased symptoms.  This did decrease symptoms but did not centralize them completely.  -AARTI      Manual Rx 5 Duration  5  -AARTI        User Key  (r) = Recorded By, (t) = Taken By, (c) = Cosigned By    Initials Name Provider Type    AARTI Sebastien Hurtado PTA Physical Therapy Assistant          PT OP Goals     Row Name 05/02/19 0847          PT Short Term Goals    STG Date to Achieve  05/13/19  -AARTI     STG 1  Pt to demonstrate B cervical rotation of 55 deg.  -AARTI     STG 1 Progress  Ongoing  -AARTI     STG 2  Pt to demonstrate cervical extension of 40 deg.  -AARTI     STG 2 Progress  Ongoing  -AARTI     STG 3  Pt demosntrates improvements in sitting and standing posture with an increased awreness and ability to correct.  -AARTI      STG 3 Progress  Ongoing  -AARTI     STG 4  Pt reports no peripheral symptoms for 1 week.  -AARTI     STG 4 Progress  Ongoing  -AARTI     STG 4 Progress Comments  she reports her symptoms have been better, but she did have an increase after performing chin tucks today   -AARTI        Long Term Goals    LTG Date to Achieve  06/03/19  -AARTI     LTG 1  Pt to demonstrate independence with HEP.  -AARTI     LTG 1 Progress  Ongoing  -AARTI     LTG 2  Pt to demonstrate gross UE and postural strength of 5/5.  -AARTI     LTG 2 Progress  Ongoing  -AARTI     LTG 3  Pt to demnstrate B cervical ROM of 70 deg.  -AARTI     LTG 3 Progress  Ongoing  -AARTI     LTG 4  Pt is able to perform gardening tasks for 1 hour without an increase in symptoms.  -AARTI     LTG 4 Progress  Ongoing  -AARTI        Time Calculation    PT Goal Re-Cert Due Date  05/22/19  -AARTI       User Key  (r) = Recorded By, (t) = Taken By, (c) = Cosigned By    Initials Name Provider Type    Sebastien Marlow PTA Physical Therapy Assistant          Therapy Education  Given: HEP  Program: Reinforced  How Provided: Verbal  Provided to: Patient  Level of Understanding: Verbalized              Time Calculation:   Start Time: 0847  Stop Time: 0930  Time Calculation (min): 43 min  Total Timed Code Minutes- PT: 43 minute(s)  Therapy Charges for Today     Code Description Service Date Service Provider Modifiers Qty    07353071454 HC PT THER PROC EA 15 MIN 5/2/2019 Sebastien Hurtado PTA GP 1    65869345929 HC PT MANUAL THERAPY EA 15 MIN 5/2/2019 Sebastien Hurtado PTA GP 2                    Sebastien Hurtado PTA  5/2/2019

## 2019-05-03 ENCOUNTER — APPOINTMENT (OUTPATIENT)
Dept: PHYSICAL THERAPY | Facility: HOSPITAL | Age: 69
End: 2019-05-03

## 2019-05-06 LAB — ANA SER QL IA: NEGATIVE

## 2019-05-07 ENCOUNTER — HOSPITAL ENCOUNTER (OUTPATIENT)
Dept: PHYSICAL THERAPY | Facility: HOSPITAL | Age: 69
Setting detail: THERAPIES SERIES
Discharge: HOME | End: 2019-05-07

## 2019-05-07 ENCOUNTER — HOSPITAL ENCOUNTER (OUTPATIENT)
Dept: ULTRASOUND IMAGING | Facility: HOSPITAL | Age: 69
Discharge: HOME | End: 2019-05-07
Admitting: INTERNAL MEDICINE

## 2019-05-07 DIAGNOSIS — R79.89 ABNORMAL LFTS: ICD-10-CM

## 2019-05-07 DIAGNOSIS — M54.2 CERVICALGIA: Primary | ICD-10-CM

## 2019-05-07 PROCEDURE — 97110 THERAPEUTIC EXERCISES: CPT

## 2019-05-07 PROCEDURE — 97140 MANUAL THERAPY 1/> REGIONS: CPT

## 2019-05-07 PROCEDURE — 76981 USE PARENCHYMA: CPT

## 2019-05-07 PROCEDURE — 76705 ECHO EXAM OF ABDOMEN: CPT

## 2019-05-07 NOTE — THERAPY TREATMENT NOTE
Outpatient Physical Therapy Ortho Treatment Note  [facility]     Patient Name: Hannah Maki  : 1950  MRN: [id number]  Today's Date: 2019      Visit Date: 2019    Visit Dx:    ICD-10-CM ICD-9-CM   1. Cervicalgia M54.2 723.1       Patient Active Problem List   Diagnosis   • Mixed hyperlipidemia   • Acquired hypothyroidism   • Mild intermittent asthma   • Gastroesophageal reflux disease without esophagitis   • Steatosis of liver   • Chronic pansinusitis   • Perennial allergic rhinitis   • Post-nasal drip   • Bronchitis   • Rectal fissure   • Sinusitis   • Parotid mass   • Atypical chest pain   • Equivocal stress test   • Hypertension   • Dyspnea on exertion   • Arthralgia of both ankles   • Cervicalgia   • Acute pain of left shoulder   • Adult BMI 35.0-35.9 kg/sq m   • Non-smoker   • Abnormal LFTs   • Colon polyp   • Family history of GI malignancy        Past Medical History:   Diagnosis Date   • Abnormal liver enzymes    • Achilles bursitis    • Anxiety    • Arthralgia    • Arthritis    • Asthma    • Chronic pansinusitis 10/14/2016   • Colon polyp    • Diverticulosis    • Dysuria    • Fatty liver    • GERD (gastroesophageal reflux disease)    • Hyperlipidemia    • Hypertension    • Hyperthyroidism    • Non-cardiac chest pain    • Palpitations    • Perennial allergic rhinitis 10/14/2016   • Rhinitis    • Tinnitus    • Trigeminal neuralgia    • Vertigo         Past Surgical History:   Procedure Laterality Date   • APPENDECTOMY     • CATARACT EXTRACTION WITH INTRAOCULAR LENS IMPLANT     • CHOLECYSTECTOMY     • COLONOSCOPY  2013     six polyps removed or destroyed, Diverticulosis  Recall 3 years   • COLONOSCOPY  2013   • COLONOSCOPY N/A 2017    Procedure: COLONOSCOPY WITH ANESTHESIA;  Surgeon: Lew Orona MD;  Location: [facility];  Service:    • HEMORRHOIDECTOMY     • HYSTERECTOMY     • HYSTERECTOMY     • NECK SURGERY     • NOSE SURGERY      nose bleeding artery    •  "RECTAL FISSURE INCISION AND DRAINAGE                         PT Assessment/Plan     Row Name 05/07/19 0838          PT Assessment    Assessment Comments  She reports a decrease in pain following treatment today. She did have trouble performing supine horizontal abd w/ yellow band. She states it made her L shoulder feel \"weak\".   -AF        PT Plan    PT Plan Comments  Continue STM to L scap and increase scapular strength lightly, no resistance.   -AF       User Key  (r) = Recorded By, (t) = Taken By, (c) = Cosigned By    Initials Name Provider Type    AF Tanya Loco PTA Physical Therapy Assistant            Exercises     Row Name 05/07/19 0838             Subjective Comments    Subjective Comments  Pt. reports feeling an improvement in pain for 2 days after last treatment. She attributes it to the massage of her L shoulder blade.  -AF         Subjective Pain    Able to rate subjective pain?  yes  -AF      Pre-Treatment Pain Level  2  -AF         Total Minutes    42880 - PT Therapeutic Exercise Minutes  19  -AF      22517 - PT Manual Therapy Minutes  33  -AF         Exercise 1    Exercise Name 1  thoracic towel stretch  -AF      Cueing 1  Verbal;Tactile  -AF      Time 1  4 min  -AF      Additional Comments  half roll  -AF         Exercise 2    Exercise Name 2  horizontal abd   -AF      Cueing 2  Verbal;Demo  -AF      Sets 2  2  -AF      Reps 2  10  -AF      Additional Comments  yellow TB L shoulder feels weak.   -AF        User Key  (r) = Recorded By, (t) = Taken By, (c) = Cosigned By    Initials Name Provider Type    AF Tanya Loco PTA Physical Therapy Assistant                      Manual Rx (last 36 hours)      Manual Treatments     Row Name 05/07/19 0838             Total Minutes    49422 - PT Manual Therapy Minutes  33  -AF         Manual Rx 1    Manual Rx 1 Location  thoracic  -AF      Manual Rx 1 Type  IASTM w/ blue ridged roller  -AF      Manual Rx 1 Duration  5  -AF         Manual Rx 2    " Manual Rx 2 Location  thoracic  -AF      Manual Rx 2 Type  extension mobilizations  -AF      Manual Rx 2 Grade  min  -AF      Manual Rx 2 Duration  3  -AF         Manual Rx 3    Manual Rx 3 Location  cervical   -AF      Manual Rx 3 Type  manual traction  -AF      Manual Rx 3 Duration  5  -AF         Manual Rx 4    Manual Rx 4 Location  B UT, LS, and L ssscapuar muscles  -AF      Manual Rx 4 Duration  20  -AF        User Key  (r) = Recorded By, (t) = Taken By, (c) = Cosigned By    Initials Name Provider Type    AF Beronica Tanya, PTA Physical Therapy Assistant          PT OP Goals     Row Name 05/07/19 0838          PT Short Term Goals    STG Date to Achieve  05/13/19  -AF     STG 1  Pt to demonstrate B cervical rotation of 55 deg.  -AF     STG 1 Progress  Ongoing  -AF     STG 2  Pt to demonstrate cervical extension of 40 deg.  -AF     STG 2 Progress  Ongoing  -AF     STG 3  Pt demosntrates improvements in sitting and standing posture with an increased awreness and ability to correct.  -AF     STG 3 Progress  Ongoing  -AF     STG 4  Pt reports no peripheral symptoms for 1 week.  -AF     STG 4 Progress  Ongoing  -AF        Long Term Goals    LTG Date to Achieve  06/03/19  -AF     LTG 1  Pt to demonstrate independence with HEP.  -AF     LTG 1 Progress  Ongoing  -AF     LTG 2  Pt to demonstrate gross UE and postural strength of 5/5.  -AF     LTG 2 Progress  Ongoing  -AF     LTG 2 Progress Comments  Pt. performed horizontal abd w/ yellow TB 10 x 2.  -AF     LTG 3  Pt to demnstrate B cervical ROM of 70 deg.  -AF     LTG 3 Progress  Ongoing  -AF     LTG 4  Pt is able to perform gardening tasks for 1 hour without an increase in symptoms.  -AF     LTG 4 Progress  Ongoing  -AF        Time Calculation    PT Goal Re-Cert Due Date  05/22/19  -AF       User Key  (r) = Recorded By, (t) = Taken By, (c) = Cosigned By    Initials Name Provider Type    AF Tanya Loco, PTA Physical Therapy Assistant          Therapy  Education  Education Details: Use whole body to perform household tasks instead of just UE.   Given: Fall prevention and home safety  Program: New, Reinforced  How Provided: Verbal, Demonstration  Provided to: Patient  Level of Understanding: Verbalized              Time Calculation:   Start Time: 0838  Stop Time: 0930  Time Calculation (min): 52 min  Total Timed Code Minutes- PT: 52 minute(s)  Therapy Charges for Today     Code Description Service Date Service Provider Modifiers Qty    69518462205 HC PT THER PROC EA 15 MIN 5/7/2019 Tanya Loco, PTA GP 1    32131003105 HC PT MANUAL THERAPY EA 15 MIN 5/7/2019 Tanya Loco, PTA GP 2                    Tanya Loco PTA  5/7/2019

## 2019-05-09 ENCOUNTER — HOSPITAL ENCOUNTER (OUTPATIENT)
Dept: PHYSICAL THERAPY | Facility: HOSPITAL | Age: 69
Setting detail: THERAPIES SERIES
Discharge: HOME | End: 2019-05-09

## 2019-05-09 DIAGNOSIS — M54.12 CERVICAL RADICULAR PAIN: ICD-10-CM

## 2019-05-09 DIAGNOSIS — M54.2 CERVICALGIA: Primary | ICD-10-CM

## 2019-05-09 PROCEDURE — 97110 THERAPEUTIC EXERCISES: CPT

## 2019-05-09 PROCEDURE — 97140 MANUAL THERAPY 1/> REGIONS: CPT

## 2019-05-09 NOTE — THERAPY TREATMENT NOTE
Outpatient Physical Therapy Ortho Treatment Note  [facility]     Patient Name: Hannah Maki  : 1950  MRN: [id number]  Today's Date: 2019      Visit Date: 2019    Visit Dx:    ICD-10-CM ICD-9-CM   1. Cervicalgia M54.2 723.1   2. Cervical radicular pain M54.12 723.4       Patient Active Problem List   Diagnosis   • Mixed hyperlipidemia   • Acquired hypothyroidism   • Mild intermittent asthma   • Gastroesophageal reflux disease without esophagitis   • Steatosis of liver   • Chronic pansinusitis   • Perennial allergic rhinitis   • Post-nasal drip   • Bronchitis   • Rectal fissure   • Sinusitis   • Parotid mass   • Atypical chest pain   • Equivocal stress test   • Hypertension   • Dyspnea on exertion   • Arthralgia of both ankles   • Cervicalgia   • Acute pain of left shoulder   • Adult BMI 35.0-35.9 kg/sq m   • Non-smoker   • Abnormal LFTs   • Colon polyp   • Family history of GI malignancy        Past Medical History:   Diagnosis Date   • Abnormal liver enzymes    • Achilles bursitis    • Anxiety    • Arthralgia    • Arthritis    • Asthma    • Chronic pansinusitis 10/14/2016   • Colon polyp    • Diverticulosis    • Dysuria    • Fatty liver    • GERD (gastroesophageal reflux disease)    • Hyperlipidemia    • Hypertension    • Hyperthyroidism    • Non-cardiac chest pain    • Palpitations    • Perennial allergic rhinitis 10/14/2016   • Rhinitis    • Tinnitus    • Trigeminal neuralgia    • Vertigo         Past Surgical History:   Procedure Laterality Date   • APPENDECTOMY     • CATARACT EXTRACTION WITH INTRAOCULAR LENS IMPLANT     • CHOLECYSTECTOMY     • COLONOSCOPY  2013     six polyps removed or destroyed, Diverticulosis  Recall 3 years   • COLONOSCOPY  2013   • COLONOSCOPY N/A 2017    Procedure: COLONOSCOPY WITH ANESTHESIA;  Surgeon: Lew Orona MD;  Location: [facility];  Service:    • HEMORRHOIDECTOMY     • HYSTERECTOMY     • HYSTERECTOMY     • NECK SURGERY     •  "NOSE SURGERY      nose bleeding artery    • RECTAL FISSURE INCISION AND DRAINAGE                         PT Assessment/Plan     Row Name 05/09/19 0843          PT Assessment    Assessment Comments  Pt. demonstrates B UE weakness, moreso in the L than R. Pt. wasprema challenged to AROM UE exercises w/out resistance and struggled w/ them. She tends to have radiating pain down her L UE during movement.   -AF        PT Plan    PT Plan Comments  Continue STM and incorporate B UE isometrics into next visit.   -AF       User Key  (r) = Recorded By, (t) = Taken By, (c) = Cosigned By    Initials Name Provider Type    AF Beronica, Tanya, PTA Physical Therapy Assistant            Exercises     Row Name 05/09/19 0843             Subjective Comments    Subjective Comments  Pt. reports feeling better after last visit.   -AF         Subjective Pain    Able to rate subjective pain?  yes  -AF      Pre-Treatment Pain Level  2 \"1 to 2\" B UT  -AF      Post-Treatment Pain Level  3 2 to 3. neck and R forearm  -AF         Total Minutes    10384 - PT Therapeutic Exercise Minutes  26  -AF      16928 - PT Manual Therapy Minutes  23  -AF         Exercise 1    Exercise Name 1  thoracic towel stretch  -AF      Cueing 1  Tactile;Verbal  -AF      Time 1  4 min  -AF      Additional Comments  half roll  -AF         Exercise 2    Exercise Name 2  horizontal abd  -AF      Cueing 2  Verbal;Demo  -AF      Sets 2  1  -AF      Reps 2  10  -AF      Additional Comments  reports pain in L shoulder  -AF         Exercise 3    Exercise Name 3  supine shoulder flexion  -AF      Cueing 3  Verbal;Tactile;Demo  -AF      Sets 3  1  -AF      Reps 3  10  -AF      Additional Comments  bilateral; L shouler pain  -AF         Exercise 4    Exercise Name 4  SL open books  -AF      Cueing 4  Verbal;Tactile;Demo  -AF      Sets 4  1  -AF      Reps 4  10  -AF      Additional Comments  bilateral  -AF         Exercise 5    Exercise Name 5  scap squeezes  -AF      Cueing 5  " Verbal;Tactile  -AF      Sets 5  1  -AF      Reps 5  12  -AF        User Key  (r) = Recorded By, (t) = Taken By, (c) = Cosigned By    Initials Name Provider Type    AF Beronica Tanya, PTA Physical Therapy Assistant                      Manual Rx (last 36 hours)      Manual Treatments     Row Name 05/09/19 0843             Total Minutes    90684 - PT Manual Therapy Minutes  23  -AF         Manual Rx 1    Manual Rx 1 Location  thoracic  -AF      Manual Rx 1 Type  IASTM w/ blue ridged roller  -AF      Manual Rx 1 Duration  5  -AF         Manual Rx 2    Manual Rx 2 Location  thoracic  -AF      Manual Rx 2 Type  extension mobilizations  -AF      Manual Rx 2 Duration  4  -AF         Manual Rx 3    Manual Rx 3 Location  B UT, LS, and L scapuar muscles  -AF      Manual Rx 3 Type  STM  -AF      Manual Rx 3 Duration  10  -AF         Manual Rx 4    Manual Rx 4 Location  lower thoracic  -AF      Manual Rx 4 Type  STM  -AF      Manual Rx 4 Duration  4  -AF        User Key  (r) = Recorded By, (t) = Taken By, (c) = Cosigned By    Initials Name Provider Type    AF Beronica Tanya, PTA Physical Therapy Assistant          PT OP Goals     Row Name 05/09/19 0843          PT Short Term Goals    STG Date to Achieve  05/13/19  -AF     STG 1  Pt to demonstrate B cervical rotation of 55 deg.  -AF     STG 1 Progress  Ongoing  -AF     STG 2  Pt to demonstrate cervical extension of 40 deg.  -AF     STG 2 Progress  Ongoing  -AF     STG 3  Pt demosntrates improvements in sitting and standing posture with an increased awreness and ability to correct.  -AF     STG 3 Progress  Ongoing  -AF     STG 4  Pt reports no peripheral symptoms for 1 week.  -AF     STG 4 Progress  Ongoing  -AF        Long Term Goals    LTG Date to Achieve  06/03/19  -AF     LTG 1  Pt to demonstrate independence with HEP.  -AF     LTG 1 Progress  Ongoing  -AF     LTG 2  Pt to demonstrate gross UE and postural strength of 5/5.  -AF     LTG 2 Progress  Ongoing  -AF     LTG  2 Progress Comments  Pt. performed more scapular strengthening exercises today w/ no resistance.   -AF     LTG 3  Pt to demnstrate B cervical ROM of 70 deg.  -AF     LTG 3 Progress  Ongoing  -AF     LTG 4  Pt is able to perform gardening tasks for 1 hour without an increase in symptoms.  -AF     LTG 4 Progress  Ongoing  -AF        Time Calculation    PT Goal Re-Cert Due Date  05/22/19  -AF       User Key  (r) = Recorded By, (t) = Taken By, (c) = Cosigned By    Initials Name Provider Type    AF Tanya Loco, PTA Physical Therapy Assistant          Therapy Education  Given: Pain management, Posture/body mechanics  Program: Reinforced  How Provided: Verbal, Demonstration  Provided to: Patient  Level of Understanding: Verbalized, Demonstrated              Time Calculation:   Start Time: 0843  Stop Time: 0932  Time Calculation (min): 49 min  Total Timed Code Minutes- PT: 49 minute(s)  Therapy Charges for Today     Code Description Service Date Service Provider Modifiers Qty    13920508022 HC PT THER PROC EA 15 MIN 5/9/2019 Tanya Loco PTA GP 2    79315635856 HC PT MANUAL THERAPY EA 15 MIN 5/9/2019 Tanya Loco PTA GP 1                    Tanya Loco PTA  5/9/2019

## 2019-05-10 ENCOUNTER — HOSPITAL ENCOUNTER (OUTPATIENT)
Dept: VASCULAR LAB | Age: 69
Discharge: HOME | End: 2019-05-10
Payer: MEDICARE

## 2019-05-10 DIAGNOSIS — M79.605 LEG PAIN, LEFT: ICD-10-CM

## 2019-05-10 DIAGNOSIS — M79.604 LEG PAIN, RIGHT: ICD-10-CM

## 2019-05-10 DIAGNOSIS — M79.604 LEG PAIN, RIGHT: Primary | ICD-10-CM

## 2019-05-10 DIAGNOSIS — M79.89 LEG SWELLING: ICD-10-CM

## 2019-05-10 PROCEDURE — 93970 EXTREMITY STUDY: CPT

## 2019-05-14 ENCOUNTER — HOSPITAL ENCOUNTER (OUTPATIENT)
Dept: PHYSICAL THERAPY | Facility: HOSPITAL | Age: 69
Setting detail: THERAPIES SERIES
Discharge: HOME | End: 2019-05-14

## 2019-05-14 DIAGNOSIS — M54.2 CERVICALGIA: Primary | ICD-10-CM

## 2019-05-14 DIAGNOSIS — M54.12 CERVICAL RADICULAR PAIN: ICD-10-CM

## 2019-05-14 PROCEDURE — 97140 MANUAL THERAPY 1/> REGIONS: CPT | Performed by: PHYSICAL THERAPIST

## 2019-05-14 PROCEDURE — 97110 THERAPEUTIC EXERCISES: CPT | Performed by: PHYSICAL THERAPIST

## 2019-05-14 NOTE — THERAPY TREATMENT NOTE
Outpatient Physical Therapy Ortho Treatment Note  [facility]     Patient Name: Hannah Maki  : 1950  MRN: [id number]  Today's Date: 2019      Visit Date: 2019    Visit Dx:    ICD-10-CM ICD-9-CM   1. Cervicalgia M54.2 723.1   2. Cervical radicular pain M54.12 723.4       Patient Active Problem List   Diagnosis   • Mixed hyperlipidemia   • Acquired hypothyroidism   • Mild intermittent asthma   • Gastroesophageal reflux disease without esophagitis   • Steatosis of liver   • Chronic pansinusitis   • Perennial allergic rhinitis   • Post-nasal drip   • Bronchitis   • Rectal fissure   • Sinusitis   • Parotid mass   • Atypical chest pain   • Equivocal stress test   • Hypertension   • Dyspnea on exertion   • Arthralgia of both ankles   • Cervicalgia   • Acute pain of left shoulder   • Adult BMI 35.0-35.9 kg/sq m   • Non-smoker   • Abnormal LFTs   • Colon polyp   • Family history of GI malignancy        Past Medical History:   Diagnosis Date   • Abnormal liver enzymes    • Achilles bursitis    • Anxiety    • Arthralgia    • Arthritis    • Asthma    • Chronic pansinusitis 10/14/2016   • Colon polyp    • Diverticulosis    • Dysuria    • Fatty liver    • GERD (gastroesophageal reflux disease)    • Hyperlipidemia    • Hypertension    • Hyperthyroidism    • Non-cardiac chest pain    • Palpitations    • Perennial allergic rhinitis 10/14/2016   • Rhinitis    • Tinnitus    • Trigeminal neuralgia    • Vertigo         Past Surgical History:   Procedure Laterality Date   • APPENDECTOMY     • CATARACT EXTRACTION WITH INTRAOCULAR LENS IMPLANT     • CHOLECYSTECTOMY     • COLONOSCOPY  2013     six polyps removed or destroyed, Diverticulosis  Recall 3 years   • COLONOSCOPY  2013   • COLONOSCOPY N/A 2017    Procedure: COLONOSCOPY WITH ANESTHESIA;  Surgeon: Lew Orona MD;  Location: [facility];  Service:    • HEMORRHOIDECTOMY     • HYSTERECTOMY     • HYSTERECTOMY     • NECK SURGERY     •  NOSE SURGERY      nose bleeding artery    • RECTAL FISSURE INCISION AND DRAINAGE                         PT Assessment/Plan     Row Name 05/14/19 0900          PT Assessment    Assessment Comments  Pt continues to have radicular symptoms in the left arm and reports increased sorenss/stiffness today following a long day yesterday. She demonstrated increased UT guarding and tndernessthis date. Attempted postural strengthening today, however she was only able to perform 1 set of each before an exacerbation of symptoms.  -WJ        PT Plan    PT Plan Comments  Continue to progress with STM and progress with postural strengthening as symptoms allow.  -WJ       User Key  (r) = Recorded By, (t) = Taken By, (c) = Cosigned By    Initials Name Provider Type    Shailesh Richardson PT DPT Physical Therapist            Exercises     Row Name 05/14/19 0800             Subjective Comments    Subjective Comments  Pt reports that after she did her exercises last session she felt pain in her neck.  -WJ         Subjective Pain    Able to rate subjective pain?  yes  -WJ      Pre-Treatment Pain Level  2  -WJ      Post-Treatment Pain Level  4  -WJ         Total Minutes    88558 - PT Therapeutic Exercise Minutes  11  -WJ      72959 - PT Manual Therapy Minutes  31  -WJ         Exercise 1    Exercise Name 1  scapular retraction   -WJ      Cueing 1  Verbal;Demo  -WJ      Sets 1  2  -WJ      Reps 1  10  -WJ         Exercise 2    Exercise Name 2  standing W's  -WJ      Cueing 2  Verbal;Demo  -WJ      Sets 2  1  -WJ      Reps 2  10  -WJ         Exercise 3    Exercise Name 3  standing serratus slides on pink foam   -WJ      Cueing 3  Verbal;Demo  -WJ      Sets 3  1  -WJ      Reps 3  10  -WJ        User Key  (r) = Recorded By, (t) = Taken By, (c) = Cosigned By    Initials Name Provider Type    Shailesh Richardson, PT DPT Physical Therapist                      Manual Rx (last 36 hours)      Manual Treatments     Row Name 05/14/19 0800              Total Minutes    71160 - PT Manual Therapy Minutes  31  -WJ         Manual Rx 1    Manual Rx 1 Location  thoracic  -WJ      Manual Rx 1 Type  IASTM with pink foam roller  -WJ      Manual Rx 1 Duration  6  -WJ         Manual Rx 2    Manual Rx 2 Location  thoracic  -WJ      Manual Rx 2 Type  extension mobilizations  -WJ      Manual Rx 2 Grade  2  -WJ      Manual Rx 2 Duration  8  -WJ         Manual Rx 3    Manual Rx 3 Location  B UT, LS, and L scapuar muscles  -WJ      Manual Rx 3 Type  STM  -WJ      Manual Rx 3 Duration  12  -WJ         Manual Rx 4    Manual Rx 4 Location  cervical/suboccipital distraction  -WJ      Manual Rx 4 Grade  2 sustained  -WJ      Manual Rx 4 Duration  5  -WJ        User Key  (r) = Recorded By, (t) = Taken By, (c) = Cosigned By    Initials Name Provider Type    W Shailesh Keith, PT DPT Physical Therapist          PT OP Goals     Row Name 05/14/19 0800          PT Short Term Goals    STG Date to Achieve  05/13/19  -W     STG 1  Pt to demonstrate B cervical rotation of 55 deg.  -WJ     STG 1 Progress  Ongoing  -WJ     STG 2  Pt to demonstrate cervical extension of 40 deg.  -WJ     STG 2 Progress  Ongoing  -WJ     STG 3  Pt demosntrates improvements in sitting and standing posture with an increased awreness and ability to correct.  -WJ     STG 3 Progress  Ongoing  -WJ     STG 4  Pt reports no peripheral symptoms for 1 week.  -     STG 4 Progress  Ongoing  -        Long Term Goals    LTG Date to Achieve  06/03/19  -W     LTG 1  Pt to demonstrate independence with HEP.  -WJ     LTG 1 Progress  Ongoing  -WJ     LTG 2  Pt to demonstrate gross UE and postural strength of 5/5.  -WJ     LTG 2 Progress  Ongoing  -WJ     LTG 3  Pt to demnstrate B cervical ROM of 70 deg.  -WJ     LTG 3 Progress  Ongoing  -WJ     LTG 4  Pt is able to perform gardening tasks for 1 hour without an increase in symptoms.  -     LTG 4 Progress  Ongoing  -        Time Calculation    PT Goal Re-Cert Due Date   05/22/19  -CELSO       User Key  (r) = Recorded By, (t) = Taken By, (c) = Cosigned By    Initials Name Provider Type    Shailesh Richardson, PT DPT Physical Therapist          Therapy Education  Given: Pain management, Posture/body mechanics  Program: Reinforced  How Provided: Verbal, Demonstration  Provided to: Patient  Level of Understanding: Verbalized, Demonstrated              Time Calculation:   Start Time: 0848  Stop Time: 0930  Time Calculation (min): 42 min  Total Timed Code Minutes- PT: 42 minute(s)  Therapy Charges for Today     Code Description Service Date Service Provider Modifiers Qty    [id number]  PT THER PROC EA 15 MIN 5/14/2019 Shailesh Keith, PT DPT GP 1    [id number] HC PT MANUAL THERAPY EA 15 MIN 5/14/2019 Shailesh Keith, PT DPT GP 2                    Shailesh Keith, PT DPT  5/14/2019

## 2019-05-15 ENCOUNTER — OFFICE VISIT (OUTPATIENT)
Dept: OTOLARYNGOLOGY | Facility: CLINIC | Age: 69
End: 2019-05-15

## 2019-05-15 VITALS
HEIGHT: 65 IN | WEIGHT: 211 LBS | SYSTOLIC BLOOD PRESSURE: 144 MMHG | TEMPERATURE: 97.4 F | BODY MASS INDEX: 35.16 KG/M2 | DIASTOLIC BLOOD PRESSURE: 88 MMHG

## 2019-05-15 DIAGNOSIS — J45.20 MILD INTERMITTENT ASTHMA WITHOUT COMPLICATION: ICD-10-CM

## 2019-05-15 DIAGNOSIS — J30.89 PERENNIAL ALLERGIC RHINITIS: ICD-10-CM

## 2019-05-15 DIAGNOSIS — K21.9 GASTROESOPHAGEAL REFLUX DISEASE WITHOUT ESOPHAGITIS: ICD-10-CM

## 2019-05-15 DIAGNOSIS — J32.4 CHRONIC PANSINUSITIS: Primary | ICD-10-CM

## 2019-05-15 DIAGNOSIS — R09.82 POST-NASAL DRIP: ICD-10-CM

## 2019-05-15 DIAGNOSIS — E03.9 ACQUIRED HYPOTHYROIDISM: ICD-10-CM

## 2019-05-15 PROCEDURE — 99214 OFFICE O/P EST MOD 30 MIN: CPT | Performed by: PHYSICIAN ASSISTANT

## 2019-05-15 RX ORDER — GUAIFENESIN 600 MG/1
600 TABLET, EXTENDED RELEASE ORAL 2 TIMES DAILY
Qty: 60 TABLET | Refills: 3 | Status: SHIPPED | OUTPATIENT
Start: 2019-05-15 | End: 2019-06-14

## 2019-05-15 NOTE — PROGRESS NOTES
YOB: 1950  Location: [facility]  Location Address: [address] [place] [address], [facility], [address] [place] [address]  Location Phone: [phone]    Chief Complaint   Patient presents with   • Follow-up     sinus       History of Present Illness  Hannah Maki is a 68 y.o. female.  Hannah Maki is here for follow-up evaluation of ENT complaints. The patient has had problems with dizziness, sinusitis, sinus pressure, postnasal drip and allergy.  The symptoms are not localized to a particular location. The patient has had relatively stable symptoms with recent increase of thickened postnasal drainage. The thickened drainage has been present for the last several weeks. The symptoms are aggravated by  no identifiable factors. The symptoms are improved by no identifiable factors.      Examination: CT neck and nasopharynx with and without contrast dated  2017.    Indication: Left parotid mass     Comparison: CT neck dated 9/50/15     Technique: Volumetric data was acquired from the level of the ventricles  to the level of the aortic arch following the intravenous injection of  contrast and reconstructed at 3 mm intervals in the axial, coronal and  sagittal plane.         Findings:  A 9 mm enhancing nodule in the superficial lobe of the left parotid lobe  is unchanged in appearance since 09/15/2015. This enhancing nodule is  immediately deep to the radiopaque marker.    Pharyngeal mucosal space of the nasopharynx, oropharynx and hypopharynx  is unremarkable without evidence of fluid collection, abscess, discrete  or enhancing mass.   Supraglottic, glottic and subglottic larynx is unremarkable.  Parapharyngeal spaces, carotid, , submandibular and  perivertebral spaces are unremarkable bilaterally.    No evidence of pathologically enlarged lymph nodes in the visualized  cervical levels. Scattered non pathologically enlarged lymph nodes are  noted bilaterally     Visualized lung apices are unremarkable  bilaterally.    Osseous structures show no suspicious lytic or blastic lesion.  Multilevel degenerative changes with disk osteophyte complexes, facet  arthrosis, uncovertebral arthrosis, without significant spinal canal or  foraminal stenosis.      Visualized intracranial contents, orbits, paranasal sinuses and nasal  cavity, remaining nasopharynx, oropharynx and oral cavity, hypopharynx  and larynx, thyroid and salivary glands are otherwise unremarkable.   Normal contrast opacification in the vessels of the neck.   Report Conclusions  IMPRESSION:   Impression:    9 mm enhancing nodule in the left parotid lobe, stable since the  09/15/2015.  Differential considerations includes primary parotid neoplasm including  BMT and lymph node             Past Medical History:   Diagnosis Date   • Abnormal liver enzymes    • Achilles bursitis    • Anxiety    • Arthralgia    • Arthritis    • Asthma    • Chronic pansinusitis 10/14/2016   • Colon polyp    • Diverticulosis    • Dysuria    • Fatty liver    • GERD (gastroesophageal reflux disease)    • Hyperlipidemia    • Hypertension    • Hyperthyroidism    • Non-cardiac chest pain    • Palpitations    • Perennial allergic rhinitis 10/14/2016   • Rhinitis    • Tinnitus    • Trigeminal neuralgia    • Vertigo        Past Surgical History:   Procedure Laterality Date   • APPENDECTOMY     • CATARACT EXTRACTION WITH INTRAOCULAR LENS IMPLANT     • CHOLECYSTECTOMY     • COLONOSCOPY  11/18/2013     six polyps removed or destroyed, Diverticulosis  Recall 3 years   • COLONOSCOPY  11/18/2013   • COLONOSCOPY N/A 4/4/2017    Procedure: COLONOSCOPY WITH ANESTHESIA;  Surgeon: Lew Oorna MD;  Location: [facility];  Service:    • HEMORRHOIDECTOMY     • HYSTERECTOMY     • HYSTERECTOMY     • NECK SURGERY     • NOSE SURGERY      nose bleeding artery    • RECTAL FISSURE INCISION AND DRAINAGE           Current Outpatient Medications:   •  esomeprazole (NexIUM) 20 MG capsule, Take 20 mg by  mouth Every Morning Before Breakfast., Disp: , Rfl:   •  levothyroxine (SYNTHROID, LEVOTHROID) 100 MCG tablet, Take 1 tablet by mouth Daily., Disp: 30 tablet, Rfl: 5  •  losartan (COZAAR) 100 MG tablet, Take 1 tablet by mouth Daily., Disp: 90 tablet, Rfl: 1  •  meclizine (ANTIVERT) 25 MG tablet, Take 1 tablet by mouth 3 (Three) Times a Day As Needed for dizziness (vertigo). Take one by mouth three times a day as needed for vertigo, Disp: 30 tablet, Rfl: 3  •  meloxicam (MOBIC) 15 MG tablet, Take 1 tablet by mouth Daily., Disp: 15 tablet, Rfl: 0  •  montelukast (SINGULAIR) 10 MG tablet, Take 1 tablet by mouth Every Night., Disp: 30 tablet, Rfl: 11  •  mupirocin (BACTROBAN) 2 % ointment, Apply intranasally bid, Disp: 22 g, Rfl: 3  •  tiZANidine (ZANAFLEX) 4 MG tablet, , Disp: , Rfl: 0  •  traMADol (ULTRAM) 50 MG tablet, Take 1 tablet by mouth Every 6 (Six) Hours As Needed for Moderate Pain ., Disp: 25 tablet, Rfl: 0  •  guaiFENesin (MUCINEX) 600 MG 12 hr tablet, Take 1 tablet by mouth 2 (Two) Times a Day for 30 days. 1 by mouth twice a day, Disp: 60 tablet, Rfl: 3    Ceftin [cefuroxime axetil]; Augmentin [amoxicillin-pot clavulanate]; Azithromycin; Bactrim [sulfamethoxazole-trimethoprim]; Cefuroxime; Codeine; Demerol [meperidine]; Erythromycin; Latex; Tequin [gatifloxacin]; and Tetracyclines & related    Family History   Problem Relation Age of Onset   • Diabetes Brother    • Heart disease Brother    • Colon cancer Sister 57   • Colon cancer Sister 55   • Heart disease Father    • Heart attack Father        Social History     Socioeconomic History   • Marital status:      Spouse name: Not on file   • Number of children: Not on file   • Years of education: Not on file   • Highest education level: Not on file   Occupational History   • Occupation: retired   Tobacco Use   • Smoking status: Never Smoker   • Smokeless tobacco: Never Used   Substance and Sexual Activity   • Alcohol use: No   • Drug use: No   • Sexual  activity: Defer       Review of Systems   Constitutional: Negative.  Negative for activity change, appetite change, chills, diaphoresis, fatigue, fever and unexpected weight change.   HENT: Positive for congestion, postnasal drip and sinus pressure. Negative for dental problem, drooling, ear discharge, ear pain, facial swelling, hearing loss, mouth sores, nosebleeds, rhinorrhea, sneezing, sore throat, tinnitus, trouble swallowing and voice change.         Parotid mass   Eyes: Negative.    Respiratory: Negative.    Cardiovascular: Negative.    Gastrointestinal: Negative.    Endocrine: Negative.    Genitourinary: Negative.    Musculoskeletal: Negative.    Skin: Negative.    Allergic/Immunologic: Positive for environmental allergies. Negative for food allergies and immunocompromised state.   Neurological: Negative.    Hematological: Negative.    Psychiatric/Behavioral: Negative.        Vitals:    05/15/19 1105   BP: 144/88   Temp: 97.4 °F (36.3 °C)       Objective     Physical Exam  CONSTITUTIONAL: well nourished, alert, oriented, in no acute distress     COMMUNICATION AND VOICE: able to communicate normally, normal voice quality    HEAD: normocephalic, no lesions, atraumatic, no tenderness, no masses     FACE: appearance normal, no lesions, no tenderness, no deformities, facial motion symmetric    SALIVARY GLANDS: parotid glands with no tenderness, no swelling, no masses (left nodule nonpalpable), submandibular glands with normal size, nontender    EYES: ocular motility normal, eyelids normal, orbits normal, no proptosis, conjunctiva normal , pupils equal, round     EARS:  Hearing: response to conversational voice normal bilaterally   External Ears: auricles without lesions  Otoscopic: tympanic membrane appearance dull, no lesions, no perforation, normal mobility, no fluid    NOSE:  External Nose: structure normal, no tenderness on palpation, no nasal discharge, no lesions, no evidence of trauma, nostrils patent    Intranasal Exam: nasal mucosa mild edema and erythema, vestibule within normal limits, inferior turbinate enlarged, nasal septum midline    ORAL:  Lips: upper and lower lips without lesion   Teeth: dentition within normal limits for age   Gums: gingivae healthy   Oral Mucosa: oral mucosa normal, no mucosal lesions   Floor of Mouth: Warthin’s duct patent, mucosa normal  Tongue: lingual mucosa normal without lesions, normal tongue mobility   Palate: soft and hard palates with normal mucosa and structure  Oropharynx: oropharyngeal mucosa erythema and thick postnasal drainage    NECK: neck appearance normal, no mass,  noted without erythema or tenderness    THYROID: no overt thyromegaly, no tenderness, nodules or mass present on palpation, position midline     LYMPH NODES: no lymphadenopathy    CHEST/RESPIRATORY: respiratory effort normal, normal breath sounds     CARDIOVASCULAR: rate and rhythm normal, extremities without cyanosis or edema      NEUROLOGIC/PSYCHIATRIC: oriented to time, place and person, mood normal, affect appropriate, CN II-XII intact grossly    Assessment/Plan   Hannah was seen today for follow-up.    Diagnoses and all orders for this visit:    Chronic pansinusitis  -     guaiFENesin (MUCINEX) 600 MG 12 hr tablet; Take 1 tablet by mouth 2 (Two) Times a Day for 30 days. 1 by mouth twice a day    Perennial allergic rhinitis  -     guaiFENesin (MUCINEX) 600 MG 12 hr tablet; Take 1 tablet by mouth 2 (Two) Times a Day for 30 days. 1 by mouth twice a day    Mild intermittent asthma without complication    Gastroesophageal reflux disease without esophagitis    Acquired hypothyroidism    Post-nasal drip  -     guaiFENesin (MUCINEX) 600 MG 12 hr tablet; Take 1 tablet by mouth 2 (Two) Times a Day for 30 days. 1 by mouth twice a day      * Surgery not found *  No orders of the defined types were placed in this encounter.    Return in about 6 months (around 11/15/2019) for Recheck sinus/parotid.       Patient  Instructions   Will start mucinex for thick postnasal drainage and advised to increase fluid intake. Continue other medications as directed, recheck in six months. If symptoms worsen call for sooner follow-up.      MyPlate from USDA  MyPlate is an outline of a general healthy diet based on the 2010 Dietary Guidelines for Americans, from the U.S. Department of Agriculture (USDA). It sets guidelines for how much food you should eat from each food group based on your age, sex, and level of physical activity.  What are tips for following MyPlate?  To follow MyPlate recommendations:  · Eat a wide variety of fruits and vegetables, grains, and protein foods.  · Serve smaller portions and eat less food throughout the day.  · Limit portion sizes to avoid overeating.  · Enjoy your food.  · Get at least 150 minutes of exercise every week. This is about 30 minutes each day, 5 or more days per week.    It can be difficult to have every meal look like MyPlate. Think about MyPlate as eating guidelines for an entire day, rather than each individual meal.  Fruits and Vegetables  · Make half of your plate fruits and vegetables.  · Eat many different colors of fruits and vegetables each day.  · For a 2,000 calorie daily food plan, eat:  ? 2½ cups of vegetables every day.  ? 2 cups of fruit every day.  · 1 cup is equal to:  ? 1 cup raw or cooked vegetables.  ? 1 cup raw fruit.  ? 1 medium-sized orange, apple, or banana.  ? 1 cup 100% fruit or vegetable juice.  ? 2 cups raw leafy greens, such as lettuce, spinach, or kale.  ? ½ cup dried fruit.  Grains  · One fourth of your plate should be grains.  · Make at least half of the grains you eat each day whole grains.  · For a 2,000 calorie daily food plan, eat 6 oz of grains every day.  · 1 oz is equal to:  ? 1 slice bread.  ? 1 cup cereal.  ? ½ cup cooked rice, cereal, or pasta.  Protein  · One fourth of your plate should be protein.  · Eat a wide variety of protein foods, including meat,  poultry, fish, eggs, beans, nuts, and tofu.  · For a 2,000 calorie daily food plan, eat 5½ oz of protein every day.  · 1 oz is equal to:  ? 1 oz meat, poultry, or fish.  ? ¼ cup cooked beans.  ? 1 egg.  ? ½ oz nuts or seeds.  ? 1 Tbsp peanut butter.  Dairy  · Drink fat-free or low-fat (1%) milk.  · Eat or drink dairy as a side to meals.  · For a 2,000 calorie daily food plan, eat or drink 3 cups of dairy every day.  · 1 cup is equal to:  ? 1 cup milk, yogurt, cottage cheese, or soy milk (soy beverage).  ? 2 oz processed cheese.  ? 1½ oz natural cheese.  Fats, oils, salt, and sugars  · Only small amounts of oils are recommended.  · Avoid foods that are high in calories and low in nutritional value (empty calories), like foods high in fat or added sugars.  · Choose foods that are low in salt (sodium). Choose foods that have less than 140 milligrams (mg) of sodium per serving.  · Drink water instead of sugary drinks. Drink enough water each day to keep your urine pale yellow.  Where to find support  · Work with your health care provider or a nutrition specialist (dietitian) to develop a customized eating plan that is right for you.  · Download an maggy (mobile application) to help you track your daily food intake.  Where to find more information  · Go to ChooseMyPlate.gov for more information.  · Learn more and log your daily food intake according to MyPlate using USDA's SuperTracker: www.supertracker.usda.gov  Summary  · MyPlate is a general guideline for healthy eating from the USDA. It is based on the 2010 Dietary Guidelines for Americans.  · In general, fruits and vegetables should take up ½ of your plate, grains should take up ¼ of your plate, and protein should take up ¼ of your plate.  This information is not intended to replace advice given to you by your health care provider. Make sure you discuss any questions you have with your health care provider.  Document Released: 01/06/2009 Document Revised: 03/19/2018  Document Reviewed: 03/19/2018  [employer] Patient Education © 2019 [employer]     Calorie Counting for Weight Loss  Calories are units of energy. Your body needs a certain amount of calories from food to keep you going throughout the day. When you eat more calories than your body needs, your body stores the extra calories as fat. When you eat fewer calories than your body needs, your body burns fat to get the energy it needs.  Calorie counting means keeping track of how many calories you eat and drink each day. Calorie counting can be helpful if you need to lose weight. If you make sure to eat fewer calories than your body needs, you should lose weight. Ask your health care provider what a healthy weight is for you.  For calorie counting to work, you will need to eat the right number of calories in a day in order to lose a healthy amount of weight per week. A dietitian can help you determine how many calories you need in a day and will give you suggestions on how to reach your calorie goal.  · A healthy amount of weight to lose per week is usually 1-2 lb (0.5-0.9 kg). This usually means that your daily calorie intake should be reduced by 500-750 calories.  · Eating 1,200 - 1,500 calories per day can help most women lose weight.  · Eating 1,500 - 1,800 calories per day can help most men lose weight.    What is my plan?  My goal is to have __________ calories per day.  If I have this many calories per day, I should lose around __________ pounds per week.  What do I need to know about calorie counting?  In order to meet your daily calorie goal, you will need to:  · Find out how many calories are in each food you would like to eat. Try to do this before you eat.  · Decide how much of the food you plan to eat.  · Write down what you ate and how many calories it had. Doing this is called keeping a food log.    To successfully lose weight, it is important to balance calorie counting with a healthy lifestyle  that includes regular activity. Aim for 150 minutes of moderate exercise (such as walking) or 75 minutes of vigorous exercise (such as running) each week.  Where do I find calorie information?    The number of calories in a food can be found on a Nutrition Facts label. If a food does not have a Nutrition Facts label, try to look up the calories online or ask your dietitian for help.  Remember that calories are listed per serving. If you choose to have more than one serving of a food, you will have to multiply the calories per serving by the amount of servings you plan to eat. For example, the label on a package of bread might say that a serving size is 1 slice and that there are 90 calories in a serving. If you eat 1 slice, you will have eaten 90 calories. If you eat 2 slices, you will have eaten 180 calories.  How do I keep a food log?  Immediately after each meal, record the following information in your food log:  · What you ate. Don't forget to include toppings, sauces, and other extras on the food.  · How much you ate. This can be measured in cups, ounces, or number of items.  · How many calories each food and drink had.  · The total number of calories in the meal.    Keep your food log near you, such as in a small notebook in your pocket, or use a mobile maggy or website. Some programs will calculate calories for you and show you how many calories you have left for the day to meet your goal.  What are some calorie counting tips?  · Use your calories on foods and drinks that will fill you up and not leave you hungry:  ? Some examples of foods that fill you up are nuts and nut butters, vegetables, lean proteins, and high-fiber foods like whole grains. High-fiber foods are foods with more than 5 g fiber per serving.  ? Drinks such as sodas, specialty coffee drinks, alcohol, and juices have a lot of calories, yet do not fill you up.  · Eat nutritious foods and avoid empty calories. Empty calories are calories you  "get from foods or beverages that do not have many vitamins or protein, such as candy, sweets, and soda. It is better to have a nutritious high-calorie food (such as an avocado) than a food with few nutrients (such as a bag of chips).  · Know how many calories are in the foods you eat most often. This will help you calculate calorie counts faster.  · Pay attention to calories in drinks. Low-calorie drinks include water and unsweetened drinks.  · Pay attention to nutrition labels for \"low fat\" or \"fat free\" foods. These foods sometimes have the same amount of calories or more calories than the full fat versions. They also often have added sugar, starch, or salt, to make up for flavor that was removed with the fat.  · Find a way of tracking calories that works for you. Get creative. Try different apps or programs if writing down calories does not work for you.  What are some portion control tips?  · Know how many calories are in a serving. This will help you know how many servings of a certain food you can have.  · Use a measuring cup to measure serving sizes. You could also try weighing out portions on a kitchen scale. With time, you will be able to estimate serving sizes for some foods.  · Take some time to put servings of different foods on your favorite plates, bowls, and cups so you know what a serving looks like.  · Try not to eat straight from a bag or box. Doing this can lead to overeating. Put the amount you would like to eat in a cup or on a plate to make sure you are eating the right portion.  · Use smaller plates, glasses, and bowls to prevent overeating.  · Try not to multitask (for example, watch TV or use your computer) while eating. If it is time to eat, sit down at a table and enjoy your food. This will help you to know when you are full. It will also help you to be aware of what you are eating and how much you are eating.  What are tips for following this plan?  Reading food labels  · Check the " calorie count compared to the serving size. The serving size may be smaller than what you are used to eating.  · Check the source of the calories. Make sure the food you are eating is high in vitamins and protein and low in saturated and trans fats.  Shopping  · Read nutrition labels while you shop. This will help you make healthy decisions before you decide to purchase your food.  · Make a grocery list and stick to it.  Cooking  · Try to cook your favorite foods in a healthier way. For example, try baking instead of frying.  · Use low-fat dairy products.  Meal planning  · Use more fruits and vegetables. Half of your plate should be fruits and vegetables.  · Include lean proteins like poultry and fish.  How do I count calories when eating out?  · Ask for smaller portion sizes.  · Consider sharing an entree and sides instead of getting your own entree.  · If you get your own entree, eat only half. Ask for a box at the beginning of your meal and put the rest of your entree in it so you are not tempted to eat it.  · If calories are listed on the menu, choose the lower calorie options.  · Choose dishes that include vegetables, fruits, whole grains, low-fat dairy products, and lean protein.  · Choose items that are boiled, broiled, grilled, or steamed. Stay away from items that are buttered, battered, fried, or served with cream sauce. Items labeled “crispy” are usually fried, unless stated otherwise.  · Choose water, low-fat milk, unsweetened iced tea, or other drinks without added sugar. If you want an alcoholic beverage, choose a lower calorie option such as a glass of wine or light beer.  · Ask for dressings, sauces, and syrups on the side. These are usually high in calories, so you should limit the amount you eat.  · If you want a salad, choose a garden salad and ask for grilled meats. Avoid extra toppings like alberto, cheese, or fried items. Ask for the dressing on the side, or ask for olive oil and vinegar or lemon  to use as dressing.  · Estimate how many servings of a food you are given. For example, a serving of cooked rice is ½ cup or about the size of half a baseball. Knowing serving sizes will help you be aware of how much food you are eating at restaurants. The list below tells you how big or small some common portion sizes are based on everyday objects:  ? 1 oz--4 stacked dice.  ? 3 oz--1 deck of cards.  ? 1 tsp--1 die.  ? 1 Tbsp--½ a ping-pong ball.  ? 2 Tbsp--1 ping-pong ball.  ? ½ cup--½ baseball.  ? 1 cup--1 baseball.  Summary  · Calorie counting means keeping track of how many calories you eat and drink each day. If you eat fewer calories than your body needs, you should lose weight.  · A healthy amount of weight to lose per week is usually 1-2 lb (0.5-0.9 kg). This usually means reducing your daily calorie intake by 500-750 calories.  · The number of calories in a food can be found on a Nutrition Facts label. If a food does not have a Nutrition Facts label, try to look up the calories online or ask your dietitian for help.  · Use your calories on foods and drinks that will fill you up, and not on foods and drinks that will leave you hungry.  · Use smaller plates, glasses, and bowls to prevent overeating.  This information is not intended to replace advice given to you by your health care provider. Make sure you discuss any questions you have with your health care provider.  Document Released: 12/18/2006 Document Revised: 11/17/2017 Document Reviewed: 11/17/2017  [employer] Interactive Patient Education © 2019 [employer]     Exercising to Lose Weight  Exercising can help you to lose weight. In order to lose weight through exercise, you need to do vigorous-intensity exercise. You can tell that you are exercising with vigorous intensity if you are breathing very hard and fast and cannot hold a conversation while exercising.  Moderate-intensity exercise helps to maintain your current weight. You can tell that you are  exercising at a moderate level if you have a higher heart rate and faster breathing, but you are still able to hold a conversation.  How often should I exercise?  Choose an activity that you enjoy and set realistic goals. Your health care provider can help you to make an activity plan that works for you. Exercise regularly as directed by your health care provider. This may include:  · Doing resistance training twice each week, such as:  ? Push-ups.  ? Sit-ups.  ? Lifting weights.  ? Using resistance bands.  · Doing a given intensity of exercise for a given amount of time. Choose from these options:  ? 150 minutes of moderate-intensity exercise every week.  ? 75 minutes of vigorous-intensity exercise every week.  ? A mix of moderate-intensity and vigorous-intensity exercise every week.    Children, pregnant women, people who are out of shape, people who are overweight, and older adults may need to consult a health care provider for individual recommendations. If you have any sort of medical condition, be sure to consult your health care provider before starting a new exercise program.  What are some activities that can help me to lose weight?  · Walking at a rate of at least 4.5 miles an hour.  · Jogging or running at a rate of 5 miles per hour.  · Biking at a rate of at least 10 miles per hour.  · Lap swimming.  · Roller-skating or in-line skating.  · Cross-country skiing.  · Vigorous competitive sports, such as football, basketball, and soccer.  · Jumping rope.  · Aerobic dancing.  How can I be more active in my day-to-day activities?  · Use the stairs instead of the elevator.  · Take a walk during your lunch break.  · If you drive, park your car farther away from work or school.  · If you take public transportation, get off one stop early and walk the rest of the way.  · Make all of your phone calls while standing up and walking around.  · Get up, stretch, and walk around every 30 minutes throughout the day.  What  guidelines should I follow while exercising?  · Do not exercise so much that you hurt yourself, feel dizzy, or get very short of breath.  · Consult your health care provider prior to starting a new exercise program.  · Wear comfortable clothes and shoes with good support.  · Drink plenty of water while you exercise to prevent dehydration or heat stroke. Body water is lost during exercise and must be replaced.  · Work out until you breathe faster and your heart beats faster.  This information is not intended to replace advice given to you by your health care provider. Make sure you discuss any questions you have with your health care provider.  Document Released: 01/20/2012 Document Revised: 05/25/2017 Document Reviewed: 05/21/2015  [employer] Interactive Patient Education © 2019 [employer]

## 2019-05-16 ENCOUNTER — HOSPITAL ENCOUNTER (OUTPATIENT)
Dept: PHYSICAL THERAPY | Facility: HOSPITAL | Age: 69
Setting detail: THERAPIES SERIES
Discharge: HOME | End: 2019-05-16

## 2019-05-16 DIAGNOSIS — M54.2 CERVICALGIA: Primary | ICD-10-CM

## 2019-05-16 PROCEDURE — 97140 MANUAL THERAPY 1/> REGIONS: CPT

## 2019-05-16 PROCEDURE — 97110 THERAPEUTIC EXERCISES: CPT

## 2019-05-16 NOTE — THERAPY PROGRESS REPORT/RE-CERT
Outpatient Physical Therapy Ortho Progress Note  [facility]     Patient Name: Hannah Maki  : 1950  MRN: [id number]  Today's Date: 2019      Visit Date: 2019    Visit Dx:    ICD-10-CM ICD-9-CM   1. Cervicalgia M54.2 723.1       Patient Active Problem List   Diagnosis   • Mixed hyperlipidemia   • Acquired hypothyroidism   • Mild intermittent asthma   • Gastroesophageal reflux disease without esophagitis   • Steatosis of liver   • Chronic pansinusitis   • Perennial allergic rhinitis   • Post-nasal drip   • Bronchitis   • Rectal fissure   • Sinusitis   • Parotid mass   • Atypical chest pain   • Equivocal stress test   • Hypertension   • Dyspnea on exertion   • Arthralgia of both ankles   • Cervicalgia   • Acute pain of left shoulder   • Adult BMI 35.0-35.9 kg/sq m   • Non-smoker   • Abnormal LFTs   • Colon polyp   • Family history of GI malignancy        Past Medical History:   Diagnosis Date   • Abnormal liver enzymes    • Achilles bursitis    • Anxiety    • Arthralgia    • Arthritis    • Asthma    • Chronic pansinusitis 10/14/2016   • Colon polyp    • Diverticulosis    • Dysuria    • Fatty liver    • GERD (gastroesophageal reflux disease)    • Hyperlipidemia    • Hypertension    • Hyperthyroidism    • Non-cardiac chest pain    • Palpitations    • Perennial allergic rhinitis 10/14/2016   • Rhinitis    • Tinnitus    • Trigeminal neuralgia    • Vertigo         Past Surgical History:   Procedure Laterality Date   • APPENDECTOMY     • CATARACT EXTRACTION WITH INTRAOCULAR LENS IMPLANT     • CHOLECYSTECTOMY     • COLONOSCOPY  2013     six polyps removed or destroyed, Diverticulosis  Recall 3 years   • COLONOSCOPY  2013   • COLONOSCOPY N/A 2017    Procedure: COLONOSCOPY WITH ANESTHESIA;  Surgeon: Lew Orona MD;  Location: [facility];  Service:    • HEMORRHOIDECTOMY     • HYSTERECTOMY     • HYSTERECTOMY     • NECK SURGERY     • NOSE SURGERY      nose bleeding artery    •  RECTAL FISSURE INCISION AND DRAINAGE                         PT Assessment/Plan     Row Name 05/16/19 0846          PT Assessment    Assessment Comments  Goals were assessed today for progress note. At this time, pt. has not met any of her 8 goals but she is progressing towards most of them. Pt. feels as if she has progressed 40-50% since beginning therapy. Her cervical ROM has improved and she has almost met her rotation goal. Where she lacks is her B UE and scapular strength. I believe pain is the issue for this. Pt. cannot complete 1 set of 10 reps of an exercise involving her UE and scapulae w/out exacerbation of symptoms that radiate down her proximal L UE. Today I progressed her B UE and scapular strengthening. Pt. reported pain in L scapula and radiating pain down L deltoid into triceps. I believe she will benefit from continued therapy to increase B UE & scapular strength and further reduce cervical pain.   -AF        PT Plan    PT Plan Comments  Continue to strengthen B UE and scapular muscles and reduce soft tissue restrictions in neck.   -AF       User Key  (r) = Recorded By, (t) = Taken By, (c) = Cosigned By    Initials Name Provider Type    AF Tanya Loco, PTA Physical Therapy Assistant            Exercises     Row Name 05/16/19 8806             Subjective Comments    Subjective Comments  Pt. states she was very sore after last visit on Tuesday and required ice to address the pain. She attributes it possibly to the positions she was in during treatment.  -AF         Subjective Pain    Able to rate subjective pain?  yes  -AF      Pre-Treatment Pain Level  2  -AF      Post-Treatment Pain Level  3  -AF         Total Minutes    64080 - PT Therapeutic Exercise Minutes  34  -AF      80334 - PT Manual Therapy Minutes  12  -AF         Exercise 1    Exercise Name 1  assessed goals for progress note  -AF         Exercise 2    Exercise Name 2  scap squeezes  -AF      Cueing 2  Verbal;Tactile  -AF      Sets 2   1  -AF      Reps 2  10  -AF      Additional Comments  pt. reports pain in scapulae  -AF         Exercise 3    Exercise Name 3  seated horizontal abd  -AF      Cueing 3  Verbal;Demo;Tactile  -AF      Sets 3  2  -AF      Reps 3  10  -AF         Exercise 4    Exercise Name 4  tables slides on ball  -AF      Cueing 4  Verbal;Demo  -AF      Sets 4  1 each direction  -AF      Reps 4  10  -AF      Additional Comments  bilateral; A/P & lateral  -AF        User Key  (r) = Recorded By, (t) = Taken By, (c) = Cosigned By    Initials Name Provider Type    AF Bradford, Tanya, PTA Physical Therapy Assistant                      Manual Rx (last 36 hours)      Manual Treatments     Row Name 05/16/19 0846             Total Minutes    93223 - PT Manual Therapy Minutes  12  -AF         Manual Rx 1    Manual Rx 1 Location  B UT, LS, and scapular muscles.   -AF      Manual Rx 1 Type  STM  -AF      Manual Rx 1 Duration  12  -AF        User Key  (r) = Recorded By, (t) = Taken By, (c) = Cosigned By    Initials Name Provider Type    AF Bradford, Tanya, PTA Physical Therapy Assistant          PT OP Goals     Row Name 05/16/19 0846          PT Short Term Goals    STG Date to Achieve  05/30/19  -AF     STG 1  Pt to demonstrate B cervical rotation of 55 deg.  -AF     STG 1 Progress  Ongoing;Progressing  -AF     STG 1 Progress Comments  R rotation: 50 degrees; L rotation: 48 degrees  -AF     STG 2  Pt to demonstrate cervical extension of 40 deg.  -AF     STG 2 Progress  Ongoing;Progressing  -AF     STG 2 Progress Comments  cervical extension: 26 degrees.   -AF     STG 3  Pt demosntrates improvements in sitting and standing posture with an increased awreness and ability to correct.  -AF     STG 3 Progress  Ongoing;Progressing  -AF     STG 3 Progress Comments  Pt. is more aware of her posture, especially her shoulder placement.   -AF     STG 4  Pt reports no peripheral symptoms for 1 week.  -AF     STG 4 Progress  Ongoing;Progressing  -AF      STG 4 Progress Comments  Pt. reports have L UE radicular symptoms after last visit on Tuesday. However, before that visit, she states that her symptoms occur less frequently and the duration has decreased since before beginning therapy.   -AF        Long Term Goals    LTG Date to Achieve  06/15/19  -AF     LTG 1  Pt to demonstrate independence with HEP.  -AF     LTG 1 Progress  Ongoing;Progressing  -AF     LTG 1 Progress Comments  Pt. only has 2 exercises on her HEP but she has been mostly consistent.   -AF     LTG 2  Pt to demonstrate gross UE and postural strength of 5/5.  -AF     LTG 2 Progress  Ongoing  -AF     LTG 2 Progress Comments  Pt. demonstrates B UE and scapular weakness. Her gross B scapular strength ranges from 2+/5- 3/5. The most deficits being in B lower trap. Pt. cannot perform 1 set of 10 reps of any UE exercise w/out exacerbation of symptoms.   -AF     LTG 3  Pt to demnstrate B cervical ROM of 70 deg.  -AF     LTG 3 Progress  Ongoing;Progressing  -AF     LTG 3 Progress Comments  see STG #1  -AF     LTG 4  Pt is able to perform gardening tasks for 1 hour without an increase in symptoms.  -AF     LTG 4 Progress  Ongoing  -AF     LTG 4 Progress Comments  Pt. hasn't been working in her garden for more than 1 hour at a time. She hoes her garden for maybe 15 to 20 mintues at a time and her pain elevates to a 4 or 5.   -AF        Time Calculation    PT Goal Re-Cert Due Date  06/15/19  -AF       User Key  (r) = Recorded By, (t) = Taken By, (c) = Cosigned By    Initials Name Provider Type    AF [place], Tanya, PTA Physical Therapy Assistant          Therapy Education  Given: Pain management, Posture/body mechanics, Symptoms/condition management  Program: Reinforced  How Provided: Verbal  Provided to: Patient  Level of Understanding: Verbalized, Demonstrated              Time Calculation:   Start Time: 0846  Stop Time: 0932  Time Calculation (min): 46 min  Total Timed Code Minutes- PT: 46  minute(s)  Therapy Charges for Today     Code Description Service Date Service Provider Modifiers Qty    71926353727 HC PT THER PROC EA 15 MIN 5/16/2019 Tanya Loco, PTA GP 2    24636106673 HC PT MANUAL THERAPY EA 15 MIN 5/16/2019 Tanya Loco, CLAIRE GP 1                    Tanya Loco PTA  5/16/2019

## 2019-05-16 NOTE — THERAPY PROGRESS REPORT/RE-CERT
Outpatient Physical Therapy Ortho Progress Note  [facility]     Patient Name: Hannah Maki  : 1950  MRN: [id number]  Today's Date: 2019      Visit Date: 2019    Visit Dx:    ICD-10-CM ICD-9-CM   1. Cervicalgia M54.2 723.1       Patient Active Problem List   Diagnosis   • Mixed hyperlipidemia   • Acquired hypothyroidism   • Mild intermittent asthma   • Gastroesophageal reflux disease without esophagitis   • Steatosis of liver   • Chronic pansinusitis   • Perennial allergic rhinitis   • Post-nasal drip   • Bronchitis   • Rectal fissure   • Sinusitis   • Parotid mass   • Atypical chest pain   • Equivocal stress test   • Hypertension   • Dyspnea on exertion   • Arthralgia of both ankles   • Cervicalgia   • Acute pain of left shoulder   • Adult BMI 35.0-35.9 kg/sq m   • Non-smoker   • Abnormal LFTs   • Colon polyp   • Family history of GI malignancy        Past Medical History:   Diagnosis Date   • Abnormal liver enzymes    • Achilles bursitis    • Anxiety    • Arthralgia    • Arthritis    • Asthma    • Chronic pansinusitis 10/14/2016   • Colon polyp    • Diverticulosis    • Dysuria    • Fatty liver    • GERD (gastroesophageal reflux disease)    • Hyperlipidemia    • Hypertension    • Hyperthyroidism    • Non-cardiac chest pain    • Palpitations    • Perennial allergic rhinitis 10/14/2016   • Rhinitis    • Tinnitus    • Trigeminal neuralgia    • Vertigo         Past Surgical History:   Procedure Laterality Date   • APPENDECTOMY     • CATARACT EXTRACTION WITH INTRAOCULAR LENS IMPLANT     • CHOLECYSTECTOMY     • COLONOSCOPY  2013     six polyps removed or destroyed, Diverticulosis  Recall 3 years   • COLONOSCOPY  2013   • COLONOSCOPY N/A 2017    Procedure: COLONOSCOPY WITH ANESTHESIA;  Surgeon: Lew Orona MD;  Location: [facility];  Service:    • HEMORRHOIDECTOMY     • HYSTERECTOMY     • HYSTERECTOMY     • NECK SURGERY     • NOSE SURGERY      nose bleeding artery    •  RECTAL FISSURE INCISION AND DRAINAGE                         PT Assessment/Plan     Row Name 05/16/19 0846          PT Assessment    Functional Limitations  Limitation in home management;Limitations in community activities;Performance in leisure activities  -WJ     Impairments  Pain;Posture;Poor body mechanics;Range of motion;Muscle strength;Impaired muscle endurance  -WJ     Assessment Comments  Goals were assessed today for progress note. At this time, pt. has not met any of her 8 goals but she is progressing towards most of them. Pt. feels as if she has progressed 40-50% since beginning therapy. Her cervical ROM has improved and she has almost met her rotation goal. Where she lacks is her B UE and scapular strength. I believe pain is the issue for this. Pt. cannot complete 1 set of 10 reps of an exercise involving her UE and scapulae w/out exacerbation of symptoms that radiate down her proximal L UE. Today I progressed her B UE and scapular strengthening. Pt. reported pain in L scapula and radiating pain down L deltoid into triceps. I believe she will benefit from continued therapy to increase B UE & scapular strength and further reduce cervical pain.   -AF     Rehab Potential  Good  -WJ     Patient/caregiver participated in establishment of treatment plan and goals  Yes  -WJ     Patient would benefit from skilled therapy intervention  Yes  -WJ        PT Plan    PT Frequency  2x/week  -WJ     Predicted Duration of Therapy Intervention (Therapy Eval)  4-6 weeks  -WJ     Planned CPT's?  PT THER PROC EA 15 MIN: 54350;PT THER ACT EA 15 MIN: 42748;PT MANUAL THERAPY EA 15 MIN: 18228;PT NEUROMUSC RE-EDUCATION EA 15 MIN: 63757;PT SELF CARE/HOME MGMT/TRAIN EA 15: 96393;PT HOT OR COLD PACK TREAT MCARE;PT ELECTRICAL STIM UNATTEND: ;PT ELECTRICAL STIM ATTD EA 15 MIN: 92533  -WJ     PT Plan Comments  Continue to strengthen B UE and scapular muscles and reduce soft tissue restrictions in neck.   -AF       User Key  (r) =  Recorded By, (t) = Taken By, (c) = Cosigned By    Initials Name Provider Type    Shailesh Richardson, PT DPT Physical Therapist    AF Tanya Loco, PTA Physical Therapy Assistant            Exercises     Row Name 05/16/19 0846             Subjective Comments    Subjective Comments  Pt. states she was very sore after last visit on Tuesday and required ice to address the pain. She attributes it possibly to the positions she was in during treatment.  -AF         Subjective Pain    Able to rate subjective pain?  yes  -AF      Pre-Treatment Pain Level  2  -AF      Post-Treatment Pain Level  3  -AF         Total Minutes    49041 - PT Therapeutic Exercise Minutes  34  -AF      74104 - PT Manual Therapy Minutes  12  -AF         Exercise 1    Exercise Name 1  assessed goals for progress note  -AF         Exercise 2    Exercise Name 2  scap squeezes  -AF      Cueing 2  Verbal;Tactile  -AF      Sets 2  1  -AF      Reps 2  10  -AF      Additional Comments  pt. reports pain in scapulae  -AF         Exercise 3    Exercise Name 3  seated horizontal abd  -AF      Cueing 3  Verbal;Demo;Tactile  -AF      Sets 3  2  -AF      Reps 3  10  -AF         Exercise 4    Exercise Name 4  tables slides on ball  -AF      Cueing 4  Verbal;Demo  -AF      Sets 4  1 each direction  -AF      Reps 4  10  -AF      Additional Comments  bilateral; A/P & lateral  -AF        User Key  (r) = Recorded By, (t) = Taken By, (c) = Cosigned By    Initials Name Provider Type    AF Tanya Loco, PTA Physical Therapy Assistant                      Manual Rx (last 36 hours)      Manual Treatments     Row Name 05/16/19 0846             Total Minutes    59449 - PT Manual Therapy Minutes  12  -AF         Manual Rx 1    Manual Rx 1 Location  B UT, LS, and scapular muscles.   -AF      Manual Rx 1 Type  STM  -AF      Manual Rx 1 Duration  12  -AF        User Key  (r) = Recorded By, (t) = Taken By, (c) = Cosigned By    Initials Name Provider Type    AF Beronica,  CLAIRE Martínez Physical Therapy Assistant          PT OP Goals     Row Name 05/16/19 0846          PT Short Term Goals    STG Date to Achieve  05/30/19  -AF     STG 1  Pt to demonstrate B cervical rotation of 55 deg.  -AF     STG 1 Progress  Ongoing;Progressing  -AF     STG 1 Progress Comments  R rotation: 50 degrees; L rotation: 48 degrees  -AF     STG 2  Pt to demonstrate cervical extension of 40 deg.  -AF     STG 2 Progress  Ongoing;Progressing  -AF     STG 2 Progress Comments  cervical extension: 26 degrees.   -AF     STG 3  Pt demosntrates improvements in sitting and standing posture with an increased awreness and ability to correct.  -AF     STG 3 Progress  Ongoing;Progressing  -AF     STG 3 Progress Comments  Pt. is more aware of her posture, especially her shoulder placement.   -AF     STG 4  Pt reports no peripheral symptoms for 1 week.  -AF     STG 4 Progress  Ongoing;Progressing  -AF     STG 4 Progress Comments  Pt. reports have L UE radicular symptoms after last visit on Tuesday. However, before that visit, she states that her symptoms occur less frequently and the duration has decreased since before beginning therapy.   -AF        Long Term Goals    LTG Date to Achieve  06/15/19  -AF     LTG 1  Pt to demonstrate independence with HEP.  -AF     LTG 1 Progress  Ongoing;Progressing  -AF     LTG 1 Progress Comments  Pt. only has 2 exercises on her HEP but she has been mostly consistent.   -AF     LTG 2  Pt to demonstrate gross UE and postural strength of 5/5.  -AF     LTG 2 Progress  Ongoing  -AF     LTG 2 Progress Comments  Pt. demonstrates B UE and scapular weakness. Her gross B scapular strength ranges from 2+/5- 3/5. The most deficits being in B lower trap. Pt. cannot perform 1 set of 10 reps of any UE exercise w/out exacerbation of symptoms.   -AF     LTG 3  Pt to demnstrate B cervical ROM of 70 deg.  -AF     LTG 3 Progress  Ongoing;Progressing  -AF     LTG 3 Progress Comments  see STG #1  -AF     LTG 4   Pt is able to perform gardening tasks for 1 hour without an increase in symptoms.  -AF     LTG 4 Progress  Ongoing  -AF     LTG 4 Progress Comments  Pt. hasn't been working in her garden for more than 1 hour at a time. She hoes her garden for maybe 15 to 20 mintues at a time and her pain elevates to a 4 or 5.   -AF        Time Calculation    PT Goal Re-Cert Due Date  06/15/19  -AF       User Key  (r) = Recorded By, (t) = Taken By, (c) = Cosigned By    Initials Name Provider Type    AF [place], Tanya, PTA Physical Therapy Assistant          Therapy Education  Given: Pain management, Posture/body mechanics, Symptoms/condition management  Program: Reinforced  How Provided: Verbal  Provided to: Patient  Level of Understanding: Verbalized, Demonstrated              Time Calculation:   Start Time: 0846  Stop Time: 0932  Time Calculation (min): 46 min  Total Timed Code Minutes- PT: 46 minute(s)                Shailesh Keith, PT DPT  5/16/2019

## 2019-05-17 NOTE — PROGRESS NOTES
urgency. No flank pain or hematuria. Musculoskeletal - no back pain, gait disturbance, or myalgia. Skin - no color change, rash, pallor, or new wound. Neurologic - no dizziness, facial asymmetry, or light headedness. No seizures. No speech difficulty or lateralizing weakness. Hematologic - no easy bruising or excessive bleeding. Psychiatric - no severe anxiety or nervousness. No confusion. All other review of systems are negative. Physical Exam    BP (!) 142/91 Comment: left  Pulse 88   Temp 97.3 °F (36.3 °C)   Resp 18   SpO2 94%     Constitutional - well developed, well nourished. No diaphoresis or acute distress. HENT - head normocephalic. Right external ear canal appears normal.  Left external ear canal appears normal.  Septum appears midline. Eyes - conjunctiva normal.  EOMS normal.  No exudate. No icterus. Neck- ROM appears normal, no tracheal deviation. Cardiovascular - Regular rate and rhythm. Heart sounds are normal.  No murmur, rub, or gallop. Carotid pulses are 2+ to palpation bilaterally without bruit. Extremities - Radial and brachial pulses are 2+ to palpation bilaterally. DP and PT pulses are palpable. No cyanosis or clubbing. No signs atheroembolic event. She has a varicose vein on the left inner thigh. She has a few spider veins bilaterally on the lower legs. Pulmonary - effort appears normal.  No respiratory distress. Lungs - Breath sounds normal. No wheezes or rales. GI - Abdomen - soft, non tender, bowel sounds X 4 quadrants. No guarding or rebound tenderness. No distension or palpable mass. Genitourinary - deferred. Musculoskeletal - ROM appears normal.  Neurologic - alert and oriented X 3. Physiologic. Skin - warm, dry, and intact. No rash, erythema, or pallor. Psychiatric - mood, affect, and behavior appear normal.  Judgment and thought processes appear normal.      Assessment    1.  Varicose veins with pain          Plan        Recommend support hose  20-30 mmHg compression (she doesn't want to wear support hose)  Recommend leg elevation above the level of the heart  Since there is only one varicose vein that is painful, I will d/w Dr. Pola Kehr and call the patient with further recommendation    Addendum:  I discussed the case with Dr. Pola Kehr. She recommends we obtain a  BLE venous scan for reflux. I spoke with the patient over the phone and she is agreeable to get the scan. We will call her with the results and discuss further recommendations. BLE venous scan - (reviewed by Dr. Pola Kehr)  [address]        Right Side: The greater saphenous vein exhibits reflux lasting for a    maximum of 3345 milliseconds in thesaphenofemoral junction. There is no    evidence of deep venous thrombosis in the segments insonated. There is no    deep vein reflux.   Shira Last is no short saphenous vein reflux.    Left Side: The greater saphenous vein exhibits no reflux. There is no    evidence of deep venous thrombosis in the segments insonated. There is    1225 jeannette seconds of deep vein reflux.   Shira Last is no short saphenous vein reflux. Dr. Pola Kehr who like for the patient to come in for an OV to discus the results of the study and possible options with her including BLE venogram with IVUS possible angioplasty/stent iliac veins. We will schedule appointment if the patient is agreeable.

## 2019-05-19 NOTE — PATIENT INSTRUCTIONS
Will start mucinex for thick postnasal drainage and advised to increase fluid intake. Continue other medications as directed, recheck in six months. If symptoms worsen call for sooner follow-up.      MyPlate from USDA  MyPlate is an outline of a general healthy diet based on the 2010 Dietary Guidelines for Americans, from the U.S. Department of Agriculture (USDA). It sets guidelines for how much food you should eat from each food group based on your age, sex, and level of physical activity.  What are tips for following MyPlate?  To follow MyPlate recommendations:  · Eat a wide variety of fruits and vegetables, grains, and protein foods.  · Serve smaller portions and eat less food throughout the day.  · Limit portion sizes to avoid overeating.  · Enjoy your food.  · Get at least 150 minutes of exercise every week. This is about 30 minutes each day, 5 or more days per week.    It can be difficult to have every meal look like MyPlate. Think about MyPlate as eating guidelines for an entire day, rather than each individual meal.  Fruits and Vegetables  · Make half of your plate fruits and vegetables.  · Eat many different colors of fruits and vegetables each day.  · For a 2,000 calorie daily food plan, eat:  ? 2½ cups of vegetables every day.  ? 2 cups of fruit every day.  · 1 cup is equal to:  ? 1 cup raw or cooked vegetables.  ? 1 cup raw fruit.  ? 1 medium-sized orange, apple, or banana.  ? 1 cup 100% fruit or vegetable juice.  ? 2 cups raw leafy greens, such as lettuce, spinach, or kale.  ? ½ cup dried fruit.  Grains  · One fourth of your plate should be grains.  · Make at least half of the grains you eat each day whole grains.  · For a 2,000 calorie daily food plan, eat 6 oz of grains every day.  · 1 oz is equal to:  ? 1 slice bread.  ? 1 cup cereal.  ? ½ cup cooked rice, cereal, or pasta.  Protein  · One fourth of your plate should be protein.  · Eat a wide variety of protein foods, including meat, poultry, fish,  eggs, beans, nuts, and tofu.  · For a 2,000 calorie daily food plan, eat 5½ oz of protein every day.  · 1 oz is equal to:  ? 1 oz meat, poultry, or fish.  ? ¼ cup cooked beans.  ? 1 egg.  ? ½ oz nuts or seeds.  ? 1 Tbsp peanut butter.  Dairy  · Drink fat-free or low-fat (1%) milk.  · Eat or drink dairy as a side to meals.  · For a 2,000 calorie daily food plan, eat or drink 3 cups of dairy every day.  · 1 cup is equal to:  ? 1 cup milk, yogurt, cottage cheese, or soy milk (soy beverage).  ? 2 oz processed cheese.  ? 1½ oz natural cheese.  Fats, oils, salt, and sugars  · Only small amounts of oils are recommended.  · Avoid foods that are high in calories and low in nutritional value (empty calories), like foods high in fat or added sugars.  · Choose foods that are low in salt (sodium). Choose foods that have less than 140 milligrams (mg) of sodium per serving.  · Drink water instead of sugary drinks. Drink enough water each day to keep your urine pale yellow.  Where to find support  · Work with your health care provider or a nutrition specialist (dietitian) to develop a customized eating plan that is right for you.  · Download an maggy (mobile application) to help you track your daily food intake.  Where to find more information  · Go to ChooseMyPlate.gov for more information.  · Learn more and log your daily food intake according to MyPlate using USDA's SuperTracker: www.supertracker.usda.gov  Summary  · MyPlate is a general guideline for healthy eating from the USDA. It is based on the 2010 Dietary Guidelines for Americans.  · In general, fruits and vegetables should take up ½ of your plate, grains should take up ¼ of your plate, and protein should take up ¼ of your plate.  This information is not intended to replace advice given to you by your health care provider. Make sure you discuss any questions you have with your health care provider.  Document Released: 01/06/2009 Document Revised: 03/19/2018 Document  Reviewed: 03/19/2018  [employer] Interactive Patient Education © 2019 [employer]     Calorie Counting for Weight Loss  Calories are units of energy. Your body needs a certain amount of calories from food to keep you going throughout the day. When you eat more calories than your body needs, your body stores the extra calories as fat. When you eat fewer calories than your body needs, your body burns fat to get the energy it needs.  Calorie counting means keeping track of how many calories you eat and drink each day. Calorie counting can be helpful if you need to lose weight. If you make sure to eat fewer calories than your body needs, you should lose weight. Ask your health care provider what a healthy weight is for you.  For calorie counting to work, you will need to eat the right number of calories in a day in order to lose a healthy amount of weight per week. A dietitian can help you determine how many calories you need in a day and will give you suggestions on how to reach your calorie goal.  · A healthy amount of weight to lose per week is usually 1-2 lb (0.5-0.9 kg). This usually means that your daily calorie intake should be reduced by 500-750 calories.  · Eating 1,200 - 1,500 calories per day can help most women lose weight.  · Eating 1,500 - 1,800 calories per day can help most men lose weight.    What is my plan?  My goal is to have __________ calories per day.  If I have this many calories per day, I should lose around __________ pounds per week.  What do I need to know about calorie counting?  In order to meet your daily calorie goal, you will need to:  · Find out how many calories are in each food you would like to eat. Try to do this before you eat.  · Decide how much of the food you plan to eat.  · Write down what you ate and how many calories it had. Doing this is called keeping a food log.    To successfully lose weight, it is important to balance calorie counting with a healthy lifestyle that  includes regular activity. Aim for 150 minutes of moderate exercise (such as walking) or 75 minutes of vigorous exercise (such as running) each week.  Where do I find calorie information?    The number of calories in a food can be found on a Nutrition Facts label. If a food does not have a Nutrition Facts label, try to look up the calories online or ask your dietitian for help.  Remember that calories are listed per serving. If you choose to have more than one serving of a food, you will have to multiply the calories per serving by the amount of servings you plan to eat. For example, the label on a package of bread might say that a serving size is 1 slice and that there are 90 calories in a serving. If you eat 1 slice, you will have eaten 90 calories. If you eat 2 slices, you will have eaten 180 calories.  How do I keep a food log?  Immediately after each meal, record the following information in your food log:  · What you ate. Don't forget to include toppings, sauces, and other extras on the food.  · How much you ate. This can be measured in cups, ounces, or number of items.  · How many calories each food and drink had.  · The total number of calories in the meal.    Keep your food log near you, such as in a small notebook in your pocket, or use a mobile maggy or website. Some programs will calculate calories for you and show you how many calories you have left for the day to meet your goal.  What are some calorie counting tips?  · Use your calories on foods and drinks that will fill you up and not leave you hungry:  ? Some examples of foods that fill you up are nuts and nut butters, vegetables, lean proteins, and high-fiber foods like whole grains. High-fiber foods are foods with more than 5 g fiber per serving.  ? Drinks such as sodas, specialty coffee drinks, alcohol, and juices have a lot of calories, yet do not fill you up.  · Eat nutritious foods and avoid empty calories. Empty calories are calories you get  "from foods or beverages that do not have many vitamins or protein, such as candy, sweets, and soda. It is better to have a nutritious high-calorie food (such as an avocado) than a food with few nutrients (such as a bag of chips).  · Know how many calories are in the foods you eat most often. This will help you calculate calorie counts faster.  · Pay attention to calories in drinks. Low-calorie drinks include water and unsweetened drinks.  · Pay attention to nutrition labels for \"low fat\" or \"fat free\" foods. These foods sometimes have the same amount of calories or more calories than the full fat versions. They also often have added sugar, starch, or salt, to make up for flavor that was removed with the fat.  · Find a way of tracking calories that works for you. Get creative. Try different apps or programs if writing down calories does not work for you.  What are some portion control tips?  · Know how many calories are in a serving. This will help you know how many servings of a certain food you can have.  · Use a measuring cup to measure serving sizes. You could also try weighing out portions on a kitchen scale. With time, you will be able to estimate serving sizes for some foods.  · Take some time to put servings of different foods on your favorite plates, bowls, and cups so you know what a serving looks like.  · Try not to eat straight from a bag or box. Doing this can lead to overeating. Put the amount you would like to eat in a cup or on a plate to make sure you are eating the right portion.  · Use smaller plates, glasses, and bowls to prevent overeating.  · Try not to multitask (for example, watch TV or use your computer) while eating. If it is time to eat, sit down at a table and enjoy your food. This will help you to know when you are full. It will also help you to be aware of what you are eating and how much you are eating.  What are tips for following this plan?  Reading food labels  · Check the calorie " count compared to the serving size. The serving size may be smaller than what you are used to eating.  · Check the source of the calories. Make sure the food you are eating is high in vitamins and protein and low in saturated and trans fats.  Shopping  · Read nutrition labels while you shop. This will help you make healthy decisions before you decide to purchase your food.  · Make a grocery list and stick to it.  Cooking  · Try to cook your favorite foods in a healthier way. For example, try baking instead of frying.  · Use low-fat dairy products.  Meal planning  · Use more fruits and vegetables. Half of your plate should be fruits and vegetables.  · Include lean proteins like poultry and fish.  How do I count calories when eating out?  · Ask for smaller portion sizes.  · Consider sharing an entree and sides instead of getting your own entree.  · If you get your own entree, eat only half. Ask for a box at the beginning of your meal and put the rest of your entree in it so you are not tempted to eat it.  · If calories are listed on the menu, choose the lower calorie options.  · Choose dishes that include vegetables, fruits, whole grains, low-fat dairy products, and lean protein.  · Choose items that are boiled, broiled, grilled, or steamed. Stay away from items that are buttered, battered, fried, or served with cream sauce. Items labeled “crispy” are usually fried, unless stated otherwise.  · Choose water, low-fat milk, unsweetened iced tea, or other drinks without added sugar. If you want an alcoholic beverage, choose a lower calorie option such as a glass of wine or light beer.  · Ask for dressings, sauces, and syrups on the side. These are usually high in calories, so you should limit the amount you eat.  · If you want a salad, choose a garden salad and ask for grilled meats. Avoid extra toppings like alberto, cheese, or fried items. Ask for the dressing on the side, or ask for olive oil and vinegar or lemon to use  as dressing.  · Estimate how many servings of a food you are given. For example, a serving of cooked rice is ½ cup or about the size of half a baseball. Knowing serving sizes will help you be aware of how much food you are eating at restaurants. The list below tells you how big or small some common portion sizes are based on everyday objects:  ? 1 oz--4 stacked dice.  ? 3 oz--1 deck of cards.  ? 1 tsp--1 die.  ? 1 Tbsp--½ a ping-pong ball.  ? 2 Tbsp--1 ping-pong ball.  ? ½ cup--½ baseball.  ? 1 cup--1 baseball.  Summary  · Calorie counting means keeping track of how many calories you eat and drink each day. If you eat fewer calories than your body needs, you should lose weight.  · A healthy amount of weight to lose per week is usually 1-2 lb (0.5-0.9 kg). This usually means reducing your daily calorie intake by 500-750 calories.  · The number of calories in a food can be found on a Nutrition Facts label. If a food does not have a Nutrition Facts label, try to look up the calories online or ask your dietitian for help.  · Use your calories on foods and drinks that will fill you up, and not on foods and drinks that will leave you hungry.  · Use smaller plates, glasses, and bowls to prevent overeating.  This information is not intended to replace advice given to you by your health care provider. Make sure you discuss any questions you have with your health care provider.  Document Released: 12/18/2006 Document Revised: 11/17/2017 Document Reviewed: 11/17/2017  [employer] Interactive Patient Education © 2019 [employer]     Exercising to Lose Weight  Exercising can help you to lose weight. In order to lose weight through exercise, you need to do vigorous-intensity exercise. You can tell that you are exercising with vigorous intensity if you are breathing very hard and fast and cannot hold a conversation while exercising.  Moderate-intensity exercise helps to maintain your current weight. You can tell that you are  exercising at a moderate level if you have a higher heart rate and faster breathing, but you are still able to hold a conversation.  How often should I exercise?  Choose an activity that you enjoy and set realistic goals. Your health care provider can help you to make an activity plan that works for you. Exercise regularly as directed by your health care provider. This may include:  · Doing resistance training twice each week, such as:  ? Push-ups.  ? Sit-ups.  ? Lifting weights.  ? Using resistance bands.  · Doing a given intensity of exercise for a given amount of time. Choose from these options:  ? 150 minutes of moderate-intensity exercise every week.  ? 75 minutes of vigorous-intensity exercise every week.  ? A mix of moderate-intensity and vigorous-intensity exercise every week.    Children, pregnant women, people who are out of shape, people who are overweight, and older adults may need to consult a health care provider for individual recommendations. If you have any sort of medical condition, be sure to consult your health care provider before starting a new exercise program.  What are some activities that can help me to lose weight?  · Walking at a rate of at least 4.5 miles an hour.  · Jogging or running at a rate of 5 miles per hour.  · Biking at a rate of at least 10 miles per hour.  · Lap swimming.  · Roller-skating or in-line skating.  · Cross-country skiing.  · Vigorous competitive sports, such as football, basketball, and soccer.  · Jumping rope.  · Aerobic dancing.  How can I be more active in my day-to-day activities?  · Use the stairs instead of the elevator.  · Take a walk during your lunch break.  · If you drive, park your car farther away from work or school.  · If you take public transportation, get off one stop early and walk the rest of the way.  · Make all of your phone calls while standing up and walking around.  · Get up, stretch, and walk around every 30 minutes throughout the day.  What  guidelines should I follow while exercising?  · Do not exercise so much that you hurt yourself, feel dizzy, or get very short of breath.  · Consult your health care provider prior to starting a new exercise program.  · Wear comfortable clothes and shoes with good support.  · Drink plenty of water while you exercise to prevent dehydration or heat stroke. Body water is lost during exercise and must be replaced.  · Work out until you breathe faster and your heart beats faster.  This information is not intended to replace advice given to you by your health care provider. Make sure you discuss any questions you have with your health care provider.  Document Released: 01/20/2012 Document Revised: 05/25/2017 Document Reviewed: 05/21/2015  [employer] Interactive Patient Education © 2019 [employer]

## 2019-05-21 ENCOUNTER — HOSPITAL ENCOUNTER (OUTPATIENT)
Dept: PHYSICAL THERAPY | Facility: HOSPITAL | Age: 69
Setting detail: THERAPIES SERIES
Discharge: HOME | End: 2019-05-21

## 2019-05-21 DIAGNOSIS — M54.2 CERVICALGIA: Primary | ICD-10-CM

## 2019-05-21 PROCEDURE — 97110 THERAPEUTIC EXERCISES: CPT

## 2019-05-21 PROCEDURE — 97140 MANUAL THERAPY 1/> REGIONS: CPT

## 2019-05-21 NOTE — THERAPY TREATMENT NOTE
Outpatient Physical Therapy Ortho Treatment Note  [facility]     Patient Name: Hannah Maki  : 1950  MRN: [id number]  Today's Date: 2019      Visit Date: 2019    Visit Dx:    ICD-10-CM ICD-9-CM   1. Cervicalgia M54.2 723.1       Patient Active Problem List   Diagnosis   • Mixed hyperlipidemia   • Acquired hypothyroidism   • Mild intermittent asthma   • Gastroesophageal reflux disease without esophagitis   • Steatosis of liver   • Chronic pansinusitis   • Perennial allergic rhinitis   • Post-nasal drip   • Bronchitis   • Rectal fissure   • Sinusitis   • Parotid mass   • Atypical chest pain   • Equivocal stress test   • Hypertension   • Dyspnea on exertion   • Arthralgia of both ankles   • Cervicalgia   • Acute pain of left shoulder   • Adult BMI 35.0-35.9 kg/sq m   • Non-smoker   • Abnormal LFTs   • Colon polyp   • Family history of GI malignancy        Past Medical History:   Diagnosis Date   • Abnormal liver enzymes    • Achilles bursitis    • Anxiety    • Arthralgia    • Arthritis    • Asthma    • Chronic pansinusitis 10/14/2016   • Colon polyp    • Diverticulosis    • Dysuria    • Fatty liver    • GERD (gastroesophageal reflux disease)    • Hyperlipidemia    • Hypertension    • Hyperthyroidism    • Non-cardiac chest pain    • Palpitations    • Perennial allergic rhinitis 10/14/2016   • Rhinitis    • Tinnitus    • Trigeminal neuralgia    • Vertigo         Past Surgical History:   Procedure Laterality Date   • APPENDECTOMY     • CATARACT EXTRACTION WITH INTRAOCULAR LENS IMPLANT     • CHOLECYSTECTOMY     • COLONOSCOPY  2013     six polyps removed or destroyed, Diverticulosis  Recall 3 years   • COLONOSCOPY  2013   • COLONOSCOPY N/A 2017    Procedure: COLONOSCOPY WITH ANESTHESIA;  Surgeon: Lew Orona MD;  Location: [facility];  Service:    • HEMORRHOIDECTOMY     • HYSTERECTOMY     • HYSTERECTOMY     • NECK SURGERY     • NOSE SURGERY      nose bleeding artery    •  RECTAL FISSURE INCISION AND DRAINAGE                         PT Assessment/Plan     Row Name 05/21/19 0849          PT Assessment    Assessment Comments  Pt. c/o pain performing AROM exercises w/ no resistance in B shoulders but moreso in her L UE. I passively mobilized her L UE and pt. stil reported pain in the same ranges as active movement.   -AF        PT Plan    PT Plan Comments  Hold off on exercises next visit. Try PROM B UE and cervical distraction. Continue STM.   -AF       User Key  (r) = Recorded By, (t) = Taken By, (c) = Cosigned By    Initials Name Provider Type    AF Tanya Loco, PTA Physical Therapy Assistant            Exercises     Row Name 05/21/19 0897             Subjective Comments    Subjective Comments  Pt. had to carry a case of water in from the grocery yesterday and felt sore.   -AF         Subjective Pain    Able to rate subjective pain?  yes  -AF      Pre-Treatment Pain Level  3  -AF      Post-Treatment Pain Level  4  -AF      Subjective Pain Comment  B UT & base of neck   -AF         Total Minutes    21541 - PT Therapeutic Exercise Minutes  24  -AF      13041 - PT Manual Therapy Minutes  15  -AF         Exercise 1    Exercise Name 1  B supine UE flexion unilaterally  -AF      Cueing 1  Verbal;Tactile;Demo  -AF      Sets 1  1  -AF      Reps 1  10  -AF         Exercise 2    Exercise Name 2  B supine snow angels  -AF      Cueing 2  Verbal;Demo  -AF      Sets 2  1  -AF      Reps 2  5  -AF         Exercise 3    Exercise Name 3  B wall circles CW/CCW  -AF      Cueing 3  Verbal;Demo  -AF      Sets 3  2  -AF      Reps 3  10  -AF         Exercise 4    Exercise Name 4  B isometric ball against wall  -AF      Cueing 4  Verbal;Demo  -AF      Sets 4  1 each direction  -AF      Reps 4  10  -AF      Additional Comments  flex, ext, abd, ER, IR  -AF        User Key  (r) = Recorded By, (t) = Taken By, (c) = Cosigned By    Initials Name Provider Type    AF [place], Tanya, PTA Physical Therapy  Assistant                      Manual Rx (last 36 hours)      Manual Treatments     Row Name 05/21/19 0849             Total Minutes    27609 - PT Manual Therapy Minutes  15  -AF         Manual Rx 1    Manual Rx 1 Location  B UT, LS, and scapular muscles.   -AF      Manual Rx 1 Type  STM seated in chair w/ B UE supported on pillow and raised table  -AF      Manual Rx 1 Duration  15  -AF        User Key  (r) = Recorded By, (t) = Taken By, (c) = Cosigned By    Initials Name Provider Type    AF [place], Tanya, PTA Physical Therapy Assistant          PT OP Goals     Row Name 05/21/19 0849          PT Short Term Goals    STG Date to Achieve  05/30/19  -AF     STG 1  Pt to demonstrate B cervical rotation of 55 deg.  -AF     STG 1 Progress  Ongoing;Progressing  -AF     STG 2  Pt to demonstrate cervical extension of 40 deg.  -AF     STG 2 Progress  Ongoing;Progressing  -AF     STG 3  Pt demosntrates improvements in sitting and standing posture with an increased awreness and ability to correct.  -AF     STG 3 Progress  Ongoing;Progressing  -AF     STG 4  Pt reports no peripheral symptoms for 1 week.  -AF     STG 4 Progress  Ongoing;Progressing  -AF        Long Term Goals    LTG Date to Achieve  06/15/19  -AF     LTG 1  Pt to demonstrate independence with HEP.  -AF     LTG 1 Progress  Ongoing;Progressing  -AF     LTG 2  Pt to demonstrate gross UE and postural strength of 5/5.  -AF     LTG 2 Progress  Ongoing  -AF     LTG 2 Progress Comments  Pt. performed B shoulder isometrics against a ball and wall 10 x 2 in flexion, extension, abduction, ER, & IR  -AF     LTG 3  Pt to demnstrate B cervical ROM of 70 deg.  -AF     LTG 3 Progress  Ongoing;Progressing  -AF     LTG 4  Pt is able to perform gardening tasks for 1 hour without an increase in symptoms.  -AF     LTG 4 Progress  Ongoing  -AF        Time Calculation    PT Goal Re-Cert Due Date  06/15/19  -AF       User Key  (r) = Recorded By, (t) = Taken By, (c) = Cosigned By     Initials Name Provider Type    AF Tanya Loco PTA Physical Therapy Assistant          Therapy Education  Education Details: Try pulleys at brother's house and see if she can produce same symptoms as passive ROM  Given: HEP, Symptoms/condition management, Pain management  Program: New  How Provided: Verbal  Provided to: Patient  Level of Understanding: Verbalized              Time Calculation:   Start Time: 0849  Stop Time: 0928  Time Calculation (min): 39 min  Total Timed Code Minutes- PT: 39 minute(s)  Therapy Charges for Today     Code Description Service Date Service Provider Modifiers Qty    00915221750 HC PT THER PROC EA 15 MIN 5/21/2019 Tanya Loco PTA GP 2    54635941188 HC PT MANUAL THERAPY EA 15 MIN 5/21/2019 Tanya Loco PTA GP 1                    Tanya Loco PTA  5/21/2019

## 2019-05-23 ENCOUNTER — HOSPITAL ENCOUNTER (OUTPATIENT)
Dept: PHYSICAL THERAPY | Facility: HOSPITAL | Age: 69
Setting detail: THERAPIES SERIES
Discharge: HOME | End: 2019-05-23

## 2019-05-23 DIAGNOSIS — M54.2 CERVICALGIA: Primary | ICD-10-CM

## 2019-05-23 PROCEDURE — 97140 MANUAL THERAPY 1/> REGIONS: CPT

## 2019-05-23 NOTE — THERAPY TREATMENT NOTE
Outpatient Physical Therapy Ortho Treatment Note  [facility]     Patient Name: Hannah Maki  : 1950  MRN: [id number]  Today's Date: 2019      Visit Date: 2019    Visit Dx:    ICD-10-CM ICD-9-CM   1. Cervicalgia M54.2 723.1       Patient Active Problem List   Diagnosis   • Mixed hyperlipidemia   • Acquired hypothyroidism   • Mild intermittent asthma   • Gastroesophageal reflux disease without esophagitis   • Steatosis of liver   • Chronic pansinusitis   • Perennial allergic rhinitis   • Post-nasal drip   • Bronchitis   • Rectal fissure   • Sinusitis   • Parotid mass   • Atypical chest pain   • Equivocal stress test   • Hypertension   • Dyspnea on exertion   • Arthralgia of both ankles   • Cervicalgia   • Acute pain of left shoulder   • Adult BMI 35.0-35.9 kg/sq m   • Non-smoker   • Abnormal LFTs   • Colon polyp   • Family history of GI malignancy        Past Medical History:   Diagnosis Date   • Abnormal liver enzymes    • Achilles bursitis    • Anxiety    • Arthralgia    • Arthritis    • Asthma    • Chronic pansinusitis 10/14/2016   • Colon polyp    • Diverticulosis    • Dysuria    • Fatty liver    • GERD (gastroesophageal reflux disease)    • Hyperlipidemia    • Hypertension    • Hyperthyroidism    • Non-cardiac chest pain    • Palpitations    • Perennial allergic rhinitis 10/14/2016   • Rhinitis    • Tinnitus    • Trigeminal neuralgia    • Vertigo         Past Surgical History:   Procedure Laterality Date   • APPENDECTOMY     • CATARACT EXTRACTION WITH INTRAOCULAR LENS IMPLANT     • CHOLECYSTECTOMY     • COLONOSCOPY  2013     six polyps removed or destroyed, Diverticulosis  Recall 3 years   • COLONOSCOPY  2013   • COLONOSCOPY N/A 2017    Procedure: COLONOSCOPY WITH ANESTHESIA;  Surgeon: eLw Orona MD;  Location: [facility];  Service:    • HEMORRHOIDECTOMY     • HYSTERECTOMY     • HYSTERECTOMY     • NECK SURGERY     • NOSE SURGERY      nose bleeding artery    •  RECTAL FISSURE INCISION AND DRAINAGE                         PT Assessment/Plan     Row Name 05/23/19 0846          PT Assessment    Assessment Comments  Today we defferred exercises due to increased pain and pain w/ movement. I was able to reduce her pain w/ manual therapy. Her L UE presented w/ muscle guarding proximally.   -AF        PT Plan    PT Plan Comments  Continue to STM cervical and L UE. Work on B isometrics next visit if pain allows.   -AF       User Key  (r) = Recorded By, (t) = Taken By, (c) = Cosigned By    Initials Name Provider Type    AF Tanya Loco PTA Physical Therapy Assistant            Exercises     Row Name 05/23/19 0846             Subjective Comments    Subjective Comments  Pt. reports increased pain today. Her  recently had surgery and she has had to take care of him which includes household chores she had avoided due to her neck pain. She specifically has trouble with taking the trash out, as she has to lift the bag up and out of the can. She also experiences trouble driving.   -AF         Subjective Pain    Able to rate subjective pain?  yes  -AF      Pre-Treatment Pain Level  4 3 or 4  -AF      Post-Treatment Pain Level  1  -AF      Subjective Pain Comment  L UT, shoulder, and proximal UE  -AF         Total Minutes    98131 - PT Manual Therapy Minutes  44  -AF        User Key  (r) = Recorded By, (t) = Taken By, (c) = Cosigned By    Initials Name Provider Type    AF Tanya Loco, CLAIRE Physical Therapy Assistant                      Manual Rx (last 36 hours)      Manual Treatments     Row Name 05/23/19 0846             Total Minutes    97347 - PT Manual Therapy Minutes  44  -AF         Manual Rx 1    Manual Rx 1 Location  B UT, LS, & scapula  -AF      Manual Rx 1 Type  STM seated in chair w/ B UE supported on pillow and raised table  -AF      Manual Rx 1 Duration  18  -AF         Manual Rx 2    Manual Rx 2 Location  cervical  -AF      Manual Rx 2 Type  manual  distraction, sustained  -AF      Manual Rx 2 Duration  8  -AF         Manual Rx 3    Manual Rx 3 Location  L deltiod, biceps, & triceps  -AF      Manual Rx 3 Type  STM in supine w/ massage cream  -AF      Manual Rx 3 Duration  18  -AF        User Key  (r) = Recorded By, (t) = Taken By, (c) = Cosigned By    Initials Name Provider Type    AF Beronica Tanya, PTA Physical Therapy Assistant          PT OP Goals     Row Name 05/23/19 0846          PT Short Term Goals    STG Date to Achieve  05/30/19  -AF     STG 1  Pt to demonstrate B cervical rotation of 55 deg.  -AF     STG 1 Progress  Ongoing;Progressing  -AF     STG 2  Pt to demonstrate cervical extension of 40 deg.  -AF     STG 2 Progress  Ongoing;Progressing  -AF     STG 3  Pt demosntrates improvements in sitting and standing posture with an increased awreness and ability to correct.  -AF     STG 3 Progress  Ongoing;Progressing  -AF     STG 4  Pt reports no peripheral symptoms for 1 week.  -AF     STG 4 Progress  Ongoing;Progressing  -AF     STG 4 Progress Comments  Pt. still reports having pain down her L UE down to her elbow.   -AF        Long Term Goals    LTG Date to Achieve  06/15/19  -AF     LTG 1  Pt to demonstrate independence with HEP.  -AF     LTG 1 Progress  Ongoing;Progressing  -AF     LTG 2  Pt to demonstrate gross UE and postural strength of 5/5.  -AF     LTG 2 Progress  Ongoing  -AF     LTG 3  Pt to demnstrate B cervical ROM of 70 deg.  -AF     LTG 3 Progress  Ongoing;Progressing  -AF     LTG 4  Pt is able to perform gardening tasks for 1 hour without an increase in symptoms.  -AF     LTG 4 Progress  Ongoing  -AF        Time Calculation    PT Goal Re-Cert Due Date  06/15/19  -AF       User Key  (r) = Recorded By, (t) = Taken By, (c) = Cosigned By    Initials Name Provider Type    AF [place], Tanya, PTA Physical Therapy Assistant          Therapy Education  Given: Symptoms/condition management, Pain management  Program: Reinforced  How Provided:  Verbal  Provided to: Patient  Level of Understanding: Verbalized              Time Calculation:   Start Time: 0846  Stop Time: 0930  Time Calculation (min): 44 min  Total Timed Code Minutes- PT: 44 minute(s)  Therapy Charges for Today     Code Description Service Date Service Provider Modifiers Qty    43583709633 HC PT MANUAL THERAPY EA 15 MIN 5/23/2019 Tanya Loco PTA GP 3                    Tanya Loco PTA  5/23/2019

## 2019-05-28 ENCOUNTER — HOSPITAL ENCOUNTER (OUTPATIENT)
Dept: PHYSICAL THERAPY | Facility: HOSPITAL | Age: 69
Setting detail: THERAPIES SERIES
Discharge: HOME | End: 2019-05-28

## 2019-05-28 DIAGNOSIS — M54.2 CERVICALGIA: Primary | ICD-10-CM

## 2019-05-28 DIAGNOSIS — M54.12 CERVICAL RADICULAR PAIN: ICD-10-CM

## 2019-05-28 PROCEDURE — 97110 THERAPEUTIC EXERCISES: CPT | Performed by: PHYSICAL THERAPIST

## 2019-05-28 PROCEDURE — 97140 MANUAL THERAPY 1/> REGIONS: CPT | Performed by: PHYSICAL THERAPIST

## 2019-05-28 NOTE — THERAPY TREATMENT NOTE
Outpatient Physical Therapy Ortho Treatment Note  [facility]     Patient Name: Hannah Maki  : 1950  MRN: [id number]  Today's Date: 2019      Visit Date: 2019    Visit Dx:    ICD-10-CM ICD-9-CM   1. Cervicalgia M54.2 723.1   2. Cervical radicular pain M54.12 723.4       Patient Active Problem List   Diagnosis   • Mixed hyperlipidemia   • Acquired hypothyroidism   • Mild intermittent asthma   • Gastroesophageal reflux disease without esophagitis   • Steatosis of liver   • Chronic pansinusitis   • Perennial allergic rhinitis   • Post-nasal drip   • Bronchitis   • Rectal fissure   • Sinusitis   • Parotid mass   • Atypical chest pain   • Equivocal stress test   • Hypertension   • Dyspnea on exertion   • Arthralgia of both ankles   • Cervicalgia   • Acute pain of left shoulder   • Adult BMI 35.0-35.9 kg/sq m   • Non-smoker   • Abnormal LFTs   • Colon polyp   • Family history of GI malignancy        Past Medical History:   Diagnosis Date   • Abnormal liver enzymes    • Achilles bursitis    • Anxiety    • Arthralgia    • Arthritis    • Asthma    • Chronic pansinusitis 10/14/2016   • Colon polyp    • Diverticulosis    • Dysuria    • Fatty liver    • GERD (gastroesophageal reflux disease)    • Hyperlipidemia    • Hypertension    • Hyperthyroidism    • Non-cardiac chest pain    • Palpitations    • Perennial allergic rhinitis 10/14/2016   • Rhinitis    • Tinnitus    • Trigeminal neuralgia    • Vertigo         Past Surgical History:   Procedure Laterality Date   • APPENDECTOMY     • CATARACT EXTRACTION WITH INTRAOCULAR LENS IMPLANT     • CHOLECYSTECTOMY     • COLONOSCOPY  2013     six polyps removed or destroyed, Diverticulosis  Recall 3 years   • COLONOSCOPY  2013   • COLONOSCOPY N/A 2017    Procedure: COLONOSCOPY WITH ANESTHESIA;  Surgeon: Lew Orona MD;  Location: [facility];  Service:    • HEMORRHOIDECTOMY     • HYSTERECTOMY     • HYSTERECTOMY     • NECK SURGERY     •  NOSE SURGERY      nose bleeding artery    • RECTAL FISSURE INCISION AND DRAINAGE                         PT Assessment/Plan     Row Name 05/28/19 0900          PT Assessment    Assessment Comments  Pt performed a good amount of yard work yesterday and only reported pain of 1/10 this morning which she was very pleased with. Her muscle guarding was decreased today and we tried to incorporate addtional strengthening activities. She continues to report pain with genlt strengthening exercises, however she was able to tolerate more with her pain only slightly increasing.  -WJ        PT Plan    PT Plan Comments  Continue with cervical STM and progress with strengthening as tolerated.  -WJ       User Key  (r) = Recorded By, (t) = Taken By, (c) = Cosigned By    Initials Name Provider Type    WShailesh Whiting, PT DPT Physical Therapist            Exercises     Row Name 05/28/19 0900 05/28/19 0800          Subjective Comments    Subjective Comments  --  Pt reports that she rolled and tilled the garden yesterday, and is not having much pain today.  -WJ        Subjective Pain    Able to rate subjective pain?  --  yes  -WJ     Pre-Treatment Pain Level  --  1  -WJ     Post-Treatment Pain Level  --  3  -WJ        Total Minutes    64931 - PT Therapeutic Exercise Minutes  21  -WJ  --     56034 - PT Manual Therapy Minutes  20  -WJ  --        Exercise 1    Exercise Name 1  --  horizontal abdution with YTB  -WJ     Cueing 1  --  Verbal;Tactile;Demo  -WJ     Sets 1  --  1  -WJ     Reps 1  --  10  -WJ        Exercise 2    Exercise Name 2  --  D2 flexion no resistance  -WJ     Cueing 2  --  Verbal  -WJ     Sets 2  --  1  -WJ     Reps 2  --  10  -WJ        Exercise 3    Exercise Name 3  --  supine Y   -WJ     Cueing 3  --  Verbal;Demo  -WJ     Sets 3  --  1  -WJ     Reps 3  --  10  -WJ        Exercise 4    Exercise Name 4  --  B isometric ball against wall  -WJ     Cueing 4  --  Verbal;Demo  -WJ     Sets 4  --  1 each direction  -WJ      Reps 4  --  10  -WJ     Additional Comments  --  flex, ext, IR,ER  -WJ        Exercise 5    Exercise Name 5  --  rows with YTB   -WJ     Cueing 5  --  Verbal;Tactile  -WJ     Sets 5  --  1  -WJ     Reps 5  --  10  -WJ       User Key  (r) = Recorded By, (t) = Taken By, (c) = Cosigned By    Initials Name Provider Type    Shailesh Richardson, PT DPT Physical Therapist                      Manual Rx (last 36 hours)      Manual Treatments     Row Name 05/28/19 0900             Total Minutes    41077 - PT Manual Therapy Minutes  20  -WJ         Manual Rx 1    Manual Rx 1 Location  B UT, LS and suboccipitals  -WJ      Manual Rx 1 Type  STM in supine  -WJ      Manual Rx 1 Grade  mod  -WJ      Manual Rx 1 Duration  15  -WJ         Manual Rx 2    Manual Rx 2 Location  cervical  -WJ      Manual Rx 2 Type  manual distraction, sustained  -WJ      Manual Rx 2 Duration  5  -WJ        User Key  (r) = Recorded By, (t) = Taken By, (c) = Cosigned By    Initials Name Provider Type    WShailesh Whiting, PT DPT Physical Therapist          PT OP Goals     Row Name 05/28/19 0900          PT Short Term Goals    STG Date to Achieve  05/30/19  -WJ     STG 1  Pt to demonstrate B cervical rotation of 55 deg.  -WJ     STG 1 Progress  Ongoing;Progressing  -WJ     STG 2  Pt to demonstrate cervical extension of 40 deg.  -WJ     STG 2 Progress  Ongoing;Progressing  -WJ     STG 3  Pt demosntrates improvements in sitting and standing posture with an increased awreness and ability to correct.  -WJ     STG 3 Progress  Ongoing;Progressing  -WJ     STG 4  Pt reports no peripheral symptoms for 1 week.  -WJ     STG 4 Progress  Ongoing;Progressing  -WJ     STG 4 Progress Comments  Pain down the arm with some of todays activities  -WJ        Long Term Goals    LTG Date to Achieve  06/15/19  -WJ     LTG 1  Pt to demonstrate independence with HEP.  -WJ     LTG 1 Progress  Ongoing;Progressing  -WJ     LTG 2  Pt to demonstrate gross UE and postural strength of  5/5.  -WJ     LTG 2 Progress  Ongoing  -W     LTG 3  Pt to demnstrate B cervical ROM of 70 deg.  -W     LTG 3 Progress  Ongoing;Progressing  -W     LTG 4  Pt is able to perform gardening tasks for 1 hour without an increase in symptoms.  -     LTG 4 Progress  Ongoing  -        Time Calculation    PT Goal Re-Cert Due Date  06/15/19  -       User Key  (r) = Recorded By, (t) = Taken By, (c) = Cosigned By    Initials Name Provider Type    WShailesh Whiting, PT DPT Physical Therapist          Therapy Education  Given: Symptoms/condition management, Pain management  Program: Reinforced  How Provided: Verbal  Provided to: Patient  Level of Understanding: Verbalized              Time Calculation:   Start Time: 0848  Stop Time: 0929  Time Calculation (min): 41 min  Total Timed Code Minutes- PT: 41 minute(s)  Therapy Charges for Today     Code Description Service Date Service Provider Modifiers Qty    34431488116 HC PT THER PROC EA 15 MIN 5/28/2019 Shailesh Keith, PT DPT GP 2    [id number]  PT MANUAL THERAPY EA 15 MIN 5/28/2019 Shailesh Keith, PT DPT GP 1                    Shailesh Keith PT DPT  5/28/2019

## 2019-05-29 ENCOUNTER — TELEPHONE (OUTPATIENT)
Dept: FAMILY MEDICINE CLINIC | Facility: CLINIC | Age: 69
End: 2019-05-29

## 2019-05-29 RX ORDER — AMOXICILLIN 500 MG/1
500 TABLET, FILM COATED ORAL 3 TIMES DAILY
Qty: 30 TABLET | Refills: 0 | Status: SHIPPED | OUTPATIENT
Start: 2019-05-29 | End: 2019-06-08

## 2019-05-29 NOTE — TELEPHONE ENCOUNTER
Pt called stated that she has a sinus inf and she asked for antbx to be called in [facility] [place] joe [phone]

## 2019-05-30 ENCOUNTER — HOSPITAL ENCOUNTER (OUTPATIENT)
Dept: PHYSICAL THERAPY | Facility: HOSPITAL | Age: 69
Setting detail: THERAPIES SERIES
Discharge: HOME | End: 2019-05-30

## 2019-05-30 DIAGNOSIS — M54.2 CERVICALGIA: Primary | ICD-10-CM

## 2019-05-30 DIAGNOSIS — M54.12 CERVICAL RADICULAR PAIN: ICD-10-CM

## 2019-05-30 PROCEDURE — 97110 THERAPEUTIC EXERCISES: CPT

## 2019-05-30 PROCEDURE — 97140 MANUAL THERAPY 1/> REGIONS: CPT

## 2019-05-30 NOTE — THERAPY TREATMENT NOTE
Outpatient Physical Therapy Ortho Treatment Note  [facility]     Patient Name: Hannah Maki  : 1950  MRN: [id number]  Today's Date: 2019      Visit Date: 2019    Visit Dx:    ICD-10-CM ICD-9-CM   1. Cervicalgia M54.2 723.1   2. Cervical radicular pain M54.12 723.4       Patient Active Problem List   Diagnosis   • Mixed hyperlipidemia   • Acquired hypothyroidism   • Mild intermittent asthma   • Gastroesophageal reflux disease without esophagitis   • Steatosis of liver   • Chronic pansinusitis   • Perennial allergic rhinitis   • Post-nasal drip   • Bronchitis   • Rectal fissure   • Sinusitis   • Parotid mass   • Atypical chest pain   • Equivocal stress test   • Hypertension   • Dyspnea on exertion   • Arthralgia of both ankles   • Cervicalgia   • Acute pain of left shoulder   • Adult BMI 35.0-35.9 kg/sq m   • Non-smoker   • Abnormal LFTs   • Colon polyp   • Family history of GI malignancy        Past Medical History:   Diagnosis Date   • Abnormal liver enzymes    • Achilles bursitis    • Anxiety    • Arthralgia    • Arthritis    • Asthma    • Chronic pansinusitis 10/14/2016   • Colon polyp    • Diverticulosis    • Dysuria    • Fatty liver    • GERD (gastroesophageal reflux disease)    • Hyperlipidemia    • Hypertension    • Hyperthyroidism    • Non-cardiac chest pain    • Palpitations    • Perennial allergic rhinitis 10/14/2016   • Rhinitis    • Tinnitus    • Trigeminal neuralgia    • Vertigo         Past Surgical History:   Procedure Laterality Date   • APPENDECTOMY     • CATARACT EXTRACTION WITH INTRAOCULAR LENS IMPLANT     • CHOLECYSTECTOMY     • COLONOSCOPY  2013     six polyps removed or destroyed, Diverticulosis  Recall 3 years   • COLONOSCOPY  2013   • COLONOSCOPY N/A 2017    Procedure: COLONOSCOPY WITH ANESTHESIA;  Surgeon: Lew Orona MD;  Location: [facility];  Service:    • HEMORRHOIDECTOMY     • HYSTERECTOMY     • HYSTERECTOMY     • NECK SURGERY     •  NOSE SURGERY      nose bleeding artery    • RECTAL FISSURE INCISION AND DRAINAGE                         PT Assessment/Plan     Row Name 05/30/19 0809          PT Assessment    Assessment Comments  Today we worked on decreasing soft tissue restrictions in her neck and light scapular strengthening. She was able to perform 2 supine B UE exercises for 2 sets of 10 reps each. Her pain decreased post-treatment.   -AF        PT Plan    PT Plan Comments  Continue to increase postural strength and apply STM to cervical region. Try isometrics and bolster HEP.   -AF       User Key  (r) = Recorded By, (t) = Taken By, (c) = Cosigned By    Initials Name Provider Type    AF [place]offe[place], Tanya, PTA Physical Therapy Assistant            Exercises     Row Name 05/30/19 0887             Subjective Comments    Subjective Comments  Pt. reports she is battling a sinus infection that is making her dizzy and giving her headaches. After treatment today she will be picking up an antibiotic. Pt. reports numbness in L UE after STM that subsided after about 15 min.   -AF         Subjective Pain    Able to rate subjective pain?  yes  -AF      Pre-Treatment Pain Level  4  -AF      Post-Treatment Pain Level  3  -AF      Subjective Pain Comment  B UT  -AF         Total Minutes    46908 - PT Therapeutic Exercise Minutes  16  -AF      67196 - PT Manual Therapy Minutes  24  -AF         Exercise 1    Exercise Name 1  supine horizontal abd  -AF      Cueing 1  Verbal;Demo  -AF      Sets 1  2  -AF      Reps 1  10  -AF      Additional Comments  yellow TB  -AF         Exercise 2    Exercise Name 2  B UE diagonals  -AF      Cueing 2  Verbal;Demo  -AF      Sets 2  2  -AF      Reps 2  10  -AF      Additional Comments  supine, no resistance  -AF        User Key  (r) = Recorded By, (t) = Taken By, (c) = Cosigned By    Initials Name Provider Type    AF Coffey, Tanya, PTA Physical Therapy Assistant                      Manual Rx (last 36 hours)      Manual  Treatments     Row Name 05/30/19 0845             Total Minutes    72819 - PT Manual Therapy Minutes  24  -AF         Manual Rx 1    Manual Rx 1 Location  B UT, LS and scapular muscles  -AF      Manual Rx 1 Type  STM seated in chair w/ B UE supported on pillow and raised table  -AF      Manual Rx 1 Duration  12  -AF         Manual Rx 2    Manual Rx 2 Location  cervical  -AF      Manual Rx 2 Type  manual distraction, sustained  -AF      Manual Rx 2 Duration  6  -AF         Manual Rx 3    Manual Rx 3 Location  cervical  -AF      Manual Rx 3 Type  manual distraction L & R stretch, sustained  -AF      Manual Rx 3 Duration  6  -AF        User Key  (r) = Recorded By, (t) = Taken By, (c) = Cosigned By    Initials Name Provider Type    AF [place], Tanya, PTA Physical Therapy Assistant          PT OP Goals     Row Name 05/30/19 0845          PT Short Term Goals    STG Date to Achieve  05/30/19  -AF     STG 1  Pt to demonstrate B cervical rotation of 55 deg.  -AF     STG 1 Progress  Ongoing;Progressing  -AF     STG 2  Pt to demonstrate cervical extension of 40 deg.  -AF     STG 2 Progress  Ongoing;Progressing  -AF     STG 3  Pt demosntrates improvements in sitting and standing posture with an increased awreness and ability to correct.  -AF     STG 3 Progress  Ongoing;Progressing  -AF     STG 4  Pt reports no peripheral symptoms for 1 week.  -AF     STG 4 Progress  Ongoing;Progressing  -AF        Long Term Goals    LTG Date to Achieve  06/15/19  -AF     LTG 1  Pt to demonstrate independence with HEP.  -AF     LTG 1 Progress  Ongoing;Progressing  -AF     LTG 2  Pt to demonstrate gross UE and postural strength of 5/5.  -AF     LTG 2 Progress  Ongoing  -AF     LTG 2 Progress Comments  Pt. performed supine B UE diagonals 10x2 w/ VCs for eccentric control.  -AF     LTG 3  Pt to demnstrate B cervical ROM of 70 deg.  -AF     LTG 3 Progress  Ongoing;Progressing  -AF     LTG 4  Pt is able to perform gardening tasks for 1 hour  without an increase in symptoms.  -AF     LTG 4 Progress  Ongoing  -AF        Time Calculation    PT Goal Re-Cert Due Date  06/15/19  -AF       User Key  (r) = Recorded By, (t) = Taken By, (c) = Cosigned By    Initials Name Provider Type    AF Beronica Tanya, PTA Physical Therapy Assistant          Therapy Education  Given: Pain management, Symptoms/condition management, Posture/body mechanics  Program: Reinforced  How Provided: Verbal  Provided to: Patient  Level of Understanding: Verbalized              Time Calculation:   Start Time: 0845  Stop Time: 0931  Time Calculation (min): 46 min  PT Non-Billable Time (min): 6 min  Total Timed Code Minutes- PT: 40 minute(s)  Therapy Charges for Today     Code Description Service Date Service Provider Modifiers Qty    99598753821 HC PT THER PROC EA 15 MIN 5/30/2019 RapidesMarcella hernandezgail, PTA GP 1    18105735800 HC PT MANUAL THERAPY EA 15 MIN 5/30/2019 [place], Tanya, PTA GP 2                    Tanya Loco PTA  5/30/2019

## 2019-06-03 ENCOUNTER — HOSPITAL ENCOUNTER (OUTPATIENT)
Dept: PHYSICAL THERAPY | Facility: HOSPITAL | Age: 69
Setting detail: THERAPIES SERIES
Discharge: HOME | End: 2019-06-03

## 2019-06-03 DIAGNOSIS — M54.2 CERVICALGIA: Primary | ICD-10-CM

## 2019-06-03 DIAGNOSIS — M54.12 CERVICAL RADICULAR PAIN: ICD-10-CM

## 2019-06-03 PROCEDURE — 97110 THERAPEUTIC EXERCISES: CPT

## 2019-06-03 PROCEDURE — 97140 MANUAL THERAPY 1/> REGIONS: CPT

## 2019-06-03 NOTE — THERAPY TREATMENT NOTE
Outpatient Physical Therapy Ortho Treatment Note  [facility]     Patient Name: Hannah Maki  : 1950  MRN: [id number]  Today's Date: 6/3/2019      Visit Date: 2019    Visit Dx:    ICD-10-CM ICD-9-CM   1. Cervicalgia M54.2 723.1   2. Cervical radicular pain M54.12 723.4       Patient Active Problem List   Diagnosis   • Mixed hyperlipidemia   • Acquired hypothyroidism   • Mild intermittent asthma   • Gastroesophageal reflux disease without esophagitis   • Steatosis of liver   • Chronic pansinusitis   • Perennial allergic rhinitis   • Post-nasal drip   • Bronchitis   • Rectal fissure   • Sinusitis   • Parotid mass   • Atypical chest pain   • Equivocal stress test   • Hypertension   • Dyspnea on exertion   • Arthralgia of both ankles   • Cervicalgia   • Acute pain of left shoulder   • Adult BMI 35.0-35.9 kg/sq m   • Non-smoker   • Abnormal LFTs   • Colon polyp   • Family history of GI malignancy        Past Medical History:   Diagnosis Date   • Abnormal liver enzymes    • Achilles bursitis    • Anxiety    • Arthralgia    • Arthritis    • Asthma    • Chronic pansinusitis 10/14/2016   • Colon polyp    • Diverticulosis    • Dysuria    • Fatty liver    • GERD (gastroesophageal reflux disease)    • Hyperlipidemia    • Hypertension    • Hyperthyroidism    • Non-cardiac chest pain    • Palpitations    • Perennial allergic rhinitis 10/14/2016   • Rhinitis    • Tinnitus    • Trigeminal neuralgia    • Vertigo         Past Surgical History:   Procedure Laterality Date   • APPENDECTOMY     • CATARACT EXTRACTION WITH INTRAOCULAR LENS IMPLANT     • CHOLECYSTECTOMY     • COLONOSCOPY  2013     six polyps removed or destroyed, Diverticulosis  Recall 3 years   • COLONOSCOPY  2013   • COLONOSCOPY N/A 2017    Procedure: COLONOSCOPY WITH ANESTHESIA;  Surgeon: Lew Orona MD;  Location: [facility];  Service:    • HEMORRHOIDECTOMY     • HYSTERECTOMY     • HYSTERECTOMY     • NECK SURGERY     •  NOSE SURGERY      nose bleeding artery    • RECTAL FISSURE INCISION AND DRAINAGE                         PT Assessment/Plan     Row Name 06/03/19 0849          PT Assessment    Assessment Comments  Patient had increased pain with every exercise today.  She presented with extremely weak scapular stabilizers especially the serratus anterior bilaterally.  This does influence poor mechanics at the shoulder and can place more unwanted stress along her upper trapezius due to over compensations.  -AARTI        PT Plan    PT Plan Comments  We will continue to work to improve postural and scapular stability and strength.  Add to HEP next session, but today since every exercise was causing pain did not add to today.  -AARTI       User Key  (r) = Recorded By, (t) = Taken By, (c) = Cosigned By    Initials Name Provider Type    Sebastien Marlow, PTA Physical Therapy Assistant            Exercises     Row Name 06/03/19 4426             Subjective Comments    Subjective Comments  Patient reports she has been doing good, and she thinks therapy is helping.  She reports today she has stiffness in the neck, and her right side of neck is swollen.  She reports she has swelling off and on.  She reports she is not having numbness in the left arm, but sometimes she can find a certain angle that causes her left arm to cramp.  This subsides as soon as she moves it again.  -AARTI         Subjective Pain    Able to rate subjective pain?  yes  -AARTI      Pre-Treatment Pain Level  3  -AARTI      Post-Treatment Pain Level  4  -AARTI      Subjective Pain Comment  posterior and anterior neck   -AARTI         Total Minutes    51467 - PT Therapeutic Exercise Minutes  26  -AARTI      08729 - PT Manual Therapy Minutes  15  -AARTI         Exercise 1    Exercise Name 1  supine horizontal abd  -AARTI      Cueing 1  Verbal;Demo  -AARTI      Sets 1  2  -AARTI      Reps 1  10  -AARTI      Additional Comments  red band   -AARTI         Exercise 2    Exercise Name 2  (R) unilateral SA punches    -AARTI      Cueing 2  Verbal;Tactile  -AARTI      Sets 2  1  -AARTI      Reps 2  10  -AARTI      Additional Comments  this increased pain and therefore transitioned to gravity eliminating position   -AARTI         Exercise 3    Exercise Name 3  sidelying SA punch on 45 cm ball   -AARTI      Cueing 3  Verbal;Tactile;Demo  -AARTI      Sets 3  2  -AARTI      Reps 3  10  -AARTI      Additional Comments  each arm, but this increased clavicle pain   -AARTI         Exercise 4    Exercise Name 4  seated UE phasic and then transitioned to rows without resistance   -AARTI      Cueing 4  Verbal;Tactile;Demo  -AARTI      Sets 4  1  -AARTI      Reps 4  10  -AARTI      Additional Comments  started with red band and then took band away , but this icnreased shoulder pain   -AARTI        User Key  (r) = Recorded By, (t) = Taken By, (c) = Cosigned By    Initials Name Provider Type    Sebastien Marlow PTA Physical Therapy Assistant                      Manual Rx (last 36 hours)      Manual Treatments     Row Name 06/03/19 0849             Total Minutes    23911 - PT Manual Therapy Minutes  15  -AARTI         Manual Rx 1    Manual Rx 1 Location  massage chair: thoracic   -AARTI      Manual Rx 1 Type  IASTM with blue ridged foam roller   -AARTI      Manual Rx 1 Duration  10  -AARTI         Manual Rx 2    Manual Rx 2 Location  massage chair: thoracic   -AARTI      Manual Rx 2 Type  extension mobilizations   -AARTI      Manual Rx 2 Grade  2 oscillatory   -AARTI      Manual Rx 2 Duration  5  -AARTI        User Key  (r) = Recorded By, (t) = Taken By, (c) = Cosigned By    Initials Name Provider Type    Sebastien Marlow PTA Physical Therapy Assistant          PT OP Goals     Row Name 06/03/19 0849          PT Short Term Goals    STG Date to Achieve  05/30/19  -AARTI     STG 1  Pt to demonstrate B cervical rotation of 55 deg.  -AARTI     STG 1 Progress  Ongoing;Progressing  -AARTI     STG 2  Pt to demonstrate cervical extension of 40 deg.  -AARTI     STG 2 Progress  Ongoing;Progressing  -AARTI     STG 3  Pt  demosntrates improvements in sitting and standing posture with an increased awreness and ability to correct.  -AARTI     STG 3 Progress  Ongoing;Progressing  -AARTI     STG 4  Pt reports no peripheral symptoms for 1 week.  -AARTI     STG 4 Progress  Ongoing;Progressing  -AARTI     STG 4 Progress Comments  Patient reports it has been almost a week since her last symptoms in left arm.    -AARTI        Long Term Goals    LTG Date to Achieve  06/15/19  -AARTI     LTG 1  Pt to demonstrate independence with HEP.  -AARTI     LTG 1 Progress  Ongoing;Progressing  -AARTI     LTG 2  Pt to demonstrate gross UE and postural strength of 5/5.  -AARTI     LTG 2 Progress  Ongoing  -AARTI     LTG 3  Pt to demnstrate B cervical ROM of 70 deg.  -AARTI     LTG 3 Progress  Ongoing;Progressing  -AARTI     LTG 4  Pt is able to perform gardening tasks for 1 hour without an increase in symptoms.  -AARTI     LTG 4 Progress  Ongoing  -AARTI        Time Calculation    PT Goal Re-Cert Due Date  06/15/19  -AARTI       User Key  (r) = Recorded By, (t) = Taken By, (c) = Cosigned By    Initials Name Provider Type    Sebastien Malrow PTA Physical Therapy Assistant          Therapy Education  Given: HEP  Program: Reinforced  How Provided: Verbal  Provided to: Patient  Level of Understanding: Verbalized              Time Calculation:   Start Time: 0849  Stop Time: 0930  Time Calculation (min): 41 min  Total Timed Code Minutes- PT: 41 minute(s)  Therapy Charges for Today     Code Description Service Date Service Provider Modifiers Qty    67912028789 HC PT THER PROC EA 15 MIN 6/3/2019 Sebastien Hurtado PTA GP 2    64419900788 HC PT MANUAL THERAPY EA 15 MIN 6/3/2019 Sebastien Hurtado PTA GP 1                    Sebastien Hurtado PTA  6/3/2019

## 2019-06-04 ENCOUNTER — OFFICE VISIT (OUTPATIENT)
Dept: NEUROSURGERY | Facility: CLINIC | Age: 69
End: 2019-06-04

## 2019-06-04 VITALS
SYSTOLIC BLOOD PRESSURE: 150 MMHG | DIASTOLIC BLOOD PRESSURE: 90 MMHG | WEIGHT: 210.8 LBS | HEIGHT: 65 IN | BODY MASS INDEX: 35.12 KG/M2

## 2019-06-04 DIAGNOSIS — M54.2 CERVICALGIA: Primary | ICD-10-CM

## 2019-06-04 DIAGNOSIS — Z78.9 NON-SMOKER: ICD-10-CM

## 2019-06-04 PROCEDURE — 99213 OFFICE O/P EST LOW 20 MIN: CPT | Performed by: NURSE PRACTITIONER

## 2019-06-04 RX ORDER — AMOXICILLIN 500 MG/1
CAPSULE ORAL
Refills: 0 | COMMUNITY
Start: 2019-05-29 | End: 2019-06-04 | Stop reason: SDUPTHER

## 2019-06-04 NOTE — PROGRESS NOTES
"    Chief complaint:   Chief Complaint   Patient presents with   • Neck Pain     Hannah is returning today after some physical therapy for her neck and left arm and shoulder pain, she is still in therapy but feels that it is helping her.         Subjective     HPI: This is a 68-year-old female patient who we have been following for neck and arm pain.  She is here to be evaluated today.  The patient states that she is doing well at this time.  She says that overall she feels like the therapy has helped 75% of the pain that she is dealing with.  She says that she can occasionally get some pain in her neck that will radiate over into her left arm but overall she feels like she is doing well and is happy and satisfied with the results at this time.  She says the pain does appear to be worse more so when she is driving    Review of Systems   Musculoskeletal: Positive for neck pain.   Neurological: Negative.          Objective      Vital Signs  /90 (BP Location: Right arm, Patient Position: Sitting)   Ht 165.1 cm (65\")   Wt 95.6 kg (210 lb 12.8 oz)   LMP 10/04/1981   BMI 35.08 kg/m²     Physical Exam   Constitutional: She is oriented to person, place, and time. She appears well-developed and well-nourished.   HENT:   Head: Normocephalic.   Eyes: EOM are normal. Pupils are equal, round, and reactive to light.   Neck: Normal range of motion.   Pulmonary/Chest: Effort normal.   Musculoskeletal: Normal range of motion.        Cervical back: She exhibits pain.   Neurological: She is alert and oriented to person, place, and time. She has normal strength and normal reflexes. No cranial nerve deficit or sensory deficit. Gait normal. GCS eye subscore is 4. GCS verbal subscore is 5. GCS motor subscore is 6.   Skin: Skin is warm.   Psychiatric: She has a normal mood and affect. Her speech is normal and behavior is normal. Thought content normal.       Results Review: X-rays of the cervical spine show the patient has had a " previous fusion at C5-6.  Mild degeneration is noted throughout.  No malalignment or fracture.          Assessment/Plan: At this point the patient is doing well.  She wants to see how she does over the next couple months to see if she has return of her symptoms.  We will follow-up with her in September at which time she will see Dr. Burns.    BMI shows the patient is Obese. BMI chart was given to the patient. Patient is a non-smoker    Hannah was seen today for neck pain.    Diagnoses and all orders for this visit:    Cervicalgia    Non-smoker    Adult BMI 35.0-35.9 kg/sq m        I discussed the patients findings and my recommendations with patient  Abdelrahman Orona, FADI  06/04/19  9:11 AM

## 2019-06-04 NOTE — PATIENT INSTRUCTIONS

## 2019-06-05 ENCOUNTER — OFFICE VISIT (OUTPATIENT)
Dept: VASCULAR SURGERY | Age: 69
End: 2019-06-05
Payer: MEDICARE

## 2019-06-05 VITALS
HEART RATE: 97 BPM | DIASTOLIC BLOOD PRESSURE: 82 MMHG | RESPIRATION RATE: 18 BRPM | OXYGEN SATURATION: 98 % | SYSTOLIC BLOOD PRESSURE: 124 MMHG

## 2019-06-05 DIAGNOSIS — I83.812 VARICOSE VEINS OF LEFT LOWER EXTREMITY WITH PAIN: ICD-10-CM

## 2019-06-05 DIAGNOSIS — I87.2 CHRONIC VENOUS INSUFFICIENCY: ICD-10-CM

## 2019-06-05 DIAGNOSIS — Z01.818 PRE-OP TESTING: Primary | ICD-10-CM

## 2019-06-05 PROCEDURE — G8427 DOCREV CUR MEDS BY ELIG CLIN: HCPCS | Performed by: PHYSICIAN ASSISTANT

## 2019-06-05 PROCEDURE — 1123F ACP DISCUSS/DSCN MKR DOCD: CPT | Performed by: PHYSICIAN ASSISTANT

## 2019-06-05 PROCEDURE — G8400 PT W/DXA NO RESULTS DOC: HCPCS | Performed by: PHYSICIAN ASSISTANT

## 2019-06-05 PROCEDURE — 4040F PNEUMOC VAC/ADMIN/RCVD: CPT | Performed by: PHYSICIAN ASSISTANT

## 2019-06-05 PROCEDURE — 1090F PRES/ABSN URINE INCON ASSESS: CPT | Performed by: PHYSICIAN ASSISTANT

## 2019-06-05 PROCEDURE — G8417 CALC BMI ABV UP PARAM F/U: HCPCS | Performed by: PHYSICIAN ASSISTANT

## 2019-06-05 PROCEDURE — 3017F COLORECTAL CA SCREEN DOC REV: CPT | Performed by: PHYSICIAN ASSISTANT

## 2019-06-05 PROCEDURE — 99213 OFFICE O/P EST LOW 20 MIN: CPT | Performed by: PHYSICIAN ASSISTANT

## 2019-06-05 PROCEDURE — 1036F TOBACCO NON-USER: CPT | Performed by: PHYSICIAN ASSISTANT

## 2019-06-05 NOTE — PROGRESS NOTES
Patient Care Team:  Hardik Guevara MD as PCP - General (Family Medicine)  Elfego Tyson MD (Gastroenterology)  Sultana Mason MD (Otolaryngology)  Francois Olivas MD as Consulting Physician (General Surgery)  Kareem Oakes (Neurosurgery)  Jones Ulloa Case (Dermatology)      History and Physical:    Mrs. Brianne Diaz is a 80 yo female who presents today for follow up of an extremely painful  varicose vein on the left inner thigh that hurts mainly when she and her  are having intercourse. This has become so painful that she and her  have refrained from having intercourse. She reports that essentially any pressure on her left inner thigh is painful. She has a few spider veins on her lower legs but reports that these do not bother her. She comes in today to discuss the results of the reflux study with Dr. Francie Eddy and possible options. Haydee Stockton is a 79 y.o. female with the following history reviewed and recorded in [facility]:  Patient Active Problem List    Diagnosis Date Noted    Varicose veins of leg with pain 06/07/2019    Chronic venous insufficiency 06/05/2019    Varicose veins with pain 05/01/2019     Current Outpatient Medications   Medication Sig Dispense Refill    amoxicillin (AMOXIL) 500 MG capsule 500 mg 3 times daily Indications: x 10 days       irbesartan (AVAPRO) 300 MG tablet       Coenzyme Q10 100 MG CHEW Take 100 mg by mouth      meclizine (ANTIVERT) 25 MG tablet Take 25 mg by mouth      esomeprazole (NEXIUM) 20 MG delayed release capsule Take 20 mg by mouth      pravastatin (PRAVACHOL) 20 MG tablet       levothyroxine (SYNTHROID) 100 MCG tablet Take 100 mcg by mouth      montelukast (SINGULAIR) 10 MG tablet Take 10 mg by mouth nightly.  azelastine (ASTELIN) 137 MCG/SPRAY nasal spray 1 spray by Nasal route 2 times daily. Use in each nostril as directed      fluticasone (VERAMYST) 27.5 MCG/SPRAY nasal spray 2 sprays by Nasal route daily.        No current facility-administered medications for this visit. Allergies: Latex; Augmentin [amoxicillin-pot clavulanate]; Bactrim [sulfamethoxazole-trimethoprim]; Ceftin [cefuroxime]; Codeine; Demerol hcl [meperidine]; Erythromycin; Tequin [gatifloxacin]; Tetracyclines & related; and Zithromax [azithromycin]  Past Medical History:   Diagnosis Date    Asthma     Fatty liver     GERD (gastroesophageal reflux disease)     Hyperlipidemia     Hypertension     Hypothyroidism     Osteoarthritis     Pre-diabetes     Seasonal allergic rhinitis     Sinus problem     Thyroid disease     Wears glasses      Past Surgical History:   Procedure Laterality Date    ARTERY SURGERY      ligation- due to nose bleed    BREAST BIOPSY  2014    LEft    CHOLECYSTECTOMY      HEMORRHOID SURGERY      HYSTERECTOMY      partial-age 36    NECK SURGERY      fusion of disc    OVARY REMOVAL      both     Family History   Problem Relation Age of Onset    Diabetes Mother     Heart Disease Father     Colon Cancer Sister     Cancer Sister         Pancreatic    Diabetes Brother      Social History     Tobacco Use    Smoking status: Never Smoker    Smokeless tobacco: Never Used   Substance Use Topics    Alcohol use: No         Review of Systems    Constitutional - no significant activity change, appetite change, or unexpected weight change. No fever or chills. No diaphoresis or significant fatigue. HENT - no significant rhinorrhea or epistaxis. No tinnitus or significant hearing loss. Eyes - no sudden vision change or amaurosis. Respiratory - no significant shortness of breath, wheezing, or stridor. No apnea, cough, or chest tightness associated with shortness of breath. Cardiovascular - no chest pain, syncope, or significant dizziness. No palpitations. She denies leg swelling. No claudication. Gastrointestinal - no abdominal swelling or pain. No blood in stool.   No severe constipation, diarrhea, nausea, or vomiting. Genitourinary - No difficulty urinating, dysuria, frequency, or urgency. No flank pain or hematuria. Musculoskeletal - no back pain, gait disturbance, or myalgia. Skin - no color change, rash, pallor, or new wound. Neurologic - no dizziness, facial asymmetry, or light headedness. No seizures. No speech difficulty or lateralizing weakness. Hematologic - no easy bruising or excessive bleeding. Psychiatric - no severe anxiety or nervousness. No confusion. All other review of systems are negative. Physical Exam    /82 (Site: Left Upper Arm, Position: Sitting, Cuff Size: Medium Adult)   Pulse 97   Resp 18   SpO2 98%     Constitutional - well developed, well nourished. No diaphoresis or acute distress. HENT - head normocephalic. Right external ear canal appears normal.  Left external ear canal appears normal.  Septum appears midline. Eyes - conjunctiva normal.  EOMS normal.  No exudate. No icterus. Neck- ROM appears normal, no tracheal deviation. Cardiovascular - Regular rate and rhythm. Heart sounds are normal.  No murmur, rub, or gallop. Carotid pulses are 2+ to palpation bilaterally without bruit. Extremities - Radial and brachial pulses are 2+ to palpation bilaterally. DP and PT pulses are palpable. No cyanosis or clubbing. No signs atheroembolic event. She has a varicose vein on the left inner thigh that she reports is very painful. She has a few spider veins bilaterally on the lower legs. Pulmonary - effort appears normal.  No respiratory distress. Lungs - Breath sounds normal. No wheezes or rales. GI - Abdomen - soft, non tender, bowel sounds X 4 quadrants. No guarding or rebound tenderness. No distension or palpable mass. Genitourinary - deferred. Musculoskeletal - ROM appears normal.  Neurologic - alert and oriented X 3. Physiologic. Skin - warm, dry, and intact. No rash, erythema, or pallor.   Psychiatric - mood, affect, and behavior appear normal.

## 2019-06-06 ENCOUNTER — HOSPITAL ENCOUNTER (OUTPATIENT)
Dept: PHYSICAL THERAPY | Facility: HOSPITAL | Age: 69
Setting detail: THERAPIES SERIES
Discharge: HOME | End: 2019-06-06

## 2019-06-06 ENCOUNTER — TELEPHONE (OUTPATIENT)
Dept: PHYSICAL THERAPY | Facility: HOSPITAL | Age: 69
End: 2019-06-06

## 2019-06-06 DIAGNOSIS — M54.2 CERVICALGIA: Primary | ICD-10-CM

## 2019-06-06 DIAGNOSIS — M54.12 CERVICAL RADICULAR PAIN: ICD-10-CM

## 2019-06-06 PROCEDURE — 97535 SELF CARE MNGMENT TRAINING: CPT

## 2019-06-06 PROCEDURE — 97110 THERAPEUTIC EXERCISES: CPT

## 2019-06-06 PROCEDURE — 97140 MANUAL THERAPY 1/> REGIONS: CPT

## 2019-06-06 NOTE — THERAPY PROGRESS REPORT/RE-CERT
Outpatient Physical Therapy Ortho Progress Note  [facility]     Patient Name: Hannah Maki  : 1950  MRN: [id number]  Today's Date: 2019      Visit Date: 2019    Visit Dx:    ICD-10-CM ICD-9-CM   1. Cervicalgia M54.2 723.1   2. Cervical radicular pain M54.12 723.4       Patient Active Problem List   Diagnosis   • Mixed hyperlipidemia   • Acquired hypothyroidism   • Mild intermittent asthma   • Gastroesophageal reflux disease without esophagitis   • Steatosis of liver   • Chronic pansinusitis   • Perennial allergic rhinitis   • Post-nasal drip   • Bronchitis   • Rectal fissure   • Sinusitis   • Parotid mass   • Atypical chest pain   • Equivocal stress test   • Hypertension   • Dyspnea on exertion   • Arthralgia of both ankles   • Cervicalgia   • Acute pain of left shoulder   • Adult BMI 35.0-35.9 kg/sq m   • Non-smoker   • Abnormal LFTs   • Colon polyp   • Family history of GI malignancy        Past Medical History:   Diagnosis Date   • Abnormal liver enzymes    • Achilles bursitis    • Anxiety    • Arthralgia    • Arthritis    • Asthma    • Chronic pansinusitis 10/14/2016   • Colon polyp    • Diverticulosis    • Dysuria    • Fatty liver    • GERD (gastroesophageal reflux disease)    • Hyperlipidemia    • Hypertension    • Hyperthyroidism    • Non-cardiac chest pain    • Palpitations    • Perennial allergic rhinitis 10/14/2016   • Rhinitis    • Tinnitus    • Trigeminal neuralgia    • Vertigo         Past Surgical History:   Procedure Laterality Date   • APPENDECTOMY     • CATARACT EXTRACTION WITH INTRAOCULAR LENS IMPLANT     • CHOLECYSTECTOMY     • COLONOSCOPY  2013     six polyps removed or destroyed, Diverticulosis  Recall 3 years   • COLONOSCOPY  2013   • COLONOSCOPY N/A 2017    Procedure: COLONOSCOPY WITH ANESTHESIA;  Surgeon: Lew Orona MD;  Location: [facility] ENDOSCOPY;  Service:    • HEMORRHOIDECTOMY     • HYSTERECTOMY     • HYSTERECTOMY     • NECK SURGERY     •  NOSE SURGERY      nose bleeding artery    • RECTAL FISSURE INCISION AND DRAINAGE                         PT Assessment/Plan     Row Name 06/06/19 0846          PT Assessment    Functional Limitations  Limitation in home management;Limitations in community activities;Performance in leisure activities  -WJ     Impairments  Pain;Posture;Poor body mechanics;Range of motion;Muscle strength;Impaired muscle endurance  -WJ     Assessment Comments  Mrs. Maki arrived to therapy today and stated that she went to see her doctor for a follow-up on Monday and was told to stop coming to therapy. This was her last scheduled appointment so goals were assessed for progress note. At this time, she has partially met 1 out of 8 goals. She is very close to meeting her cervical ROM goals. Her B UE strength is slow to increase but is increasing nonetheless. She had started out having large amounts of pain w/ just passive movement of her UE and now she is able to perform horizontal abd in supine against yellow TB. Her pain is almost always reduced after manual therapy. Her knowledge of pain management has increased and she is very good about managing it. I do believe she will benefit from continued therapy to increase B UE strength and address her posture. Pt. feels as if she has progressed 75% since beginning therapy. Today we worked on reducing soft tissue restrictions in the cervical region and also reviewed a bolstered HEP. I messaged the provider she saw on Monday to clarify what she had reported and there seemed to be an error in communication. (see telephone encounter) I contacted Mrs. Maki and explained my messages to her and she was excited that she would not be ending her therapy and was put back on the schedule. Mrs. Maki would benefit from continued therapy 2x/week to increase B UE and postural strength as well as manual therapy to address pain.   -AF     Rehab Potential  Good  -WJ     Patient/caregiver participated in  establishment of treatment plan and goals  Yes  -WJ     Patient would benefit from skilled therapy intervention  Yes  -WJ        PT Plan    PT Frequency  2x/week  -W     Predicted Duration of Therapy Intervention (Therapy Eval)  4 weeks  -W     Planned CPT's?  PT RE-EVAL: 09445;PT THER PROC EA 15 MIN: 75701;PT THER ACT EA 15 MIN: 12619;PT MANUAL THERAPY EA 15 MIN: 26124;PT NEUROMUSC RE-EDUCATION EA 15 MIN: 34997;PT SELF CARE/HOME MGMT/TRAIN EA 15: 52069;PT HOT OR COLD PACK TREAT MCARE;PT ELECTRICAL STIM UNATTEND: ;PT ELECTRICAL STIM ATTD EA 15 MIN: 60140  -W     PT Plan Comments  Prepare her for her vacation next visit and work on increasing cervical ROM and B UE strength.  -AF       User Key  (r) = Recorded By, (t) = Taken By, (c) = Cosigned By    Initials Name Provider Type    W Shailesh Keith, PT DPT Physical Therapist    Tanya Rasmussen PTA Physical Therapy Assistant            Exercises     Row Name 06/06/19 0802             Subjective Comments    Subjective Comments  Pt. reports she has been doing well. She had an appointment Monday w/ her regular doctor and saw the PA. The PA told her to stop physical therapy to save her visits and if she has pain, to get injections. She was a bit disappointed for therapy to be ending. Pt. is about to leave on a vacation to [place] and will be gone for a week.   -AF         Subjective Pain    Able to rate subjective pain?  yes  -AF      Pre-Treatment Pain Level  2  -AF      Post-Treatment Pain Level  2  -AF      Subjective Pain Comment  B UT & LS  -AF         Total Minutes    24137 - PT Therapeutic Exercise Minutes  10  -AF      64734 - PT Manual Therapy Minutes  24  -AF         Exercise 1    Exercise Name 1  assessed goals for progress note  -AF         Exercise 2    Exercise Name 2  Discussed progression and direction of POC.  -AF        User Key  (r) = Recorded By, (t) = Taken By, (c) = Cosigned By    Initials Name Provider Type    AF Tanya Loco, PTA  Physical Therapy Assistant                      Manual Rx (last 36 hours)      Manual Treatments     Row Name 06/06/19 0846             Total Minutes    85064 - PT Manual Therapy Minutes  24  -AF         Manual Rx 1    Manual Rx 1 Location  B UT, LS and scapular muscles  -AF      Manual Rx 1 Type  STM seated in chair w/ B UE supported on pillow and raised table  -AF      Manual Rx 1 Duration  12  -AF         Manual Rx 2    Manual Rx 2 Location  cervical  -AF      Manual Rx 2 Type  manual distraction, sustained; lateral side bends  -AF      Manual Rx 2 Duration  8 + 4 = 12  -AF        User Key  (r) = Recorded By, (t) = Taken By, (c) = Cosigned By    Initials Name Provider Type    AF [place], Tanya, PTA Physical Therapy Assistant          PT OP Goals     Row Name 06/06/19 0846          PT Short Term Goals    STG Date to Achieve  06/21/19  -AF     STG 1  Pt to demonstrate B cervical rotation of 55 deg.  -AF     STG 1 Progress  Ongoing;Progressing;Partially Met  -AF     STG 1 Progress Comments  R: 62*; L: 51*  -AF     STG 2  Pt to demonstrate cervical extension of 40 deg.  -AF     STG 2 Progress  Ongoing;Progressing  -AF     STG 2 Progress Comments  cervical extension:  21*   -AF     STG 3  Pt demosntrates improvements in sitting and standing posture with an increased awreness and ability to correct.  -AF     STG 3 Progress  Ongoing;Progressing  -AF     STG 3 Progress Comments  Pt. reports increased postural awareness since beginning therapy. She does notice herself slump sitting at times but corrects herself.  -AF     STG 4  Pt reports no peripheral symptoms for 1 week.  -AF     STG 4 Progress  Ongoing;Progressing  -AF     STG 4 Progress Comments  Pt. has random minor twinges in her L biceps/triceps about 4 to 5 times a week. The pain is positional and is relieved when she changes position.   -AF        Long Term Goals    LTG Date to Achieve  07/06/19  -AF     LTG 1  Pt to demonstrate independence with HEP.  -AF      LTG 1 Progress  Ongoing;Progressing  -AF     LTG 1 Progress Comments  Pt. is consistent w/ HEP and it was bolstered today.   -AF     LTG 2  Pt to demonstrate gross UE and postural strength of 5/5.  -AF     LTG 2 Progress  Ongoing  -AF     LTG 2 Progress Comments  Pt. still demonstrates B UE weakness and reports pain w/ resistance.   -AF     LTG 3  Pt to demnstrate B cervical ROM of 70 deg.  -AF     LTG 3 Progress  Ongoing;Progressing  -AF     LTG 3 Progress Comments  R: 62*; L: 51*  -AF     LTG 4  Pt is able to perform gardening tasks for 1 hour without an increase in symptoms.  -AF     LTG 4 Progress  Ongoing  -AF     LTG 4 Progress Comments  Pt. tilled her garden for > 1 hour last week and was able to manage her pain w/ immediate icing. She rates her pain as a 5 or 6 out of 10 while tilling  -AF        Time Calculation    PT Goal Re-Cert Due Date  07/06/19  -AF       User Key  (r) = Recorded By, (t) = Taken By, (c) = Cosigned By    Initials Name Provider Type    AF [place], Tanya, PTA Physical Therapy Assistant          Therapy Education  Education Details: Added scap squeezes, horizontal abd, diagonals, thoracic towel roll stretch, TB rows, UT self- stretch, & LS self stretch to HEP  Given: HEP  Program: New, Progressed  How Provided: Verbal, Demonstration, Written  Provided to: Patient  Level of Understanding: Verbalized  69838 - PT Self Care/Mgmt Minutes: 11              Time Calculation:   Start Time: 0846  Stop Time: 0931  Time Calculation (min): 45 min  Total Timed Code Minutes- PT: 45 minute(s)                Shailesh Keith, PT DPT  6/6/2019

## 2019-06-06 NOTE — TELEPHONE ENCOUNTER
This AM, pt. Arrives to PT and reports that she had a doctors appointment on Monday and that she was told to discontinue therapy and just get injections if her neck pain continues. She reported being disappointed at this news because she likes therapy and wants to keep improving. After she left, I messaged her provider from Monday through Epic secure chat and asked him about their encounter. He stated that he gave her the option to continue or not and that it was up to her and he would be supportive of either decision. I called Mrs. Maki and explained that conversation and she was happy to schedule some more visits.

## 2019-06-07 PROBLEM — I83.819 VARICOSE VEINS OF LEG WITH PAIN: Status: ACTIVE | Noted: 2019-06-07

## 2019-06-11 ENCOUNTER — HOSPITAL ENCOUNTER (OUTPATIENT)
Dept: PHYSICAL THERAPY | Facility: HOSPITAL | Age: 69
Setting detail: THERAPIES SERIES
Discharge: HOME | End: 2019-06-11

## 2019-06-11 DIAGNOSIS — M54.12 CERVICAL RADICULAR PAIN: ICD-10-CM

## 2019-06-11 DIAGNOSIS — M54.2 CERVICALGIA: Primary | ICD-10-CM

## 2019-06-11 PROCEDURE — 97140 MANUAL THERAPY 1/> REGIONS: CPT

## 2019-06-11 NOTE — THERAPY TREATMENT NOTE
Outpatient Physical Therapy Ortho Treatment Note  [facility]     Patient Name: Hannah Maki  : 1950  MRN: [id number]  Today's Date: 2019      Visit Date: 2019    Visit Dx:    ICD-10-CM ICD-9-CM   1. Cervicalgia M54.2 723.1   2. Cervical radicular pain M54.12 723.4       Patient Active Problem List   Diagnosis   • Mixed hyperlipidemia   • Acquired hypothyroidism   • Mild intermittent asthma   • Gastroesophageal reflux disease without esophagitis   • Steatosis of liver   • Chronic pansinusitis   • Perennial allergic rhinitis   • Post-nasal drip   • Bronchitis   • Rectal fissure   • Sinusitis   • Parotid mass   • Atypical chest pain   • Equivocal stress test   • Hypertension   • Dyspnea on exertion   • Arthralgia of both ankles   • Cervicalgia   • Acute pain of left shoulder   • Adult BMI 35.0-35.9 kg/sq m   • Non-smoker   • Abnormal LFTs   • Colon polyp   • Family history of GI malignancy        Past Medical History:   Diagnosis Date   • Abnormal liver enzymes    • Achilles bursitis    • Anxiety    • Arthralgia    • Arthritis    • Asthma    • Chronic pansinusitis 10/14/2016   • Colon polyp    • Diverticulosis    • Dysuria    • Fatty liver    • GERD (gastroesophageal reflux disease)    • Hyperlipidemia    • Hypertension    • Hyperthyroidism    • Non-cardiac chest pain    • Palpitations    • Perennial allergic rhinitis 10/14/2016   • Rhinitis    • Tinnitus    • Trigeminal neuralgia    • Vertigo         Past Surgical History:   Procedure Laterality Date   • APPENDECTOMY     • CATARACT EXTRACTION WITH INTRAOCULAR LENS IMPLANT     • CHOLECYSTECTOMY     • COLONOSCOPY  2013     six polyps removed or destroyed, Diverticulosis  Recall 3 years   • COLONOSCOPY  2013   • COLONOSCOPY N/A 2017    Procedure: COLONOSCOPY WITH ANESTHESIA;  Surgeon: Lew Orona MD;  Location: [facility];  Service:    • HEMORRHOIDECTOMY     • HYSTERECTOMY     • HYSTERECTOMY     • NECK SURGERY     •  NOSE SURGERY      nose bleeding artery    • RECTAL FISSURE INCISION AND DRAINAGE                         PT Assessment/Plan     Row Name 06/11/19 0822          PT Assessment    Assessment Comments  Today we focused on reducing soft tissue restrictions in her cervical region and L proximal UE. Pt. presents w/ muscle guarding near her L biceps insertion and reported minor twinges of pain w/ palpation that eventually became less and less severe. Pt. was reminded to consider posture and body mechanics on her trip to [place].   -AF        PT Plan    PT Plan Comments  Assess pain from vacation. Continue to reduce soft tissue restrictions as well as cervical and thoracic ROM and postural strengthening.   -AF       User Key  (r) = Recorded By, (t) = Taken By, (c) = Cosigned By    Initials Name Provider Type    AF Tanya Loco, PTA Physical Therapy Assistant            Exercises     Row Name 06/11/19 0870             Subjective Comments    Subjective Comments  Pt. reports hold her baby grandchild during [employer] and ever since then, her L arm has been hurting. She has been cleaning her house in preparation for vacation next week. She spent about 2 hours ironing yesterday and it did not exacerbate symptoms.   -AF         Subjective Pain    Able to rate subjective pain?  yes  -AF      Pre-Treatment Pain Level  4  -AF      Post-Treatment Pain Level  2  -AF      Subjective Pain Comment  CT joint and L UE  -AF         Total Minutes    90747 - PT Manual Therapy Minutes  40  -AF        User Key  (r) = Recorded By, (t) = Taken By, (c) = Cosigned By    Initials Name Provider Type    AF Tanya Loco, PTA Physical Therapy Assistant                      Manual Rx (last 36 hours)      Manual Treatments     Row Name 06/11/19 0847             Total Minutes    57204 - PT Manual Therapy Minutes  40  -AF         Manual Rx 1    Manual Rx 1 Location  B UT, LS and scapular muscles  -AF      Manual Rx 1 Type  STM seated in chair w/ B UE  supported on pillow and raised table  -AF      Manual Rx 1 Duration  15  -AF         Manual Rx 2    Manual Rx 2 Location  cervical  -AF      Manual Rx 2 Type  manual distraction, sustained; lateral side bends  -AF      Manual Rx 2 Duration  10  -AF         Manual Rx 3    Manual Rx 3 Location  L biceps and triceps  -AF      Manual Rx 3 Type  STM w/ massage cream, supine  -AF      Manual Rx 3 Duration  15  -AF        User Key  (r) = Recorded By, (t) = Taken By, (c) = Cosigned By    Initials Name Provider Type    AF [place], Tanya, PTA Physical Therapy Assistant          PT OP Goals     Row Name 06/11/19 0847          PT Short Term Goals    STG Date to Achieve  06/21/19  -AF     STG 1  Pt to demonstrate B cervical rotation of 55 deg.  -AF     STG 1 Progress  Ongoing;Progressing;Partially Met  -AF     STG 2  Pt to demonstrate cervical extension of 40 deg.  -AF     STG 2 Progress  Ongoing;Progressing  -AF     STG 3  Pt demosntrates improvements in sitting and standing posture with an increased awreness and ability to correct.  -AF     STG 3 Progress  Ongoing;Progressing  -AF     STG 3 Progress Comments  We discussed prolonged sitting posture in the car on her trip down to [place]. I gave her seated thoracic towel stretch to perform intermittently in the car.   -AF     STG 4  Pt reports no peripheral symptoms for 1 week.  -AF     STG 4 Progress  Ongoing;Progressing  -AF        Long Term Goals    LTG Date to Achieve  07/06/19  -AF     LTG 1  Pt to demonstrate independence with HEP.  -AF     LTG 1 Progress  Ongoing;Progressing  -AF     LTG 2  Pt to demonstrate gross UE and postural strength of 5/5.  -AF     LTG 2 Progress  Ongoing  -AF     LTG 3  Pt to demnstrate B cervical ROM of 70 deg.  -AF     LTG 3 Progress  Ongoing;Progressing  -AF     LTG 4  Pt is able to perform gardening tasks for 1 hour without an increase in symptoms.  -AF     LTG 4 Progress  Ongoing  -AF        Time Calculation    PT Goal Re-Cert Due Date   07/06/19  -AF       User Key  (r) = Recorded By, (t) = Taken By, (c) = Cosigned By    Initials Name Provider Type    AF Tanya Loco PTA Physical Therapy Assistant          Therapy Education  Education Details: Perform thoracic towel roll stretch in the car intermittently on her trip to [place]  Given: HEP  Program: Reinforced, New  How Provided: Verbal, Demonstration  Provided to: Patient  Level of Understanding: Verbalized              Time Calculation:   Start Time: 0847  Stop Time: 0934  Time Calculation (min): 47 min  Total Timed Code Minutes- PT: 40 minute(s)  Therapy Charges for Today     Code Description Service Date Service Provider Modifiers Qty    63315249182 HC PT MANUAL THERAPY EA 15 MIN 6/11/2019 Tanya Loco PTA GP 3                    Tanya Loco PTA  6/11/2019

## 2019-06-20 ENCOUNTER — APPOINTMENT (OUTPATIENT)
Dept: PHYSICAL THERAPY | Facility: HOSPITAL | Age: 69
End: 2019-06-20

## 2019-06-25 ENCOUNTER — HOSPITAL ENCOUNTER (OUTPATIENT)
Dept: PHYSICAL THERAPY | Facility: HOSPITAL | Age: 69
Setting detail: THERAPIES SERIES
Discharge: HOME | End: 2019-06-25

## 2019-06-25 DIAGNOSIS — M54.2 CERVICALGIA: Primary | ICD-10-CM

## 2019-06-25 DIAGNOSIS — M54.12 CERVICAL RADICULAR PAIN: ICD-10-CM

## 2019-06-25 PROCEDURE — 97110 THERAPEUTIC EXERCISES: CPT

## 2019-06-25 PROCEDURE — 97140 MANUAL THERAPY 1/> REGIONS: CPT

## 2019-06-25 NOTE — THERAPY TREATMENT NOTE
Outpatient Physical Therapy Ortho Treatment Note  [facility]     Patient Name: Hannah Maki  : 1950  MRN: [id number]  Today's Date: 2019      Visit Date: 2019    Visit Dx:    ICD-10-CM ICD-9-CM   1. Cervicalgia M54.2 723.1   2. Cervical radicular pain M54.12 723.4       Patient Active Problem List   Diagnosis   • Mixed hyperlipidemia   • Acquired hypothyroidism   • Mild intermittent asthma   • Gastroesophageal reflux disease without esophagitis   • Steatosis of liver   • Chronic pansinusitis   • Perennial allergic rhinitis   • Post-nasal drip   • Bronchitis   • Rectal fissure   • Sinusitis   • Parotid mass   • Atypical chest pain   • Equivocal stress test   • Hypertension   • Dyspnea on exertion   • Arthralgia of both ankles   • Cervicalgia   • Acute pain of left shoulder   • Adult BMI 35.0-35.9 kg/sq m   • Non-smoker   • Abnormal LFTs   • Colon polyp   • Family history of GI malignancy        Past Medical History:   Diagnosis Date   • Abnormal liver enzymes    • Achilles bursitis    • Anxiety    • Arthralgia    • Arthritis    • Asthma    • Chronic pansinusitis 10/14/2016   • Colon polyp    • Diverticulosis    • Dysuria    • Fatty liver    • GERD (gastroesophageal reflux disease)    • Hyperlipidemia    • Hypertension    • Hyperthyroidism    • Non-cardiac chest pain    • Palpitations    • Perennial allergic rhinitis 10/14/2016   • Rhinitis    • Tinnitus    • Trigeminal neuralgia    • Vertigo         Past Surgical History:   Procedure Laterality Date   • APPENDECTOMY     • CATARACT EXTRACTION WITH INTRAOCULAR LENS IMPLANT     • CHOLECYSTECTOMY     • COLONOSCOPY  2013     six polyps removed or destroyed, Diverticulosis  Recall 3 years   • COLONOSCOPY  2013   • COLONOSCOPY N/A 2017    Procedure: COLONOSCOPY WITH ANESTHESIA;  Surgeon: Lew Orona MD;  Location: [facility];  Service:    • HEMORRHOIDECTOMY     • HYSTERECTOMY     • HYSTERECTOMY     • NECK SURGERY     •  NOSE SURGERY      nose bleeding artery    • RECTAL FISSURE INCISION AND DRAINAGE                         PT Assessment/Plan     Row Name 06/25/19 1019          PT Assessment    Assessment Comments  Today we focused on decreasing soft tissue restrictions in her cervical region and strengthening her B UE proximally. Pt. did very well over vacation and continues to have reduced pain.   -AF        PT Plan    PT Plan Comments  Progress scapular strengthening, review HEP, & discuss scheduling any more visits.   -AF       User Key  (r) = Recorded By, (t) = Taken By, (c) = Cosigned By    Initials Name Provider Type    AF Tanya Loco, PTA Physical Therapy Assistant            Exercises     Row Name 06/25/19 1019             Subjective Comments    Subjective Comments  Pt. reports she had very little to no pain during her vacation to [place]. She was consistent in doing her exercises and remembered to demonstrate good posture during long car rides. She will be pressure washing her house later this week and predicts she will really be hurting next visit.   -AF         Subjective Pain    Able to rate subjective pain?  yes  -AF      Pre-Treatment Pain Level  0  -AF      Post-Treatment Pain Level  1  -AF      Subjective Pain Comment  B UT  -AF         Total Minutes    36481 - PT Therapeutic Exercise Minutes  18  -AF      78952 - PT Manual Therapy Minutes  23  -AF         Exercise 1    Exercise Name 1  horizontal abd  -AF      Cueing 1  Verbal;Demo  -AF      Sets 1  2  -AF      Reps 1  10  -AF      Additional Comments  yellow TB  -AF         Exercise 2    Exercise Name 2  B UE diagonals  -AF      Cueing 2  Verbal;Demo  -AF      Sets 2  2  -AF      Reps 2  10  -AF        User Key  (r) = Recorded By, (t) = Taken By, (c) = Cosigned By    Initials Name Provider Type    AF Tanya Loco, PTA Physical Therapy Assistant                      Manual Rx (last 36 hours)      Manual Treatments     Row Name 06/25/19 1012              Total Minutes    76582 - PT Manual Therapy Minutes  23  -AF         Manual Rx 1    Manual Rx 1 Location  B UT, LS and scapular muscles  -AF      Manual Rx 1 Type  STM seated in chair w/ B UE supported on pillow and raised table  -AF      Manual Rx 1 Duration  16  -AF         Manual Rx 2    Manual Rx 2 Location  cervical  -AF      Manual Rx 2 Type  manual distraction  -AF      Manual Rx 2 Duration  7  -AF        User Key  (r) = Recorded By, (t) = Taken By, (c) = Cosigned By    Initials Name Provider Type    AF [place], Tanya, PTA Physical Therapy Assistant          PT OP Goals     Row Name 06/25/19 1019          PT Short Term Goals    STG Date to Achieve  06/21/19  -AF     STG 1  Pt to demonstrate B cervical rotation of 55 deg.  -AF     STG 1 Progress  Ongoing;Progressing;Partially Met  -AF     STG 2  Pt to demonstrate cervical extension of 40 deg.  -AF     STG 2 Progress  Ongoing;Progressing  -AF     STG 3  Pt demosntrates improvements in sitting and standing posture with an increased awreness and ability to correct.  -AF     STG 3 Progress  Ongoing;Progressing  -AF     STG 3 Progress Comments  Pt. is very aware of posture and will self correct. She does tend to round her shoulders the longer she sits.   -AF     STG 4  Pt reports no peripheral symptoms for 1 week.  -AF     STG 4 Progress  Ongoing;Progressing  -AF        Long Term Goals    LTG Date to Achieve  07/06/19  -AF     LTG 1  Pt to demonstrate independence with HEP.  -AF     LTG 1 Progress  Ongoing;Progressing  -AF     LTG 2  Pt to demonstrate gross UE and postural strength of 5/5.  -AF     LTG 2 Progress  Ongoing  -AF     LTG 3  Pt to demnstrate B cervical ROM of 70 deg.  -AF     LTG 3 Progress  Ongoing;Progressing  -AF     LTG 4  Pt is able to perform gardening tasks for 1 hour without an increase in symptoms.  -AF     LTG 4 Progress  Ongoing  -AF        Time Calculation    PT Goal Re-Cert Due Date  07/06/19  -AF       User Key  (r) = Recorded By, (t) =  Taken By, (c) = Cosigned By    Initials Name Provider Type    AF [place], Tanya, PTA Physical Therapy Assistant          Therapy Education  Education Details: POC review  Given: Other (comment)  Program: Reinforced, New  How Provided: Verbal  Provided to: Patient  Level of Understanding: Verbalized              Time Calculation:   Start Time: 1019  Stop Time: 1100  Time Calculation (min): 41 min  Total Timed Code Minutes- PT: 41 minute(s)  Therapy Charges for Today     Code Description Service Date Service Provider Modifiers Qty    47368473899 HC PT THER PROC EA 15 MIN 6/25/2019 [place], Tanya, PTA GP 1    11156920417 HC PT MANUAL THERAPY EA 15 MIN 6/25/2019 [place], Tanya, PTA GP 2                    Tany[place]uldip Loco, PTA  6/25/2019

## 2019-06-27 ENCOUNTER — APPOINTMENT (OUTPATIENT)
Dept: PHYSICAL THERAPY | Facility: HOSPITAL | Age: 69
End: 2019-06-27

## 2019-07-02 ENCOUNTER — HOSPITAL ENCOUNTER (OUTPATIENT)
Dept: PHYSICAL THERAPY | Facility: HOSPITAL | Age: 69
Setting detail: THERAPIES SERIES
Discharge: HOME | End: 2019-07-02

## 2019-07-02 DIAGNOSIS — M54.12 CERVICAL RADICULAR PAIN: ICD-10-CM

## 2019-07-02 DIAGNOSIS — M54.2 CERVICALGIA: Primary | ICD-10-CM

## 2019-07-02 PROCEDURE — 97110 THERAPEUTIC EXERCISES: CPT

## 2019-07-02 PROCEDURE — 97140 MANUAL THERAPY 1/> REGIONS: CPT

## 2019-07-02 NOTE — THERAPY DISCHARGE NOTE
Outpatient Physical Therapy Ortho Treatment Note/Discharge Summary  [facility]     Patient Name: Hannah Maki  : 1950  MRN: [id number]  Today's Date: 2019      Visit Date: 2019    Visit Dx:    ICD-10-CM ICD-9-CM   1. Cervicalgia M54.2 723.1   2. Cervical radicular pain M54.12 723.4       Patient Active Problem List   Diagnosis   • Mixed hyperlipidemia   • Acquired hypothyroidism   • Mild intermittent asthma   • Gastroesophageal reflux disease without esophagitis   • Steatosis of liver   • Chronic pansinusitis   • Perennial allergic rhinitis   • Post-nasal drip   • Bronchitis   • Rectal fissure   • Sinusitis   • Parotid mass   • Atypical chest pain   • Equivocal stress test   • Hypertension   • Dyspnea on exertion   • Arthralgia of both ankles   • Cervicalgia   • Acute pain of left shoulder   • Adult BMI 35.0-35.9 kg/sq m   • Non-smoker   • Abnormal LFTs   • Colon polyp   • Family history of GI malignancy        Past Medical History:   Diagnosis Date   • Abnormal liver enzymes    • Achilles bursitis    • Anxiety    • Arthralgia    • Arthritis    • Asthma    • Chronic pansinusitis 10/14/2016   • Colon polyp    • Diverticulosis    • Dysuria    • Fatty liver    • GERD (gastroesophageal reflux disease)    • Hyperlipidemia    • Hypertension    • Hyperthyroidism    • Non-cardiac chest pain    • Palpitations    • Perennial allergic rhinitis 10/14/2016   • Rhinitis    • Tinnitus    • Trigeminal neuralgia    • Vertigo         Past Surgical History:   Procedure Laterality Date   • APPENDECTOMY     • CATARACT EXTRACTION WITH INTRAOCULAR LENS IMPLANT     • CHOLECYSTECTOMY     • COLONOSCOPY  2013     six polyps removed or destroyed, Diverticulosis  Recall 3 years   • COLONOSCOPY  2013   • COLONOSCOPY N/A 2017    Procedure: COLONOSCOPY WITH ANESTHESIA;  Surgeon: Lew Orona MD;  Location: [facility];  Service:    • HEMORRHOIDECTOMY     • HYSTERECTOMY     • HYSTERECTOMY     •  NECK SURGERY     • NOSE SURGERY      nose bleeding artery    • RECTAL FISSURE INCISION AND DRAINAGE                         PT Assessment/Plan     Row Name 07/02/19 0870          PT Assessment    Assessment Comments  Today goals were assessed for discharge note. Mrs. Maki met 3 of her 8 goals, partially met another, and had progressed toward the remaining. Her biggest issue was her pain level. In the beginning, she was barely able to move her UE in a gravity elliminated plain w/out pain and now she can perform resistance exercises. She continued to have radicular pain throughout her time here, however it has decreased in frequency and severity. Her scapular stabilizers are still weak but she now has the tools to progress at home. Pt. states she feels has progressed 80-90% since beginning therapy. She and I both agreed that what remains is her scapular strength and radicular symptoms. Pt. states she feels like she will always deal w/ intermittent pain for the rest of her life however, it has been significantly reduced and she can tolerate it through her daily life now. Pt. was given a maintence HEP to include scapular strengthening exercises and she was given various levels of theraband to progress.   -AF        PT Plan    PT Plan Comments  Pt. is D/C.  -AF       User Key  (r) = Recorded By, (t) = Taken By, (c) = Cosigned By    Initials Name Provider Type    AF Tanya Loco PTA Physical Therapy Assistant              Exercises     Row Name 07/02/19 6206             Subjective Comments    Subjective Comments  Pt. reports she is a little sore on the L side of her neck today because she held a 3 month old baby for a prolonged period of time yesterday. Other than that, she has been doing well. She reports that when he internally rotates her L UE she feels a pain in her L hand.   -AF         Subjective Pain    Able to rate subjective pain?  yes  -AF      Pre-Treatment Pain Level  1  -AF      Post-Treatment Pain  Level  2  -AF      Subjective Pain Comment  L UT  -AF         Total Minutes    25364 - PT Therapeutic Exercise Minutes  28  -AF      43223 - PT Manual Therapy Minutes  16  -AF         Exercise 1    Exercise Name 1  horizontal abd  -AF      Cueing 1  Verbal  -AF      Sets 1  2  -AF      Reps 1  10  -AF      Additional Comments  yellow TB  -AF         Exercise 2    Exercise Name 2  B UE diagonals  -AF      Cueing 2  Verbal  -AF      Sets 2  2  -AF      Reps 2  10  -AF         Exercise 3    Exercise Name 3  TB rows  -AF      Cueing 3  Verbal;Demo  -AF      Sets 3  2  -AF      Reps 3  10  -AF         Exercise 4    Exercise Name 4  scap squeezes  -AF      Cueing 4  Verbal;Tactile  -AF      Sets 4  2  -AF      Reps 4  10  -AF        User Key  (r) = Recorded By, (t) = Taken By, (c) = Cosigned By    Initials Name Provider Type    AF [place], Tanya, PTA Physical Therapy Assistant                        Manual Rx (last 36 hours)      Manual Treatments     Row Name 07/02/19 0847             Total Minutes    59913 - PT Manual Therapy Minutes  16  -AF         Manual Rx 1    Manual Rx 1 Location  B UT, LS and scapular muscles  -AF      Manual Rx 1 Type  STM seated in chair w/ B UE supported on pillow and raised table  -AF      Manual Rx 1 Grade  mod  -AF      Manual Rx 1 Duration  16  -AF         Manual Rx 2    Manual Rx 2 Location  --  -AF      Manual Rx 2 Type  --  -AF      Manual Rx 2 Grade  --  -AF      Manual Rx 2 Duration  --  -AF         Manual Rx 3    Manual Rx 3 Location  --  -AF      Manual Rx 3 Type  --  -AF      Manual Rx 3 Grade  --  -AF      Manual Rx 3 Duration  --  -AF         Manual Rx 4    Manual Rx 4 Location  --  -AF      Manual Rx 4 Type  --  -AF      Manual Rx 4 Grade  --  -AF      Manual Rx 4 Duration  --  -AF         Manual Rx 5    Manual Rx 5 Location  --  -AF      Manual Rx 5 Grade  --  -AF      Manual Rx 5 Duration  --  -AF        User Key  (r) = Recorded By, (t) = Taken By, (c) = Cosigned By     Initials Name Provider Type    AF Marcella [place], PTA Physical Therapy Assistant          PT OP Goals     Row Name 07/02/19 0847          PT Short Term Goals    STG Date to Achieve  07/17/19  -AF     STG 1  Pt to demonstrate B cervical rotation of 55 deg.  -AF     STG 1 Progress  Partially Met  -AF     STG 1 Progress Comments  R 54 degrees; L 57 degrees  -AF     STG 2  Pt to demonstrate cervical extension of 40 deg.  -AF     STG 2 Progress  Not Met  -AF     STG 2 Progress Comments  25 degrees  -AF     STG 3  Pt demosntrates improvements in sitting and standing posture with an increased awreness and ability to correct.  -AF     STG 3 Progress  Met  -AF     STG 3 Progress Comments  Pt. has increased awareness of her posture and will self-correct.   -AF     STG 4  Pt reports no peripheral symptoms for 1 week.  -AF     STG 4 Progress  Not Met  -AF     STG 4 Progress Comments  Pt. reports having radicular symptoms in B UE daily but only w/ certain stretching movements.   -AF        Long Term Goals    LTG Date to Achieve  08/01/19  -AF     LTG 1  Pt to demonstrate independence with HEP.  -AF     LTG 1 Progress  Met  -AF     LTG 1 Progress Comments  Pt. is compliant w/ HEP. She was given multiple colors of theraband to advance at home.   -AF     LTG 2  Pt to demonstrate gross UE and postural strength of 5/5.  -AF     LTG 2 Progress  Not Met  -AF     LTG 2 Progress Comments  R mid trap: 3/5; R low trap: 3-/5; R rhomboid: 3+/5; L mid trap: 3-/5; L low trap: 3-/5; L rhomboid: 3+/5  -AF     LTG 3  Pt to demnstrate B cervical ROM of 70 deg.  -AF     LTG 3 Progress  Not Met  -AF     LTG 3 Progress Comments  See STG #1  -AF     LTG 4  Pt is able to perform gardening tasks for 1 hour without an increase in symptoms.  -AF     LTG 4 Progress  Met  -AF     LTG 4 Progress Comments  Pt. states she is able to perform yard work for about 1-2 hours at a time. She immediately ices her shoulders when she is done and therefore, doesn't  affect her.   -AF        Time Calculation    PT Goal Re-Cert Due Date  08/01/19  -AF       User Key  (r) = Recorded By, (t) = Taken By, (c) = Cosigned By    Initials Name Provider Type    AF Tanya Loco PTA Physical Therapy Assistant          Therapy Education  Education Details: Reviewed finalized HEP for home maintenence. Pt. was given multiple levels of theraband to progress.   Given: HEP  Program: Reinforced, Progressed  How Provided: Verbal, Demonstration, Written  Provided to: Patient  Level of Understanding: Verbalized, Demonstrated, Teach back education performed              Time Calculation:   Start Time: 0847  Stop Time: 0931  Time Calculation (min): 44 min  Total Timed Code Minutes- PT: 44 minute(s)  Therapy Charges for Today     Code Description Service Date Service Provider Modifiers Qty    20561105283 HC PT THER PROC EA 15 MIN 7/2/2019 Tanya Loco, CLAIRE GP 2    03334563017 HC PT MANUAL THERAPY EA 15 MIN 7/2/2019 Tanya Loco PTA GP 1                OP PT Discharge Summary  Date of Discharge: 07/02/19  Reason for Discharge: other (comment), Independent(Pt. can progress independently. )  Outcomes Achieved: Patient able to partially acheive established goals  Discharge Destination: Home with home program  Discharge Instructions/Additional Comments: Pt. has been coming to therapy for several months for cervical and radicular UE pain. During her time here we have applied manual techniques to decrease her guarding and pain, performed exercises to strengthen scapular stabilizers, and educated and strongly encouraged postural education. Pt. has had significantly decreased pain and would benefit from further strengthening, however, she is capable and has been given to the tools to progress independently at home.       Tanya Loco PTA  7/2/2019

## 2019-07-16 RX ORDER — LEVOTHYROXINE SODIUM 0.1 MG/1
100 TABLET ORAL DAILY
Qty: 30 TABLET | Refills: 5 | Status: SHIPPED | OUTPATIENT
Start: 2019-07-16 | End: 2020-01-13

## 2019-07-24 ENCOUNTER — OFFICE VISIT (OUTPATIENT)
Dept: VASCULAR SURGERY | Age: 69
End: 2019-07-24
Payer: MEDICARE

## 2019-07-24 VITALS
RESPIRATION RATE: 18 BRPM | OXYGEN SATURATION: 98 % | TEMPERATURE: 98.6 F | HEART RATE: 88 BPM | DIASTOLIC BLOOD PRESSURE: 84 MMHG | SYSTOLIC BLOOD PRESSURE: 147 MMHG

## 2019-07-24 DIAGNOSIS — I83.812 VARICOSE VEINS OF LEFT LOWER EXTREMITY WITH PAIN: ICD-10-CM

## 2019-07-24 DIAGNOSIS — I87.2 CHRONIC VENOUS INSUFFICIENCY: Primary | ICD-10-CM

## 2019-07-24 PROCEDURE — 1090F PRES/ABSN URINE INCON ASSESS: CPT | Performed by: PHYSICIAN ASSISTANT

## 2019-07-24 PROCEDURE — 1123F ACP DISCUSS/DSCN MKR DOCD: CPT | Performed by: PHYSICIAN ASSISTANT

## 2019-07-24 PROCEDURE — 3017F COLORECTAL CA SCREEN DOC REV: CPT | Performed by: PHYSICIAN ASSISTANT

## 2019-07-24 PROCEDURE — G8400 PT W/DXA NO RESULTS DOC: HCPCS | Performed by: PHYSICIAN ASSISTANT

## 2019-07-24 PROCEDURE — G8417 CALC BMI ABV UP PARAM F/U: HCPCS | Performed by: PHYSICIAN ASSISTANT

## 2019-07-24 PROCEDURE — 1036F TOBACCO NON-USER: CPT | Performed by: PHYSICIAN ASSISTANT

## 2019-07-24 PROCEDURE — 4040F PNEUMOC VAC/ADMIN/RCVD: CPT | Performed by: PHYSICIAN ASSISTANT

## 2019-07-24 PROCEDURE — 99212 OFFICE O/P EST SF 10 MIN: CPT | Performed by: PHYSICIAN ASSISTANT

## 2019-07-24 PROCEDURE — G8427 DOCREV CUR MEDS BY ELIG CLIN: HCPCS | Performed by: PHYSICIAN ASSISTANT

## 2019-08-05 ENCOUNTER — APPOINTMENT (OUTPATIENT)
Dept: LAB | Facility: HOSPITAL | Age: 69
End: 2019-08-05

## 2019-08-05 ENCOUNTER — OFFICE VISIT (OUTPATIENT)
Dept: GASTROENTEROLOGY | Facility: CLINIC | Age: 69
End: 2019-08-05

## 2019-08-05 VITALS
SYSTOLIC BLOOD PRESSURE: 130 MMHG | HEART RATE: 98 BPM | WEIGHT: 206 LBS | HEIGHT: 65 IN | BODY MASS INDEX: 34.32 KG/M2 | TEMPERATURE: 99 F | OXYGEN SATURATION: 100 % | DIASTOLIC BLOOD PRESSURE: 78 MMHG

## 2019-08-05 DIAGNOSIS — R19.7 DIARRHEA, UNSPECIFIED TYPE: ICD-10-CM

## 2019-08-05 DIAGNOSIS — R10.84 GENERALIZED ABDOMINAL PAIN: ICD-10-CM

## 2019-08-05 DIAGNOSIS — D12.6 ADENOMATOUS POLYP OF COLON, UNSPECIFIED PART OF COLON: ICD-10-CM

## 2019-08-05 DIAGNOSIS — K76.0 STEATOSIS OF LIVER: Primary | ICD-10-CM

## 2019-08-05 DIAGNOSIS — K21.9 GASTROESOPHAGEAL REFLUX DISEASE WITHOUT ESOPHAGITIS: ICD-10-CM

## 2019-08-05 DIAGNOSIS — Z80.0 FAMILY HISTORY OF COLON CANCER: ICD-10-CM

## 2019-08-05 LAB
ALBUMIN SERPL-MCNC: 4.7 G/DL (ref 3.5–5)
ALP SERPL-CCNC: 112 U/L (ref 24–120)
ALT SERPL W P-5'-P-CCNC: 84 U/L (ref 0–54)
AST SERPL-CCNC: 64 U/L (ref 7–45)
BILIRUB CONJ SERPL-MCNC: 0 MG/DL (ref 0–0.3)
BILIRUB INDIRECT SERPL-MCNC: 0.4 MG/DL (ref 0–1.1)
BILIRUB SERPL-MCNC: 0.8 MG/DL (ref 0.1–1)
PROT SERPL-MCNC: 8.1 G/DL (ref 6.3–8.7)

## 2019-08-05 PROCEDURE — 36415 COLL VENOUS BLD VENIPUNCTURE: CPT | Performed by: INTERNAL MEDICINE

## 2019-08-05 PROCEDURE — 80076 HEPATIC FUNCTION PANEL: CPT | Performed by: INTERNAL MEDICINE

## 2019-08-05 PROCEDURE — 83516 IMMUNOASSAY NONANTIBODY: CPT | Performed by: INTERNAL MEDICINE

## 2019-08-05 PROCEDURE — 99214 OFFICE O/P EST MOD 30 MIN: CPT | Performed by: INTERNAL MEDICINE

## 2019-08-05 PROCEDURE — 86255 FLUORESCENT ANTIBODY SCREEN: CPT | Performed by: INTERNAL MEDICINE

## 2019-08-05 PROCEDURE — 82784 ASSAY IGA/IGD/IGG/IGM EACH: CPT | Performed by: INTERNAL MEDICINE

## 2019-08-05 RX ORDER — DICYCLOMINE HYDROCHLORIDE 10 MG/1
10 CAPSULE ORAL 3 TIMES DAILY PRN
Qty: 90 CAPSULE | Refills: 3 | Status: SHIPPED | OUTPATIENT
Start: 2019-08-05 | End: 2021-07-08 | Stop reason: SDUPTHER

## 2019-08-05 NOTE — PROGRESS NOTES
[facility] Gastroenterology    Chief Complaint   Patient presents with   • Elevated Hepatic Enzymes       Subjective     HPI    Hannah Maki is a 68 y.o. female who presents with a chief complaint of abnormal LFTs.    She was here 3 months ago.  We felt that she had fatty liver.  She underwent liver serologies which all came back unremarkable.  She had an ultrasound of her liver which did show steatosis.  She had F1 fibrosis.  I advised her to lose 5 to 10 pounds.  She comes in today 6 pounds lighter.    She tells me she has been feeling well.  She does tell me that she has some chronic intermittent abdominal cramping and diarrhea.  She states her son is gluten intolerant.  She thinks he has celiac disease.  She herself is gone on a gluten-free diet and she is felt much better since going on gluten-free diet.  She is never had testing done before.  She has been on a gluten-free diet for about a year.  She does note that when she eats gluten she will have some cramps for about a week.  She states she ate Chinese food and Mexican food this weekend which was supposedly gluten-free.  But she has had some cramps and diarrhea since then.  The diarrhea is cleared except for after she eats she will just have a little bit of loose stools.  It has subsided over the last 3 days.  She was noted to have a little bit of an elevated temperature to 99 degrees at her visit today.  She denies any other symptoms other than some chronic sinus drainage which she attributes to allergies.  She denies dysuria or shortness of breath.    She also tells me she has heartburn.  She is on over-the-counter Nexium daily.  She will have breakthrough symptoms once in a while.  Is not every day.  She asked what she could take for breakthrough symptoms.      Past Medical History:   Diagnosis Date   • Abnormal liver enzymes    • Achilles bursitis    • Anxiety    • Arthralgia    • Arthritis    • Asthma    • Chronic pansinusitis 10/14/2016   •  Colon polyp    • Diverticulosis    • Dysuria    • Fatty liver    • GERD (gastroesophageal reflux disease)    • Hyperlipidemia    • Hypertension    • Hyperthyroidism    • Non-cardiac chest pain    • Palpitations    • Perennial allergic rhinitis 10/14/2016   • Rhinitis    • Tinnitus    • Trigeminal neuralgia    • Vertigo        Past Surgical History:   Procedure Laterality Date   • APPENDECTOMY     • CATARACT EXTRACTION WITH INTRAOCULAR LENS IMPLANT     • CHOLECYSTECTOMY     • COLONOSCOPY  11/18/2013     six polyps removed or destroyed, Diverticulosis  Recall 3 years   • COLONOSCOPY  11/18/2013   • COLONOSCOPY N/A 4/4/2017    Procedure: COLONOSCOPY WITH ANESTHESIA;  Surgeon: Lew Orona MD;  Location: [facility] ENDOSCOPY;  Service:    • HEMORRHOIDECTOMY     • HYSTERECTOMY     • HYSTERECTOMY     • NECK SURGERY     • NOSE SURGERY      nose bleeding artery    • RECTAL FISSURE INCISION AND DRAINAGE           Current Outpatient Medications:   •  esomeprazole (NexIUM) 20 MG capsule, Take 20 mg by mouth Every Morning Before Breakfast., Disp: , Rfl:   •  levothyroxine (SYNTHROID, LEVOTHROID) 100 MCG tablet, Take 1 tablet by mouth Daily., Disp: 30 tablet, Rfl: 5  •  losartan (COZAAR) 100 MG tablet, Take 1 tablet by mouth Daily., Disp: 90 tablet, Rfl: 1  •  meclizine (ANTIVERT) 25 MG tablet, Take 1 tablet by mouth 3 (Three) Times a Day As Needed for dizziness (vertigo). Take one by mouth three times a day as needed for vertigo, Disp: 30 tablet, Rfl: 3  •  montelukast (SINGULAIR) 10 MG tablet, Take 1 tablet by mouth Every Night., Disp: 30 tablet, Rfl: 11  •  mupirocin (BACTROBAN) 2 % ointment, Apply intranasally bid, Disp: 22 g, Rfl: 3  •  dicyclomine (BENTYL) 10 MG capsule, Take 1 capsule by mouth 3 (Three) Times a Day As Needed (abdominal discomfort)., Disp: 90 capsule, Rfl: 3    Allergies   Allergen Reactions   • Ceftin [Cefuroxime Axetil] Other (See Comments)     Pt does not recall   • Augmentin [Amoxicillin-Pot  Clavulanate] Rash   • Azithromycin Nausea Only   • Bactrim [Sulfamethoxazole-Trimethoprim] Nausea And Vomiting   • Cefuroxime Nausea Only   • Codeine GI Intolerance   • Demerol [Meperidine] Nausea And Vomiting   • Erythromycin Rash   • Latex Other (See Comments)     Patient says it pulls her skin off.   • Tequin [Gatifloxacin] Nausea Only   • Tetracyclines & Related Nausea And Vomiting       Social History     Socioeconomic History   • Marital status:      Spouse name: Not on file   • Number of children: Not on file   • Years of education: Not on file   • Highest education level: Not on file   Occupational History   • Occupation: retired   Tobacco Use   • Smoking status: Never Smoker   • Smokeless tobacco: Never Used   Substance and Sexual Activity   • Alcohol use: No   • Drug use: No   • Sexual activity: Defer       Family History   Problem Relation Age of Onset   • Diabetes Brother    • Heart disease Brother    • Colon cancer Sister 57   • Colon cancer Sister 55   • Heart disease Father    • Heart attack Father        Review of Systems  General no chills sweats  Cardiac no chest pains or palpitations  Gastrointestinal see history of present illness    Objective     Vitals:    08/05/19 1335   BP: 130/78   Pulse: 98   Temp: 99 °F (37.2 °C)   SpO2: 100%       Physical Exam  No acute distress. Vital signs as documented. Skin warm and dry and without overt rashes. EOMI, sclera anicteric.  Neck without JVD or masses. Lungs clear to auscultation bilaterally, no rales. Heart exam notable for regular rhythm, normal sounds and absence of loud murmurs, rubs or gallops. Abdomen is soft, nontender, non distended, normal bowel sounds and without evidence of organomegaly, masses, or abdominal aortic enlargement. Extremities nonedematous, no cyanosis. Neuro alert, moves extremities.        Assessment/Plan   Problem List Items Addressed This Visit        Digestive    Gastroesophageal reflux disease without esophagitis     Relevant Medications    dicyclomine (BENTYL) 10 MG capsule    Steatosis of liver - Primary    Overview     U/s 5/2019 F1 fibrosis.          Relevant Orders    Hepatic Function Panel    Colon polyp    Diarrhea    Relevant Orders    Celiac Comprehensive Panel       Nervous and Auditory    Generalized abdominal pain       Other    Family history of colon cancer    Overview     2 sisters both in their 50s                 First, regarding her fatty liver I commended her for losing weight.  I discussed how fatty liver can lead to cirrhosis, disability and pre-mature death.  How it also can be a sign of increased risk for cardiovascular disease.  I discussed the importance of getting rid of fat in the liver by controlling lipids and glucose, avoiding etoh helps, and gradual weight loss to ideal body weight is very important.  We will check LFTs.  I have asked her to continue work on her weight we will see her back in the office next spring.    Second, regarding her intermittent abdominal cramping and diarrhea I question whether this episode is due to an infectious gastroenteritis that she is starting to feel better from.  It could be due to gluten intake but she says she is stuck with her diet.  She is been on her diet for about a year.  I recommend supportive care at this time.  She is never been tested for gluten allergies i.e. celiac disease.  I offered to pursue laboratory testing that if it is abnormal pursue endoscopy.  The endoscopy may be unremarkable as she has been on a gluten-free diet.  We talked about the pros and cons.  She agreed to laboratory testing but did not want to pursue the endoscopy.  She states no matter what she wishes to stay on a gluten-free diet which is reasonable.  I did offer dietary consultation but she declined as well as she is pretty up on gluten she states.  We will see what her antibody profile shows.    Regarding her intermittent abdominal cramping I did prescribe Bentyl for her to  use.  I talked her how to use this.  I stated that the mainstay is for her to stick with her gluten-free diet.  We also talked about letter food intolerances.    She also has complained of breakthrough reflux disease.  While she has her upset stomach this week of asked her to double her Nexium to before breakfast and before supper for 1 week.  Then she is to go back to Nexium every morning.  She can take an over-the-counter Zantac on an as needed basis for breakthrough reflux.  She agreed to give this a try.    Lastly, she has a strong family history of colon cancer involving 2 sisters.  We talked about pursuing a colonoscopy in April which would be 3 years after last one.  She is in agreement with this approach.  We will see in the office in April and can set up the colonoscopy then.    Continue ongoing management by primary care provider and other specialists.     Patient's Body mass index is 34.28 kg/m². BMI is above normal parameters. Recommendations include: nutrition counseling.        EMR Dragon/transcription disclaimer:  Much of this encounter note is electronic transcription/translation of spoken language to printed text.  The electronic translation of spoken language may be erroneous, or at times, nonsensical words or phrases may be inadvertently transcribed.  Although I have reviewed the note for such errors, some may still exist.    Lew Orona MD  2:22 PM  08/05/19

## 2019-08-06 LAB
ENDOMYSIUM IGA SER QL: NEGATIVE
GLIADIN PEPTIDE IGA SER-ACNC: 5 UNITS (ref 0–19)
GLIADIN PEPTIDE IGG SER-ACNC: 1 UNITS (ref 0–19)
IGA SERPL-MCNC: 152 MG/DL (ref 87–352)
TTG IGA SER-ACNC: <2 U/ML (ref 0–3)
TTG IGG SER-ACNC: <2 U/ML (ref 0–5)

## 2019-08-13 ENCOUNTER — OFFICE VISIT (OUTPATIENT)
Dept: NEUROSURGERY | Facility: CLINIC | Age: 69
End: 2019-08-13

## 2019-08-13 VITALS
HEIGHT: 65 IN | DIASTOLIC BLOOD PRESSURE: 84 MMHG | BODY MASS INDEX: 34.66 KG/M2 | SYSTOLIC BLOOD PRESSURE: 138 MMHG | WEIGHT: 208 LBS

## 2019-08-13 DIAGNOSIS — Z78.9 NON-SMOKER: ICD-10-CM

## 2019-08-13 DIAGNOSIS — M48.02 SPINAL STENOSIS IN CERVICAL REGION: Primary | ICD-10-CM

## 2019-08-13 DIAGNOSIS — M54.12 CERVICAL RADICULOPATHY: ICD-10-CM

## 2019-08-13 DIAGNOSIS — M54.2 CERVICALGIA: ICD-10-CM

## 2019-08-13 PROCEDURE — 99213 OFFICE O/P EST LOW 20 MIN: CPT | Performed by: NEUROLOGICAL SURGERY

## 2019-08-13 RX ORDER — IRBESARTAN 300 MG/1
300 TABLET ORAL
COMMUNITY
Start: 2017-11-07 | End: 2019-08-13 | Stop reason: ALTCHOICE

## 2019-08-13 NOTE — PATIENT INSTRUCTIONS
"PATIENT TO CONTINUE TO FOLLOW UP WITH HER PRIMARY CARE PROVIDER FOR YEARLY PHYSICAL EXAMS TO ENSURE COMPLETE HEALTH MAINTENANCE        BMI for Adults    Body mass index (BMI) is a number that is calculated from a person's weight and height. BMI may help to estimate how much of a person's weight is composed of fat. BMI can help identify those who may be at higher risk for certain medical problems.  How is BMI used with adults?  BMI is used as a screening tool to identify possible weight problems. It is used to check whether a person is obese, overweight, healthy weight, or underweight.  How is BMI calculated?  BMI measures your weight and compares it to your height. This can be done either in English ([place] or metric measurements. Note that charts are available to help you find your BMI quickly and easily without having to do these calculations yourself.  To calculate your BMI in English ([place] measurements, your health care provider will:  1. Measure your weight in pounds (lb).  2. Multiply the number of pounds by 703.  ? For example, for a person who weighs 180 lb, multiply that number by 703, which equals 126,540.  3. Measure your height in inches (in). Then multiply that number by itself to get a measurement called \"inches squared.\"  ? For example, for a person who is 70 in tall, the \"inches squared\" measurement is 70 in x 70 in, which equals 4900 inches squared.  4. Divide the total from Step 2 (number of lb x 703) by the total from Step 3 (inches squared): 126,540 ÷ 4900 = 25.8. This is your BMI.  To calculate your BMI in metric measurements, your health care provider will:  1. Measure your weight in kilograms (kg).  2. Measure your height in meters (m). Then multiply that number by itself to get a measurement called \"meters squared.\"  ? For example, for a person who is 1.75 m tall, the \"meters squared\" measurement is 1.75 m x 1.75 m, which is equal to 3.1 meters squared.  3. Divide the number of kilograms " (your weight) by the meters squared number. In this example: 70 ÷ 3.1 = 22.6. This is your BMI.  How is BMI interpreted?  To interpret your results, your health care provider will use BMI charts to identify whether you are underweight, normal weight, overweight, or obese. The following guidelines will be used:  · Underweight: BMI less than 18.5.  · Normal weight: BMI between 18.5 and 24.9.  · Overweight: BMI between 25 and 29.9.  · Obese: BMI of 30 and above.  Please note:  · Weight includes both fat and muscle, so someone with a muscular build, such as an athlete, may have a BMI that is higher than 24.9. In cases like these, BMI is not an accurate measure of body fat.  · To determine if excess body fat is the cause of a BMI of 25 or higher, further assessments may need to be done by a health care provider.  · BMI is usually interpreted in the same way for men and women.  Why is BMI a useful tool?  BMI is useful in two ways:  · Identifying a weight problem that may be related to a medical condition, or that may increase the risk for medical problems.  · Promoting lifestyle and diet changes in order to reach a healthy weight.  Summary  · Body mass index (BMI) is a number that is calculated from a person's weight and height.  · BMI may help to estimate how much of a person's weight is composed of fat. BMI can help identify those who may be at higher risk for certain medical problems.  · BMI can be measured using English measurements or metric measurements.  · To interpret your results, your health care provider will use BMI charts to identify whether you are underweight, normal weight, overweight, or obese.  This information is not intended to replace advice given to you by your health care provider. Make sure you discuss any questions you have with your health care provider.  Document Released: 08/29/2005 Document Revised: 10/31/2018 Document Reviewed: 10/31/2018  [employer]vier Interactive Patient Education © 2019  [employer]

## 2019-08-13 NOTE — PROGRESS NOTES
SUBJECTIVE:  Patient ID: Hannah Maki is a 68 y.o. female is here today for follow-up.    Chief Complaint: Neck pain  Chief Complaint   Patient presents with   • Neck Pain     patient saw Abdelrahman in June and was doing well from having completed physical therapy; she is here [facility] for a 2 month follow up.       HPI  68-year-old female that we have been following for neck pain and left upper extremity radiculopathy.  She did a dedicated course of physical therapy.  She was doing very well the last time we saw her.  Today she reports very little left paraspinal neck pain.  The radicular arm pain is disappeared.  She is doing very well and right now is very satisfied with the results for conservative care.    The following portions of the patient's history were reviewed and updated as appropriate: allergies, current medications, past family history, past medical history, past social history, past surgical history and problem list.    OBJECTIVE:    Review of Systems   Musculoskeletal: Positive for neck pain and neck stiffness.   All other systems reviewed and are negative.         Physical Exam   Constitutional: She is oriented to person, place, and time. She appears well-developed and well-nourished.   HENT:   Head: Normocephalic and atraumatic.   Right Ear: Hearing normal.   Left Ear: Hearing normal.   Eyes: EOM are normal. Pupils are equal, round, and reactive to light.   Neck: Normal range of motion.   Neurological: She is alert and oriented to person, place, and time. She has normal strength and normal reflexes. No cranial nerve deficit or sensory deficit. She displays a negative Romberg sign. GCS eye subscore is 4. GCS verbal subscore is 5. GCS motor subscore is 6. She displays no Babinski's sign on the right side. She displays no Babinski's sign on the left side.   Psychiatric: Her speech is normal. Judgment normal. Cognition and memory are normal.       Neurologic Exam     Mental Status   Oriented to person,  place, and time.   Speech: speech is normal     Cranial Nerves     CN III, IV, VI   Pupils are equal, round, and reactive to light.  Extraocular motions are normal.     Motor Exam     Strength   Strength 5/5 throughout.       Independent Review of Radiographic Studies:       ASSESSMENT/PLAN:  The patient is done very well her neck pain is under very good control with conservative care.  I cautioned her to continue to do her home exercises and listen to her necK. should her symptoms return we be happy to see her again in the future.      1. Spinal stenosis in cervical region    2. Cervicalgia    3. Cervical radiculopathy    4. Non-smoker    5. BMI 34.0-34.9,adult            No Follow-up on file.      Deni Burns MD

## 2019-09-04 ENCOUNTER — OFFICE VISIT (OUTPATIENT)
Dept: FAMILY MEDICINE CLINIC | Facility: CLINIC | Age: 69
End: 2019-09-04

## 2019-09-04 DIAGNOSIS — M72.2 PLANTAR FASCIITIS: Primary | ICD-10-CM

## 2019-09-04 PROCEDURE — 99213 OFFICE O/P EST LOW 20 MIN: CPT | Performed by: FAMILY MEDICINE

## 2019-09-05 NOTE — PROGRESS NOTES
Subjective   Hannah Maki is a 68 y.o. female.         History of Present Illness     she notes  pain and swellohg of óscar feet with ambulation      Current Outpatient Medications:   •  Coenzyme Q10 100 MG chewable tablet, Chew 100 mg., Disp: , Rfl:   •  dicyclomine (BENTYL) 10 MG capsule, Take 1 capsule by mouth 3 (Three) Times a Day As Needed (abdominal discomfort)., Disp: 90 capsule, Rfl: 3  •  esomeprazole (NexIUM) 20 MG capsule, Take 20 mg by mouth Every Morning Before Breakfast., Disp: , Rfl:   •  levothyroxine (SYNTHROID, LEVOTHROID) 100 MCG tablet, Take 1 tablet by mouth Daily., Disp: 30 tablet, Rfl: 5  •  losartan (COZAAR) 100 MG tablet, Take 1 tablet by mouth Daily., Disp: 90 tablet, Rfl: 1  •  meclizine (ANTIVERT) 25 MG tablet, Take 1 tablet by mouth 3 (Three) Times a Day As Needed for dizziness (vertigo). Take one by mouth three times a day as needed for vertigo, Disp: 30 tablet, Rfl: 3  •  montelukast (SINGULAIR) 10 MG tablet, Take 1 tablet by mouth Every Night., Disp: 30 tablet, Rfl: 11  •  mupirocin (BACTROBAN) 2 % ointment, Apply intranasally bid, Disp: 22 g, Rfl: 3  Allergies   Allergen Reactions   • Ceftin [Cefuroxime Axetil] Other (See Comments)     Pt does not recall   • Augmentin [Amoxicillin-Pot Clavulanate] Rash   • Azithromycin Nausea Only   • Bactrim [Sulfamethoxazole-Trimethoprim] Nausea And Vomiting   • Cefuroxime Nausea Only   • Codeine GI Intolerance   • Demerol [Meperidine] Nausea And Vomiting   • Erythromycin Rash   • Latex Other (See Comments)     Patient says it pulls her skin off.   • Tequin [Gatifloxacin] Nausea Only   • Tetracyclines & Related Nausea And Vomiting       Past Medical History:   Diagnosis Date   • Abnormal liver enzymes    • Achilles bursitis    • Anxiety    • Arthralgia    • Arthritis    • Asthma    • Chronic pansinusitis 10/14/2016   • Colon polyp    • Diverticulosis    • Dysuria    • Fatty liver    • GERD (gastroesophageal reflux disease)    • Hyperlipidemia     • Hypertension    • Hyperthyroidism    • Non-cardiac chest pain    • Palpitations    • Perennial allergic rhinitis 10/14/2016   • Rhinitis    • Tinnitus    • Trigeminal neuralgia    • Vertigo      Past Surgical History:   Procedure Laterality Date   • APPENDECTOMY     • CATARACT EXTRACTION WITH INTRAOCULAR LENS IMPLANT     • CHOLECYSTECTOMY     • COLONOSCOPY  11/18/2013     six polyps removed or destroyed, Diverticulosis  Recall 3 years   • COLONOSCOPY  11/18/2013   • COLONOSCOPY N/A 4/4/2017    Procedure: COLONOSCOPY WITH ANESTHESIA;  Surgeon: Lew Orona MD;  Location: [facility] ENDOSCOPY;  Service:    • HEMORRHOIDECTOMY     • HYSTERECTOMY     • HYSTERECTOMY     • NECK SURGERY     • NOSE SURGERY      nose bleeding artery    • RECTAL FISSURE INCISION AND DRAINAGE         Review of Systems   Constitutional: Negative.    Eyes: Negative.    Respiratory: Negative.    Endocrine: Negative.    Genitourinary: Negative.    Allergic/Immunologic: Negative.    Neurological: Negative.        Objective  LMP 10/04/1981   Physical Exam   Constitutional: She is oriented to person, place, and time. She appears well-developed and well-nourished.   Cardiovascular: Normal rate.   Pulmonary/Chest: Effort normal and breath sounds normal.   Musculoskeletal: Normal range of motion.   Neurological: She is alert and oriented to person, place, and time.   Nursing note and vitals reviewed.      Assessment/Plan   Diagnoses and all orders for this visit:    Plantar fasciitis  -     Ambulatory Referral to Podiatry                 Orders Placed This Encounter   Procedures   • Ambulatory Referral to Podiatry     Referral Priority:   Routine     Referral Type:   Consultation     Referral Reason:   Specialty Services Required     Referred to Provider:   Sandor Hedrick DPM     Requested Specialty:   Podiatry     Number of Visits Requested:   1       Follow up: 3 month(s)

## 2019-10-14 ENCOUNTER — OFFICE VISIT (OUTPATIENT)
Dept: FAMILY MEDICINE CLINIC | Facility: CLINIC | Age: 69
End: 2019-10-14

## 2019-10-14 VITALS
TEMPERATURE: 98.4 F | HEART RATE: 80 BPM | RESPIRATION RATE: 16 BRPM | DIASTOLIC BLOOD PRESSURE: 78 MMHG | BODY MASS INDEX: 34.66 KG/M2 | OXYGEN SATURATION: 98 % | SYSTOLIC BLOOD PRESSURE: 136 MMHG | HEIGHT: 65 IN | WEIGHT: 208 LBS

## 2019-10-14 DIAGNOSIS — E74.89 OTHER SPECIFIED DISORDERS OF CARBOHYDRATE METABOLISM (HCC): ICD-10-CM

## 2019-10-14 DIAGNOSIS — E03.9 ACQUIRED HYPOTHYROIDISM: ICD-10-CM

## 2019-10-14 DIAGNOSIS — I10 ESSENTIAL HYPERTENSION: Primary | ICD-10-CM

## 2019-10-14 DIAGNOSIS — K21.9 GASTROESOPHAGEAL REFLUX DISEASE WITHOUT ESOPHAGITIS: ICD-10-CM

## 2019-10-14 PROCEDURE — 99214 OFFICE O/P EST MOD 30 MIN: CPT | Performed by: FAMILY MEDICINE

## 2019-10-14 RX ORDER — VALSARTAN 320 MG/1
320 TABLET ORAL DAILY
Qty: 90 TABLET | Refills: 1 | Status: SHIPPED | OUTPATIENT
Start: 2019-10-14 | End: 2020-04-01 | Stop reason: SDUPTHER

## 2019-10-14 NOTE — PROGRESS NOTES
Subjective   Hannah Maki is a 68 y.o. female.     Chief Complaint   Patient presents with   • Follow-up        History of Present Illness     she is in therapy at [facility] for plantar fascities--she wants tyo chagne her bp meds due to manufacturiung issues--she thinks her gerd symp;toms are stable withotu dysphagia      Current Outpatient Medications:   •  Coenzyme Q10 100 MG chewable tablet, Chew 100 mg., Disp: , Rfl:   •  dicyclomine (BENTYL) 10 MG capsule, Take 1 capsule by mouth 3 (Three) Times a Day As Needed (abdominal discomfort)., Disp: 90 capsule, Rfl: 3  •  esomeprazole (NexIUM) 20 MG capsule, Take 20 mg by mouth Every Morning Before Breakfast., Disp: , Rfl:   •  levothyroxine (SYNTHROID, LEVOTHROID) 100 MCG tablet, Take 1 tablet by mouth Daily., Disp: 30 tablet, Rfl: 5  •  losartan (COZAAR) 100 MG tablet, Take 1 tablet by mouth Daily., Disp: 90 tablet, Rfl: 1  •  meclizine (ANTIVERT) 25 MG tablet, Take 1 tablet by mouth 3 (Three) Times a Day As Needed for dizziness (vertigo). Take one by mouth three times a day as needed for vertigo, Disp: 30 tablet, Rfl: 3  •  montelukast (SINGULAIR) 10 MG tablet, Take 1 tablet by mouth Every Night., Disp: 30 tablet, Rfl: 11  •  mupirocin (BACTROBAN) 2 % ointment, Apply intranasally bid, Disp: 22 g, Rfl: 3  Allergies   Allergen Reactions   • Ceftin [Cefuroxime Axetil] Other (See Comments)     Pt does not recall   • Augmentin [Amoxicillin-Pot Clavulanate] Rash   • Azithromycin Nausea Only   • Bactrim [Sulfamethoxazole-Trimethoprim] Nausea And Vomiting   • Cefuroxime Nausea Only   • Codeine GI Intolerance   • Demerol [Meperidine] Nausea And Vomiting   • Erythromycin Rash   • Latex Other (See Comments)     Patient says it pulls her skin off.   • Tequin [Gatifloxacin] Nausea Only   • Tetracyclines & Related Nausea And Vomiting       Past Medical History:   Diagnosis Date   • Abnormal liver enzymes    • Achilles bursitis    • Anxiety    • Arthralgia    • Arthritis    •  "Asthma    • Chronic pansinusitis 10/14/2016   • Colon polyp    • Diverticulosis    • Dysuria    • Fatty liver    • GERD (gastroesophageal reflux disease)    • Hyperlipidemia    • Hypertension    • Hyperthyroidism    • Non-cardiac chest pain    • Palpitations    • Perennial allergic rhinitis 10/14/2016   • Rhinitis    • Tinnitus    • Trigeminal neuralgia    • Vertigo      Past Surgical History:   Procedure Laterality Date   • APPENDECTOMY     • CATARACT EXTRACTION WITH INTRAOCULAR LENS IMPLANT     • CHOLECYSTECTOMY     • COLONOSCOPY  11/18/2013     six polyps removed or destroyed, Diverticulosis  Recall 3 years   • COLONOSCOPY  11/18/2013   • COLONOSCOPY N/A 4/4/2017    Procedure: COLONOSCOPY WITH ANESTHESIA;  Surgeon: Lew Orona MD;  Location: [facility] ENDOSCOPY;  Service:    • HEMORRHOIDECTOMY     • HYSTERECTOMY     • HYSTERECTOMY     • NECK SURGERY     • NOSE SURGERY      nose bleeding artery    • RECTAL FISSURE INCISION AND DRAINAGE         Review of Systems   Constitutional: Negative.    HENT: Negative.    Eyes: Negative.    Respiratory: Negative.    Cardiovascular: Negative.    Gastrointestinal: Negative.    Endocrine: Negative.    Genitourinary: Negative.    Musculoskeletal: Positive for gait problem.   Skin: Negative.    Allergic/Immunologic: Negative.    Hematological: Negative.    Psychiatric/Behavioral: Negative.        Objective  /78   Pulse 80   Temp 98.4 °F (36.9 °C)   Resp 16   Ht 165.1 cm (65\")   Wt 94.3 kg (208 lb)   LMP 10/04/1981   SpO2 98%   BMI 34.61 kg/m²   Physical Exam   Constitutional: She is oriented to person, place, and time. She appears well-developed and well-nourished.   HENT:   Head: Normocephalic and atraumatic.   Right Ear: External ear normal.   Left Ear: External ear normal.   Nose: Nose normal.   Mouth/Throat: Oropharynx is clear and moist.   Eyes: Conjunctivae and EOM are normal. Pupils are equal, round, and reactive to light.   Neck: Normal range of motion. " Neck supple.   Cardiovascular: Normal rate, regular rhythm, normal heart sounds and intact distal pulses.   Pulmonary/Chest: Effort normal and breath sounds normal.   Abdominal: Soft. Bowel sounds are normal.   Musculoskeletal: Normal range of motion.   Neurological: She is alert and oriented to person, place, and time.   Skin: Skin is warm. Capillary refill takes less than 2 seconds.   Psychiatric: She has a normal mood and affect. Her behavior is normal. Judgment and thought content normal.   Nursing note and vitals reviewed.      Assessment/Plan   Hannah was seen today for follow-up.    Diagnoses and all orders for this visit:    Essential hypertension  -     Comprehensive metabolic panel    Acquired hypothyroidism  -     TSH    Gastroesophageal reflux disease without esophagitis    BMI 34.0-34.9,adult  -     Hemoglobin A1c    Other specified disorders of carbohydrate metabolism (CMS/HCC)   -     Hemoglobin A1c    Patient's Body mass index is 34.61 kg/m². BMI is above normal parameters. Recommendations include: nutrition counseling.    She says her mammo and coloni are up to date       Orders Placed This Encounter   Procedures   • Comprehensive metabolic panel   • Hemoglobin A1c   • TSH     Current outpatient and discharge medications have been reconciled for the patient.  Reviewed by: Deni Henry MD  Follow up: 6 month(s)

## 2019-10-15 LAB
ALBUMIN SERPL-MCNC: 4.5 G/DL (ref 3.5–5.2)
ALBUMIN/GLOB SERPL: 1.6 G/DL
ALP SERPL-CCNC: 110 U/L (ref 39–117)
ALT SERPL-CCNC: 66 U/L (ref 1–33)
AST SERPL-CCNC: 35 U/L (ref 1–32)
BILIRUB SERPL-MCNC: 0.6 MG/DL (ref 0.2–1.2)
BUN SERPL-MCNC: 14 MG/DL (ref 8–23)
BUN/CREAT SERPL: 17.7 (ref 7–25)
CALCIUM SERPL-MCNC: 9.7 MG/DL (ref 8.6–10.5)
CHLORIDE SERPL-SCNC: 99 MMOL/L (ref 98–107)
CO2 SERPL-SCNC: 25.1 MMOL/L (ref 22–29)
CREAT SERPL-MCNC: 0.79 MG/DL (ref 0.57–1)
GLOBULIN SER CALC-MCNC: 2.8 GM/DL
GLUCOSE SERPL-MCNC: 106 MG/DL (ref 65–99)
HBA1C MFR BLD: 6 % (ref 4.8–5.6)
POTASSIUM SERPL-SCNC: 4.7 MMOL/L (ref 3.5–5.2)
PROT SERPL-MCNC: 7.3 G/DL (ref 6–8.5)
SODIUM SERPL-SCNC: 140 MMOL/L (ref 136–145)
TSH SERPL DL<=0.005 MIU/L-ACNC: 1.88 UIU/ML (ref 0.27–4.2)

## 2019-11-18 ENCOUNTER — OFFICE VISIT (OUTPATIENT)
Dept: OTOLARYNGOLOGY | Facility: CLINIC | Age: 69
End: 2019-11-18

## 2019-11-18 VITALS
DIASTOLIC BLOOD PRESSURE: 85 MMHG | SYSTOLIC BLOOD PRESSURE: 155 MMHG | BODY MASS INDEX: 34.72 KG/M2 | WEIGHT: 208.4 LBS | RESPIRATION RATE: 16 BRPM | HEART RATE: 92 BPM | HEIGHT: 65 IN

## 2019-11-18 DIAGNOSIS — R09.82 POST-NASAL DRIP: ICD-10-CM

## 2019-11-18 DIAGNOSIS — J01.00 ACUTE MAXILLARY SINUSITIS, RECURRENCE NOT SPECIFIED: ICD-10-CM

## 2019-11-18 DIAGNOSIS — E03.9 ACQUIRED HYPOTHYROIDISM: ICD-10-CM

## 2019-11-18 DIAGNOSIS — K11.8 PAROTID MASS: ICD-10-CM

## 2019-11-18 DIAGNOSIS — J30.89 PERENNIAL ALLERGIC RHINITIS: ICD-10-CM

## 2019-11-18 DIAGNOSIS — J32.4 CHRONIC PANSINUSITIS: Primary | ICD-10-CM

## 2019-11-18 DIAGNOSIS — H93.13 TINNITUS OF BOTH EARS: ICD-10-CM

## 2019-11-18 DIAGNOSIS — H90.3 SENSORINEURAL HEARING LOSS (SNHL) OF BOTH EARS: ICD-10-CM

## 2019-11-18 DIAGNOSIS — K21.9 GASTROESOPHAGEAL REFLUX DISEASE WITHOUT ESOPHAGITIS: ICD-10-CM

## 2019-11-18 PROCEDURE — 99214 OFFICE O/P EST MOD 30 MIN: CPT | Performed by: PHYSICIAN ASSISTANT

## 2019-11-18 RX ORDER — CLINDAMYCIN HYDROCHLORIDE 300 MG/1
300 CAPSULE ORAL 3 TIMES DAILY
Qty: 42 CAPSULE | Refills: 0 | Status: SHIPPED | OUTPATIENT
Start: 2019-11-18 | End: 2019-12-02

## 2019-11-18 RX ORDER — MONTELUKAST SODIUM 10 MG/1
10 TABLET ORAL NIGHTLY
Qty: 30 TABLET | Refills: 11 | Status: SHIPPED | OUTPATIENT
Start: 2019-11-18 | End: 2020-10-19 | Stop reason: SDUPTHER

## 2019-11-18 RX ORDER — METHYLPREDNISOLONE 4 MG/1
TABLET ORAL
Qty: 1 EACH | Refills: 0 | Status: SHIPPED | OUTPATIENT
Start: 2019-11-18 | End: 2019-12-23

## 2019-11-18 RX ORDER — FLUCONAZOLE 150 MG/1
150 TABLET ORAL ONCE
Qty: 3 TABLET | Refills: 0 | Status: SHIPPED | OUTPATIENT
Start: 2019-11-18 | End: 2019-11-18

## 2019-11-18 RX ORDER — MECLIZINE HYDROCHLORIDE 25 MG/1
25 TABLET ORAL 3 TIMES DAILY PRN
Qty: 30 TABLET | Refills: 3 | Status: SHIPPED | OUTPATIENT
Start: 2019-11-18 | End: 2020-10-19 | Stop reason: SDUPTHER

## 2019-11-18 NOTE — PATIENT INSTRUCTIONS
Will treat sinus with cleocin and a medrol pack, continue other medications as directed, recheck in six months with audiogram. If symptoms worsen or does not improve call for sooner appointment.

## 2019-11-18 NOTE — PROGRESS NOTES
YOB: 1950  Location: [facility]  Location Address: [address] [place] [address], [facility], [address] [place] [address]  Location Phone: [phone]    Chief Complaint   Patient presents with   • Sinus Problem       History of Present Illness  Hannah Maki is a 69 y.o. female.  Hannah Maki is here for follow-up evaluation of ENT complaints. The patient has had problems with dizziness, sinusitis, sinus pressure, postnasal drip and allergy.  The symptoms are localized to the left nose. The patient has had worsening symptoms. The acute flair-up has been present for the last several weeks. The symptoms are aggravated by sinonasal complaints and weather change. The symptoms are improved by no identifiable factors.      The patient denies changes to the left parotid mass and denies related complaints.    Thyroid levels have been normal on current dose of synthroid. The patient reports well controlled GERD symptoms with Nexium.       Examination: CT neck and nasopharynx with and without contrast dated  2017.    Indication: Left parotid mass     Comparison: CT neck dated 9/50/15     Technique: Volumetric data was acquired from the level of the ventricles  to the level of the aortic arch following the intravenous injection of  contrast and reconstructed at 3 mm intervals in the axial, coronal and  sagittal plane.         Findings:  A 9 mm enhancing nodule in the superficial lobe of the left parotid lobe  is unchanged in appearance since 09/15/2015. This enhancing nodule is  immediately deep to the radiopaque marker.    Pharyngeal mucosal space of the nasopharynx, oropharynx and hypopharynx  is unremarkable without evidence of fluid collection, abscess, discrete  or enhancing mass.   Supraglottic, glottic and subglottic larynx is unremarkable.  Parapharyngeal spaces, carotid, , submandibular and  perivertebral spaces are unremarkable bilaterally.    No evidence of pathologically enlarged lymph nodes in  the visualized  cervical levels. Scattered non pathologically enlarged lymph nodes are  noted bilaterally     Visualized lung apices are unremarkable bilaterally.    Osseous structures show no suspicious lytic or blastic lesion.  Multilevel degenerative changes with disk osteophyte complexes, facet  arthrosis, uncovertebral arthrosis, without significant spinal canal or  foraminal stenosis.      Visualized intracranial contents, orbits, paranasal sinuses and nasal  cavity, remaining nasopharynx, oropharynx and oral cavity, hypopharynx  and larynx, thyroid and salivary glands are otherwise unremarkable.   Normal contrast opacification in the vessels of the neck.   Report Conclusions  IMPRESSION:   Impression:    9 mm enhancing nodule in the left parotid lobe, stable since the  09/15/2015.  Differential considerations includes primary parotid neoplasm including  BMT and lymph node        Exam:   US HEAD NECK SOFT TISSUE-       Date:  09/27/2017      History:  Female, age  66 years;left parotid mass/lymph node no  improvement with medications; R22.0-Localized swelling, mass and lump,  head; H92.02-Otalgia, left ear     COMPARISON:  CT neck dated 09/27/2017.  Technique: Routine grayscale and color Doppler images were obtained  through the left parotid region.  Findings :  1.1 cm benign-appearing left parotid lymph node is present. The cortical  thickness is 2 mm. The fatty hilum is well visualized.        IMPRESSION:  Impression:     Benign-appearing left parotid lymph node.  This report was finalized on 09/27/2017 14:38 by Dr. Cynthia Tobias MD.     Past Medical History:   Diagnosis Date   • Abnormal liver enzymes    • Achilles bursitis    • Anxiety    • Arthralgia    • Arthritis    • Asthma    • Chronic pansinusitis 10/14/2016   • Colon polyp    • Diverticulosis    • Dysuria    • Fatty liver    • GERD (gastroesophageal reflux disease)    • Hyperlipidemia    • Hypertension    • Hyperthyroidism    • Non-cardiac chest  pain    • Palpitations    • Perennial allergic rhinitis 10/14/2016   • Rhinitis    • Tinnitus    • Trigeminal neuralgia    • Vertigo        Past Surgical History:   Procedure Laterality Date   • APPENDECTOMY     • CATARACT EXTRACTION WITH INTRAOCULAR LENS IMPLANT     • CHOLECYSTECTOMY     • COLONOSCOPY  11/18/2013     six polyps removed or destroyed, Diverticulosis  Recall 3 years   • COLONOSCOPY  11/18/2013   • COLONOSCOPY N/A 4/4/2017    Procedure: COLONOSCOPY WITH ANESTHESIA;  Surgeon: Lew Orona MD;  Location: [facility] ENDOSCOPY;  Service:    • HEMORRHOIDECTOMY     • HYSTERECTOMY     • NECK SURGERY     • NOSE SURGERY      nose bleeding artery    • RECTAL FISSURE INCISION AND DRAINAGE           Current Outpatient Medications:   •  dicyclomine (BENTYL) 10 MG capsule, Take 1 capsule by mouth 3 (Three) Times a Day As Needed (abdominal discomfort)., Disp: 90 capsule, Rfl: 3  •  esomeprazole (NexIUM) 20 MG capsule, Take 20 mg by mouth Every Morning Before Breakfast., Disp: , Rfl:   •  levothyroxine (SYNTHROID, LEVOTHROID) 100 MCG tablet, Take 1 tablet by mouth Daily., Disp: 30 tablet, Rfl: 5  •  meclizine (ANTIVERT) 25 MG tablet, Take 1 tablet by mouth 3 (Three) Times a Day As Needed for Dizziness (vertigo). Take one by mouth three times a day as needed for vertigo, Disp: 30 tablet, Rfl: 3  •  montelukast (SINGULAIR) 10 MG tablet, Take 1 tablet by mouth Every Night., Disp: 30 tablet, Rfl: 11  •  mupirocin (BACTROBAN) 2 % ointment, Apply intranasally bid, Disp: 22 g, Rfl: 3  •  valsartan (DIOVAN) 320 MG tablet, Take 1 tablet by mouth Daily., Disp: 90 tablet, Rfl: 1  •  clindamycin (CLEOCIN) 300 MG capsule, Take 1 capsule by mouth 3 (Three) Times a Day for 14 days., Disp: 42 capsule, Rfl: 0  •  Coenzyme Q10 100 MG chewable tablet, Chew 100 mg., Disp: , Rfl:   •  fluconazole (DIFLUCAN) 150 MG tablet, Take 1 tablet by mouth 1 (One) Time for 1 dose., Disp: 3 tablet, Rfl: 0  •  methylPREDNISolone (MEDROL) 4 MG tablet,  Take as directed on package instructions., Disp: 1 each, Rfl: 0    Ceftin [cefuroxime axetil]; Augmentin [amoxicillin-pot clavulanate]; Azithromycin; Bactrim [sulfamethoxazole-trimethoprim]; Cefuroxime; Codeine; Demerol [meperidine]; Erythromycin; Latex; Tequin [gatifloxacin]; and Tetracyclines & related    Family History   Problem Relation Age of Onset   • Diabetes Brother    • Heart disease Brother    • Colon cancer Sister 57   • Colon cancer Sister 55   • Heart disease Father    • Heart attack Father        Social History     Socioeconomic History   • Marital status:      Spouse name: Not on file   • Number of children: Not on file   • Years of education: Not on file   • Highest education level: Not on file   Occupational History   • Occupation: retired   Tobacco Use   • Smoking status: Never Smoker   • Smokeless tobacco: Never Used   Substance and Sexual Activity   • Alcohol use: No   • Drug use: No   • Sexual activity: Defer       Review of Systems   Constitutional: Negative.  Negative for activity change, appetite change, chills, diaphoresis, fatigue, fever and unexpected weight change.   HENT: Positive for congestion, hearing loss, postnasal drip, sinus pressure, sinus pain and tinnitus. Negative for dental problem, drooling, ear discharge, ear pain, facial swelling, mouth sores, nosebleeds, rhinorrhea, sneezing, sore throat, trouble swallowing and voice change.         Parotid mass  hypothyroid   Eyes: Negative.    Respiratory: Negative.    Cardiovascular: Negative.    Gastrointestinal: Negative.    Endocrine: Negative.    Genitourinary: Negative.    Musculoskeletal: Negative.    Skin: Negative.    Allergic/Immunologic: Positive for environmental allergies. Negative for food allergies and immunocompromised state.   Neurological: Negative.    Hematological: Negative.    Psychiatric/Behavioral: Negative.        Vitals:    11/18/19 0951   BP: 155/85   Pulse: 92   Resp: 16       Objective     Physical  Exam  CONSTITUTIONAL: well nourished, alert, oriented, in no acute distress     COMMUNICATION AND VOICE: able to communicate normally, normal voice quality    HEAD: normocephalic, no lesions, atraumatic, no tenderness, no masses     FACE: appearance normal, no lesions, no tenderness, no deformities, facial motion symmetric    SALIVARY GLANDS: parotid glands with no tenderness, no swelling, no masses (left nodule nonpalpable), submandibular glands with normal size, nontender    EYES: ocular motility normal, eyelids normal, orbits normal, no proptosis, conjunctiva normal , pupils equal, round     EARS:  Hearing: response to conversational voice normal bilaterally   External Ears: auricles without lesions  Otoscopic: tympanic membrane appearance dull, no lesions, no perforation, normal mobility, no fluid right, effusion on the left    NOSE:  External Nose: structure normal, no tenderness on palpation, no nasal discharge, no lesions, no evidence of trauma, nostrils patent   Intranasal Exam: nasal mucosa moderate edema and erythema with purulent drainage on the left, vestibule within normal limits, inferior turbinate enlarged, nasal septum midline    ORAL:  Lips: upper and lower lips without lesion   Teeth: dentition within normal limits for age   Gums: gingivae healthy   Oral Mucosa: oral mucosa normal, no mucosal lesions   Floor of Mouth: Warthin’s duct patent, mucosa normal  Tongue: lingual mucosa normal without lesions, normal tongue mobility   Palate: soft and hard palates with normal mucosa and structure  Oropharynx: oropharyngeal mucosa erythema    NECK: neck appearance normal, no mass,  noted without erythema or tenderness    THYROID: no overt thyromegaly, no tenderness, nodules or mass present on palpation, position midline     LYMPH NODES: no lymphadenopathy    CHEST/RESPIRATORY: respiratory effort normal, normal breath sounds     CARDIOVASCULAR: rate and rhythm normal, extremities without cyanosis or edema       NEUROLOGIC/PSYCHIATRIC: oriented to time, place and person, mood normal, affect appropriate, CN II-XII intact grossly    Assessment/Plan   Hannah was seen today for sinus problem.    Diagnoses and all orders for this visit:    Chronic pansinusitis  -     mupirocin (BACTROBAN) 2 % ointment; Apply intranasally bid    Perennial allergic rhinitis  -     montelukast (SINGULAIR) 10 MG tablet; Take 1 tablet by mouth Every Night.    Gastroesophageal reflux disease without esophagitis    Acquired hypothyroidism    Parotid mass    Post-nasal drip    Sensorineural hearing loss (SNHL) of both ears  -     Comprehensive Hearing Test; Future  -     Tympanometry; Future    Tinnitus of both ears  -     Comprehensive Hearing Test; Future  -     Tympanometry; Future    Acute maxillary sinusitis, recurrence not specified  -     methylPREDNISolone (MEDROL) 4 MG tablet; Take as directed on package instructions.  -     clindamycin (CLEOCIN) 300 MG capsule; Take 1 capsule by mouth 3 (Three) Times a Day for 14 days.    Other orders  -     meclizine (ANTIVERT) 25 MG tablet; Take 1 tablet by mouth 3 (Three) Times a Day As Needed for Dizziness (vertigo). Take one by mouth three times a day as needed for vertigo  -     fluconazole (DIFLUCAN) 150 MG tablet; Take 1 tablet by mouth 1 (One) Time for 1 dose.      * Surgery not found *  Orders Placed This Encounter   Procedures   • Comprehensive Hearing Test     Standing Status:   Future     Standing Expiration Date:   11/18/2020     Order Specific Question:   Laterality     Answer:   Bilateral   • Tympanometry     Standing Status:   Future     Standing Expiration Date:   11/18/2020     Order Specific Question:   Laterality     Answer:   Bilateral     Return in about 6 months (around 5/18/2020) for Recheck sinus and parotid mass and ears with audiogram.       Patient Instructions   Will treat sinus with cleocin and a medrol pack, continue other medications as directed, recheck in six months with  audiogram. If symptoms worsen or does not improve call for sooner appointment.

## 2019-12-16 ENCOUNTER — TELEPHONE (OUTPATIENT)
Dept: FAMILY MEDICINE CLINIC | Facility: CLINIC | Age: 69
End: 2019-12-16

## 2019-12-16 RX ORDER — AMOXICILLIN 500 MG/1
1000 CAPSULE ORAL 3 TIMES DAILY
Qty: 42 CAPSULE | Refills: 0 | Status: SHIPPED | OUTPATIENT
Start: 2019-12-16 | End: 2019-12-16

## 2019-12-16 RX ORDER — AMOXICILLIN 250 MG/1
250 CAPSULE ORAL 3 TIMES DAILY
Qty: 21 CAPSULE | Refills: 0 | Status: SHIPPED | OUTPATIENT
Start: 2019-12-16 | End: 2019-12-23

## 2019-12-23 ENCOUNTER — TELEPHONE (OUTPATIENT)
Dept: FAMILY MEDICINE CLINIC | Facility: CLINIC | Age: 69
End: 2019-12-23

## 2019-12-23 RX ORDER — METHYLPREDNISOLONE 4 MG/1
TABLET ORAL
Qty: 21 TABLET | Refills: 0 | Status: SHIPPED | OUTPATIENT
Start: 2019-12-23 | End: 2020-01-06

## 2019-12-23 NOTE — TELEPHONE ENCOUNTER
Hannah called & said that she took her last amoxicillin & still has head/chest congestion & cough.

## 2019-12-30 ENCOUNTER — TELEPHONE (OUTPATIENT)
Dept: FAMILY MEDICINE CLINIC | Facility: CLINIC | Age: 69
End: 2019-12-30

## 2019-12-30 RX ORDER — AMOXICILLIN 500 MG/1
500 TABLET, FILM COATED ORAL 3 TIMES DAILY
Qty: 30 TABLET | Refills: 0 | Status: SHIPPED | OUTPATIENT
Start: 2019-12-30 | End: 2020-01-09

## 2019-12-30 NOTE — TELEPHONE ENCOUNTER
Pt is aware and yes she can take this so I sent to [facility]

## 2019-12-30 NOTE — TELEPHONE ENCOUNTER
Pt called and said that she had the flu last week and she still has thick green mucus her ear is hurting and she has swollen lymp node so she asked what do u suggest she do

## 2019-12-30 NOTE — TELEPHONE ENCOUNTER
We better get her started on antbx right away---if she can take it---amoxil 500mg tid x 10 days---see me in 48hrs if the ear is not better

## 2020-01-06 ENCOUNTER — TELEPHONE (OUTPATIENT)
Dept: FAMILY MEDICINE CLINIC | Facility: CLINIC | Age: 70
End: 2020-01-06

## 2020-01-06 ENCOUNTER — OFFICE VISIT (OUTPATIENT)
Dept: FAMILY MEDICINE CLINIC | Facility: CLINIC | Age: 70
End: 2020-01-06

## 2020-01-06 VITALS
DIASTOLIC BLOOD PRESSURE: 84 MMHG | OXYGEN SATURATION: 97 % | HEIGHT: 65 IN | SYSTOLIC BLOOD PRESSURE: 126 MMHG | RESPIRATION RATE: 16 BRPM | HEART RATE: 104 BPM | TEMPERATURE: 98.4 F | BODY MASS INDEX: 34.49 KG/M2 | WEIGHT: 207 LBS

## 2020-01-06 DIAGNOSIS — J40 BRONCHITIS: Primary | ICD-10-CM

## 2020-01-06 PROCEDURE — 99213 OFFICE O/P EST LOW 20 MIN: CPT | Performed by: NURSE PRACTITIONER

## 2020-01-06 RX ORDER — AMOXICILLIN 500 MG/1
500 TABLET, FILM COATED ORAL 3 TIMES DAILY
Qty: 12 TABLET | Refills: 0 | Status: SHIPPED | OUTPATIENT
Start: 2020-01-06 | End: 2020-01-10

## 2020-01-06 RX ORDER — ALBUTEROL SULFATE 90 UG/1
2 AEROSOL, METERED RESPIRATORY (INHALATION) EVERY 4 HOURS PRN
Qty: 1 INHALER | Refills: 0 | Status: SHIPPED | OUTPATIENT
Start: 2020-01-06 | End: 2020-04-13

## 2020-01-06 NOTE — TELEPHONE ENCOUNTER
Hannah called & said that she is on her 8th day of abx & constantly coughing w/ has green mucus.  She wants to know what to do?

## 2020-01-06 NOTE — PROGRESS NOTES
Subjective   Chief Complaint:  Cough and respiratory congestion, left earache    History of Present Illness:  This 69 y.o. female was seen in the office today for Cough and respiratory congestion, left earache.  Also had swollen lymph nodes last week.  Reports lymph node is better.  Reports green respiratory sputum and cough.  Had influenza 12/23/2019.  Started Amoxil 12/30/2019.      Past Medical, Surgical, Social, and Family History:  Past Medical History:   Diagnosis Date   • Abnormal liver enzymes    • Achilles bursitis    • Anxiety    • Arthralgia    • Arthritis    • Asthma    • Chronic pansinusitis 10/14/2016   • Colon polyp    • Diverticulosis    • Dysuria    • Fatty liver    • GERD (gastroesophageal reflux disease)    • Hyperlipidemia    • Hypertension    • Hyperthyroidism    • Non-cardiac chest pain    • Palpitations    • Perennial allergic rhinitis 10/14/2016   • Rhinitis    • Tinnitus    • Trigeminal neuralgia    • Vertigo      Past Surgical History:   Procedure Laterality Date   • APPENDECTOMY     • CATARACT EXTRACTION WITH INTRAOCULAR LENS IMPLANT     • CHOLECYSTECTOMY     • COLONOSCOPY  11/18/2013     six polyps removed or destroyed, Diverticulosis  Recall 3 years   • COLONOSCOPY  11/18/2013   • COLONOSCOPY N/A 4/4/2017    Procedure: COLONOSCOPY WITH ANESTHESIA;  Surgeon: Lew Orona MD;  Location: [facility] ENDOSCOPY;  Service:    • HEMORRHOIDECTOMY     • HYSTERECTOMY     • NECK SURGERY     • NOSE SURGERY      nose bleeding artery    • RECTAL FISSURE INCISION AND DRAINAGE       Social History     Socioeconomic History   • Marital status:      Spouse name: Not on file   • Number of children: Not on file   • Years of education: Not on file   • Highest education level: Not on file   Occupational History   • Occupation: retired   Tobacco Use   • Smoking status: Never Smoker   • Smokeless tobacco: Never Used   Substance and Sexual Activity   • Alcohol use: No   • Drug use: No   • Sexual activity:  Defer     Family History   Problem Relation Age of Onset   • Diabetes Brother    • Heart disease Brother    • Colon cancer Sister 57   • Colon cancer Sister 55   • Heart disease Father    • Heart attack Father      Review of Systems   Constitutional: Negative for chills and fever.   HENT: Positive for ear pain (left). Negative for congestion, sinus pressure and sore throat.    Eyes: Negative for blurred vision, pain and redness.   Respiratory: Positive for cough (with green sputum). Negative for chest tightness, shortness of breath and wheezing.    Cardiovascular: Negative for chest pain and palpitations.   Gastrointestinal: Negative for abdominal distention and abdominal pain.   Endocrine: Negative for cold intolerance and heat intolerance.   Genitourinary: Negative for dysuria, flank pain, hematuria and urgency.   Musculoskeletal: Negative for arthralgias and myalgias.   Skin: Negative for rash, skin lesions and bruise.   Allergic/Immunologic: Negative for environmental allergies and food allergies.   Neurological: Negative for dizziness, speech difficulty and numbness.   Hematological: Negative for adenopathy. Does not bruise/bleed easily.   Psychiatric/Behavioral: Negative for behavioral problems and stress. The patient is not nervous/anxious.      Objective   Physical Exam   Constitutional: She is oriented to person, place, and time. No distress.   HENT:   Head: Normocephalic and atraumatic.   Left Ear: A middle ear effusion is present.   Nose: Nose normal.   Eyes: Pupils are equal, round, and reactive to light. Conjunctivae and EOM are normal.   Neck: Normal range of motion. Neck supple. No JVD present. No tracheal deviation present. No thyromegaly present.   Cardiovascular: Normal rate, regular rhythm, normal heart sounds and intact distal pulses. Exam reveals no gallop and no friction rub.   No murmur heard.  Pulmonary/Chest: Effort normal. No respiratory distress. She has no decreased breath sounds. She has  "wheezes in the left upper field. She has rhonchi in the right upper field and the left upper field.   Abdominal: Soft. Bowel sounds are normal. There is no tenderness. There is no guarding.   Musculoskeletal: Normal range of motion. She exhibits no edema, tenderness or deformity.   Lymphadenopathy:     She has no cervical adenopathy.   Neurological: She is alert and oriented to person, place, and time.   Skin: Skin is warm and dry. Capillary refill takes less than 2 seconds. She is not diaphoretic.   Psychiatric: She has a normal mood and affect. Her behavior is normal. Judgment and thought content normal.   Nursing note and vitals reviewed.  /84 (BP Location: Left arm)   Pulse 104   Temp 98.4 °F (36.9 °C) (Temporal)   Resp 16   Ht 165.1 cm (65\")   Wt 93.9 kg (207 lb)   LMP 10/04/1981   SpO2 97%   BMI 34.45 kg/m²     Assessment/Plan   Diagnoses and all orders for this visit:    1. Bronchitis (Primary)  -     HYDROcod Polst-CPM Polst ER (TUSSIONEX PENNKINETIC ER) 10-8 MG/5ML ER suspension; Take 5 mL by mouth Every 12 (Twelve) Hours As Needed for Cough.  Dispense: 70 mL; Refill: 0  -     amoxicillin (AMOXIL) 500 MG tablet; Take 1 tablet by mouth 3 (Three) Times a Day for 4 days.  Dispense: 12 tablet; Refill: 0  -     albuterol sulfate  (90 Base) MCG/ACT inhaler; Inhale 2 puffs Every 4 (Four) Hours As Needed for Wheezing.  Dispense: 1 inhaler; Refill: 0    Discussion:  Advised and educated plan of care.  We will add 4 more days to amoxicillin for complete 14-day regimen.  Inhaler and cough syrup as ordered, follow-up as needed.    Follow-up:  Return if symptoms worsen or fail to improve.    Note e-Signed by FADI Lynch on 01/06/2020 at 11:50 AM  "

## 2020-01-09 RX ORDER — ONDANSETRON 4 MG/1
4 TABLET, FILM COATED ORAL EVERY 4 HOURS PRN
Qty: 10 TABLET | Refills: 0 | Status: SHIPPED | OUTPATIENT
Start: 2020-01-09 | End: 2020-04-13

## 2020-01-13 RX ORDER — LEVOTHYROXINE SODIUM 0.1 MG/1
TABLET ORAL
Qty: 30 TABLET | Refills: 5 | Status: SHIPPED | OUTPATIENT
Start: 2020-01-13 | End: 2020-10-19

## 2020-02-28 ENCOUNTER — TELEPHONE (OUTPATIENT)
Dept: FAMILY MEDICINE CLINIC | Facility: CLINIC | Age: 70
End: 2020-02-28

## 2020-02-28 RX ORDER — AMOXICILLIN 500 MG/1
1000 CAPSULE ORAL 3 TIMES DAILY
Qty: 60 CAPSULE | Refills: 0 | Status: SHIPPED | OUTPATIENT
Start: 2020-02-28 | End: 2020-03-09

## 2020-03-09 ENCOUNTER — OFFICE VISIT (OUTPATIENT)
Dept: GASTROENTEROLOGY | Facility: CLINIC | Age: 70
End: 2020-03-09

## 2020-03-09 VITALS
OXYGEN SATURATION: 98 % | SYSTOLIC BLOOD PRESSURE: 144 MMHG | DIASTOLIC BLOOD PRESSURE: 84 MMHG | WEIGHT: 211 LBS | HEIGHT: 65 IN | HEART RATE: 76 BPM | TEMPERATURE: 97.4 F | BODY MASS INDEX: 35.16 KG/M2

## 2020-03-09 DIAGNOSIS — I10 ESSENTIAL HYPERTENSION: ICD-10-CM

## 2020-03-09 DIAGNOSIS — Z86.010 HISTORY OF ADENOMATOUS POLYP OF COLON: Primary | ICD-10-CM

## 2020-03-09 DIAGNOSIS — Z80.0 FAMILY HX OF COLON CANCER: ICD-10-CM

## 2020-03-09 DIAGNOSIS — K76.0 STEATOSIS OF LIVER: ICD-10-CM

## 2020-03-09 PROBLEM — Z86.0101 HISTORY OF ADENOMATOUS POLYP OF COLON: Status: ACTIVE | Noted: 2020-03-09

## 2020-03-09 PROCEDURE — 99213 OFFICE O/P EST LOW 20 MIN: CPT | Performed by: NURSE PRACTITIONER

## 2020-03-09 NOTE — H&P (VIEW-ONLY)
[facility]    Primary Physician Deni Henry MD    3/9/2020    Hannah Maki   1950      Chief Complaint   Patient presents with   • Colonoscopy   fatty liver     Subjective     HPI    Hannah Maki is a 69 y.o. female who presents as a referral for preventative maintenance. She has no complaints of nausea or vomiting. No change in bowels. No wt loss. No BRBPR. No melena. No abdominal pain.        Last colonoscopy was 4/2017 noting 2 polyps ( one retrieved with path hyperplastic) and diverticulosis. The patient does have history of colon polyps. The patient does not have history of colon cancer.  There is family history of colon cancer sister in her 50's, another sister in her 50's.    She also history of fatty liver.  Was seen here last year .  Ultrasound elastography score was F1.  Last labs October 2019 and LFTs had improved from previous.  She did lose a little weight but has gained some back.  Does not drink alcohol.  She is not diabetic.      ====================================================================================  Ov  8-5-19  Hannah Maki is a 68 y.o. female who presents with a chief complaint of abnormal LFTs.     She was here 3 months ago.  We felt that she had fatty liver.  She underwent liver serologies which all came back unremarkable.  She had an ultrasound of her liver which did show steatosis.  She had F1 fibrosis.  I advised her to lose 5 to 10 pounds.  She comes in today 6 pounds lighter.     She tells me she has been feeling well.  She does tell me that she has some chronic intermittent abdominal cramping and diarrhea.  She states her son is gluten intolerant.  She thinks he has celiac disease.  She herself is gone on a gluten-free diet and she is felt much better since going on gluten-free diet.  She is never had testing done before.  She has been on a gluten-free diet for about a year.  She does note that when she eats gluten she will  have some cramps for about a week.  She states she ate Chinese food and Mexican food this weekend which was supposedly gluten-free.  But she has had some cramps and diarrhea since then.  The diarrhea is cleared except for after she eats she will just have a little bit of loose stools.  It has subsided over the last 3 days.  She was noted to have a little bit of an elevated temperature to 99 degrees at her visit today.  She denies any other symptoms other than some chronic sinus drainage which she attributes to allergies.  She denies dysuria or shortness of breath.     She also tells me she has heartburn.  She is on over-the-counter Nexium daily.  She will have breakthrough symptoms once in a while.  Is not every day.  She asked what she could take for breakthrough symptoms.    Past Medical History:   Diagnosis Date   • Abnormal liver enzymes    • Achilles bursitis    • Anxiety    • Arthralgia    • Arthritis    • Asthma    • Chronic pansinusitis 10/14/2016   • Colon polyp    • Diverticulosis    • Dysuria    • Fatty liver    • GERD (gastroesophageal reflux disease)    • Hyperlipidemia    • Hypertension    • Hyperthyroidism    • Non-cardiac chest pain    • Palpitations    • Perennial allergic rhinitis 10/14/2016   • Pneumonia    • Rhinitis    • Tinnitus    • Trigeminal neuralgia    • Vertigo        Past Surgical History:   Procedure Laterality Date   • APPENDECTOMY     • CATARACT EXTRACTION WITH INTRAOCULAR LENS IMPLANT     • CHOLECYSTECTOMY     • COLONOSCOPY  11/18/2013     six polyps removed or destroyed, Diverticulosis  Recall 3 years   • COLONOSCOPY  11/18/2013   • COLONOSCOPY N/A 4/4/2017    Procedure: COLONOSCOPY WITH ANESTHESIA;  Surgeon: Lew Orona MD;  Location: [facility] ENDOSCOPY;  Service:    • HEMORRHOIDECTOMY     • HYSTERECTOMY     • NECK SURGERY     • NOSE SURGERY      nose bleeding artery    • OTHER SURGICAL HISTORY      ingrown toe nail   • RECTAL FISSURE INCISION AND DRAINAGE         Outpatient  Medications Marked as Taking for the 3/9/20 encounter (Office Visit) with Rosa Keith APRN   Medication Sig Dispense Refill   • dicyclomine (BENTYL) 10 MG capsule Take 1 capsule by mouth 3 (Three) Times a Day As Needed (abdominal discomfort). 90 capsule 3   • esomeprazole (NexIUM) 20 MG capsule Take 20 mg by mouth Every Morning Before Breakfast.     • levothyroxine (SYNTHROID, LEVOTHROID) 100 MCG tablet TAKE 1 TABLET BY MOUTH ONCE DAILY 30 tablet 5   • meclizine (ANTIVERT) 25 MG tablet Take 1 tablet by mouth 3 (Three) Times a Day As Needed for Dizziness (vertigo). Take one by mouth three times a day as needed for vertigo 30 tablet 3   • montelukast (SINGULAIR) 10 MG tablet Take 1 tablet by mouth Every Night. 30 tablet 11   • mupirocin (BACTROBAN) 2 % ointment Apply intranasally bid 22 g 3   • valsartan (DIOVAN) 320 MG tablet Take 1 tablet by mouth Daily. 90 tablet 1       Allergies   Allergen Reactions   • Ceftin [Cefuroxime Axetil] Other (See Comments)     Pt does not recall   • Augmentin [Amoxicillin-Pot Clavulanate] Rash   • Azithromycin Nausea Only   • Bactrim [Sulfamethoxazole-Trimethoprim] Nausea And Vomiting   • Cefuroxime Nausea Only   • Codeine GI Intolerance   • Demerol [Meperidine] Nausea And Vomiting   • Erythromycin Rash   • Latex Other (See Comments)     Patient says it pulls her skin off.   • Tequin [Gatifloxacin] Nausea Only   • Tetracyclines & Related Nausea And Vomiting       Social History     Socioeconomic History   • Marital status:      Spouse name: Not on file   • Number of children: Not on file   • Years of education: Not on file   • Highest education level: Not on file   Occupational History   • Occupation: retired   Tobacco Use   • Smoking status: Never Smoker   • Smokeless tobacco: Never Used   Substance and Sexual Activity   • Alcohol use: No   • Drug use: No   • Sexual activity: Defer       Family History   Problem Relation Age of Onset   • Diabetes Brother    • Heart disease  Brother    • Colon cancer Sister 57   • Colon cancer Sister 55   • Heart disease Father    • Heart attack Father        Review of Systems   Constitutional: Negative for chills and fever.   Respiratory: Negative for cough, shortness of breath and wheezing.    Cardiovascular: Negative for chest pain and palpitations.   Gastrointestinal: Negative for abdominal distention, abdominal pain, anal bleeding, blood in stool, constipation, diarrhea, nausea and vomiting.       Objective     Vitals:    03/09/20 0842   BP: 144/84   Pulse: 76   Temp: 97.4 °F (36.3 °C)   SpO2: 98%         03/09/20  0842   Weight: 95.7 kg (211 lb)     Body mass index is 35.11 kg/m².    Physical Exam   Constitutional: No distress.   Cardiovascular: Normal rate, regular rhythm and normal heart sounds.   Pulmonary/Chest: Effort normal and breath sounds normal.   Abdominal: Soft. Bowel sounds are normal. She exhibits no distension. There is no tenderness.   Musculoskeletal: She exhibits no edema.   Neurological: She is alert.   Skin: Skin is warm and dry.   Vitals reviewed.      Imaging Results (Most Recent)     None          Assessment/Plan     Hannah was seen today for colonoscopy.    Diagnoses and all orders for this visit:    History of adenomatous polyp of colon  -     Case Request; Standing  -     Case Request    Family hx of colon cancer  -     Case Request; Standing  -     Case Request    Essential hypertension    Steatosis of liver    Other orders  -     Follow Anesthesia Guidelines / Protocol; Future  -     Obtain Informed Consent; Future  -     polyethylene glycol (GOLYTELY) 236 g solution; Take 4,000 mL by mouth 1 (One) Time for 1 dose. Take as directed per instruction sheet.             Plan for colonoscopy. The patient was advised to take any blood pressure or heart  medications the morning of  procedure if that is when he/she normally takes.       In regards to fatty liver, I discussed how fatty liver can lead to cirrhosis, disability and  pre-mature death.  How it also can be a sign of increased risk for cardiovascular disease.  I discussed the importance of getting rid of fat in the liver by controlling lipids and glucose, avoiding etoh helps, and gradual weight loss to ideal body weight is very important. She will see her pcp 4/2020 and will have labs. She will ask them to forward copy of her labs to us for review.  We discussed her last ultrasound elastography report that was done May 2019.  Ultrasound elastography score was F1 at that time.  I did discuss with her that ultrasound elastography is sometimes inaccurate and especially inaccurate  in fatty liver.  Will see her back in 1 year.              Body mass index is 35.11 kg/m².    Patient's Body mass index is 35.11 kg/m². BMI is above normal parameters. Recommendations include: no follow up, recommend weight loss. .      COLONOSCOPY WITH ANESTHESIA (N/A)  All risks, benefits, alternatives, and indications of colonoscopy procedure have been discussed with the patient. Risks to include perforation of the colon requiring possible surgery or colostomy, risk of bleeding from biopsies or removal of colon tissue, possibility of missing a colon polyp or cancer, or adverse drug reaction.  Benefits to include the diagnosis and management of disease of the colon and rectum. Alternatives to include barium enema, radiographic evaluation, lab testing or no intervention. Pt verbalizes understanding and agrees.       FADI Ward      EMR Dragon/transcription disclaimer:  Much of this encounter note is electronic transcription/translation of spoken language to printed text.  The electronic translation of spoken language may be erroneous, or at times, nonsensical words or phrases may be inadvertently transcribed.  Although I have reviewed the note for such errors, some may still exist.

## 2020-03-09 NOTE — PROGRESS NOTES
[facility]enterology    Primary Physician Deni Henry MD    3/9/2020    Hannah Maki   1950      Chief Complaint   Patient presents with   • Colonoscopy   fatty liver     Subjective     HPI    Hannah Maki is a 69 y.o. female who presents as a referral for preventative maintenance. She has no complaints of nausea or vomiting. No change in bowels. No wt loss. No BRBPR. No melena. No abdominal pain.        Last colonoscopy was 4/2017 noting 2 polyps ( one retrieved with path hyperplastic) and diverticulosis. The patient does have history of colon polyps. The patient does not have history of colon cancer.  There is family history of colon cancer sister in her 50's, another sister in her 50's.    She also history of fatty liver.  Was seen here last year .  Ultrasound elastography score was F1.  Last labs October 2019 and LFTs had improved from previous.  She did lose a little weight but has gained some back.  Does not drink alcohol.  She is not diabetic.      ====================================================================================  Ov  8-5-19  Hannah Maki is a 68 y.o. female who presents with a chief complaint of abnormal LFTs.     She was here 3 months ago.  We felt that she had fatty liver.  She underwent liver serologies which all came back unremarkable.  She had an ultrasound of her liver which did show steatosis.  She had F1 fibrosis.  I advised her to lose 5 to 10 pounds.  She comes in today 6 pounds lighter.     She tells me she has been feeling well.  She does tell me that she has some chronic intermittent abdominal cramping and diarrhea.  She states her son is gluten intolerant.  She thinks he has celiac disease.  She herself is gone on a gluten-free diet and she is felt much better since going on gluten-free diet.  She is never had testing done before.  She has been on a gluten-free diet for about a year.  She does note that when she eats gluten she will  have some cramps for about a week.  She states she ate Chinese food and Mexican food this weekend which was supposedly gluten-free.  But she has had some cramps and diarrhea since then.  The diarrhea is cleared except for after she eats she will just have a little bit of loose stools.  It has subsided over the last 3 days.  She was noted to have a little bit of an elevated temperature to 99 degrees at her visit today.  She denies any other symptoms other than some chronic sinus drainage which she attributes to allergies.  She denies dysuria or shortness of breath.     She also tells me she has heartburn.  She is on over-the-counter Nexium daily.  She will have breakthrough symptoms once in a while.  Is not every day.  She asked what she could take for breakthrough symptoms.    Past Medical History:   Diagnosis Date   • Abnormal liver enzymes    • Achilles bursitis    • Anxiety    • Arthralgia    • Arthritis    • Asthma    • Chronic pansinusitis 10/14/2016   • Colon polyp    • Diverticulosis    • Dysuria    • Fatty liver    • GERD (gastroesophageal reflux disease)    • Hyperlipidemia    • Hypertension    • Hyperthyroidism    • Non-cardiac chest pain    • Palpitations    • Perennial allergic rhinitis 10/14/2016   • Pneumonia    • Rhinitis    • Tinnitus    • Trigeminal neuralgia    • Vertigo        Past Surgical History:   Procedure Laterality Date   • APPENDECTOMY     • CATARACT EXTRACTION WITH INTRAOCULAR LENS IMPLANT     • CHOLECYSTECTOMY     • COLONOSCOPY  11/18/2013     six polyps removed or destroyed, Diverticulosis  Recall 3 years   • COLONOSCOPY  11/18/2013   • COLONOSCOPY N/A 4/4/2017    Procedure: COLONOSCOPY WITH ANESTHESIA;  Surgeon: Lew Orona MD;  Location: [facility] ENDOSCOPY;  Service:    • HEMORRHOIDECTOMY     • HYSTERECTOMY     • NECK SURGERY     • NOSE SURGERY      nose bleeding artery    • OTHER SURGICAL HISTORY      ingrown toe nail   • RECTAL FISSURE INCISION AND DRAINAGE         Outpatient  Medications Marked as Taking for the 3/9/20 encounter (Office Visit) with Rosa Keith APRN   Medication Sig Dispense Refill   • dicyclomine (BENTYL) 10 MG capsule Take 1 capsule by mouth 3 (Three) Times a Day As Needed (abdominal discomfort). 90 capsule 3   • esomeprazole (NexIUM) 20 MG capsule Take 20 mg by mouth Every Morning Before Breakfast.     • levothyroxine (SYNTHROID, LEVOTHROID) 100 MCG tablet TAKE 1 TABLET BY MOUTH ONCE DAILY 30 tablet 5   • meclizine (ANTIVERT) 25 MG tablet Take 1 tablet by mouth 3 (Three) Times a Day As Needed for Dizziness (vertigo). Take one by mouth three times a day as needed for vertigo 30 tablet 3   • montelukast (SINGULAIR) 10 MG tablet Take 1 tablet by mouth Every Night. 30 tablet 11   • mupirocin (BACTROBAN) 2 % ointment Apply intranasally bid 22 g 3   • valsartan (DIOVAN) 320 MG tablet Take 1 tablet by mouth Daily. 90 tablet 1       Allergies   Allergen Reactions   • Ceftin [Cefuroxime Axetil] Other (See Comments)     Pt does not recall   • Augmentin [Amoxicillin-Pot Clavulanate] Rash   • Azithromycin Nausea Only   • Bactrim [Sulfamethoxazole-Trimethoprim] Nausea And Vomiting   • Cefuroxime Nausea Only   • Codeine GI Intolerance   • Demerol [Meperidine] Nausea And Vomiting   • Erythromycin Rash   • Latex Other (See Comments)     Patient says it pulls her skin off.   • Tequin [Gatifloxacin] Nausea Only   • Tetracyclines & Related Nausea And Vomiting       Social History     Socioeconomic History   • Marital status:      Spouse name: Not on file   • Number of children: Not on file   • Years of education: Not on file   • Highest education level: Not on file   Occupational History   • Occupation: retired   Tobacco Use   • Smoking status: Never Smoker   • Smokeless tobacco: Never Used   Substance and Sexual Activity   • Alcohol use: No   • Drug use: No   • Sexual activity: Defer       Family History   Problem Relation Age of Onset   • Diabetes Brother    • Heart disease  Brother    • Colon cancer Sister 57   • Colon cancer Sister 55   • Heart disease Father    • Heart attack Father        Review of Systems   Constitutional: Negative for chills and fever.   Respiratory: Negative for cough, shortness of breath and wheezing.    Cardiovascular: Negative for chest pain and palpitations.   Gastrointestinal: Negative for abdominal distention, abdominal pain, anal bleeding, blood in stool, constipation, diarrhea, nausea and vomiting.       Objective     Vitals:    03/09/20 0842   BP: 144/84   Pulse: 76   Temp: 97.4 °F (36.3 °C)   SpO2: 98%         03/09/20  0842   Weight: 95.7 kg (211 lb)     Body mass index is 35.11 kg/m².    Physical Exam   Constitutional: No distress.   Cardiovascular: Normal rate, regular rhythm and normal heart sounds.   Pulmonary/Chest: Effort normal and breath sounds normal.   Abdominal: Soft. Bowel sounds are normal. She exhibits no distension. There is no tenderness.   Musculoskeletal: She exhibits no edema.   Neurological: She is alert.   Skin: Skin is warm and dry.   Vitals reviewed.      Imaging Results (Most Recent)     None          Assessment/Plan     Hannah was seen today for colonoscopy.    Diagnoses and all orders for this visit:    History of adenomatous polyp of colon  -     Case Request; Standing  -     Case Request    Family hx of colon cancer  -     Case Request; Standing  -     Case Request    Essential hypertension    Steatosis of liver    Other orders  -     Follow Anesthesia Guidelines / Protocol; Future  -     Obtain Informed Consent; Future  -     polyethylene glycol (GOLYTELY) 236 g solution; Take 4,000 mL by mouth 1 (One) Time for 1 dose. Take as directed per instruction sheet.             Plan for colonoscopy. The patient was advised to take any blood pressure or heart  medications the morning of  procedure if that is when he/she normally takes.       In regards to fatty liver, I discussed how fatty liver can lead to cirrhosis, disability and  pre-mature death.  How it also can be a sign of increased risk for cardiovascular disease.  I discussed the importance of getting rid of fat in the liver by controlling lipids and glucose, avoiding etoh helps, and gradual weight loss to ideal body weight is very important. She will see her pcp 4/2020 and will have labs. She will ask them to forward copy of her labs to us for review.  We discussed her last ultrasound elastography report that was done May 2019.  Ultrasound elastography score was F1 at that time.  I did discuss with her that ultrasound elastography is sometimes inaccurate and especially inaccurate  in fatty liver.  Will see her back in 1 year.              Body mass index is 35.11 kg/m².    Patient's Body mass index is 35.11 kg/m². BMI is above normal parameters. Recommendations include: no follow up, recommend weight loss. .      COLONOSCOPY WITH ANESTHESIA (N/A)  All risks, benefits, alternatives, and indications of colonoscopy procedure have been discussed with the patient. Risks to include perforation of the colon requiring possible surgery or colostomy, risk of bleeding from biopsies or removal of colon tissue, possibility of missing a colon polyp or cancer, or adverse drug reaction.  Benefits to include the diagnosis and management of disease of the colon and rectum. Alternatives to include barium enema, radiographic evaluation, lab testing or no intervention. Pt verbalizes understanding and agrees.       FADI Ward      EMR Dragon/transcription disclaimer:  Much of this encounter note is electronic transcription/translation of spoken language to printed text.  The electronic translation of spoken language may be erroneous, or at times, nonsensical words or phrases may be inadvertently transcribed.  Although I have reviewed the note for such errors, some may still exist.

## 2020-03-16 ENCOUNTER — HOSPITAL ENCOUNTER (OUTPATIENT)
Facility: HOSPITAL | Age: 70
Setting detail: HOSPITAL OUTPATIENT SURGERY
Discharge: HOME | End: 2020-03-16
Attending: INTERNAL MEDICINE | Admitting: INTERNAL MEDICINE

## 2020-03-16 ENCOUNTER — ANESTHESIA (OUTPATIENT)
Dept: GASTROENTEROLOGY | Facility: HOSPITAL | Age: 70
End: 2020-03-16

## 2020-03-16 ENCOUNTER — TELEPHONE (OUTPATIENT)
Dept: GASTROENTEROLOGY | Facility: CLINIC | Age: 70
End: 2020-03-16

## 2020-03-16 ENCOUNTER — ANESTHESIA EVENT (OUTPATIENT)
Dept: GASTROENTEROLOGY | Facility: HOSPITAL | Age: 70
End: 2020-03-16

## 2020-03-16 VITALS
BODY MASS INDEX: 34.66 KG/M2 | RESPIRATION RATE: 15 BRPM | HEIGHT: 65 IN | SYSTOLIC BLOOD PRESSURE: 121 MMHG | WEIGHT: 208 LBS | HEART RATE: 70 BPM | DIASTOLIC BLOOD PRESSURE: 66 MMHG | TEMPERATURE: 97.3 F | OXYGEN SATURATION: 98 %

## 2020-03-16 PROCEDURE — 45385 COLONOSCOPY W/LESION REMOVAL: CPT | Performed by: INTERNAL MEDICINE

## 2020-03-16 PROCEDURE — 25010000002 ONDANSETRON PER 1 MG: Performed by: ANESTHESIOLOGY

## 2020-03-16 PROCEDURE — 25010000002 PROPOFOL 10 MG/ML EMULSION: Performed by: NURSE ANESTHETIST, CERTIFIED REGISTERED

## 2020-03-16 RX ORDER — ONDANSETRON 2 MG/ML
4 INJECTION INTRAMUSCULAR; INTRAVENOUS ONCE AS NEEDED
Status: DISCONTINUED | OUTPATIENT
Start: 2020-03-16 | End: 2020-03-16 | Stop reason: HOSPADM

## 2020-03-16 RX ORDER — SODIUM CHLORIDE 9 MG/ML
500 INJECTION, SOLUTION INTRAVENOUS CONTINUOUS PRN
Status: DISCONTINUED | OUTPATIENT
Start: 2020-03-16 | End: 2020-03-16 | Stop reason: HOSPADM

## 2020-03-16 RX ORDER — PROPOFOL 10 MG/ML
VIAL (ML) INTRAVENOUS AS NEEDED
Status: DISCONTINUED | OUTPATIENT
Start: 2020-03-16 | End: 2020-03-16 | Stop reason: SURG

## 2020-03-16 RX ORDER — SODIUM CHLORIDE 9 MG/ML
100 INJECTION, SOLUTION INTRAVENOUS CONTINUOUS
Status: CANCELLED | OUTPATIENT
Start: 2020-03-16

## 2020-03-16 RX ORDER — LIDOCAINE HYDROCHLORIDE 10 MG/ML
0.5 INJECTION, SOLUTION EPIDURAL; INFILTRATION; INTRACAUDAL; PERINEURAL ONCE AS NEEDED
Status: DISCONTINUED | OUTPATIENT
Start: 2020-03-16 | End: 2020-03-16 | Stop reason: HOSPADM

## 2020-03-16 RX ORDER — ONDANSETRON 2 MG/ML
4 INJECTION INTRAMUSCULAR; INTRAVENOUS ONCE AS NEEDED
Status: COMPLETED | OUTPATIENT
Start: 2020-03-16 | End: 2020-03-16

## 2020-03-16 RX ORDER — SODIUM CHLORIDE 0.9 % (FLUSH) 0.9 %
10 SYRINGE (ML) INJECTION AS NEEDED
Status: CANCELLED | OUTPATIENT
Start: 2020-03-16

## 2020-03-16 RX ORDER — SODIUM CHLORIDE 0.9 % (FLUSH) 0.9 %
10 SYRINGE (ML) INJECTION EVERY 12 HOURS SCHEDULED
Status: CANCELLED | OUTPATIENT
Start: 2020-03-16

## 2020-03-16 RX ORDER — SODIUM CHLORIDE 0.9 % (FLUSH) 0.9 %
10 SYRINGE (ML) INJECTION AS NEEDED
Status: DISCONTINUED | OUTPATIENT
Start: 2020-03-16 | End: 2020-03-16 | Stop reason: HOSPADM

## 2020-03-16 RX ADMIN — PROPOFOL 80 MG: 10 INJECTION, EMULSION INTRAVENOUS at 07:55

## 2020-03-16 RX ADMIN — SODIUM CHLORIDE 500 ML: 9 INJECTION, SOLUTION INTRAVENOUS at 07:23

## 2020-03-16 RX ADMIN — ONDANSETRON HYDROCHLORIDE 4 MG: 2 SOLUTION INTRAMUSCULAR; INTRAVENOUS at 07:34

## 2020-03-16 NOTE — NURSING NOTE
0735 pt c/o itching to left forearm area. Slight redness noted. Sesar Godinez CRNA here to speak with Pt.

## 2020-03-16 NOTE — ANESTHESIA POSTPROCEDURE EVALUATION
"Patient: Hannah Maki    Procedure Summary     Date:  03/16/20 Room / Location:   [facility] 4 /  [facility]    Anesthesia Start:  0750 Anesthesia Stop:  0813    Procedure:  COLONOSCOPY WITH ANESTHESIA (N/A ) Diagnosis:       History of adenomatous polyp of colon      Family hx of colon cancer      (History of adenomatous polyp of colon [Z86.010])      (Family hx of colon cancer [Z80.0])    Surgeon:  Lew Orona MD Provider:  Jose Astudillo CRNA    Anesthesia Type:  MAC ASA Status:  3          Anesthesia Type: MAC    Vitals  Vitals Value Taken Time   BP 92/50 3/16/2020  8:11 AM   Temp     Pulse 72 3/16/2020  8:14 AM   Resp 17 3/16/2020  8:10 AM   SpO2 91 % 3/16/2020  8:14 AM   Vitals shown include unvalidated device data.        Post Anesthesia Care and Evaluation    Patient location during evaluation: PACU  Patient participation: complete - patient participated  Level of consciousness: awake and alert  Pain management: adequate  Airway patency: patent  Anesthetic complications: No anesthetic complications    Cardiovascular status: acceptable  Respiratory status: acceptable  Hydration status: acceptable    Comments: Blood pressure 163/75, pulse 94, temperature 97.3 °F (36.3 °C), temperature source Temporal, resp. rate 17, height 165.1 cm (65\"), weight 94.3 kg (208 lb), last menstrual period 10/04/1981, SpO2 96 %.    Pt discharged from PACU based on kaykay score >8      "

## 2020-03-16 NOTE — ANESTHESIA PREPROCEDURE EVALUATION
Anesthesia Evaluation     history of anesthetic complications: PONV  NPO Solid Status: > 8 hours  NPO Liquid Status: > 2 hours           Airway   Mallampati: II  TM distance: >3 FB  Neck ROM: full  Dental          Pulmonary - normal exam    breath sounds clear to auscultation  (+) asthma,  (-) recent URI, sleep apnea, not a smoker  Cardiovascular - normal exam  Exercise tolerance: good (4-7 METS)    Rhythm: regular  Rate: normal    (+) hypertension, hyperlipidemia,   (-) pacemaker, past MI, angina, cardiac stents, CABG      Neuro/Psych  (+) psychiatric history Anxiety,     (-) seizures, TIA, CVA  GI/Hepatic/Renal/Endo    (+) obesity,  GERD,  liver disease fatty liver disease, thyroid problem hypothyroidism  (-) no renal disease, diabetes    Musculoskeletal     Abdominal    Substance History      OB/GYN          Other                        Anesthesia Plan    ASA 3     MAC     intravenous induction     Anesthetic plan, all risks, benefits, and alternatives have been provided, discussed and informed consent has been obtained with: patient.

## 2020-04-01 ENCOUNTER — TELEPHONE (OUTPATIENT)
Dept: FAMILY MEDICINE CLINIC | Facility: CLINIC | Age: 70
End: 2020-04-01

## 2020-04-01 RX ORDER — VALSARTAN 320 MG/1
320 TABLET ORAL DAILY
Qty: 90 TABLET | Refills: 1 | Status: SHIPPED | OUTPATIENT
Start: 2020-04-01 | End: 2020-10-06

## 2020-04-01 NOTE — TELEPHONE ENCOUNTER
Hannah called & said that she has had an earache and sinus pressure for over a week.  She said that she started taking amoxicillin 500mg 6 days ago from a dose that she accidentally a double of.  She says that she has another 4 days.  She wants to know if there is anything else that she could take with it.   She denies any fever.

## 2020-04-02 RX ORDER — METHYLPREDNISOLONE 4 MG/1
TABLET ORAL
Qty: 21 TABLET | Refills: 0 | Status: SHIPPED | OUTPATIENT
Start: 2020-04-02 | End: 2020-04-13

## 2020-04-13 ENCOUNTER — OFFICE VISIT (OUTPATIENT)
Dept: FAMILY MEDICINE CLINIC | Facility: CLINIC | Age: 70
End: 2020-04-13

## 2020-04-13 VITALS — WEIGHT: 207 LBS | HEIGHT: 65 IN | TEMPERATURE: 97.7 F | BODY MASS INDEX: 34.49 KG/M2

## 2020-04-13 DIAGNOSIS — R06.00 DYSPNEA, UNSPECIFIED TYPE: Primary | ICD-10-CM

## 2020-04-13 PROCEDURE — 99213 OFFICE O/P EST LOW 20 MIN: CPT | Performed by: FAMILY MEDICINE

## 2020-04-13 RX ORDER — PREDNISONE 10 MG/1
TABLET ORAL
Qty: 15 TABLET | Refills: 0 | Status: SHIPPED | OUTPATIENT
Start: 2020-04-13 | End: 2020-10-19

## 2020-04-13 NOTE — PROGRESS NOTES
You have chosen to receive care through a telephone visit today. Do you consent to use a telephone visit for your medical care today? Yes---we attempted a video visit  Today but I was unable to see or hear the patient through existing pathway  Subjective   Hannah BARBI Maki is a 69 y.o. female.     Chief Complaint   Patient presents with   • Follow-up     6 mo   htn   • Earache     pt states that she continues to have ear pain (Left ear)   • Shortness of Breath     with activity.  pt states that when she was on the MDP that her SOB was better    This was an audio and video enabled telemedicine encounter.    History of Present Illness     she thinks her bp is doinbg well without cp or ha---she requests a short trial of prednisone to help her breathing ---denies any chest pain or angina symptoms      Current Outpatient Medications:   •  dicyclomine (BENTYL) 10 MG capsule, Take 1 capsule by mouth 3 (Three) Times a Day As Needed (abdominal discomfort)., Disp: 90 capsule, Rfl: 3  •  esomeprazole (NexIUM) 20 MG capsule, Take 20 mg by mouth Every Morning Before Breakfast., Disp: , Rfl:   •  levothyroxine (SYNTHROID, LEVOTHROID) 100 MCG tablet, TAKE 1 TABLET BY MOUTH ONCE DAILY, Disp: 30 tablet, Rfl: 5  •  meclizine (ANTIVERT) 25 MG tablet, Take 1 tablet by mouth 3 (Three) Times a Day As Needed for Dizziness (vertigo). Take one by mouth three times a day as needed for vertigo, Disp: 30 tablet, Rfl: 3  •  montelukast (SINGULAIR) 10 MG tablet, Take 1 tablet by mouth Every Night., Disp: 30 tablet, Rfl: 11  •  valsartan (Diovan) 320 MG tablet, Take 1 tablet by mouth Daily., Disp: 90 tablet, Rfl: 1  Allergies   Allergen Reactions   • Ceftin [Cefuroxime Axetil] Other (See Comments)     Pt does not recall   • Augmentin [Amoxicillin-Pot Clavulanate] Rash   • Azithromycin Nausea Only   • Bactrim [Sulfamethoxazole-Trimethoprim] Nausea And Vomiting   • Cefuroxime Nausea Only   • Codeine GI Intolerance   • Demerol [Meperidine] Nausea And  Vomiting   • Erythromycin Rash   • Latex Other (See Comments)     Patient says it pulls her skin off.   • Tequin [Gatifloxacin] Nausea Only   • Tetracyclines & Related Nausea And Vomiting       Past Medical History:   Diagnosis Date   • Abnormal liver enzymes    • Achilles bursitis    • Anxiety    • Arthralgia    • Arthritis    • Asthma    • Chronic pansinusitis 10/14/2016   • Colon polyp    • Diverticulosis    • Dysuria    • Fatty liver    • GERD (gastroesophageal reflux disease)    • Hyperlipidemia    • Hypertension    • Hyperthyroidism    • Non-cardiac chest pain    • Palpitations    • Perennial allergic rhinitis 10/14/2016   • Pneumonia    • PONV (postoperative nausea and vomiting)    • Rhinitis    • Tinnitus    • Trigeminal neuralgia    • Vertigo      Past Surgical History:   Procedure Laterality Date   • APPENDECTOMY     • CATARACT EXTRACTION WITH INTRAOCULAR LENS IMPLANT     • CHOLECYSTECTOMY     • COLONOSCOPY  11/18/2013     six polyps removed or destroyed, Diverticulosis  Recall 3 years   • COLONOSCOPY  11/18/2013   • COLONOSCOPY N/A 4/4/2017    Procedure: COLONOSCOPY WITH ANESTHESIA;  Surgeon: Lew Orona MD;  Location: [facility] ENDOSCOPY;  Service:    • COLONOSCOPY N/A 3/16/2020    Procedure: COLONOSCOPY WITH ANESTHESIA;  Surgeon: Lew Orona MD;  Location: [facility] ENDOSCOPY;  Service: Gastroenterology;  Laterality: N/A;  pre op: hx polyps  Post op:polyps  PCP: Deni Henry MD   • EYE SURGERY  Nov 2018    Cataract Removal   • HEMORRHOIDECTOMY     • HYSTERECTOMY     • NECK SURGERY     • NOSE SURGERY      nose bleeding artery    • OTHER SURGICAL HISTORY      ingrown toe nail   • RECTAL FISSURE INCISION AND DRAINAGE         Review of Systems   Constitutional: Negative.    HENT: Negative.    Eyes: Negative.    Respiratory: Positive for shortness of breath.    Cardiovascular: Negative.    Gastrointestinal: Negative.    Endocrine: Negative.    Genitourinary: Negative.    Musculoskeletal:  "Negative.    Skin: Negative.    Allergic/Immunologic: Negative.    Neurological: Negative.    Hematological: Negative.    Psychiatric/Behavioral: Negative.    Unable to complete visit using a video connection to the patient. A phone visit was used to complete this visits. Total time of discussion was 17 minutes.    Objective  Temp 97.7 °F (36.5 °C)   Ht 165.1 cm (65\")   Wt 93.9 kg (207 lb)   LMP 10/04/1981   BMI 34.45 kg/m²   Physical Exam    Assessment/Plan   Hannah was seen today for follow-up, earache and shortness of breath.    Diagnoses and all orders for this visit:    Dyspnea, unspecified type    Other orders  -     predniSONE (DELTASONE) 10 MG tablet; 20mg daily x 3 days then 10mg daily #7      She will let me know if not better in a few days(ear pain)           No orders of the defined types were placed in this encounter.      Follow up: 3 month(s)  "

## 2020-04-16 ENCOUNTER — TELEMEDICINE (OUTPATIENT)
Dept: OTOLARYNGOLOGY | Facility: CLINIC | Age: 70
End: 2020-04-16

## 2020-04-16 DIAGNOSIS — J45.20 MILD INTERMITTENT ASTHMA WITHOUT COMPLICATION: ICD-10-CM

## 2020-04-16 DIAGNOSIS — J32.4 CHRONIC PANSINUSITIS: Primary | ICD-10-CM

## 2020-04-16 DIAGNOSIS — H90.3 SENSORINEURAL HEARING LOSS (SNHL) OF BOTH EARS: ICD-10-CM

## 2020-04-16 DIAGNOSIS — J30.89 PERENNIAL ALLERGIC RHINITIS: ICD-10-CM

## 2020-04-16 DIAGNOSIS — H93.13 TINNITUS OF BOTH EARS: ICD-10-CM

## 2020-04-16 PROCEDURE — 99214 OFFICE O/P EST MOD 30 MIN: CPT | Performed by: PHYSICIAN ASSISTANT

## 2020-04-16 RX ORDER — FLUCONAZOLE 150 MG/1
150 TABLET ORAL ONCE
Qty: 1 TABLET | Refills: 1 | Status: SHIPPED | OUTPATIENT
Start: 2020-04-16 | End: 2020-04-16

## 2020-04-16 RX ORDER — CLINDAMYCIN HYDROCHLORIDE 300 MG/1
300 CAPSULE ORAL 3 TIMES DAILY
Qty: 42 CAPSULE | Refills: 0 | Status: SHIPPED | OUTPATIENT
Start: 2020-04-16 | End: 2020-04-30

## 2020-04-16 RX ORDER — CLINDAMYCIN HYDROCHLORIDE 300 MG/1
300 CAPSULE ORAL 3 TIMES DAILY
Qty: 30 CAPSULE | Refills: 0 | Status: SHIPPED | OUTPATIENT
Start: 2020-04-16 | End: 2020-04-16 | Stop reason: SDUPTHER

## 2020-04-16 NOTE — PROGRESS NOTES
KAMLESH Duncan     FOLLOW UP VIDEO VISIT   1. This service was provided via Telemedicine.   2. TeleMed provider: KAMLESH Duncan   3. Provider location: [facility]  4. Additional parties in room with patient during tele consult: none  5. After connecting through audio and visual connection, the patient was identified by name and date of birth. We discussed that this was a Telemedicine visit and that the visit was being conducted confidentially over secure lines. Consent and understanding of privacy and security of the Telemedicine visit was demonstrated. I have reviewed the medical record in [employer] and presented the opportunity for them to ask any questions regarding the visit today. The advantages and limitations of video visits were discussed and understood. Consent was given to participate.     Chief Complaint   Patient presents with   • Earache   • Sinus Problem        HPI  Hannah Maki is a 69 y.o. female who presents for a video follow up visit. She has had a recent flair up of otalgia, nasal congestion, sinusitis and sinus pressure. The symptoms are localized to the left ear, nose and maxillary sinus. The symptoms severity was described as: moderate The symptoms have been: relatively constant for the last several weeks The symptoms are aggravated by  allergy and weather change. The symptoms are improved by no identifiable factors. She denies  fever.    The patient was treated with a ten day round of Amoxil and a medrol pack without improvement of symptoms about a month ago and is currently on a prednisone taper with continued symptoms.    The patient was seen for a parotid mass on the left side with a scan in 2017, this has not changed since 2015.    Hearing is stable.    Study Result     Examination: CT neck and nasopharynx with and without contrast dated  09/27/2017.     Indication: Left parotid mass      Comparison: CT neck dated 9/50/15       Technique: Volumetric data was acquired from the level of the ventricles  to the level of the aortic arch following the intravenous injection of  contrast and reconstructed at 3 mm intervals in the axial, coronal and  sagittal plane.            Findings:  A 9 mm enhancing nodule in the superficial lobe of the left parotid lobe  is unchanged in appearance since 09/15/2015. This enhancing nodule is  immediately deep to the radiopaque marker.     Pharyngeal mucosal space of the nasopharynx, oropharynx and hypopharynx  is unremarkable without evidence of fluid collection, abscess, discrete  or enhancing mass.   Supraglottic, glottic and subglottic larynx is unremarkable.  Parapharyngeal spaces, carotid,  , submandibular and  perivertebral spaces are unremarkable bilaterally.     No evidence of pathologically enlarged lymph nodes in the visualized  cervical levels. Scattered non pathologically enlarged lymph nodes are  noted bilaterally      Visualized lung apices are unremarkable bilaterally.     Osseous structures show no suspicious lytic or blastic lesion.  Multilevel degenerative changes with disk osteophyte complexes, facet  arthrosis, uncovertebral arthrosis, without significant spinal canal or  foraminal stenosis.        Visualized intracranial contents, orbits, paranasal sinuses and nasal  cavity, remaining nasopharynx, oropharynx and oral cavity, hypopharynx  and larynx, thyroid and salivary glands are otherwise unremarkable.   Normal contrast opacification in the vessels of the neck.     IMPRESSION:  Impression:     9 mm enhancing nodule in the left parotid lobe, stable since the  09/15/2015.  Differential considerations includes primary parotid neoplasm including  BMT and lymph node         This report was finalized on 09/27/2017 13:32 by Dr. Cynthia Tobias MD.       Review of Systems   Constitutional: Negative for activity change, appetite change, chills, diaphoresis, fatigue, fever and  unexpected weight change.   HENT: Positive for congestion, ear pain and sinus pressure. Negative for dental problem, drooling, ear discharge, facial swelling, hearing loss, mouth sores, nosebleeds, postnasal drip, rhinorrhea, sneezing, sore throat, tinnitus, trouble swallowing and voice change.    Eyes: Negative.    Respiratory: Negative.    Cardiovascular: Negative.    Gastrointestinal: Negative.    Endocrine: Negative.    Skin: Negative.    Allergic/Immunologic: Positive for environmental allergies. Negative for food allergies and immunocompromised state.   Neurological: Negative.    Hematological: Negative.    Psychiatric/Behavioral: Negative.         Past History: {SnapShot  Notes  Encounters  Media :23}  Past medical and surgical history, family history and social history reviewed and updated when appropriate.  Current medications and allergies reviewed and updated when appropriate.  Allergies:  Ceftin [cefuroxime axetil]; Augmentin [amoxicillin-pot clavulanate]; Azithromycin; Bactrim [sulfamethoxazole-trimethoprim]; Cefuroxime; Codeine; Demerol [meperidine]; Erythromycin; Latex; Tequin [gatifloxacin]; and Tetracyclines & related          Physical Exam   CONSTITUTIONAL: well nourished, well-developed, alert, oriented, in no acute distress  COMMUNICATION AND VOICE: able to communicate normally, normal voice quality   HEAD: normocephalic, no lesions, atraumatic, no masses  FACE: appearance normal, no lesions, no deformities, facial motion symmetric  EYES: ocular motility normal, eyelids normal, orbits normal, no proptosis, conjunctiva normal, pupils equal, round  HEARING: response to conversational voice normal bilaterally  EXTERNAL EARS: auricles without lesions  EXTERNAL NOSE: structure normal, no nasal discharge, no lesions, no evidence of trauma, nostrils patent  LIPS: structure normal, no lesions, no evidence of trauma  NECK: neck appearance normal, no masses  LYMPH NODES: no  lymphadenopathy  CHEST/RESPIRATORY: respiratory effort normal, no work of breathing, no audible wheezes or stridor  CARDIOVASCULAR: no visual jugulovenous distension present  NEUROLOGIC/PSYCHIATRIC: oriented appropriately for age, mood normal, affect appropriate, cranial nerves intact grossly unless specifically mentioned above     RESULTS REVIEW:    I have reviewed the patients old records in the chart.       Assessment/Plan    Assessment:   1. Chronic pansinusitis    2. Perennial allergic rhinitis    3. Mild intermittent asthma without complication    4. Gastroesophageal reflux disease without esophagitis    5. Acquired hypothyroidism    6. Sensorineural hearing loss (SNHL) of both ears    7. Tinnitus of both ears    8. Post-nasal drip        Plan:      Will start Clindamycin and Diflucan, advised to use warm compresses on the left sinus/ear, continue Prednisone taper. If symptoms do not improve, worsen, or return after treatment call for sooner appointment, otherwise recheck in six months.    Will continue to monitor left parotid mass for changes on exam during next office visit.       New Medications Ordered This Visit   Medications   • clindamycin (CLEOCIN) 300 MG capsule     Sig: Take 1 capsule by mouth 3 (Three) Times a Day for 14 days.     Dispense:  30 capsule     Refill:  0   • fluconazole (Diflucan) 150 MG tablet     Sig: Take 1 tablet by mouth 1 (One) Time for 1 dose.     Dispense:  1 tablet     Refill:  1          Return in about 6 months (around 10/16/2020) for Recheck sinuses.        KAMLESH Duncan  04/16/20  11:32

## 2020-04-16 NOTE — PATIENT INSTRUCTIONS
Will start Clindamycin and Diflucan, advised to use warm compresses on the left sinus/ear, continue Prednisone taper. If symptoms do not improve, worsen, or return after treatment call for sooner appointment, otherwise recheck in six months.

## 2020-05-12 ENCOUNTER — TELEPHONE (OUTPATIENT)
Dept: FAMILY MEDICINE CLINIC | Facility: CLINIC | Age: 70
End: 2020-05-12

## 2020-05-12 DIAGNOSIS — S89.90XA KNEE INJURY, UNSPECIFIED LATERALITY, INITIAL ENCOUNTER: Primary | ICD-10-CM

## 2020-05-12 NOTE — TELEPHONE ENCOUNTER
Hannah called and said that she was working in her garden and squatted down and felt a pop.  She says that she has pain behind the right knee and the whole knee is swollen.  She is req to have some kind of imaging (MRI/XRAY) to see if she has torn something.

## 2020-05-18 ENCOUNTER — TELEPHONE (OUTPATIENT)
Dept: FAMILY MEDICINE CLINIC | Facility: CLINIC | Age: 70
End: 2020-05-18

## 2020-05-18 ENCOUNTER — HOSPITAL ENCOUNTER (OUTPATIENT)
Dept: MRI IMAGING | Facility: HOSPITAL | Age: 70
Discharge: HOME | End: 2020-05-18
Admitting: FAMILY MEDICINE

## 2020-05-18 DIAGNOSIS — S89.90XA KNEE INJURY, UNSPECIFIED LATERALITY, INITIAL ENCOUNTER: ICD-10-CM

## 2020-05-18 DIAGNOSIS — M25.569 ACUTE KNEE PAIN, UNSPECIFIED LATERALITY: Primary | ICD-10-CM

## 2020-05-18 PROCEDURE — 73721 MRI JNT OF LWR EXTRE W/O DYE: CPT

## 2020-05-18 NOTE — TELEPHONE ENCOUNTER
----- Message from Yasemin Day MA sent at 5/18/2020 12:17 PM CDT -----  Notified gregorio of imaging results.  She says that she would like to see Cholo or Reza at [facility].      [facility]--I put in the referral to dr germain---will u let her know how referrals work?--thanks

## 2020-06-17 ENCOUNTER — OFFICE VISIT (OUTPATIENT)
Dept: SURGERY | Age: 70
End: 2020-06-17
Payer: MEDICARE

## 2020-06-17 ENCOUNTER — HOSPITAL ENCOUNTER (OUTPATIENT)
Dept: WOMENS IMAGING | Age: 70
Discharge: HOME | End: 2020-06-17
Payer: MEDICARE

## 2020-06-17 VITALS
OXYGEN SATURATION: 98 % | HEIGHT: 65 IN | TEMPERATURE: 97.7 F | HEART RATE: 97 BPM | WEIGHT: 211.8 LBS | BODY MASS INDEX: 35.29 KG/M2

## 2020-06-17 PROCEDURE — 77063 BREAST TOMOSYNTHESIS BI: CPT

## 2020-06-17 PROCEDURE — G8417 CALC BMI ABV UP PARAM F/U: HCPCS | Performed by: PHYSICIAN ASSISTANT

## 2020-06-17 PROCEDURE — 1036F TOBACCO NON-USER: CPT | Performed by: PHYSICIAN ASSISTANT

## 2020-06-17 PROCEDURE — 1123F ACP DISCUSS/DSCN MKR DOCD: CPT | Performed by: PHYSICIAN ASSISTANT

## 2020-06-17 PROCEDURE — 3017F COLORECTAL CA SCREEN DOC REV: CPT | Performed by: PHYSICIAN ASSISTANT

## 2020-06-17 PROCEDURE — 1090F PRES/ABSN URINE INCON ASSESS: CPT | Performed by: PHYSICIAN ASSISTANT

## 2020-06-17 PROCEDURE — 4040F PNEUMOC VAC/ADMIN/RCVD: CPT | Performed by: PHYSICIAN ASSISTANT

## 2020-06-17 PROCEDURE — G8427 DOCREV CUR MEDS BY ELIG CLIN: HCPCS | Performed by: PHYSICIAN ASSISTANT

## 2020-06-17 PROCEDURE — 99213 OFFICE O/P EST LOW 20 MIN: CPT | Performed by: PHYSICIAN ASSISTANT

## 2020-06-17 PROCEDURE — G8400 PT W/DXA NO RESULTS DOC: HCPCS | Performed by: PHYSICIAN ASSISTANT

## 2020-06-17 RX ORDER — DICYCLOMINE HYDROCHLORIDE 10 MG/1
10 CAPSULE ORAL 3 TIMES DAILY PRN
COMMUNITY
Start: 2019-08-05 | End: 2022-07-05

## 2020-06-17 RX ORDER — VALSARTAN 320 MG/1
TABLET ORAL
COMMUNITY
Start: 2020-04-02

## 2020-06-17 RX ORDER — MONTELUKAST SODIUM 10 MG/1
10 TABLET ORAL NIGHTLY
COMMUNITY
Start: 2019-11-18 | End: 2020-06-17

## 2020-07-02 ENCOUNTER — OFFICE VISIT (OUTPATIENT)
Dept: GASTROENTEROLOGY | Facility: CLINIC | Age: 70
End: 2020-07-02

## 2020-07-02 VITALS
TEMPERATURE: 97.6 F | OXYGEN SATURATION: 99 % | SYSTOLIC BLOOD PRESSURE: 138 MMHG | DIASTOLIC BLOOD PRESSURE: 80 MMHG | WEIGHT: 213 LBS | HEIGHT: 65 IN | HEART RATE: 82 BPM | BODY MASS INDEX: 35.49 KG/M2

## 2020-07-02 DIAGNOSIS — Z80.0 FAMILY HX OF COLON CANCER: ICD-10-CM

## 2020-07-02 DIAGNOSIS — K21.9 GASTROESOPHAGEAL REFLUX DISEASE WITHOUT ESOPHAGITIS: Primary | ICD-10-CM

## 2020-07-02 DIAGNOSIS — K76.0 FATTY LIVER: ICD-10-CM

## 2020-07-02 DIAGNOSIS — Z86.010 HISTORY OF ADENOMATOUS POLYP OF COLON: ICD-10-CM

## 2020-07-02 PROCEDURE — 99213 OFFICE O/P EST LOW 20 MIN: CPT | Performed by: INTERNAL MEDICINE

## 2020-07-02 RX ORDER — LEVOTHYROXINE SODIUM 0.1 MG/1
100 TABLET ORAL DAILY
COMMUNITY
End: 2020-07-13 | Stop reason: SDUPTHER

## 2020-07-02 NOTE — PROGRESS NOTES
[facility]    Chief Complaint   Patient presents with   • GI Problem     steatosis of liver       Subjective     HPI    Hannah Maki is a 69 y.o. female who presents with a chief complaint of fatty liver.    She presents for follow-up of her liver.  She tells me she is not been able to lose weight.  She injured her knee gardening and since then she has not been able to exercise or walk much.  She states she did get a steroid injection in her knee that did not help.  She did take meloxicam and that helped take care of the swelling.  She tolerated it well but only took it for 2 weeks.  She was concerned about the side effects of NSAIDs with her knee and liver.  She had a colonoscopy in March and went over that report with her.  Everything else is going well.      -=================== March 2020 HPI=======================  HPI     Hannah Maki is a 69 y.o. female who presents as a referral for preventative maintenance. She has no complaints of nausea or vomiting. No change in bowels. No wt loss. No BRBPR. No melena. No abdominal pain.          Last colonoscopy was 4/2017 noting 2 polyps ( one retrieved with path hyperplastic) and diverticulosis. The patient does have history of colon polyps. The patient does not have history of colon cancer.  There is family history of colon cancer sister in her 50's, another sister in her 50's.     She also history of fatty liver.  Was seen here last year .  Ultrasound elastography score was F1.  Last labs October 2019 and LFTs had improved from previous.  She did lose a little weight but has gained some back.  Does not drink alcohol.  She is not diabetic.        ====================================================================================  Ov  8-5-19  Hannah Maki is a 68 y.o. female who presents with a chief complaint of abnormal LFTs.     She was here 3 months ago.  We felt that she had fatty liver.  She underwent liver serologies which all came  back unremarkable.  She had an ultrasound of her liver which did show steatosis.  She had F1 fibrosis.  I advised her to lose 5 to 10 pounds.  She comes in today 6 pounds lighter.     She tells me she has been feeling well.  She does tell me that she has some chronic intermittent abdominal cramping and diarrhea.  She states her son is gluten intolerant.  She thinks he has celiac disease.  She herself is gone on a gluten-free diet and she is felt much better since going on gluten-free diet.  She is never had testing done before.  She has been on a gluten-free diet for about a year.  She does note that when she eats gluten she will have some cramps for about a week.  She states she ate Chinese food and Mexican food this weekend which was supposedly gluten-free.  But she has had some cramps and diarrhea since then.  The diarrhea is cleared except for after she eats she will just have a little bit of loose stools.  It has subsided over the last 3 days.  She was noted to have a little bit of an elevated temperature to 99 degrees at her visit today.  She denies any other symptoms other than some chronic sinus drainage which she attributes to allergies.  She denies dysuria or shortness of breath.     She also tells me she has heartburn.  She is on over-the-counter Nexium daily.  She will have breakthrough symptoms once in a while.  Is not every day.  She asked what she could take for breakthrough symptoms.       Past Medical History:   Diagnosis Date   • Abnormal liver enzymes    • Achilles bursitis    • Anxiety    • Arthralgia    • Arthritis    • Asthma    • Chronic pansinusitis 10/14/2016   • Colon polyp    • Diverticulosis    • Dysuria    • Fatty liver    • GERD (gastroesophageal reflux disease)    • Hyperlipidemia    • Hypertension    • Hyperthyroidism    • Non-cardiac chest pain    • Palpitations    • Perennial allergic rhinitis 10/14/2016   • Pneumonia    • PONV (postoperative nausea and vomiting)    • Rhinitis    •  Tinnitus    • Trigeminal neuralgia    • Vertigo        Past Surgical History:   Procedure Laterality Date   • APPENDECTOMY     • CATARACT EXTRACTION WITH INTRAOCULAR LENS IMPLANT     • CHOLECYSTECTOMY     • COLONOSCOPY  11/18/2013     six polyps removed or destroyed, Diverticulosis  Recall 3 years   • COLONOSCOPY  11/18/2013   • COLONOSCOPY N/A 4/4/2017    Procedure: COLONOSCOPY WITH ANESTHESIA;  Surgeon: Lew Orona MD;  Location:  [facility];  Service:    • COLONOSCOPY N/A 3/16/2020    Procedure: COLONOSCOPY WITH ANESTHESIA;  Surgeon: Lew Orona MD;  Location: [facility] END[facility]COPY;  Service: Gastroenterology;  Laterality: N/A;  pre op: hx polyps  Post op:polyps  PCP: Deni Henry MD   • EYE SURGERY  Nov 2018    Cataract Removal   • HEMORRHOIDECTOMY     • HYSTERECTOMY     • NECK SURGERY     • NOSE SURGERY      nose bleeding artery    • OTHER SURGICAL HISTORY      ingrown toe nail   • RECTAL FISSURE INCISION AND DRAINAGE           Current Outpatient Medications:   •  dicyclomine (BENTYL) 10 MG capsule, Take 1 capsule by mouth 3 (Three) Times a Day As Needed (abdominal discomfort)., Disp: 90 capsule, Rfl: 3  •  esomeprazole (NexIUM) 20 MG capsule, Take 20 mg by mouth Every Morning Before Breakfast., Disp: , Rfl:   •  levothyroxine (SYNTHROID, LEVOTHROID) 100 MCG tablet, Take 100 mcg by mouth Daily., Disp: , Rfl:   •  meclizine (ANTIVERT) 25 MG tablet, Take 1 tablet by mouth 3 (Three) Times a Day As Needed for Dizziness (vertigo). Take one by mouth three times a day as needed for vertigo, Disp: 30 tablet, Rfl: 3  •  montelukast (SINGULAIR) 10 MG tablet, Take 1 tablet by mouth Every Night., Disp: 30 tablet, Rfl: 11  •  valsartan (Diovan) 320 MG tablet, Take 1 tablet by mouth Daily., Disp: 90 tablet, Rfl: 1  •  levothyroxine (SYNTHROID, LEVOTHROID) 100 MCG tablet, TAKE 1 TABLET BY MOUTH ONCE DAILY, Disp: 30 tablet, Rfl: 5  •  predniSONE (DELTASONE) 10 MG tablet, 20mg daily x 3 days then 10mg  daily #7, Disp: 15 tablet, Rfl: 0    Allergies   Allergen Reactions   • Ceftin [Cefuroxime Axetil] Other (See Comments)     Pt does not recall   • Augmentin [Amoxicillin-Pot Clavulanate] Rash   • Azithromycin Nausea Only   • Bactrim [Sulfamethoxazole-Trimethoprim] Nausea And Vomiting   • Cefuroxime Nausea Only   • Codeine GI Intolerance   • Demerol [Meperidine] Nausea And Vomiting   • Erythromycin Rash   • Latex Other (See Comments)     Patient says it pulls her skin off.   • Tequin [Gatifloxacin] Nausea Only   • Tetracyclines & Related Nausea And Vomiting       Social History     Socioeconomic History   • Marital status:      Spouse name: Not on file   • Number of children: Not on file   • Years of education: Not on file   • Highest education level: Not on file   Occupational History   • Occupation: retired   Tobacco Use   • Smoking status: Never Smoker   • Smokeless tobacco: Never Used   Substance and Sexual Activity   • Alcohol use: Never   • Drug use: Never   • Sexual activity: Never       Family History   Problem Relation Age of Onset   • Diabetes Brother    • Heart disease Brother    • Colon cancer Sister 57   • Cancer Sister    • Colon cancer Sister 55   • Cancer Sister    • Arthritis Mother    • Heart disease Father    • Heart attack Father        Review of Systems  General no fever chills or sweats weight stable  Gastrointestinal: Not present-abdominal pain, constipation, diarrhea, dysphagia, hematemesis, melena, odynophagia, nausea, vomiting, pyrosis, regurgitation, hematochezia,    Objective     Vitals:    07/02/20 1315   BP: 138/80   Pulse: 82   Temp: 97.6 °F (36.4 °C)   SpO2: 99%       Physical Exam   Constitutional: She appears well-developed and well-nourished.   Abdominal: Soft. She exhibits no distension.   Psychiatric: She has a normal mood and affect. Thought content normal.   Vitals reviewed.            Assessment/Plan   Problem List Items Addressed This Visit        Digestive     Gastroesophageal reflux disease without esophagitis - Primary    Fatty liver    Overview     U/s 5/2019 F1 fibrosis.             Other    Family hx of colon cancer    Overview     2 sisters both in their 50s         History of adenomatous polyp of colon    Overview     Colonoscopy March 2020 revealed 2 diminutive polyps destroyed.  Surveillance colonoscopy recommended approximately March 2023 based on history of adenomatous polyps and strong family history of colon cancer.                 Regarding her fatty liver we had a long discussion.  We discussed about side effects of NSAIDs.  We talked about the risk of developing ulcers.  We talked about renal risk.  We talked about liver risk.  I told her that ideally I would recommend that she not take any NSAIDs.  But in a practical world, it may be better if she took the NSAIDs and exercise than not taking and not being active and continued to gain weight.  Weight gain would be more detrimental to her liver than an NSAID based on probability.  Tylenol would be preferred up to 2 g daily instead of NSAIDs.   At this point, from a GI perspective I do recommend weight loss and increase activity.  She is going to continue avoid NSAIDs and work on those factors.    She did ask about cholesterol medications with a history of fatty liver and elevated LFTs.  I did advise that it would be reasonable for her to take lipid-lowering medicine such as a statin if her primary care provider felt they were indicated and as long as her LFTs are monitored and do not significantly increase on the statins.  With underlying fatty liver most individuals also are at increased for cardiovascular disease.  The benefit of the statins and lowering risk for cardiovascular disease can outweigh the risk of harm to liver as long as the liver LFTs are monitored and do not significantly increase more than 2 times to 3 times baseline.  Ideally, again, to treat the lipids and fatty liver is weight loss and  exercise.    She needs to get labs by her PCP sometime this month.  I did ask her when she gets the labs to contact our office so we can look at those.  Otherwise I will see her back in the office in 1 year, sooner if needed    Continue ongoing management by primary care provider and other specialists.     Patient's Body mass index is 35.45 kg/m². BMI is above normal parameters. Recommendations include: exercise counseling and nutrition counseling.        EMR Dragon/transcription disclaimer:  Much of this encounter note is electronic transcription/translation of spoken language to printed text.  The electronic translation of spoken language may be erroneous, or at times, nonsensical words or phrases may be inadvertently transcribed.  Although I have reviewed the note for such errors, some may still exist.    Lew Orona MD  16:21  07/02/20

## 2020-07-13 ENCOUNTER — OFFICE VISIT (OUTPATIENT)
Dept: FAMILY MEDICINE CLINIC | Facility: CLINIC | Age: 70
End: 2020-07-13

## 2020-07-13 VITALS
WEIGHT: 212 LBS | SYSTOLIC BLOOD PRESSURE: 142 MMHG | HEART RATE: 98 BPM | TEMPERATURE: 97.8 F | DIASTOLIC BLOOD PRESSURE: 88 MMHG | OXYGEN SATURATION: 99 % | BODY MASS INDEX: 35.32 KG/M2 | RESPIRATION RATE: 16 BRPM | HEIGHT: 65 IN

## 2020-07-13 DIAGNOSIS — E03.9 ACQUIRED HYPOTHYROIDISM: ICD-10-CM

## 2020-07-13 DIAGNOSIS — E74.89 OTHER SPECIFIED DISORDERS OF CARBOHYDRATE METABOLISM (HCC): ICD-10-CM

## 2020-07-13 DIAGNOSIS — E78.2 MIXED HYPERLIPIDEMIA: Primary | ICD-10-CM

## 2020-07-13 DIAGNOSIS — E66.01 MORBIDLY OBESE (HCC): ICD-10-CM

## 2020-07-13 DIAGNOSIS — K21.9 GASTROESOPHAGEAL REFLUX DISEASE WITHOUT ESOPHAGITIS: ICD-10-CM

## 2020-07-13 DIAGNOSIS — I10 ESSENTIAL HYPERTENSION: ICD-10-CM

## 2020-07-13 PROCEDURE — G0439 PPPS, SUBSEQ VISIT: HCPCS | Performed by: FAMILY MEDICINE

## 2020-07-13 PROCEDURE — 99214 OFFICE O/P EST MOD 30 MIN: CPT | Performed by: FAMILY MEDICINE

## 2020-07-13 RX ORDER — MELOXICAM 15 MG/1
15 TABLET ORAL DAILY
COMMUNITY
Start: 2020-06-23 | End: 2022-10-17

## 2020-07-13 RX ORDER — LEVOTHYROXINE SODIUM 0.1 MG/1
100 TABLET ORAL DAILY
Qty: 90 TABLET | Refills: 1 | Status: SHIPPED | OUTPATIENT
Start: 2020-07-13 | End: 2021-01-05

## 2020-07-13 NOTE — PROGRESS NOTES
Sena Cornell today for her follow-up breast exam.  She has no new breast complaints. She is had no new palpable masses. There is no skin or nipple changes. There is no nipple discharge. She has no appreciable evidence of supraclavicular or axillary adenopathy. Patient Active Problem List    Diagnosis Date Noted    Varicose veins of leg with pain 06/07/2019    Chronic venous insufficiency 06/05/2019    Varicose veins with pain 05/01/2019       Current Outpatient Medications   Medication Sig Dispense Refill    dicyclomine (BENTYL) 10 MG capsule Take 10 mg by mouth 3 times daily as needed      valsartan (DIOVAN) 320 MG tablet TAKE 1 TABLET BY MOUTH ONCE DAILY      irbesartan (AVAPRO) 300 MG tablet       esomeprazole (NEXIUM) 20 MG delayed release capsule Take 20 mg by mouth      levothyroxine (SYNTHROID) 100 MCG tablet Take 100 mcg by mouth      amoxicillin (AMOXIL) 500 MG capsule 500 mg 3 times daily Indications: x 10 days       Coenzyme Q10 100 MG CHEW Take 100 mg by mouth      meclizine (ANTIVERT) 25 MG tablet Take 25 mg by mouth      pravastatin (PRAVACHOL) 20 MG tablet       azelastine (ASTELIN) 137 MCG/SPRAY nasal spray 1 spray by Nasal route 2 times daily. Use in each nostril as directed      fluticasone (VERAMYST) 27.5 MCG/SPRAY nasal spray 2 sprays by Nasal route daily. No current facility-administered medications for this visit. Allergies: Latex; Augmentin [amoxicillin-pot clavulanate]; Bactrim [sulfamethoxazole-trimethoprim]; Ceftin [cefuroxime]; Codeine; Demerol hcl [meperidine]; Erythromycin; Tequin [gatifloxacin];  Tetracyclines & related; and Zithromax [azithromycin]    Past Medical History:   Diagnosis Date    Asthma     Fatty liver     GERD (gastroesophageal reflux disease)     Hyperlipidemia     Hypertension     Hypothyroidism     Osteoarthritis     Pre-diabetes     Seasonal allergic rhinitis     Sinus problem     Thyroid disease     Wears glasses Past Surgical History:   Procedure Laterality Date    ARTERY SURGERY      ligation- due to nose bleed    BREAST BIOPSY  2014    LEft    CHOLECYSTECTOMY      HEMORRHOID SURGERY      HYSTERECTOMY      partial-age 36    NECK SURGERY      fusion of disc    OVARY REMOVAL      both       Family History   Problem Relation Age of Onset    Diabetes Mother     Heart Disease Father     Colon Cancer Sister     Cancer Sister         Pancreatic    Diabetes Brother        Social History     Tobacco Use    Smoking status: Never Smoker    Smokeless tobacco: Never Used   Substance Use Topics    Alcohol use: No      Mammogram:  No mammographic evidence of malignancy. Recommendation is for the    patient to return for routine mammography in one year or sooner, if    clinically indicated. BI-RADS CATEGORY 2: BENIGN FINDINGS        ROS:  review of system reviewed and positive for the above all other systems noted to be negative      PHYSICAL EXAM:  Pulse 97, temperature 97.7 °F (36.5 °C), temperature source Temporal, height 5' 5\" (1.651 m), weight 211 lb 12.8 oz (96.1 kg), SpO2 98 %. Constitutional:  This is a 81 y. o.female that appears to be in no acute distress. She is pleasant and answers questions appropriately. Breast:  In examination to her breast, patient has no dominant palpable masses. She has fibrocystic changes throughout both breast.  There is no appreciable skin dimpling. There is no axillary adenopathy or supraclavicular adenopathy appreciable.       Assessment:  Benign fibrocystic changes    PLAN  We will see her back next year for yearly exam and mammography        15 minutes was spent during this exam with face-to-face counseling, review of data and physical exam

## 2020-07-13 NOTE — PROGRESS NOTES
The ABCs of the Annual Wellness Visit  Subsequent Medicare Wellness Visit    Chief Complaint   Patient presents with   • Medicare Wellness-subsequent       Subjective   History of Present Illness:  Hannah Maki is a 69 y.o. female who presents for a Subsequent Medicare Wellness Visit.    HEALTH RISK ASSESSMENT    Recent Hospitalizations:  No hospitalization(s) within the last year.    Current Medical Providers:  Patient Care Team:  Deni Henry MD as PCP - General  Carl, Deni Corcoran MD as PCP - Family Medicine  Deni Henry MD as PCP - Claims Attributed  Aiden Cho PA as Physician Assistant (Otolaryngology)  Sesar Dacosta MD as Consulting Physician (Otolaryngology)  Deni Henry MD as Referring Physician (Family Medicine)  Asael Suazo MD as Cardiologist (Cardiology)  Lew Orona MD as Consulting Physician (Gastroenterology)  Lew Orona MD as Consulting Physician (Gastroenterology)    Smoking Status:  Social History     Tobacco Use   Smoking Status Never Smoker   Smokeless Tobacco Never Used       Alcohol Consumption:  Social History     Substance and Sexual Activity   Alcohol Use Never       Depression Screen:   PHQ-2/PHQ-9 Depression Screening 7/13/2020   Little interest or pleasure in doing things 0   Feeling down, depressed, or hopeless 0   Total Score 0       Fall Risk Screen:  STEADI Fall Risk Assessment was completed, and patient is at LOW risk for falls.Assessment completed on:7/13/2020    Health Habits and Functional and Cognitive Screening:  Functional & Cognitive Status 7/13/2020   Do you have difficulty preparing food and eating? No   Do you have difficulty bathing yourself, getting dressed or grooming yourself? No   Do you have difficulty using the toilet? No   Do you have difficulty moving around from place to place? No   Do you have trouble with steps or getting out of a bed or a chair? No   Current Diet Well Balanced  Diet   Dental Exam Not up to date   Eye Exam Up to date   Exercise (times per week) 3 times per week   Current Exercise Activities Include Swimming   Do you need help using the phone?  No   Are you deaf or do you have serious difficulty hearing?  No   Do you need help with transportation? No   Do you need help shopping? No   Do you need help preparing meals?  No   Do you need help with housework?  No   Do you need help with laundry? No   Do you need help taking your medications? No   Do you need help managing money? No   Do you ever drive or ride in a car without wearing a seat belt? No   Have you felt unusual stress, anger or loneliness in the last month? Yes   Who do you live with? Spouse   If you need help, do you have trouble finding someone available to you? No   Have you been bothered in the last four weeks by sexual problems? No   Do you have difficulty concentrating, remembering or making decisions? No         Does the patient have evidence of cognitive impairment? No    Asprin use counseling:Does not need ASA (and currently is not on it)    Age-appropriate Screening Schedule:  Refer to the list below for future screening recommendations based on patient's age, sex and/or medical conditions. Orders for these recommended tests are listed in the plan section. The patient has been provided with a written plan.    Health Maintenance   Topic Date Due   • TDAP/TD VACCINES (1 - Tdap) 10/22/1963   • ZOSTER VACCINE (1 of 2) 10/22/2002   • LIPID PANEL  04/15/2020   • INFLUENZA VACCINE  08/01/2020   • MAMMOGRAM  06/17/2022   • COLONOSCOPY  03/16/2023          The following portions of the patient's history were reviewed and updated as appropriate: allergies, current medications, past family history, past medical history, past social history, past surgical history and problem list.    Outpatient Medications Prior to Visit   Medication Sig Dispense Refill   • dicyclomine (BENTYL) 10 MG capsule Take 1 capsule by mouth 3  (Three) Times a Day As Needed (abdominal discomfort). 90 capsule 3   • esomeprazole (NexIUM) 20 MG capsule Take 20 mg by mouth Every Morning Before Breakfast.     • levothyroxine (SYNTHROID, LEVOTHROID) 100 MCG tablet Take 100 mcg by mouth Daily.     • meclizine (ANTIVERT) 25 MG tablet Take 1 tablet by mouth 3 (Three) Times a Day As Needed for Dizziness (vertigo). Take one by mouth three times a day as needed for vertigo 30 tablet 3   • montelukast (SINGULAIR) 10 MG tablet Take 1 tablet by mouth Every Night. 30 tablet 11   • valsartan (Diovan) 320 MG tablet Take 1 tablet by mouth Daily. 90 tablet 1   • levothyroxine (SYNTHROID, LEVOTHROID) 100 MCG tablet TAKE 1 TABLET BY MOUTH ONCE DAILY 30 tablet 5   • meloxicam (MOBIC) 15 MG tablet Take 15 mg by mouth Daily.     • predniSONE (DELTASONE) 10 MG tablet 20mg daily x 3 days then 10mg daily #7 15 tablet 0     No facility-administered medications prior to visit.        Patient Active Problem List   Diagnosis   • Mixed hyperlipidemia   • Acquired hypothyroidism   • Mild intermittent asthma   • Gastroesophageal reflux disease without esophagitis   • Fatty liver   • Chronic pansinusitis   • Perennial allergic rhinitis   • Post-nasal drip   • Bronchitis   • Rectal fissure   • Sinusitis   • Parotid mass   • Atypical chest pain   • Equivocal stress test   • Hypertension   • Dyspnea   • Arthralgia of both ankles   • Cervicalgia   • Acute pain of left shoulder   • Adult BMI 35.0-35.9 kg/sq m   • Non-smoker   • Abnormal LFTs   • Colon polyp   • Family history of GI malignancy   • Diarrhea   • Generalized abdominal pain   • Family hx of colon cancer   • Cervical radiculopathy   • Spinal stenosis in cervical region   • BMI 34.0-34.9,adult   • Plantar fasciitis   • Sensorineural hearing loss (SNHL) of both ears   • Tinnitus of both ears   • Acute maxillary sinusitis   • History of adenomatous polyp of colon   • Other specified disorders of carbohydrate metabolism (CMS/HCC)   •  "Morbidly obese (CMS/HCC)       Advanced Care Planning:  ACP discussion was held with the patient during this visit. Patient does not have an advance directive, information provided.    Review of Systems    Compared to one year ago, the patient feels her physical health is the same.  Compared to one year ago, the patient feels her mental health is the same.    Reviewed chart for potential of high risk medication in the elderly: yes  Reviewed chart for potential of harmful drug interactions in the elderly:yes    Objective         Vitals:    07/13/20 0923   BP: 142/88   Pulse: 98   Resp: 16   Temp: 97.8 °F (36.6 °C)   SpO2: 99%   Weight: 96.2 kg (212 lb)   Height: 165.1 cm (65\")   PainSc: 0-No pain       Body mass index is 35.28 kg/m².  Discussed the patient's BMI with her. The BMI is above average; BMI management plan is completed.    Physical Exam          Assessment/Plan   Medicare Risks and Personalized Health Plan  CMS Preventative Services Quick Reference  Advance Directive Discussion    The above risks/problems have been discussed with the patient.  Pertinent information has been shared with the patient in the After Visit Summary.  Follow up plans and orders are seen below in the Assessment/Plan Section.    Diagnoses and all orders for this visit:    1. Mixed hyperlipidemia (Primary)  -     Comprehensive metabolic panel  -     Lipid Panel With / Chol / HDL Ratio  -     Hepatitis C Antibody  -     TSH    2. Other specified disorders of carbohydrate metabolism (CMS/HCC)    3. Morbidly obese (CMS/HCC)    4. Essential hypertension  -     Comprehensive metabolic panel  -     Lipid Panel With / Chol / HDL Ratio  -     Hepatitis C Antibody  -     TSH    5. Gastroesophageal reflux disease without esophagitis    6. Acquired hypothyroidism  -     Comprehensive metabolic panel  -     Lipid Panel With / Chol / HDL Ratio  -     Hepatitis C Antibody  -     TSH      Follow Up:  No follow-ups on file.     An After Visit Summary " and PPPS were given to the patient.

## 2020-07-13 NOTE — PROGRESS NOTES
Subjective   Hannah Maki is a 69 y.o. female.     Chief Complaint   Patient presents with   • Medicare Wellness-subsequent       History of Present Illness     she notes tolerating syhnthroid without heat or cold intolances---bp has been stable without cp o rha--her ged symptoms are stable witguotu dysphagia  She is dealing with arthriis in the knee    Current Outpatient Medications:   •  dicyclomine (BENTYL) 10 MG capsule, Take 1 capsule by mouth 3 (Three) Times a Day As Needed (abdominal discomfort)., Disp: 90 capsule, Rfl: 3  •  esomeprazole (NexIUM) 20 MG capsule, Take 20 mg by mouth Every Morning Before Breakfast., Disp: , Rfl:   •  levothyroxine (SYNTHROID, LEVOTHROID) 100 MCG tablet, Take 100 mcg by mouth Daily., Disp: , Rfl:   •  meclizine (ANTIVERT) 25 MG tablet, Take 1 tablet by mouth 3 (Three) Times a Day As Needed for Dizziness (vertigo). Take one by mouth three times a day as needed for vertigo, Disp: 30 tablet, Rfl: 3  •  montelukast (SINGULAIR) 10 MG tablet, Take 1 tablet by mouth Every Night., Disp: 30 tablet, Rfl: 11  •  valsartan (Diovan) 320 MG tablet, Take 1 tablet by mouth Daily., Disp: 90 tablet, Rfl: 1  •  levothyroxine (SYNTHROID, LEVOTHROID) 100 MCG tablet, TAKE 1 TABLET BY MOUTH ONCE DAILY, Disp: 30 tablet, Rfl: 5  •  meloxicam (MOBIC) 15 MG tablet, Take 15 mg by mouth Daily., Disp: , Rfl:   •  predniSONE (DELTASONE) 10 MG tablet, 20mg daily x 3 days then 10mg daily #7, Disp: 15 tablet, Rfl: 0  Allergies   Allergen Reactions   • Ceftin [Cefuroxime Axetil] Other (See Comments)     Pt does not recall   • Augmentin [Amoxicillin-Pot Clavulanate] Rash   • Azithromycin Nausea Only   • Bactrim [Sulfamethoxazole-Trimethoprim] Nausea And Vomiting   • Cefuroxime Nausea Only   • Codeine GI Intolerance   • Demerol [Meperidine] Nausea And Vomiting   • Erythromycin Rash   • Latex Other (See Comments)     Patient says it pulls her skin off.   • Tequin [Gatifloxacin] Nausea Only   • Tetracyclines &  Related Nausea And Vomiting       Past Medical History:   Diagnosis Date   • Abnormal liver enzymes    • Achilles bursitis    • Anxiety    • Arthralgia    • Arthritis    • Asthma    • Chronic pansinusitis 10/14/2016   • Colon polyp    • Diverticulosis    • Dysuria    • Fatty liver    • GERD (gastroesophageal reflux disease)    • Hyperlipidemia    • Hypertension    • Hyperthyroidism    • Non-cardiac chest pain    • Palpitations    • Perennial allergic rhinitis 10/14/2016   • Pneumonia    • PONV (postoperative nausea and vomiting)    • Rhinitis    • Tinnitus    • Trigeminal neuralgia    • Vertigo      Past Surgical History:   Procedure Laterality Date   • APPENDECTOMY     • CATARACT EXTRACTION WITH INTRAOCULAR LENS IMPLANT     • CHOLECYSTECTOMY     • COLONOSCOPY  11/18/2013     six polyps removed or destroyed, Diverticulosis  Recall 3 years   • COLONOSCOPY  11/18/2013   • COLONOSCOPY N/A 4/4/2017    Procedure: COLONOSCOPY WITH ANESTHESIA;  Surgeon: Lew Orona MD;  Location:  [facility];  Service:    • COLONOSCOPY N/A 3/16/2020    Procedure: COLONOSCOPY WITH ANESTHESIA;  Surgeon: Lew Orona MD;  Location: [facility];  Service: Gastroenterology;  Laterality: N/A;  pre op: hx polyps  Post op:polyps  PCP: Deni Henry MD   • EYE SURGERY  Nov 2018    Cataract Removal   • HEMORRHOIDECTOMY     • HYSTERECTOMY     • NECK SURGERY     • NOSE SURGERY      nose bleeding artery    • OTHER SURGICAL HISTORY      ingrown toe nail   • RECTAL FISSURE INCISION AND DRAINAGE         Review of Systems   Constitutional: Negative.    HENT: Negative.    Eyes: Negative.    Respiratory: Negative.    Cardiovascular: Negative.    Gastrointestinal: Negative.    Endocrine: Negative.    Genitourinary: Negative.    Musculoskeletal: Positive for arthralgias.   Skin: Negative.    Allergic/Immunologic: Negative.    Neurological: Negative.    Hematological: Negative.    Psychiatric/Behavioral: Negative.        Objective  " /88   Pulse 98   Temp 97.8 °F (36.6 °C)   Resp 16   Ht 165.1 cm (65\")   Wt 96.2 kg (212 lb)   LMP 10/04/1981   SpO2 99%   BMI 35.28 kg/m²   Physical Exam   Constitutional: She is oriented to person, place, and time. She appears well-developed and well-nourished.   HENT:   Head: Normocephalic and atraumatic.   Eyes: Conjunctivae and EOM are normal.   Neck: Normal range of motion. Neck supple.   Cardiovascular: Normal rate, regular rhythm and normal heart sounds.   Pulmonary/Chest: Effort normal and breath sounds normal.   Abdominal: Soft. Bowel sounds are normal.   Musculoskeletal: Normal range of motion.   Neurological: She is alert and oriented to person, place, and time.   Skin: Skin is warm. Capillary refill takes less than 2 seconds.   Psychiatric: She has a normal mood and affect. Her behavior is normal. Judgment and thought content normal.   Nursing note and vitals reviewed.      Assessment/Plan   Hannah was seen today for medicare wellness-subsequent.    Diagnoses and all orders for this visit:    Mixed hyperlipidemia  -     Comprehensive metabolic panel  -     Lipid Panel With / Chol / HDL Ratio  -     Hepatitis C Antibody  -     TSH    Other specified disorders of carbohydrate metabolism (CMS/HCC)    Morbidly obese (CMS/HCC)    Essential hypertension  -     Comprehensive metabolic panel  -     Lipid Panel With / Chol / HDL Ratio  -     Hepatitis C Antibody  -     TSH    Gastroesophageal reflux disease without esophagitis    Acquired hypothyroidism  -     Comprehensive metabolic panel  -     Lipid Panel With / Chol / HDL Ratio  -     Hepatitis C Antibody  -     TSH                 Orders Placed This Encounter   Procedures   • Comprehensive metabolic panel   • Lipid Panel With / Chol / HDL Ratio   • Hepatitis C Antibody   • TSH       Follow up: 6 month(s)  "

## 2020-07-14 LAB
ALBUMIN SERPL-MCNC: 4.3 G/DL (ref 3.8–4.8)
ALBUMIN/GLOB SERPL: 1.5 {RATIO} (ref 1.2–2.2)
ALP SERPL-CCNC: 107 IU/L (ref 39–117)
ALT SERPL-CCNC: 77 IU/L (ref 0–32)
AST SERPL-CCNC: 51 IU/L (ref 0–40)
BILIRUB SERPL-MCNC: 0.7 MG/DL (ref 0–1.2)
BUN SERPL-MCNC: 14 MG/DL (ref 8–27)
BUN/CREAT SERPL: 17 (ref 12–28)
CALCIUM SERPL-MCNC: 9.6 MG/DL (ref 8.7–10.3)
CHLORIDE SERPL-SCNC: 104 MMOL/L (ref 96–106)
CHOLEST SERPL-MCNC: 268 MG/DL (ref 100–199)
CHOLEST/HDLC SERPL: 4.3 RATIO (ref 0–4.4)
CO2 SERPL-SCNC: 22 MMOL/L (ref 20–29)
CREAT SERPL-MCNC: 0.81 MG/DL (ref 0.57–1)
GLOBULIN SER CALC-MCNC: 2.9 G/DL (ref 1.5–4.5)
GLUCOSE SERPL-MCNC: 104 MG/DL (ref 65–99)
HCV AB S/CO SERPL IA: 0.1 S/CO RATIO (ref 0–0.9)
HDLC SERPL-MCNC: 62 MG/DL
LDLC SERPL CALC-MCNC: 167 MG/DL (ref 0–99)
POTASSIUM SERPL-SCNC: 4.3 MMOL/L (ref 3.5–5.2)
PROT SERPL-MCNC: 7.2 G/DL (ref 6–8.5)
SODIUM SERPL-SCNC: 140 MMOL/L (ref 134–144)
TRIGL SERPL-MCNC: 195 MG/DL (ref 0–149)
TSH SERPL DL<=0.005 MIU/L-ACNC: 1.41 UIU/ML (ref 0.45–4.5)
VLDLC SERPL CALC-MCNC: 39 MG/DL (ref 5–40)

## 2020-10-06 RX ORDER — VALSARTAN 320 MG/1
TABLET ORAL
Qty: 90 TABLET | Refills: 0 | Status: SHIPPED | OUTPATIENT
Start: 2020-10-06 | End: 2021-01-05

## 2020-10-13 ENCOUNTER — TRANSCRIBE ORDERS (OUTPATIENT)
Dept: ADMINISTRATIVE | Facility: HOSPITAL | Age: 70
End: 2020-10-13

## 2020-10-13 DIAGNOSIS — Z01.818 PREOP TESTING: Primary | ICD-10-CM

## 2020-10-15 ENCOUNTER — LAB (OUTPATIENT)
Dept: LAB | Facility: HOSPITAL | Age: 70
End: 2020-10-15

## 2020-10-15 PROCEDURE — U0003 INFECTIOUS AGENT DETECTION BY NUCLEIC ACID (DNA OR RNA); SEVERE ACUTE RESPIRATORY SYNDROME CORONAVIRUS 2 (SARS-COV-2) (CORONAVIRUS DISEASE [COVID-19]), AMPLIFIED PROBE TECHNIQUE, MAKING USE OF HIGH THROUGHPUT TECHNOLOGIES AS DESCRIBED BY CMS-2020-01-R: HCPCS | Performed by: PHYSICIAN ASSISTANT

## 2020-10-15 PROCEDURE — C9803 HOPD COVID-19 SPEC COLLECT: HCPCS | Performed by: PHYSICIAN ASSISTANT

## 2020-10-16 LAB
COVID LABCORP PRIORITY: NORMAL
SARS-COV-2 RNA RESP QL NAA+PROBE: NOT DETECTED

## 2020-10-19 ENCOUNTER — OFFICE VISIT (OUTPATIENT)
Dept: OTOLARYNGOLOGY | Facility: CLINIC | Age: 70
End: 2020-10-19

## 2020-10-19 VITALS
TEMPERATURE: 97.8 F | WEIGHT: 212.6 LBS | BODY MASS INDEX: 35.42 KG/M2 | HEART RATE: 80 BPM | DIASTOLIC BLOOD PRESSURE: 78 MMHG | HEIGHT: 65 IN | SYSTOLIC BLOOD PRESSURE: 121 MMHG

## 2020-10-19 DIAGNOSIS — K21.9 GASTROESOPHAGEAL REFLUX DISEASE WITHOUT ESOPHAGITIS: ICD-10-CM

## 2020-10-19 DIAGNOSIS — J32.4 CHRONIC PANSINUSITIS: Primary | ICD-10-CM

## 2020-10-19 DIAGNOSIS — J30.89 PERENNIAL ALLERGIC RHINITIS: ICD-10-CM

## 2020-10-19 DIAGNOSIS — E03.9 ACQUIRED HYPOTHYROIDISM: ICD-10-CM

## 2020-10-19 DIAGNOSIS — R09.82 POST-NASAL DRIP: ICD-10-CM

## 2020-10-19 PROCEDURE — 99214 OFFICE O/P EST MOD 30 MIN: CPT | Performed by: PHYSICIAN ASSISTANT

## 2020-10-19 RX ORDER — MECLIZINE HYDROCHLORIDE 25 MG/1
25 TABLET ORAL 3 TIMES DAILY PRN
Qty: 60 TABLET | Refills: 3 | Status: SHIPPED | OUTPATIENT
Start: 2020-10-19 | End: 2021-05-20 | Stop reason: SDUPTHER

## 2020-10-19 RX ORDER — PREDNISONE 10 MG/1
TABLET ORAL
Qty: 30 TABLET | Refills: 0 | Status: SHIPPED | OUTPATIENT
Start: 2020-10-19 | End: 2021-07-12

## 2020-10-19 RX ORDER — MONTELUKAST SODIUM 10 MG/1
10 TABLET ORAL NIGHTLY
Qty: 30 TABLET | Refills: 11 | Status: SHIPPED | OUTPATIENT
Start: 2020-10-19 | End: 2021-10-04 | Stop reason: SDUPTHER

## 2020-11-04 RX ORDER — CLINDAMYCIN HYDROCHLORIDE 300 MG/1
300 CAPSULE ORAL 3 TIMES DAILY
Qty: 42 CAPSULE | Refills: 0 | Status: SHIPPED | OUTPATIENT
Start: 2020-11-04 | End: 2020-11-18

## 2020-11-04 RX ORDER — FLUCONAZOLE 150 MG/1
150 TABLET ORAL ONCE
Qty: 2 TABLET | Refills: 0 | Status: SHIPPED | OUTPATIENT
Start: 2020-11-04 | End: 2020-11-04

## 2020-11-04 RX ORDER — SACCHAROMYCES BOULARDII 250 MG
250 CAPSULE ORAL 2 TIMES DAILY
Qty: 28 CAPSULE | Refills: 0 | Status: SHIPPED | OUTPATIENT
Start: 2020-11-04 | End: 2022-10-17

## 2020-11-04 NOTE — TELEPHONE ENCOUNTER
PT states the prednisone is not helping her and she is requesting an ATB, please see sx list as she has an extensive one

## 2021-01-05 RX ORDER — LEVOTHYROXINE SODIUM 0.1 MG/1
TABLET ORAL
Qty: 90 TABLET | Refills: 0 | Status: SHIPPED | OUTPATIENT
Start: 2021-01-05 | End: 2021-04-05 | Stop reason: SDUPTHER

## 2021-01-05 RX ORDER — VALSARTAN 320 MG/1
TABLET ORAL
Qty: 90 TABLET | Refills: 0 | Status: SHIPPED | OUTPATIENT
Start: 2021-01-05 | End: 2021-04-05 | Stop reason: SDUPTHER

## 2021-01-05 NOTE — TELEPHONE ENCOUNTER
Requested Prescriptions     Pending Prescriptions Disp Refills   • levothyroxine (SYNTHROID, LEVOTHROID) 100 MCG tablet [Pharmacy Med Name: Levothyroxine Sodium 100 MCG Oral Tablet] 90 tablet 0     Sig: Take 1 tablet by mouth once daily   • valsartan (DIOVAN) 320 MG tablet [Pharmacy Med Name: Valsartan 320 MG Oral Tablet] 90 tablet 0     Sig: Take 1 tablet by mouth once daily

## 2021-01-11 ENCOUNTER — OFFICE VISIT (OUTPATIENT)
Dept: FAMILY MEDICINE CLINIC | Facility: CLINIC | Age: 71
End: 2021-01-11

## 2021-01-11 VITALS
OXYGEN SATURATION: 97 % | WEIGHT: 213 LBS | HEART RATE: 89 BPM | BODY MASS INDEX: 35.49 KG/M2 | DIASTOLIC BLOOD PRESSURE: 72 MMHG | SYSTOLIC BLOOD PRESSURE: 148 MMHG | TEMPERATURE: 98.7 F | HEIGHT: 65 IN | RESPIRATION RATE: 16 BRPM

## 2021-01-11 DIAGNOSIS — E03.9 ACQUIRED HYPOTHYROIDISM: Chronic | ICD-10-CM

## 2021-01-11 DIAGNOSIS — E66.01 MORBIDLY OBESE (HCC): Chronic | ICD-10-CM

## 2021-01-11 DIAGNOSIS — E78.2 MIXED HYPERLIPIDEMIA: Primary | Chronic | ICD-10-CM

## 2021-01-11 DIAGNOSIS — I10 ESSENTIAL HYPERTENSION: Chronic | ICD-10-CM

## 2021-01-11 PROCEDURE — 99214 OFFICE O/P EST MOD 30 MIN: CPT | Performed by: FAMILY MEDICINE

## 2021-01-11 RX ORDER — AMOXICILLIN 500 MG/1
500 CAPSULE ORAL 3 TIMES DAILY
Qty: 30 CAPSULE | Refills: 0 | Status: SHIPPED | OUTPATIENT
Start: 2021-01-11 | End: 2021-02-15

## 2021-01-11 NOTE — PROGRESS NOTES
Subjective   Hannah Maki is a 70 y.o. female.     Chief Complaint   Patient presents with   • Follow-up     6mos       History of Present Illness     she notes bp has been stable without cp or ha--she is toleratins statin without myalgias --tolerating  thyfroid without heat or cold intolernades  She is getting injections from ortho for her arthriris     Current Outpatient Medications:   •  dicyclomine (BENTYL) 10 MG capsule, Take 1 capsule by mouth 3 (Three) Times a Day As Needed (abdominal discomfort)., Disp: 90 capsule, Rfl: 3  •  esomeprazole (NexIUM) 20 MG capsule, Take 20 mg by mouth Every Morning Before Breakfast., Disp: , Rfl:   •  levothyroxine (SYNTHROID, LEVOTHROID) 100 MCG tablet, Take 1 tablet by mouth once daily, Disp: 90 tablet, Rfl: 0  •  meclizine (ANTIVERT) 25 MG tablet, Take 1 tablet by mouth 3 (Three) Times a Day As Needed for Dizziness (vertigo). Take one by mouth three times a day as needed for vertigo, Disp: 60 tablet, Rfl: 3  •  meloxicam (MOBIC) 15 MG tablet, Take 15 mg by mouth Daily., Disp: , Rfl:   •  montelukast (SINGULAIR) 10 MG tablet, Take 1 tablet by mouth Every Night., Disp: 30 tablet, Rfl: 11  •  mupirocin (BACTROBAN) 2 % nasal ointment, Apply intranasally twice a day, Disp: 30 g, Rfl: 5  •  predniSONE (DELTASONE) 10 MG tablet, Daily dose: 5 tabs for 2 days, then 4 tabs for 2 days, then 3 tabs for 2 days, then 2 tabs for 2 days, then 1 tab for 2 days, Disp: 30 tablet, Rfl: 0  •  saccharomyces boulardii (Florastor) 250 MG capsule, Take 1 capsule by mouth 2 (Two) Times a Day., Disp: 28 capsule, Rfl: 0  •  valsartan (DIOVAN) 320 MG tablet, Take 1 tablet by mouth once daily, Disp: 90 tablet, Rfl: 0  Allergies   Allergen Reactions   • Ceftin [Cefuroxime Axetil] Other (See Comments)     Pt does not recall   • Augmentin [Amoxicillin-Pot Clavulanate] Rash   • Azithromycin Nausea Only   • Bactrim [Sulfamethoxazole-Trimethoprim] Nausea And Vomiting   • Cefuroxime Nausea Only   • Codeine GI  Intolerance   • Demerol [Meperidine] Nausea And Vomiting   • Erythromycin Rash   • Latex Other (See Comments)     Patient says it pulls her skin off.   • Tequin [Gatifloxacin] Nausea Only   • Tetracyclines & Related Nausea And Vomiting       Past Medical History:   Diagnosis Date   • Abnormal liver enzymes    • Achilles bursitis    • Anxiety    • Arthralgia    • Arthritis    • Asthma    • Chronic pansinusitis 10/14/2016   • Colon polyp    • Diverticulosis    • Dysuria    • Fatty liver    • GERD (gastroesophageal reflux disease)    • Hyperlipidemia    • Hypertension    • Hyperthyroidism    • Non-cardiac chest pain    • Osteoarthritis    • Palpitations    • Perennial allergic rhinitis 10/14/2016   • Pneumonia    • PONV (postoperative nausea and vomiting)    • Rhinitis    • Tinnitus    • Trigeminal neuralgia    • Vertigo      Past Surgical History:   Procedure Laterality Date   • APPENDECTOMY     • CATARACT EXTRACTION WITH INTRAOCULAR LENS IMPLANT     • CHOLECYSTECTOMY     • COLONOSCOPY  11/18/2013     six polyps removed or destroyed, Diverticulosis  Recall 3 years   • COLONOSCOPY  11/18/2013   • COLONOSCOPY N/A 4/4/2017    Procedure: COLONOSCOPY WITH ANESTHESIA;  Surgeon: Lew Orona MD;  Location:  [facility];  Service:    • COLONOSCOPY N/A 3/16/2020    Procedure: COLONOSCOPY WITH ANESTHESIA;  Surgeon: Lew Orona MD;  Location: [facility] ENDOSCOPY;  Service: Gastroenterology;  Laterality: N/A;  pre op: hx polyps  Post op:polyps  PCP: Deni Henry MD   • EYE SURGERY  Nov 2018    Cataract Removal   • HEMORRHOIDECTOMY     • HYSTERECTOMY     • NECK SURGERY     • NOSE SURGERY      nose bleeding artery    • OTHER SURGICAL HISTORY      ingrown toe nail   • RECTAL FISSURE INCISION AND DRAINAGE         Review of Systems   Constitutional: Negative.    HENT: Negative.    Eyes: Negative.    Respiratory: Negative.    Cardiovascular: Negative.    Gastrointestinal: Negative.    Endocrine: Negative.   "  Genitourinary: Negative.    Musculoskeletal: Positive for arthralgias and gait problem.   Skin: Negative.    Allergic/Immunologic: Negative.    Hematological: Negative.    Psychiatric/Behavioral: Negative.        Objective  /72   Pulse 89   Temp 98.7 °F (37.1 °C)   Resp 16   Ht 165.1 cm (65\")   Wt 96.6 kg (213 lb)   LMP 10/04/1981   SpO2 97%   Breastfeeding No   BMI 35.45 kg/m²   Physical Exam  Vitals signs and nursing note reviewed.   HENT:      Head: Normocephalic and atraumatic.      Right Ear: Ear canal normal.      Left Ear: Ear canal normal.      Nose: Nose normal.      Mouth/Throat:      Mouth: Mucous membranes are dry.      Pharynx: Oropharynx is clear.   Eyes:      Extraocular Movements: Extraocular movements intact.      Conjunctiva/sclera: Conjunctivae normal.      Pupils: Pupils are equal, round, and reactive to light.   Neck:      Musculoskeletal: Normal range of motion and neck supple.   Cardiovascular:      Rate and Rhythm: Normal rate and regular rhythm.      Pulses: Normal pulses.      Heart sounds: Normal heart sounds.   Pulmonary:      Effort: Pulmonary effort is normal.      Breath sounds: Normal breath sounds.   Abdominal:      General: Abdomen is flat. Bowel sounds are normal.   Musculoskeletal: Normal range of motion.   Skin:     General: Skin is warm and dry.      Capillary Refill: Capillary refill takes less than 2 seconds.   Neurological:      General: No focal deficit present.      Mental Status: She is alert and oriented to person, place, and time. Mental status is at baseline.   Psychiatric:         Mood and Affect: Mood normal.         Behavior: Behavior normal.         Thought Content: Thought content normal.         Judgment: Judgment normal.         Assessment/Plan   Diagnoses and all orders for this visit:    1. Mixed hyperlipidemia (Primary)  -     CBC w AUTO Differential  -     Comprehensive metabolic panel  -     Lipid Panel With / Chol / HDL Ratio  -     " TSH    2. Acquired hypothyroidism  -     CBC w AUTO Differential  -     Comprehensive metabolic panel  -     Lipid Panel With / Chol / HDL Ratio  -     TSH    3. Morbidly obese (CMS/HCC)  -     CBC w AUTO Differential  -     Comprehensive metabolic panel  -     Lipid Panel With / Chol / HDL Ratio  -     TSH    4. Essential hypertension  -     CBC w AUTO Differential  -     Comprehensive metabolic panel  -     Lipid Panel With / Chol / HDL Ratio  -     TSH      She will moniotor her bp and I encourgd her to get pap up to date           Orders Placed This Encounter   Procedures   • Comprehensive metabolic panel   • Lipid Panel With / Chol / HDL Ratio   • TSH   • CBC w AUTO Differential     Order Specific Question:   Manual Differential     Answer:   No       Follow up: 6 month(s)

## 2021-01-12 LAB
ALBUMIN SERPL-MCNC: 4.5 G/DL (ref 3.5–5.2)
ALBUMIN/GLOB SERPL: 1.6 G/DL
ALP SERPL-CCNC: 121 U/L (ref 39–117)
ALT SERPL-CCNC: 78 U/L (ref 1–33)
AST SERPL-CCNC: 52 U/L (ref 1–32)
BASOPHILS # BLD AUTO: ABNORMAL 10*3/UL
BILIRUB SERPL-MCNC: 0.7 MG/DL (ref 0–1.2)
BUN SERPL-MCNC: 8 MG/DL (ref 8–23)
BUN/CREAT SERPL: 10.3 (ref 7–25)
CALCIUM SERPL-MCNC: 10 MG/DL (ref 8.6–10.5)
CHLORIDE SERPL-SCNC: 100 MMOL/L (ref 98–107)
CHOLEST SERPL-MCNC: 297 MG/DL (ref 0–200)
CHOLEST/HDLC SERPL: 4.95 {RATIO}
CO2 SERPL-SCNC: 27.2 MMOL/L (ref 22–29)
CREAT SERPL-MCNC: 0.78 MG/DL (ref 0.57–1)
DIFFERENTIAL COMMENT: ABNORMAL
EOSINOPHIL # BLD AUTO: ABNORMAL 10*3/UL
EOSINOPHIL # BLD MANUAL: 0.32 10*3/MM3 (ref 0–0.4)
EOSINOPHIL NFR BLD AUTO: ABNORMAL %
EOSINOPHIL NFR BLD MANUAL: 3 % (ref 0.3–6.2)
ERYTHROCYTE [DISTWIDTH] IN BLOOD BY AUTOMATED COUNT: 12.4 % (ref 12.3–15.4)
GLOBULIN SER CALC-MCNC: 2.8 GM/DL
GLUCOSE SERPL-MCNC: 129 MG/DL (ref 65–99)
HCT VFR BLD AUTO: 48.2 % (ref 34–46.6)
HDLC SERPL-MCNC: 60 MG/DL (ref 40–60)
HGB BLD-MCNC: 16.4 G/DL (ref 12–15.9)
LDLC SERPL CALC-MCNC: 201 MG/DL (ref 0–100)
LYMPHOCYTES # BLD AUTO: ABNORMAL 10*3/UL
LYMPHOCYTES # BLD MANUAL: 3.83 10*3/MM3 (ref 0.7–3.1)
LYMPHOCYTES NFR BLD AUTO: ABNORMAL %
LYMPHOCYTES NFR BLD MANUAL: 36 % (ref 19.6–45.3)
MCH RBC QN AUTO: 30.9 PG (ref 26.6–33)
MCHC RBC AUTO-ENTMCNC: 34 G/DL (ref 31.5–35.7)
MCV RBC AUTO: 90.8 FL (ref 79–97)
MONOCYTES # BLD MANUAL: 1.17 10*3/MM3 (ref 0.1–0.9)
MONOCYTES NFR BLD AUTO: ABNORMAL %
MONOCYTES NFR BLD MANUAL: 11 % (ref 5–12)
NEUTROPHILS # BLD MANUAL: 5.32 10*3/MM3 (ref 1.7–7)
NEUTROPHILS NFR BLD AUTO: ABNORMAL %
NEUTROPHILS NFR BLD MANUAL: 50 % (ref 42.7–76)
PLATELET # BLD AUTO: 294 10*3/MM3 (ref 140–450)
PLATELET BLD QL SMEAR: ABNORMAL
POTASSIUM SERPL-SCNC: 4.8 MMOL/L (ref 3.5–5.2)
PROT SERPL-MCNC: 7.3 G/DL (ref 6–8.5)
RBC # BLD AUTO: 5.31 10*6/MM3 (ref 3.77–5.28)
RBC MORPH BLD: ABNORMAL
SODIUM SERPL-SCNC: 138 MMOL/L (ref 136–145)
TRIGL SERPL-MCNC: 190 MG/DL (ref 0–150)
TSH SERPL DL<=0.005 MIU/L-ACNC: 2.17 UIU/ML (ref 0.27–4.2)
VLDLC SERPL CALC-MCNC: 36 MG/DL (ref 5–40)
WBC # BLD AUTO: 10.63 10*3/MM3 (ref 3.4–10.8)

## 2021-01-27 NOTE — TELEPHONE ENCOUNTER
I spoke to Hannah and she said that the cost of the repatha is going to be a little expensive for her.  She asked if there was a by mouth med that she could take.  I asked her about the potential s/e of myalgia and she said that she has that anyway.  She will try a statin again if she needs to.

## 2021-01-27 NOTE — TELEPHONE ENCOUNTER
PATIENT CALLED AND STATED THAT [facility] IS GOING TO CHARGE HER OVER $500 FOR THE Evolocumab (REPATHA) solution prefilled syringe injection. THE $500 IS JUST FOR ONE SHOT. PLEASE ADVISE AS THEY WOULD LIKE TO TALK TO NURSE OR PROVIDER.    CALLBACK: [phone]

## 2021-01-27 NOTE — TELEPHONE ENCOUNTER
Yasemin called and spoke to pt this is the deductible so it will be 450 the first time then 50 the rest oftheyr

## 2021-01-28 RX ORDER — PRAVASTATIN SODIUM 20 MG
20 TABLET ORAL NIGHTLY
Qty: 30 TABLET | Refills: 5 | Status: SHIPPED | OUTPATIENT
Start: 2021-01-28 | End: 2021-03-10 | Stop reason: SDUPTHER

## 2021-02-15 RX ORDER — METHYLPREDNISOLONE 4 MG/1
TABLET ORAL
Qty: 21 TABLET | Refills: 0 | Status: SHIPPED | OUTPATIENT
Start: 2021-02-15 | End: 2021-07-12

## 2021-02-15 RX ORDER — AMOXICILLIN 250 MG/1
250 CAPSULE ORAL 3 TIMES DAILY
Qty: 21 CAPSULE | Refills: 0 | Status: SHIPPED | OUTPATIENT
Start: 2021-02-15 | End: 2021-02-22 | Stop reason: SDUPTHER

## 2021-02-15 NOTE — TELEPHONE ENCOUNTER
Hannah called & said that she is getting a sore throat and cough.  She says that it is what Laura had last week.  She is req med sent to pharm.

## 2021-02-22 RX ORDER — AMOXICILLIN 500 MG/1
1000 CAPSULE ORAL 3 TIMES DAILY
Qty: 42 CAPSULE | Refills: 0 | Status: SHIPPED | OUTPATIENT
Start: 2021-02-22 | End: 2021-03-01

## 2021-03-10 RX ORDER — PRAVASTATIN SODIUM 20 MG
20 TABLET ORAL NIGHTLY
Qty: 90 TABLET | Refills: 1 | Status: SHIPPED | OUTPATIENT
Start: 2021-03-10 | End: 2021-06-09 | Stop reason: SDUPTHER

## 2021-04-05 RX ORDER — LEVOTHYROXINE SODIUM 0.1 MG/1
100 TABLET ORAL DAILY
Qty: 90 TABLET | Refills: 1 | Status: SHIPPED | OUTPATIENT
Start: 2021-04-05 | End: 2021-10-04 | Stop reason: SDUPTHER

## 2021-04-05 RX ORDER — VALSARTAN 320 MG/1
320 TABLET ORAL DAILY
Qty: 90 TABLET | Refills: 1 | Status: SHIPPED | OUTPATIENT
Start: 2021-04-05 | End: 2021-10-04 | Stop reason: SDUPTHER

## 2021-05-17 NOTE — PROGRESS NOTES
Sesar Dacosta MD     Chief Complaint   Patient presents with   • Sinus Problem          HPI  Hannah Maki is a  70 y.o. female who is here for follow up. She has had stable complaints of allergy, posterior nasal drip, ear itching. She is doing well on medication.           Vital Signs:   Temp:  [98.4 °F (36.9 °C)] 98.4 °F (36.9 °C)  Heart Rate:  [83] 83  BP: (134)/(72) 134/72    Physical Exam  Vitals reviewed.   Constitutional:       General: She is not in acute distress.     Appearance: She is well-developed. She is not diaphoretic.   HENT:      Head: Normocephalic and atraumatic.      Right Ear: External ear normal.      Left Ear: External ear normal.      Nose: Mucosal edema and rhinorrhea present.   Eyes:      General: Lids are normal.      Conjunctiva/sclera: Conjunctivae normal.      Pupils: Pupils are equal, round, and reactive to light.   Neck:      Trachea: Phonation normal.   Pulmonary:      Effort: Pulmonary effort is normal. No respiratory distress.      Breath sounds: No stridor.   Musculoskeletal:         General: Normal range of motion.      Cervical back: Normal range of motion. No edema or erythema.   Skin:     Findings: No erythema or rash.   Neurological:      Mental Status: She is alert and oriented to person, place, and time.      Cranial Nerves: No cranial nerve deficit.   Psychiatric:         Speech: Speech normal.         Behavior: Behavior normal.         Thought Content: Thought content normal.         Judgment: Judgment normal.                  Assessment/Plan    Assessment:   1. Chronic rhinitis    2. Chronic pansinusitis    3. Perennial allergic rhinitis    4. Post-nasal drip        Plan:   continue current management. Primary care provider to follow symptoms. If worsens, can follow up with us again          New Medications Ordered This Visit   Medications   • mupirocin (BACTROBAN) 2 % nasal ointment     Sig: Apply intranasally twice a day     Dispense:  30 g     Refill:  5   •  meclizine (ANTIVERT) 25 MG tablet     Sig: Take 1 tablet by mouth 3 (Three) Times a Day As Needed for Dizziness (vertigo). Take one by mouth three times a day as needed for vertigo     Dispense:  60 tablet     Refill:  3   • acetic acid-hydrocortisone (VOSOL-HC) 1-2 % otic solution     Sig: Administer 4 drops into ear(s) as directed by provider 2 (Two) Times a Day. 3-4 drops in the affected ear 2-3 times a day     Dispense:  35 mL     Refill:  0   • guaiFENesin (MUCINEX) 600 MG 12 hr tablet     Sig: Take 1 tablet by mouth Every 12 (Twelve) Hours.     Dispense:  60 tablet     Refill:  3          Return if symptoms worsen or fail to improve.         Sesar Dacosta MD  05/20/21  11:26 CDT

## 2021-05-20 ENCOUNTER — OFFICE VISIT (OUTPATIENT)
Dept: OTOLARYNGOLOGY | Facility: CLINIC | Age: 71
End: 2021-05-20

## 2021-05-20 VITALS
HEART RATE: 83 BPM | BODY MASS INDEX: 35.71 KG/M2 | DIASTOLIC BLOOD PRESSURE: 72 MMHG | SYSTOLIC BLOOD PRESSURE: 134 MMHG | TEMPERATURE: 98.4 F | WEIGHT: 214.6 LBS

## 2021-05-20 DIAGNOSIS — J31.0 CHRONIC RHINITIS: Primary | ICD-10-CM

## 2021-05-20 DIAGNOSIS — R09.82 POST-NASAL DRIP: ICD-10-CM

## 2021-05-20 DIAGNOSIS — J30.89 PERENNIAL ALLERGIC RHINITIS: ICD-10-CM

## 2021-05-20 DIAGNOSIS — J32.4 CHRONIC PANSINUSITIS: ICD-10-CM

## 2021-05-20 PROCEDURE — 99214 OFFICE O/P EST MOD 30 MIN: CPT | Performed by: OTOLARYNGOLOGY

## 2021-05-20 RX ORDER — HYDROCORTISONE AND ACETIC ACID 20.75; 10.375 MG/ML; MG/ML
4 SOLUTION AURICULAR (OTIC) 2 TIMES DAILY
Qty: 35 ML | Refills: 0 | Status: SHIPPED | OUTPATIENT
Start: 2021-05-20 | End: 2022-10-17

## 2021-05-20 RX ORDER — MECLIZINE HYDROCHLORIDE 25 MG/1
25 TABLET ORAL 3 TIMES DAILY PRN
Qty: 60 TABLET | Refills: 3 | Status: SHIPPED | OUTPATIENT
Start: 2021-05-20 | End: 2023-02-09 | Stop reason: SDUPTHER

## 2021-05-20 RX ORDER — GUAIFENESIN 600 MG/1
600 TABLET, EXTENDED RELEASE ORAL EVERY 12 HOURS SCHEDULED
Qty: 60 TABLET | Refills: 3 | Status: SHIPPED | OUTPATIENT
Start: 2021-05-20 | End: 2021-07-12

## 2021-06-09 RX ORDER — PRAVASTATIN SODIUM 20 MG
20 TABLET ORAL NIGHTLY
Qty: 90 TABLET | Refills: 1 | Status: SHIPPED | OUTPATIENT
Start: 2021-06-09 | End: 2021-08-19

## 2021-06-24 ENCOUNTER — OFFICE VISIT (OUTPATIENT)
Dept: SURGERY | Age: 71
End: 2021-06-24
Payer: MEDICARE

## 2021-06-24 ENCOUNTER — HOSPITAL ENCOUNTER (OUTPATIENT)
Dept: WOMENS IMAGING | Age: 71
Discharge: HOME | End: 2021-06-24
Payer: MEDICARE

## 2021-06-24 VITALS
HEIGHT: 65 IN | WEIGHT: 213 LBS | SYSTOLIC BLOOD PRESSURE: 132 MMHG | DIASTOLIC BLOOD PRESSURE: 96 MMHG | HEART RATE: 74 BPM | TEMPERATURE: 98.7 F | BODY MASS INDEX: 35.49 KG/M2

## 2021-06-24 DIAGNOSIS — Z12.31 VISIT FOR SCREENING MAMMOGRAM: Primary | ICD-10-CM

## 2021-06-24 DIAGNOSIS — Z12.31 VISIT FOR SCREENING MAMMOGRAM: ICD-10-CM

## 2021-06-24 PROCEDURE — 4040F PNEUMOC VAC/ADMIN/RCVD: CPT | Performed by: PHYSICIAN ASSISTANT

## 2021-06-24 PROCEDURE — G8400 PT W/DXA NO RESULTS DOC: HCPCS | Performed by: PHYSICIAN ASSISTANT

## 2021-06-24 PROCEDURE — G8427 DOCREV CUR MEDS BY ELIG CLIN: HCPCS | Performed by: PHYSICIAN ASSISTANT

## 2021-06-24 PROCEDURE — 1090F PRES/ABSN URINE INCON ASSESS: CPT | Performed by: PHYSICIAN ASSISTANT

## 2021-06-24 PROCEDURE — 99213 OFFICE O/P EST LOW 20 MIN: CPT | Performed by: PHYSICIAN ASSISTANT

## 2021-06-24 PROCEDURE — 1036F TOBACCO NON-USER: CPT | Performed by: PHYSICIAN ASSISTANT

## 2021-06-24 PROCEDURE — 3017F COLORECTAL CA SCREEN DOC REV: CPT | Performed by: PHYSICIAN ASSISTANT

## 2021-06-24 PROCEDURE — 77067 SCR MAMMO BI INCL CAD: CPT

## 2021-06-24 PROCEDURE — G8417 CALC BMI ABV UP PARAM F/U: HCPCS | Performed by: PHYSICIAN ASSISTANT

## 2021-06-24 PROCEDURE — 1123F ACP DISCUSS/DSCN MKR DOCD: CPT | Performed by: PHYSICIAN ASSISTANT

## 2021-06-24 RX ORDER — MONTELUKAST SODIUM 10 MG/1
10 TABLET ORAL NIGHTLY
COMMUNITY
Start: 2020-10-19

## 2021-06-24 NOTE — PROGRESS NOTES
HPI:  Carla Wong is in for yearly follow-up breast check. She has not noticed any changes in her breasts. Her imaging was reviewed and is noted below. SCREENING BILATERAL DIGITAL MAMMOGRAM WITH TOMOSYNTHESIS 6/24/2021   9:26 AM   CLINICAL HISTORY: Screening.     COMPARISON STUDIES: 6/17/2020, 4/26/2017 and 4/20/2016. FINDINGS:    Digital CC and MLO views of bilateral breasts were obtained. Tomosynthesis in the MLO and CC projections was also performed. The breast tissue is heterogeneously dense (approximately 50-75%   glandular tissue) consistent with a type C parenchymal pattern,   limiting the sensitivity of mammography. No suspicious masses or   calcifications are identified. There has been no interval change. This   study was interpreted with CAD. IMPRESSION AND RECOMMENDATION:    No mammographic evidence of malignancy. Recommendation is for the   patient to return for routine mammography in one year or sooner, if   clinically indicated. BIRADS Category 2 - Benign findings       BREAST EXAM:  On examination, she has fibrocystic changes throughout both breasts, no dominant masses, no skin or nipple changes and no axillary adenopathy. I see nothing suspicious for breast cancer. ASSESSMENT:  Benign fibrocystic changes              PLAN:  I will plan to see her back in 1 year for physical exam and mammograms. She will contact me if anything significant changes. 20 minutes spent, which includes face to face with patient, record review, evaluation, planning, and education.

## 2021-07-07 DIAGNOSIS — Z12.31 VISIT FOR SCREENING MAMMOGRAM: Primary | ICD-10-CM

## 2021-07-08 ENCOUNTER — OFFICE VISIT (OUTPATIENT)
Dept: GASTROENTEROLOGY | Facility: CLINIC | Age: 71
End: 2021-07-08

## 2021-07-08 VITALS
HEIGHT: 65 IN | OXYGEN SATURATION: 99 % | WEIGHT: 211 LBS | TEMPERATURE: 97.5 F | BODY MASS INDEX: 35.16 KG/M2 | SYSTOLIC BLOOD PRESSURE: 150 MMHG | DIASTOLIC BLOOD PRESSURE: 88 MMHG | HEART RATE: 87 BPM

## 2021-07-08 DIAGNOSIS — K76.0 FATTY LIVER: Primary | ICD-10-CM

## 2021-07-08 DIAGNOSIS — K58.9 IRRITABLE BOWEL SYNDROME, UNSPECIFIED TYPE: ICD-10-CM

## 2021-07-08 DIAGNOSIS — D12.6 ADENOMATOUS POLYP OF COLON, UNSPECIFIED PART OF COLON: ICD-10-CM

## 2021-07-08 DIAGNOSIS — K21.9 GASTROESOPHAGEAL REFLUX DISEASE WITHOUT ESOPHAGITIS: ICD-10-CM

## 2021-07-08 PROCEDURE — 99214 OFFICE O/P EST MOD 30 MIN: CPT | Performed by: INTERNAL MEDICINE

## 2021-07-08 RX ORDER — DICYCLOMINE HYDROCHLORIDE 10 MG/1
10 CAPSULE ORAL 3 TIMES DAILY PRN
Qty: 90 CAPSULE | Refills: 3 | Status: SHIPPED | OUTPATIENT
Start: 2021-07-08 | End: 2022-07-11 | Stop reason: SDUPTHER

## 2021-07-08 NOTE — PROGRESS NOTES
[facility]    Chief Complaint   Patient presents with   • Elevated Hepatic Enzymes       Subjective     HPI    Hannah Maki is a 70 y.o. female who presents with a chief complaint of fatty liver.    She comes in for an annual checkup.  I did review her labs that she had about 6 months ago.  Her liver transaminases remain elevated.  ALT 78 AST 52.  That is about where she runs.  I note that her cholesterol is elevated.  She tells me her PCP did place her on a statin drug.  She stopped that recently on her own because she has significant muscle cramps.  When she stopped that the muscle cramps went away.  She is due to follow-up with her PCP again in a couple weeks.  Regarding weight loss she states she really has not had significant strides in losing weight.  I note that she is down a few pounds from when she was here last year.  As discussed, she needs to lose more.  She states she tries but she is always been skinny until she got in her 50s and now she is had trouble losing weight.    Her heartburn is under control.  She states she still notes that if she eats gluten products she has more heartburn and indigestion.  Last year we tested her for celiac and she had negative antibodies.  She states as long as she avoids gluten type product she feels pretty well so she is going to continue to avoid it.  She notes that she has symptoms right away if she eats something which would not be consistent with celiac disease but more of an intolerance.  She does have cramps at times and Bentyl has been prescribed she states that works well for her.  She may take it once a week and then some days she may take it 3 times.    Everything else is going well      ============== July 2020 HPI===========================================  HPI     Hannah Maki is a 69 y.o. female who presents with a chief complaint of fatty liver.     She presents for follow-up of her liver.  She tells me she is not been able to  lose weight.  She injured her knee gardening and since then she has not been able to exercise or walk much.  She states she did get a steroid injection in her knee that did not help.  She did take meloxicam and that helped take care of the swelling.  She tolerated it well but only took it for 2 weeks.  She was concerned about the side effects of NSAIDs with her knee and liver.  She had a colonoscopy in March and went over that report with her.  Everything else is going well.        -=================== March 2020 HPI=======================  HPI     Hannah Maki is a 69 y.o. female who presents as a referral for preventative maintenance. She has no complaints of nausea or vomiting. No change in bowels. No wt loss. No BRBPR. No melena. No abdominal pain.          Last colonoscopy was 4/2017 noting 2 polyps ( one retrieved with path hyperplastic) and diverticulosis. The patient does have history of colon polyps. The patient does not have history of colon cancer.  There is family history of colon cancer sister in her 50's, another sister in her 50's.     She also history of fatty liver.  Was seen here last year .  Ultrasound elastography score was F1.  Last labs October 2019 and LFTs had improved from previous.  She did lose a little weight but has gained some back.  Does not drink alcohol.  She is not diabetic.        ====================================================================================  Ov  8-5-19  Hannah Maki is a 68 y.o. female who presents with a chief complaint of abnormal LFTs.     She was here 3 months ago.  We felt that she had fatty liver.  She underwent liver serologies which all came back unremarkable.  She had an ultrasound of her liver which did show steatosis.  She had F1 fibrosis.  I advised her to lose 5 to 10 pounds.  She comes in today 6 pounds lighter.     She tells me she has been feeling well.  She does tell me that she has some chronic intermittent abdominal cramping and  diarrhea.  She states her son is gluten intolerant.  She thinks he has celiac disease.  She herself is gone on a gluten-free diet and she is felt much better since going on gluten-free diet.  She is never had testing done before.  She has been on a gluten-free diet for about a year.  She does note that when she eats gluten she will have some cramps for about a week.  She states she ate Chinese food and Mexican food this weekend which was supposedly gluten-free.  But she has had some cramps and diarrhea since then.  The diarrhea is cleared except for after she eats she will just have a little bit of loose stools.  It has subsided over the last 3 days.  She was noted to have a little bit of an elevated temperature to 99 degrees at her visit today.  She denies any other symptoms other than some chronic sinus drainage which she attributes to allergies.  She denies dysuria or shortness of breath.     She also tells me she has heartburn.  She is on over-the-counter Nexium daily.  She will have breakthrough symptoms once in a while.  Is not every day.  She asked what she could take for breakthrough symptoms.          Past Medical History:   Diagnosis Date   • Abnormal liver enzymes    • Achilles bursitis    • Anxiety    • Arthralgia    • Arthritis    • Asthma    • Chronic pansinusitis 10/14/2016   • Colon polyp    • Diverticulosis    • Dysuria    • Fatty liver    • GERD (gastroesophageal reflux disease)    • Hyperlipidemia    • Hypertension    • Hyperthyroidism    • Non-cardiac chest pain    • Osteoarthritis    • Palpitations    • Perennial allergic rhinitis 10/14/2016   • Pneumonia    • PONV (postoperative nausea and vomiting)    • Rhinitis    • Tinnitus    • Trigeminal neuralgia    • Vertigo        Past Surgical History:   Procedure Laterality Date   • APPENDECTOMY     • CATARACT EXTRACTION WITH INTRAOCULAR LENS IMPLANT     • CHOLECYSTECTOMY     • COLONOSCOPY  11/18/2013     six polyps removed or destroyed,  Diverticulosis  Recall 3 years   • COLONOSCOPY  11/18/2013   • COLONOSCOPY N/A 4/4/2017    Procedure: COLONOSCOPY WITH ANESTHESIA;  Surgeon: Lew Orona MD;  Location:  [facility];  Service:    • COLONOSCOPY N/A 3/16/2020    Procedure: COLONOSCOPY WITH ANESTHESIA;  Surgeon: Lew Orona MD;  Location:  [facility];  Service: Gastroenterology;  Laterality: N/A;  pre op: hx polyps  Post op:polyps  PCP: Deni Henry MD   • EYE SURGERY  Nov 2018    Cataract Removal   • HEMORRHOIDECTOMY     • HYSTERECTOMY     • NECK SURGERY     • NOSE SURGERY      nose bleeding artery    • OTHER SURGICAL HISTORY      ingrown toe nail   • RECTAL FISSURE INCISION AND DRAINAGE           Current Outpatient Medications:   •  dicyclomine (Bentyl) 10 MG capsule, Take 1 capsule by mouth 3 (Three) Times a Day As Needed (abdominal discomfort)., Disp: 90 capsule, Rfl: 3  •  esomeprazole (NexIUM) 20 MG capsule, Take 20 mg by mouth Every Morning Before Breakfast., Disp: , Rfl:   •  levothyroxine (SYNTHROID, LEVOTHROID) 100 MCG tablet, Take 1 tablet by mouth Daily., Disp: 90 tablet, Rfl: 1  •  meclizine (ANTIVERT) 25 MG tablet, Take 1 tablet by mouth 3 (Three) Times a Day As Needed for Dizziness (vertigo). Take one by mouth three times a day as needed for vertigo, Disp: 60 tablet, Rfl: 3  •  montelukast (SINGULAIR) 10 MG tablet, Take 1 tablet by mouth Every Night., Disp: 30 tablet, Rfl: 11  •  mupirocin (BACTROBAN) 2 % nasal ointment, Apply intranasally twice a day, Disp: 30 g, Rfl: 5  •  pravastatin (Pravachol) 20 MG tablet, Take 1 tablet by mouth Every Night., Disp: 90 tablet, Rfl: 1  •  valsartan (DIOVAN) 320 MG tablet, Take 1 tablet by mouth Daily., Disp: 90 tablet, Rfl: 1  •  acetic acid-hydrocortisone (VOSOL-HC) 1-2 % otic solution, Administer 4 drops into ear(s) as directed by provider 2 (Two) Times a Day. 3-4 drops in the affected ear 2-3 times a day, Disp: 35 mL, Rfl: 0  •  Evolocumab (REPATHA) solution  prefilled syringe injection, Inject 1 mL under the skin into the appropriate area as directed Every 14 (Fourteen) Days., Disp: 2 mL, Rfl: 2  •  guaiFENesin (MUCINEX) 600 MG 12 hr tablet, Take 1 tablet by mouth Every 12 (Twelve) Hours., Disp: 60 tablet, Rfl: 3  •  meloxicam (MOBIC) 15 MG tablet, Take 15 mg by mouth Daily., Disp: , Rfl:   •  methylPREDNISolone (MEDROL) 4 MG dose pack, Take as directed on package instructions., Disp: 21 tablet, Rfl: 0  •  mupirocin (BACTROBAN) 2 % ointment, Apply  topically to the appropriate area as directed 2 (Two) Times a Day., Disp: 22 g, Rfl: 0  •  predniSONE (DELTASONE) 10 MG tablet, Daily dose: 5 tabs for 2 days, then 4 tabs for 2 days, then 3 tabs for 2 days, then 2 tabs for 2 days, then 1 tab for 2 days, Disp: 30 tablet, Rfl: 0  •  saccharomyces boulardii (Florastor) 250 MG capsule, Take 1 capsule by mouth 2 (Two) Times a Day., Disp: 28 capsule, Rfl: 0    Allergies   Allergen Reactions   • Ceftin [Cefuroxime Axetil] Other (See Comments)     Pt does not recall   • Augmentin [Amoxicillin-Pot Clavulanate] Rash   • Azithromycin Nausea Only   • Bactrim [Sulfamethoxazole-Trimethoprim] Nausea And Vomiting   • Cefuroxime Nausea Only   • Codeine GI Intolerance   • Demerol [Meperidine] Nausea And Vomiting   • Erythromycin Rash   • Latex Other (See Comments)     Patient says it pulls her skin off.   • Tequin [Gatifloxacin] Nausea Only   • Tetracyclines & Related Nausea And Vomiting       Social History     Socioeconomic History   • Marital status:      Spouse name: Not on file   • Number of children: Not on file   • Years of education: Not on file   • Highest education level: Not on file   Tobacco Use   • Smoking status: Never Smoker   • Smokeless tobacco: Never Used   Substance and Sexual Activity   • Alcohol use: Never   • Drug use: Never   • Sexual activity: Never       Family History   Problem Relation Age of Onset   • Diabetes Brother    • Heart disease Brother    • Colon  cancer Sister 57   • Cancer Sister    • Colon cancer Sister 55   • Cancer Sister    • Arthritis Mother    • Heart disease Father    • Heart attack Father        Review of Systems  No blood in stools.  Occasional abdominal cramps.    Objective     Vitals:    07/08/21 1319   BP: 150/88   Pulse: 87   Temp: 97.5 °F (36.4 °C)   SpO2: 99%       Physical Exam  No acute distress. Vital signs as documented.  Sclera anicteric.  Neck without noticeable JVD. Lungs clear to auscultation. Heart exam notable for regular rhythm, normal sounds. Abdomen is soft, nontender, non distended, normal bowel sounds and without evidence of organomegaly, masses.  Neuro alert, moves extremities.        Assessment/Plan   Problem List Items Addressed This Visit        Gastrointestinal Abdominal     Gastroesophageal reflux disease without esophagitis    Relevant Medications    dicyclomine (Bentyl) 10 MG capsule    Fatty liver - Primary    Overview     U/s 5/2019 F1 fibrosis.          Colon polyp    Overview     2 diminutive polyps were destroyed and colonoscopy March 2020.  Based on history of adenomatous polyps and strong family history of colon cancer involving multiple first-degree relatives surveillance colonoscopy recommended again approximately March 2023         Irritable bowel syndrome            Regarding her reflux disease reinforced reflux precautions.  She does have IBS type symptoms and she attributes some of this to food intolerance such as to gluten.  She will continue avoid gluten.  I renewed her prescription for Bentyl to use on an as-needed basis.  Regarding her fatty liver we once again discussed how fatty liver can lead to cirrhosis and I reinforced the need to lose weight, try to get her cholesterol under control etc.  Goal would be to exercise and diet and try to achieve ideal body weight.  She does enjoy swimming with her grandchildren.  She has 5 granddaughters in a pool.  I encouraged her to exercise as much as she can and  pull and maybe start walking.    She does have a history of colon polyps.  She will be due for colonoscopy this coming March as we discussed.  She expressed understanding.  We will have her come back and see us in the office in March, sooner if needed.    Continue ongoing management by primary care provider and other specialists.     Body mass index is 35.11 kg/m².  Elevated BMI, weight loss and exercise advised      EMR Dragon/transcription disclaimer:  Much of this encounter note is electronic transcription/translation of spoken language to printed text.  The electronic translation of spoken language may be erroneous, or at times, nonsensical words or phrases may be inadvertently transcribed.  Although I have reviewed the note for such errors, some may still exist.    Lew Orona MD  16:45 CDT  07/08/21

## 2021-07-12 ENCOUNTER — OFFICE VISIT (OUTPATIENT)
Dept: FAMILY MEDICINE CLINIC | Facility: CLINIC | Age: 71
End: 2021-07-12

## 2021-07-12 VITALS
WEIGHT: 214 LBS | OXYGEN SATURATION: 97 % | RESPIRATION RATE: 16 BRPM | HEIGHT: 65 IN | TEMPERATURE: 97.4 F | SYSTOLIC BLOOD PRESSURE: 134 MMHG | BODY MASS INDEX: 35.65 KG/M2 | HEART RATE: 91 BPM | DIASTOLIC BLOOD PRESSURE: 84 MMHG

## 2021-07-12 DIAGNOSIS — E78.2 MIXED HYPERLIPIDEMIA: Primary | ICD-10-CM

## 2021-07-12 DIAGNOSIS — I10 ESSENTIAL HYPERTENSION: ICD-10-CM

## 2021-07-12 DIAGNOSIS — E03.9 ACQUIRED HYPOTHYROIDISM: ICD-10-CM

## 2021-07-12 DIAGNOSIS — K21.9 GASTROESOPHAGEAL REFLUX DISEASE WITHOUT ESOPHAGITIS: ICD-10-CM

## 2021-07-12 PROCEDURE — 99214 OFFICE O/P EST MOD 30 MIN: CPT | Performed by: FAMILY MEDICINE

## 2021-07-12 NOTE — PROGRESS NOTES
Subjective   Hannah Maki is a 70 y.o. female.     Chief Complaint   Patient presents with   • Hyperlipidemia   • Hypertension     History of Present Illness     she notes good bp control without cp or ha---tolerating meds for her lipids..tolating synthroid whotu tmyalgia s--her gerd sympotoms are contolled without dysphgai -she stopped her pravachol       Current Outpatient Medications:   •  acetic acid-hydrocortisone (VOSOL-HC) 1-2 % otic solution, Administer 4 drops into ear(s) as directed by provider 2 (Two) Times a Day. 3-4 drops in the affected ear 2-3 times a day, Disp: 35 mL, Rfl: 0  •  dicyclomine (Bentyl) 10 MG capsule, Take 1 capsule by mouth 3 (Three) Times a Day As Needed (abdominal discomfort)., Disp: 90 capsule, Rfl: 3  •  esomeprazole (NexIUM) 20 MG capsule, Take 20 mg by mouth Every Morning Before Breakfast., Disp: , Rfl:   •  Evolocumab (REPATHA) solution prefilled syringe injection, Inject 1 mL under the skin into the appropriate area as directed Every 14 (Fourteen) Days., Disp: 2 mL, Rfl: 2  •  levothyroxine (SYNTHROID, LEVOTHROID) 100 MCG tablet, Take 1 tablet by mouth Daily., Disp: 90 tablet, Rfl: 1  •  meclizine (ANTIVERT) 25 MG tablet, Take 1 tablet by mouth 3 (Three) Times a Day As Needed for Dizziness (vertigo). Take one by mouth three times a day as needed for vertigo, Disp: 60 tablet, Rfl: 3  •  meloxicam (MOBIC) 15 MG tablet, Take 15 mg by mouth Daily., Disp: , Rfl:   •  montelukast (SINGULAIR) 10 MG tablet, Take 1 tablet by mouth Every Night., Disp: 30 tablet, Rfl: 11  •  mupirocin (BACTROBAN) 2 % nasal ointment, Apply intranasally twice a day, Disp: 30 g, Rfl: 5  •  pravastatin (Pravachol) 20 MG tablet, Take 1 tablet by mouth Every Night., Disp: 90 tablet, Rfl: 1  •  saccharomyces boulardii (Florastor) 250 MG capsule, Take 1 capsule by mouth 2 (Two) Times a Day., Disp: 28 capsule, Rfl: 0  •  valsartan (DIOVAN) 320 MG tablet, Take 1 tablet by mouth Daily., Disp: 90 tablet, Rfl:  1  Allergies   Allergen Reactions   • Ceftin [Cefuroxime Axetil] Other (See Comments)     Pt does not recall   • Augmentin [Amoxicillin-Pot Clavulanate] Rash   • Azithromycin Nausea Only   • Bactrim [Sulfamethoxazole-Trimethoprim] Nausea And Vomiting   • Cefuroxime Nausea Only   • Codeine GI Intolerance   • Demerol [Meperidine] Nausea And Vomiting   • Erythromycin Rash   • Latex Other (See Comments)     Patient says it pulls her skin off.   • Tequin [Gatifloxacin] Nausea Only   • Tetracyclines & Related Nausea And Vomiting       Past Medical History:   Diagnosis Date   • Abnormal liver enzymes    • Achilles bursitis    • Anxiety    • Arthralgia    • Arthritis    • Asthma    • Chronic pansinusitis 10/14/2016   • Colon polyp    • Diverticulosis    • Dysuria    • Fatty liver    • GERD (gastroesophageal reflux disease)    • Hyperlipidemia    • Hypertension    • Hyperthyroidism    • Non-cardiac chest pain    • Osteoarthritis    • Palpitations    • Perennial allergic rhinitis 10/14/2016   • Pneumonia    • PONV (postoperative nausea and vomiting)    • Rhinitis    • Tinnitus    • Trigeminal neuralgia    • Vertigo      Past Surgical History:   Procedure Laterality Date   • APPENDECTOMY     • CATARACT EXTRACTION WITH INTRAOCULAR LENS IMPLANT     • CHOLECYSTECTOMY     • COLONOSCOPY  11/18/2013     six polyps removed or destroyed, Diverticulosis  Recall 3 years   • COLONOSCOPY  11/18/2013   • COLONOSCOPY N/A 4/4/2017    Procedure: COLONOSCOPY WITH ANESTHESIA;  Surgeon: Lew Orona MD;  Location: [facility];  Service:    • COLONOSCOPY N/A 3/16/2020    Procedure: COLONOSCOPY WITH ANESTHESIA;  Surgeon: Lew Orona MD;  Location: [facility];  Service: Gastroenterology;  Laterality: N/A;  pre op: hx polyps  Post op:polyps  PCP: Deni Henry MD   • EYE SURGERY  Nov 2018    Cataract Removal   • HEMORRHOIDECTOMY     • HYSTERECTOMY     • NECK SURGERY     • NOSE SURGERY      nose bleeding artery    •  "OTHER SURGICAL HISTORY      ingrown toe nail   • RECTAL FISSURE INCISION AND DRAINAGE         Review of Systems   Constitutional: Negative.    HENT: Negative.    Eyes: Negative.    Respiratory: Negative.    Cardiovascular: Negative.    Gastrointestinal: Negative.    Endocrine: Negative.    Genitourinary: Negative.    Musculoskeletal: Negative.    Skin: Negative.    Allergic/Immunologic: Negative.    Neurological: Negative.    Hematological: Negative.    Psychiatric/Behavioral: Negative.        Objective  /84 (BP Location: Left arm)   Pulse 91   Temp 97.4 °F (36.3 °C)   Resp 16   Ht 165.1 cm (65\")   Wt 97.1 kg (214 lb)   LMP 10/04/1981   SpO2 97%   BMI 35.61 kg/m²   Physical Exam  Vitals and nursing note reviewed.   Constitutional:       Appearance: Normal appearance. She is normal weight.   HENT:      Head: Normocephalic.      Nose: Nose normal.      Mouth/Throat:      Mouth: Mucous membranes are moist.      Pharynx: Oropharynx is clear.   Eyes:      Extraocular Movements: Extraocular movements intact.      Conjunctiva/sclera: Conjunctivae normal.      Pupils: Pupils are equal, round, and reactive to light.   Cardiovascular:      Rate and Rhythm: Normal rate and regular rhythm.      Pulses: Normal pulses.      Heart sounds: Normal heart sounds.   Pulmonary:      Effort: Pulmonary effort is normal.      Breath sounds: Normal breath sounds.   Abdominal:      General: Abdomen is flat. Bowel sounds are normal.      Palpations: Abdomen is soft.   Musculoskeletal:         General: Normal range of motion.      Cervical back: Normal range of motion and neck supple.   Skin:     General: Skin is warm and dry.      Capillary Refill: Capillary refill takes less than 2 seconds.   Neurological:      General: No focal deficit present.      Mental Status: She is alert and oriented to person, place, and time. Mental status is at baseline.   Psychiatric:         Mood and Affect: Mood normal.         Behavior: Behavior " normal.         Thought Content: Thought content normal.         Judgment: Judgment normal.         Assessment/Plan   Diagnoses and all orders for this visit:    1. Mixed hyperlipidemia (Primary)  -     CBC & Differential  -     Comprehensive metabolic panel  -     Lipid Panel With / Chol / HDL Ratio  -     TSH    2. Essential hypertension  -     CBC & Differential  -     Comprehensive metabolic panel  -     Lipid Panel With / Chol / HDL Ratio  -     TSH    3. Acquired hypothyroidism  -     CBC & Differential  -     Comprehensive metabolic panel  -     Lipid Panel With / Chol / HDL Ratio  -     TSH    4. Gastroesophageal reflux disease without esophagitis    we will assess lipids and see if tx will be tx--certainly willl need tx.   She will monitor bp anc keep us informemd  She prashant continue synthroid =and monitor for signs of toxiciity.  She will continue ppi and monitor for dysphagia.               Orders Placed This Encounter   Procedures   • Comprehensive metabolic panel     Order Specific Question:   Release to patient     Answer:   Immediate   • Lipid Panel With / Chol / HDL Ratio     Order Specific Question:   Release to patient     Answer:   Immediate   • TSH     Order Specific Question:   Release to patient     Answer:   Immediate   • CBC & Differential       Follow up: 6 month(s)

## 2021-07-13 LAB
ALBUMIN SERPL-MCNC: 4.7 G/DL (ref 3.5–5.2)
ALBUMIN/GLOB SERPL: 1.8 G/DL
ALP SERPL-CCNC: 140 U/L (ref 39–117)
ALT SERPL-CCNC: 94 U/L (ref 1–33)
AST SERPL-CCNC: 76 U/L (ref 1–32)
BASOPHILS # BLD AUTO: 0.11 10*3/MM3 (ref 0–0.2)
BASOPHILS NFR BLD AUTO: 1.1 % (ref 0–1.5)
BILIRUB SERPL-MCNC: 0.6 MG/DL (ref 0–1.2)
BUN SERPL-MCNC: 11 MG/DL (ref 8–23)
BUN/CREAT SERPL: 13.4 (ref 7–25)
CALCIUM SERPL-MCNC: 10.2 MG/DL (ref 8.6–10.5)
CHLORIDE SERPL-SCNC: 101 MMOL/L (ref 98–107)
CHOLEST SERPL-MCNC: 275 MG/DL (ref 0–200)
CHOLEST/HDLC SERPL: 4.3 {RATIO}
CO2 SERPL-SCNC: 24.7 MMOL/L (ref 22–29)
CREAT SERPL-MCNC: 0.82 MG/DL (ref 0.57–1)
EOSINOPHIL # BLD AUTO: 0.28 10*3/MM3 (ref 0–0.4)
EOSINOPHIL NFR BLD AUTO: 2.8 % (ref 0.3–6.2)
ERYTHROCYTE [DISTWIDTH] IN BLOOD BY AUTOMATED COUNT: 12.6 % (ref 12.3–15.4)
GLOBULIN SER CALC-MCNC: 2.6 GM/DL
GLUCOSE SERPL-MCNC: 117 MG/DL (ref 65–99)
HCT VFR BLD AUTO: 51 % (ref 34–46.6)
HDLC SERPL-MCNC: 64 MG/DL (ref 40–60)
HGB BLD-MCNC: 16.9 G/DL (ref 12–15.9)
IMM GRANULOCYTES # BLD AUTO: 0.06 10*3/MM3 (ref 0–0.05)
IMM GRANULOCYTES NFR BLD AUTO: 0.6 % (ref 0–0.5)
LDLC SERPL CALC-MCNC: 185 MG/DL (ref 0–100)
LYMPHOCYTES # BLD AUTO: 3.85 10*3/MM3 (ref 0.7–3.1)
LYMPHOCYTES NFR BLD AUTO: 37.9 % (ref 19.6–45.3)
MCH RBC QN AUTO: 31.3 PG (ref 26.6–33)
MCHC RBC AUTO-ENTMCNC: 33.1 G/DL (ref 31.5–35.7)
MCV RBC AUTO: 94.4 FL (ref 79–97)
MONOCYTES # BLD AUTO: 0.77 10*3/MM3 (ref 0.1–0.9)
MONOCYTES NFR BLD AUTO: 7.6 % (ref 5–12)
NEUTROPHILS # BLD AUTO: 5.1 10*3/MM3 (ref 1.7–7)
NEUTROPHILS NFR BLD AUTO: 50 % (ref 42.7–76)
NRBC BLD AUTO-RTO: 0 /100 WBC (ref 0–0.2)
PLATELET # BLD AUTO: 272 10*3/MM3 (ref 140–450)
POTASSIUM SERPL-SCNC: 5.1 MMOL/L (ref 3.5–5.2)
PROT SERPL-MCNC: 7.3 G/DL (ref 6–8.5)
RBC # BLD AUTO: 5.4 10*6/MM3 (ref 3.77–5.28)
SODIUM SERPL-SCNC: 138 MMOL/L (ref 136–145)
TRIGL SERPL-MCNC: 146 MG/DL (ref 0–150)
TSH SERPL DL<=0.005 MIU/L-ACNC: 2.6 UIU/ML (ref 0.27–4.2)
VLDLC SERPL CALC-MCNC: 26 MG/DL (ref 5–40)
WBC # BLD AUTO: 10.17 10*3/MM3 (ref 3.4–10.8)

## 2021-07-15 RX ORDER — ALIROCUMAB 75 MG/ML
75 INJECTION, SOLUTION SUBCUTANEOUS
Qty: 2 PEN | Refills: 1 | Status: SHIPPED | OUTPATIENT
Start: 2021-07-15 | End: 2021-08-19

## 2021-07-22 PROBLEM — S76.319A STRAIN OF HAMSTRING MUSCLE: Status: ACTIVE | Noted: 2021-07-22

## 2021-07-22 PROBLEM — M17.11 OSTEOARTHRITIS OF RIGHT KNEE: Status: ACTIVE | Noted: 2021-07-22

## 2021-07-22 PROBLEM — I83.819 VARICOSE VEINS OF LEG WITH PAIN: Status: ACTIVE | Noted: 2019-05-01

## 2021-07-22 PROBLEM — M77.30 CALCANEAL SPUR: Status: ACTIVE | Noted: 2019-09-18

## 2021-07-22 PROBLEM — M25.80 SESAMOIDITIS: Status: ACTIVE | Noted: 2021-07-22

## 2021-07-22 PROBLEM — M67.00 CONTRACTURE OF ACHILLES TENDON: Status: ACTIVE | Noted: 2019-09-18

## 2021-07-22 PROBLEM — N20.0 KIDNEY STONE: Status: ACTIVE | Noted: 2021-07-22

## 2021-07-22 PROBLEM — S91.209A AVULSION OF TOENAIL: Status: ACTIVE | Noted: 2021-07-22

## 2021-07-22 PROBLEM — L60.0 INGROWN NAIL: Status: ACTIVE | Noted: 2021-07-22

## 2021-07-22 PROBLEM — M79.673 FOOT PAIN: Status: ACTIVE | Noted: 2019-09-18

## 2021-07-22 PROBLEM — M89.8X1 SCAPULALGIA: Status: ACTIVE | Noted: 2019-04-01

## 2021-07-22 PROBLEM — I87.2 CHRONIC VENOUS INSUFFICIENCY: Status: ACTIVE | Noted: 2019-06-05

## 2021-08-19 ENCOUNTER — OFFICE VISIT (OUTPATIENT)
Dept: FAMILY MEDICINE CLINIC | Facility: CLINIC | Age: 71
End: 2021-08-19

## 2021-08-19 VITALS
RESPIRATION RATE: 18 BRPM | OXYGEN SATURATION: 94 % | WEIGHT: 212 LBS | DIASTOLIC BLOOD PRESSURE: 86 MMHG | TEMPERATURE: 97.8 F | BODY MASS INDEX: 35.32 KG/M2 | HEIGHT: 65 IN | HEART RATE: 81 BPM | SYSTOLIC BLOOD PRESSURE: 142 MMHG

## 2021-08-19 DIAGNOSIS — L60.0 INGROWN LEFT BIG TOENAIL: Primary | ICD-10-CM

## 2021-08-19 PROCEDURE — 99213 OFFICE O/P EST LOW 20 MIN: CPT | Performed by: NURSE PRACTITIONER

## 2021-08-19 NOTE — PROGRESS NOTES
Subjective   Chief Complaint:  Left #1 toe pain    History of Present Illness:  This 70 y.o. female was seen in the office today.  She presents with left #1 toe pain, reports did produce purulent material.  Reports is sore.    Allergies   Allergen Reactions   • Ceftin [Cefuroxime Axetil] Other (See Comments)     Pt does not recall   • Augmentin [Amoxicillin-Pot Clavulanate] Rash   • Azithromycin Nausea Only   • Bactrim [Sulfamethoxazole-Trimethoprim] Nausea And Vomiting   • Cefuroxime Nausea Only   • Codeine GI Intolerance   • Demerol [Meperidine] Nausea And Vomiting   • Erythromycin Rash   • Latex Other (See Comments)     Patient says it pulls her skin off.   • Tequin [Gatifloxacin] Nausea Only   • Tetracyclines & Related Nausea And Vomiting      Current Outpatient Medications on File Prior to Visit   Medication Sig   • acetic acid-hydrocortisone (VOSOL-HC) 1-2 % otic solution Administer 4 drops into ear(s) as directed by provider 2 (Two) Times a Day. 3-4 drops in the affected ear 2-3 times a day   • dicyclomine (Bentyl) 10 MG capsule Take 1 capsule by mouth 3 (Three) Times a Day As Needed (abdominal discomfort).   • esomeprazole (NexIUM) 20 MG capsule Take 20 mg by mouth Every Morning Before Breakfast.   • Evolocumab (REPATHA) solution prefilled syringe injection Inject 1 mL under the skin into the appropriate area as directed Every 14 (Fourteen) Days.   • levothyroxine (SYNTHROID, LEVOTHROID) 100 MCG tablet Take 1 tablet by mouth Daily.   • meclizine (ANTIVERT) 25 MG tablet Take 1 tablet by mouth 3 (Three) Times a Day As Needed for Dizziness (vertigo). Take one by mouth three times a day as needed for vertigo   • meloxicam (MOBIC) 15 MG tablet Take 15 mg by mouth Daily.   • montelukast (SINGULAIR) 10 MG tablet Take 1 tablet by mouth Every Night.   • mupirocin (BACTROBAN) 2 % nasal ointment Apply intranasally twice a day   • saccharomyces boulardii (Florastor) 250 MG capsule Take 1 capsule by mouth 2 (Two) Times a  Day.   • valsartan (DIOVAN) 320 MG tablet Take 1 tablet by mouth Daily.     No current facility-administered medications on file prior to visit.      Past Medical, Surgical, Social, and Family History:  Past Medical History:   Diagnosis Date   • Abnormal liver enzymes    • Achilles bursitis    • Anxiety    • Arthralgia    • Arthritis    • Asthma    • Chronic pansinusitis 10/14/2016   • Colon polyp    • Diverticulosis    • Dysuria    • Fatty liver    • GERD (gastroesophageal reflux disease)    • Hyperlipidemia    • Hypertension    • Hyperthyroidism    • Non-cardiac chest pain    • Osteoarthritis    • Palpitations    • Perennial allergic rhinitis 10/14/2016   • Pneumonia    • PONV (postoperative nausea and vomiting)    • Rhinitis    • Tinnitus    • Trigeminal neuralgia    • Vertigo      Past Surgical History:   Procedure Laterality Date   • APPENDECTOMY     • CATARACT EXTRACTION WITH INTRAOCULAR LENS IMPLANT     • CHOLECYSTECTOMY     • COLONOSCOPY  11/18/2013     six polyps removed or destroyed, Diverticulosis  Recall 3 years   • COLONOSCOPY  11/18/2013   • COLONOSCOPY N/A 4/4/2017    Procedure: COLONOSCOPY WITH ANESTHESIA;  Surgeon: Lew Orona MD;  Location: [facility] ENDOSCOPY;  Service:    • COLONOSCOPY N/A 3/16/2020    Procedure: COLONOSCOPY WITH ANESTHESIA;  Surgeon: Lew Orona MD;  Location: [facility] ENDOSCOPY;  Service: Gastroenterology;  Laterality: N/A;  pre op: hx polyps  Post op:polyps  PCP: Deni Henry MD   • EYE SURGERY  Nov 2018    Cataract Removal   • HEMORRHOIDECTOMY     • HYSTERECTOMY     • NECK SURGERY     • NOSE SURGERY      nose bleeding artery    • OTHER SURGICAL HISTORY      ingrown toe nail   • RECTAL FISSURE INCISION AND DRAINAGE       Social History     Socioeconomic History   • Marital status:      Spouse name: Not on file   • Number of children: Not on file   • Years of education: Not on file   • Highest education level: Not on file   Tobacco Use   • Smoking  "status: Never Smoker   • Smokeless tobacco: Never Used   Substance and Sexual Activity   • Alcohol use: Never   • Drug use: Never   • Sexual activity: Never     Family History   Problem Relation Age of Onset   • Diabetes Brother    • Heart disease Brother    • Colon cancer Sister 57   • Cancer Sister    • Colon cancer Sister 55   • Cancer Sister    • Arthritis Mother    • Heart disease Father    • Heart attack Father      Objective   Physical Exam  Vitals reviewed.   Constitutional:       General: She is not in acute distress.     Appearance: Normal appearance.   Cardiovascular:      Rate and Rhythm: Normal rate and regular rhythm.   Pulmonary:      Effort: Pulmonary effort is normal.      Breath sounds: Normal breath sounds.   Skin:     Findings: Erythema present. No rash.     /86   Pulse 81   Temp 97.8 °F (36.6 °C) (Infrared)   Resp 18   Ht 165.1 cm (65\")   Wt 96.2 kg (212 lb)   LMP 10/04/1981   SpO2 94%   BMI 35.28 kg/m²     Assessment/Plan   Diagnoses and all orders for this visit:    1. Ingrown left big toenail (Primary)    Discussion:  Advised and educated plan of care.  Advised to let the nail grow out.  She already has a prescription for amoxicillin-she has multiple allergies, this is a good choice for this.  Advised to follow-up as needed if no better.    Follow-up:  Return if symptoms worsen or fail to improve.    Electronically signed by FADI Lynch, 08/19/21, 9:27 AM CDT.  "

## 2021-10-04 DIAGNOSIS — J30.89 PERENNIAL ALLERGIC RHINITIS: ICD-10-CM

## 2021-10-04 DIAGNOSIS — R09.82 POST-NASAL DRIP: ICD-10-CM

## 2021-10-04 RX ORDER — LEVOTHYROXINE SODIUM 0.1 MG/1
100 TABLET ORAL DAILY
Qty: 90 TABLET | Refills: 1 | Status: SHIPPED | OUTPATIENT
Start: 2021-10-04 | End: 2022-03-28 | Stop reason: SDUPTHER

## 2021-10-04 RX ORDER — MONTELUKAST SODIUM 10 MG/1
10 TABLET ORAL NIGHTLY
Qty: 30 TABLET | Refills: 8 | Status: SHIPPED | OUTPATIENT
Start: 2021-10-04 | End: 2022-07-05 | Stop reason: SDUPTHER

## 2021-10-04 RX ORDER — VALSARTAN 320 MG/1
320 TABLET ORAL DAILY
Qty: 90 TABLET | Refills: 1 | Status: SHIPPED | OUTPATIENT
Start: 2021-10-04 | End: 2022-03-28 | Stop reason: SDUPTHER

## 2021-10-15 ENCOUNTER — TELEPHONE (OUTPATIENT)
Dept: FAMILY MEDICINE CLINIC | Facility: CLINIC | Age: 71
End: 2021-10-15

## 2021-10-15 RX ORDER — NYSTATIN 100000 [USP'U]/G
POWDER TOPICAL 3 TIMES DAILY
Qty: 60 G | Refills: 0 | Status: SHIPPED | OUTPATIENT
Start: 2021-10-15 | End: 2022-10-17

## 2021-10-15 NOTE — TELEPHONE ENCOUNTER
Hannah called & said that under the apron of her stomach is red, itchy, irritated & burning.  She req med sent to  [facility]

## 2021-10-18 ENCOUNTER — TELEPHONE (OUTPATIENT)
Dept: FAMILY MEDICINE CLINIC | Facility: CLINIC | Age: 71
End: 2021-10-18

## 2021-10-18 RX ORDER — CLOTRIMAZOLE AND BETAMETHASONE DIPROPIONATE 10; .64 MG/G; MG/G
1 CREAM TOPICAL 2 TIMES DAILY
Qty: 15 G | Refills: 0 | Status: SHIPPED | OUTPATIENT
Start: 2021-10-18 | End: 2022-10-17

## 2021-10-18 NOTE — TELEPHONE ENCOUNTER
Hannah called & said that she has been using the nystatin powder since Friday and continues to have redness, irritation, burning and itching.  Please advise

## 2021-11-02 ENCOUNTER — TELEPHONE (OUTPATIENT)
Dept: FAMILY MEDICINE CLINIC | Facility: CLINIC | Age: 71
End: 2021-11-02

## 2021-11-02 RX ORDER — AMOXICILLIN 500 MG/1
500 TABLET, FILM COATED ORAL 3 TIMES DAILY
Qty: 21 TABLET | Refills: 0 | Status: SHIPPED | OUTPATIENT
Start: 2021-11-02 | End: 2021-11-09

## 2021-11-26 NOTE — TELEPHONE ENCOUNTER
PATIENT CALLED IN STATING THAT SHE FINISHED HER STEROID PACK FOR HER CHEST COLD, HOWEVER SHE IS STILL EXPERIENCING SYMPTOMS. PATIENT IS WONDERING IF SHE CAN BE PRESCRIBED SOMETHING ELSE. PLEASE ADVISE.     CALLBACK # [phone]    [address] [place] [address] [phone] [facility] [phone]   [phone]   Name band;

## 2021-11-29 ENCOUNTER — TELEPHONE (OUTPATIENT)
Dept: FAMILY MEDICINE CLINIC | Facility: CLINIC | Age: 71
End: 2021-11-29

## 2021-11-29 RX ORDER — AMOXICILLIN 250 MG/1
250 CAPSULE ORAL 3 TIMES DAILY
Qty: 30 CAPSULE | Refills: 0 | Status: SHIPPED | OUTPATIENT
Start: 2021-11-29 | End: 2021-12-09

## 2022-01-10 ENCOUNTER — OFFICE VISIT (OUTPATIENT)
Dept: FAMILY MEDICINE CLINIC | Facility: CLINIC | Age: 72
End: 2022-01-10

## 2022-01-10 VITALS
OXYGEN SATURATION: 97 % | DIASTOLIC BLOOD PRESSURE: 86 MMHG | HEIGHT: 65 IN | BODY MASS INDEX: 35.82 KG/M2 | SYSTOLIC BLOOD PRESSURE: 144 MMHG | RESPIRATION RATE: 16 BRPM | HEART RATE: 79 BPM | WEIGHT: 215 LBS

## 2022-01-10 DIAGNOSIS — R94.39 EQUIVOCAL STRESS TEST: Primary | ICD-10-CM

## 2022-01-10 DIAGNOSIS — E03.9 ACQUIRED HYPOTHYROIDISM: ICD-10-CM

## 2022-01-10 DIAGNOSIS — E78.2 MIXED HYPERLIPIDEMIA: ICD-10-CM

## 2022-01-10 DIAGNOSIS — I10 PRIMARY HYPERTENSION: ICD-10-CM

## 2022-01-10 PROCEDURE — 99213 OFFICE O/P EST LOW 20 MIN: CPT | Performed by: FAMILY MEDICINE

## 2022-01-10 PROCEDURE — G0439 PPPS, SUBSEQ VISIT: HCPCS | Performed by: FAMILY MEDICINE

## 2022-01-10 PROCEDURE — 1170F FXNL STATUS ASSESSED: CPT | Performed by: FAMILY MEDICINE

## 2022-01-10 PROCEDURE — 1159F MED LIST DOCD IN RCRD: CPT | Performed by: FAMILY MEDICINE

## 2022-01-10 NOTE — PROGRESS NOTES
The ABCs of the Annual Wellness Visit  Subsequent Medicare Wellness Visit    Chief Complaint   Patient presents with   • Medicare Wellness-subsequent   • Hyperlipidemia     6 mo f/u      Subjective    History of Present Illness:  Hannah Maki is a 71 y.o. female who presents for a Subsequent Medicare Wellness Visit.    The following portions of the patient's history were reviewed and   updated as appropriate: allergies, current medications, past family history, past medical history, past social history, past surgical history and problem list.    Compared to one year ago, the patient feels her physical   health is the same.    Compared to one year ago, the patient feels her mental   health is the same.    Recent Hospitalizations:  She was not admitted to the hospital during the last year.       Current Medical Providers:  Patient Care Team:  Deni Henry MD as PCP - General  Deni Henry MD as PCP - Family Medicine  [facility] Phys[facility] (Otolaryngology)  Deni Henry MD as Referring Physician (Family Medicine)  Asael Suazo MD as Cardiologist (Cardiology)  Lew Orona MD as Consulting Physician (Gastroenterology)  Lew Orona MD as Consulting Physician (Gastroenterology)  Jacquie Jack APRN as Nurse Practitioner (Nurse Practitioner)    Outpatient Medications Prior to Visit   Medication Sig Dispense Refill   • acetic acid-hydrocortisone (VOSOL-HC) 1-2 % otic solution Administer 4 drops into ear(s) as directed by provider 2 (Two) Times a Day. 3-4 drops in the affected ear 2-3 times a day 35 mL 0   • clotrimazole-betamethasone (LOTRISONE) 1-0.05 % cream Apply 1 application topically to the appropriate area as directed 2 (Two) Times a Day. 15 g 0   • dicyclomine (Bentyl) 10 MG capsule Take 1 capsule by mouth 3 (Three) Times a Day As Needed (abdominal discomfort). 90 capsule 3   • esomeprazole (NexIUM) 20 MG capsule Take 20 mg by  mouth Every Morning Before Breakfast.     • Evolocumab (REPATHA) solution prefilled syringe injection Inject 1 mL under the skin into the appropriate area as directed Every 14 (Fourteen) Days. 2 mL 2   • levothyroxine (SYNTHROID, LEVOTHROID) 100 MCG tablet Take 1 tablet by mouth Daily. 90 tablet 1   • meclizine (ANTIVERT) 25 MG tablet Take 1 tablet by mouth 3 (Three) Times a Day As Needed for Dizziness (vertigo). Take one by mouth three times a day as needed for vertigo 60 tablet 3   • meloxicam (MOBIC) 15 MG tablet Take 15 mg by mouth Daily.     • montelukast (SINGULAIR) 10 MG tablet Take 1 tablet by mouth Every Night. 30 tablet 8   • mupirocin (BACTROBAN) 2 % nasal ointment Apply intranasally twice a day 30 g 5   • nystatin (MYCOSTATIN) 103147 UNIT/GM powder Apply  topically to the appropriate area as directed 3 (Three) Times a Day. 60 g 0   • saccharomyces boulardii (Florastor) 250 MG capsule Take 1 capsule by mouth 2 (Two) Times a Day. 28 capsule 0   • valsartan (DIOVAN) 320 MG tablet Take 1 tablet by mouth Daily. 90 tablet 1     No facility-administered medications prior to visit.       No opioid medication identified on active medication list. I have reviewed chart for other potential  high risk medication/s and harmful drug interactions in the elderly.          Aspirin is not on active medication list.  Aspirin use is not indicated based on review of current medical condition/s. Risk of harm outweighs potential benefits.  .    Patient Active Problem List   Diagnosis   • Mixed hyperlipidemia   • Acquired hypothyroidism   • Mild intermittent asthma   • Gastroesophageal reflux disease without esophagitis   • Fatty liver   • Chronic pansinusitis   • Perennial allergic rhinitis   • Post-nasal drip   • Bronchitis   • Rectal fissure   • Sinusitis   • Parotid mass   • Atypical chest pain   • Hypertension   • Dyspnea   • Arthralgia of both ankles   • Cervicalgia   • Scapulalgia   • Adult BMI 35.0-35.9 kg/sq m   •  "Non-smoker   • Abnormal LFTs   • Colon polyp   • Family history of GI malignancy   • Diarrhea   • Generalized abdominal pain   • Family hx of colon cancer   • Cervical radiculopathy   • Spinal stenosis in cervical region   • BMI 34.0-34.9,adult   • Plantar fasciitis   • Sensorineural hearing loss (SNHL) of both ears   • Tinnitus of both ears   • Acute maxillary sinusitis   • History of adenomatous polyp of colon   • Other specified disorders of carbohydrate metabolism (HCC)   • Morbidly obese (HCC)   • Irritable bowel syndrome   • Avulsion of toenail   • Calcaneal spur   • Chronic venous insufficiency   • Contracture of Achilles tendon   • Foot pain   • Ingrown nail   • Kidney stone   • Osteoarthritis of right knee   • Sesamoiditis   • Strain of hamstring muscle   • Varicose veins of leg with pain     Advance Care Planning  Advance Directive is not on file.  ACP discussion was held with the patient during this visit. Patient does not have an advance directive, information provided.          Objective    Vitals:    01/10/22 0853   BP: 144/86   Pulse: 79   Resp: 16   SpO2: 97%   Weight: 97.5 kg (215 lb)   Height: 165.1 cm (65\")     BMI Readings from Last 1 Encounters:   01/10/22 35.78 kg/m²   BMI is above normal parameters. Recommendations include: nutrition counseling    Does the patient have evidence of cognitive impairment? No    Physical Exam            HEALTH RISK ASSESSMENT    Smoking Status:  Social History     Tobacco Use   Smoking Status Never Smoker   Smokeless Tobacco Never Used     Alcohol Consumption:  Social History     Substance and Sexual Activity   Alcohol Use Never     Fall Risk Screen:    SYLVIE Fall Risk Assessment was completed, and patient is at LOW risk for falls.Assessment completed on:1/10/2022    Depression Screening:  PHQ-2/PHQ-9 Depression Screening 1/10/2022   Little interest or pleasure in doing things 0   Feeling down, depressed, or hopeless 0   Trouble falling or staying asleep, or " sleeping too much 0   Feeling tired or having little energy 0   Poor appetite or overeating 0   Feeling bad about yourself - or that you are a failure or have let yourself or your family down 0   Trouble concentrating on things, such as reading the newspaper or watching television 0   Moving or speaking so slowly that other people could have noticed. Or the opposite - being so fidgety or restless that you have been moving around a lot more than usual 0   Thoughts that you would be better off dead, or of hurting yourself in some way 0   Total Score 0   If you checked off any problems, how difficult have these problems made it for you to do your work, take care of things at home, or get along with other people? Not difficult at all       Health Habits and Functional and Cognitive Screening:  Functional & Cognitive Status 1/10/2022   Do you have difficulty preparing food and eating? No   Do you have difficulty bathing yourself, getting dressed or grooming yourself? No   Do you have difficulty using the toilet? No   Do you have difficulty moving around from place to place? No   Do you have trouble with steps or getting out of a bed or a chair? No   Current Diet Well Balanced Diet   Dental Exam Up to date   Eye Exam Up to date   Exercise (times per week) 3 times per week   Current Exercises Include Walking   Current Exercise Activities Include -   Do you need help using the phone?  No   Are you deaf or do you have serious difficulty hearing?  No   Do you need help with transportation? No   Do you need help shopping? No   Do you need help preparing meals?  No   Do you need help with housework?  No   Do you need help with laundry? No   Do you need help taking your medications? No   Do you need help managing money? No   Do you ever drive or ride in a car without wearing a seat belt? No   Have you felt unusual stress, anger or loneliness in the last month? No   Who do you live with? Spouse   If you need help, do you have  trouble finding someone available to you? No   Have you been bothered in the last four weeks by sexual problems? No   Do you have difficulty concentrating, remembering or making decisions? No       Age-appropriate Screening Schedule:  Refer to the list below for future screening recommendations based on patient's age, sex and/or medical conditions. Orders for these recommended tests are listed in the plan section. The patient has been provided with a written plan.    Health Maintenance   Topic Date Due   • DXA SCAN  Never done   • TDAP/TD VACCINES (1 - Tdap) Never done   • ZOSTER VACCINE (1 of 2) Never done   • PAP SMEAR  Never done   • INFLUENZA VACCINE  01/10/2023 (Originally 8/1/2021)   • LIPID PANEL  07/12/2022   • MAMMOGRAM  06/24/2023              Assessment/Plan   CMS Preventative Services Quick Reference  Risk Factors Identified During Encounter  Cardiovascular Disease  The above risks/problems have been discussed with the patient.  Follow up actions/plans if indicated are seen below in the Assessment/Plan Section.  Pertinent information has been shared with the patient in the After Visit Summary.    Diagnoses and all orders for this visit:    1. Equivocal stress test (Primary)  -     CBC & Differential  -     Comprehensive metabolic panel  -     Lipid Panel With / Chol / HDL Ratio  -     TSH    2. Mixed hyperlipidemia  -     CBC & Differential  -     Comprehensive metabolic panel  -     Lipid Panel With / Chol / HDL Ratio  -     TSH    3. Primary hypertension  -     CBC & Differential  -     Comprehensive metabolic panel  -     Lipid Panel With / Chol / HDL Ratio  -     TSH    4. Acquired hypothyroidism  -     CBC & Differential  -     Comprehensive metabolic panel  -     Lipid Panel With / Chol / HDL Ratio  -     TSH    Patient's Body mass index is 35.78 kg/m². indicating that she is obese (BMI >30). Obesity-related health conditions include the following: hypertension and dyslipidemias. Obesity is  unchanged. BMI is is above average; BMI management plan is completed. We discussed portion control and increasing exercise..    Follow Up:   No follow-ups on file.     An After Visit Summary and PPPS were made available to the patient.        I spent 5  minutes caring for Hannah on this date of service. This time includes time spent by me in the following activities:reviewing tests, referring and communicating with other health care professionals , documenting information in the medical record and independently interpreting results and communicating that information with the patient/family/caregiver

## 2022-01-10 NOTE — PROGRESS NOTES
Subjective   Hannah Maki is a 71 y.o. female.     Chief Complaint   Patient presents with   • Medicare Wellness-subsequent   • Hyperlipidemia     6 mo f/u       History of Present Illness     she nots good bp controlwithout cp ro ah--sheis tolerating lipid tx wsithout cojmpolications...her gerd syhmptosm are stalbe without dysphagia      Current Outpatient Medications:   •  acetic acid-hydrocortisone (VOSOL-HC) 1-2 % otic solution, Administer 4 drops into ear(s) as directed by provider 2 (Two) Times a Day. 3-4 drops in the affected ear 2-3 times a day, Disp: 35 mL, Rfl: 0  •  clotrimazole-betamethasone (LOTRISONE) 1-0.05 % cream, Apply 1 application topically to the appropriate area as directed 2 (Two) Times a Day., Disp: 15 g, Rfl: 0  •  dicyclomine (Bentyl) 10 MG capsule, Take 1 capsule by mouth 3 (Three) Times a Day As Needed (abdominal discomfort)., Disp: 90 capsule, Rfl: 3  •  esomeprazole (NexIUM) 20 MG capsule, Take 20 mg by mouth Every Morning Before Breakfast., Disp: , Rfl:   •  Evolocumab (REPATHA) solution prefilled syringe injection, Inject 1 mL under the skin into the appropriate area as directed Every 14 (Fourteen) Days., Disp: 2 mL, Rfl: 2  •  levothyroxine (SYNTHROID, LEVOTHROID) 100 MCG tablet, Take 1 tablet by mouth Daily., Disp: 90 tablet, Rfl: 1  •  meclizine (ANTIVERT) 25 MG tablet, Take 1 tablet by mouth 3 (Three) Times a Day As Needed for Dizziness (vertigo). Take one by mouth three times a day as needed for vertigo, Disp: 60 tablet, Rfl: 3  •  meloxicam (MOBIC) 15 MG tablet, Take 15 mg by mouth Daily., Disp: , Rfl:   •  montelukast (SINGULAIR) 10 MG tablet, Take 1 tablet by mouth Every Night., Disp: 30 tablet, Rfl: 8  •  mupirocin (BACTROBAN) 2 % nasal ointment, Apply intranasally twice a day, Disp: 30 g, Rfl: 5  •  nystatin (MYCOSTATIN) 540250 UNIT/GM powder, Apply  topically to the appropriate area as directed 3 (Three) Times a Day., Disp: 60 g, Rfl: 0  •  saccharomyces boulardii  (Florastor) 250 MG capsule, Take 1 capsule by mouth 2 (Two) Times a Day., Disp: 28 capsule, Rfl: 0  •  valsartan (DIOVAN) 320 MG tablet, Take 1 tablet by mouth Daily., Disp: 90 tablet, Rfl: 1  Allergies   Allergen Reactions   • Ceftin [Cefuroxime Axetil] Other (See Comments)     Pt does not recall   • Augmentin [Amoxicillin-Pot Clavulanate] Rash   • Azithromycin Nausea Only   • Bactrim [Sulfamethoxazole-Trimethoprim] Nausea And Vomiting   • Cefuroxime Nausea Only   • Codeine GI Intolerance   • Demerol [Meperidine] Nausea And Vomiting   • Erythromycin Rash   • Latex Other (See Comments)     Patient says it pulls her skin off.   • Tequin [Gatifloxacin] Nausea Only   • Tetracyclines & Related Nausea And Vomiting       Past Medical History:   Diagnosis Date   • Abnormal liver enzymes    • Achilles bursitis    • Anxiety    • Arthralgia    • Arthritis    • Asthma    • Chronic pansinusitis 10/14/2016   • Colon polyp    • Diverticulosis    • Dysuria    • Fatty liver    • GERD (gastroesophageal reflux disease)    • Hyperlipidemia    • Hypertension    • Hyperthyroidism    • Non-cardiac chest pain    • Osteoarthritis    • Palpitations    • Perennial allergic rhinitis 10/14/2016   • Pneumonia    • PONV (postoperative nausea and vomiting)    • Rhinitis    • Tinnitus    • Trigeminal neuralgia    • Vertigo      Past Surgical History:   Procedure Laterality Date   • APPENDECTOMY     • CATARACT EXTRACTION WITH INTRAOCULAR LENS IMPLANT     • CHOLECYSTECTOMY     • COLONOSCOPY  11/18/2013     six polyps removed or destroyed, Diverticulosis  Recall 3 years   • COLONOSCOPY  11/18/2013   • COLONOSCOPY N/A 4/4/2017    Procedure: COLONOSCOPY WITH ANESTHESIA;  Surgeon: Lew Orona MD;  Location: [facility] ENDOSCOPY;  Service:    • COLONOSCOPY N/A 3/16/2020    Procedure: COLONOSCOPY WITH ANESTHESIA;  Surgeon: Lew Orona MD;  Location: [facility] ENDOSCOPY;  Service: Gastroenterology;  Laterality: N/A;  pre op: hx polyps  Post  "op:polyps  PCP: Deni Henry MD   • EYE SURGERY  Nov 2018    Cataract Removal   • HEMORRHOIDECTOMY     • HYSTERECTOMY     • NECK SURGERY     • NOSE SURGERY      nose bleeding artery    • OTHER SURGICAL HISTORY      ingrown toe nail   • RECTAL FISSURE INCISION AND DRAINAGE         Review of Systems   Constitutional: Negative.    HENT: Negative.    Eyes: Negative.    Respiratory: Negative.    Cardiovascular: Negative.    Gastrointestinal: Negative.    Endocrine: Negative.    Genitourinary: Negative.    Musculoskeletal: Negative.    Skin: Negative.    Allergic/Immunologic: Negative.    Neurological: Negative.    Hematological: Negative.    Psychiatric/Behavioral: Negative.        Objective  /86   Pulse 79   Resp 16   Ht 165.1 cm (65\")   Wt 97.5 kg (215 lb)   LMP 10/04/1981   SpO2 97%   BMI 35.78 kg/m²   Physical Exam  Vitals and nursing note reviewed.   Constitutional:       Appearance: Normal appearance. She is normal weight.   HENT:      Head: Normocephalic and atraumatic.      Nose: Nose normal.      Mouth/Throat:      Mouth: Mucous membranes are moist.   Eyes:      Pupils: Pupils are equal, round, and reactive to light.   Cardiovascular:      Rate and Rhythm: Normal rate and regular rhythm.      Pulses: Normal pulses.      Heart sounds: Normal heart sounds.   Pulmonary:      Effort: Pulmonary effort is normal.      Breath sounds: Normal breath sounds.   Abdominal:      General: Abdomen is flat. Bowel sounds are normal.      Palpations: Abdomen is soft.   Musculoskeletal:         General: Normal range of motion.      Cervical back: Normal range of motion and neck supple.   Skin:     General: Skin is warm and dry.      Capillary Refill: Capillary refill takes less than 2 seconds.   Neurological:      General: No focal deficit present.      Mental Status: She is alert and oriented to person, place, and time. Mental status is at baseline.   Psychiatric:         Mood and Affect: Mood normal. "         Assessment/Plan   Diagnoses and all orders for this visit:    1. Equivocal stress test (Primary)  -     CBC & Differential  -     Comprehensive metabolic panel  -     Lipid Panel With / Chol / HDL Ratio  -     TSH    2. Mixed hyperlipidemia  -     CBC & Differential  -     Comprehensive metabolic panel  -     Lipid Panel With / Chol / HDL Ratio  -     TSH    3. Primary hypertension  -     CBC & Differential  -     Comprehensive metabolic panel  -     Lipid Panel With / Chol / HDL Ratio  -     TSH    4. Acquired hypothyroidism  -     CBC & Differential  -     Comprehensive metabolic panel  -     Lipid Panel With / Chol / HDL Ratio  -     TSH    she will moniitor bp and keep us informed  She will monitor for heat/cild inonalces  She is seeing cardiology for her stress test.             Orders Placed This Encounter   Procedures   • Comprehensive metabolic panel     Order Specific Question:   Release to patient     Answer:   Immediate   • Lipid Panel With / Chol / HDL Ratio     Order Specific Question:   Release to patient     Answer:   Immediate   • TSH     Order Specific Question:   Release to patient     Answer:   Immediate   • CBC & Differential       Follow up: 6 month(s)

## 2022-01-11 LAB
ALBUMIN SERPL-MCNC: 4.8 G/DL (ref 3.5–5.2)
ALBUMIN/GLOB SERPL: 1.8 G/DL
ALP SERPL-CCNC: 162 U/L (ref 39–117)
ALT SERPL-CCNC: 94 U/L (ref 1–33)
AST SERPL-CCNC: 51 U/L (ref 1–32)
BASOPHILS # BLD AUTO: 0.11 10*3/MM3 (ref 0–0.2)
BASOPHILS NFR BLD AUTO: 1.1 % (ref 0–1.5)
BILIRUB SERPL-MCNC: 0.8 MG/DL (ref 0–1.2)
BUN SERPL-MCNC: 12 MG/DL (ref 8–23)
BUN/CREAT SERPL: 16.7 (ref 7–25)
CALCIUM SERPL-MCNC: 9.9 MG/DL (ref 8.6–10.5)
CHLORIDE SERPL-SCNC: 102 MMOL/L (ref 98–107)
CHOLEST SERPL-MCNC: 283 MG/DL (ref 0–200)
CHOLEST/HDLC SERPL: 3.99 {RATIO}
CO2 SERPL-SCNC: 23.8 MMOL/L (ref 22–29)
CREAT SERPL-MCNC: 0.72 MG/DL (ref 0.57–1)
EOSINOPHIL # BLD AUTO: 0.25 10*3/MM3 (ref 0–0.4)
EOSINOPHIL NFR BLD AUTO: 2.5 % (ref 0.3–6.2)
ERYTHROCYTE [DISTWIDTH] IN BLOOD BY AUTOMATED COUNT: 12.3 % (ref 12.3–15.4)
GLOBULIN SER CALC-MCNC: 2.6 GM/DL
GLUCOSE SERPL-MCNC: 128 MG/DL (ref 65–99)
HCT VFR BLD AUTO: 47.3 % (ref 34–46.6)
HDLC SERPL-MCNC: 71 MG/DL (ref 40–60)
HGB BLD-MCNC: 16.2 G/DL (ref 12–15.9)
IMM GRANULOCYTES # BLD AUTO: 0.06 10*3/MM3 (ref 0–0.05)
IMM GRANULOCYTES NFR BLD AUTO: 0.6 % (ref 0–0.5)
LDLC SERPL CALC-MCNC: 179 MG/DL (ref 0–100)
LYMPHOCYTES # BLD AUTO: 3.75 10*3/MM3 (ref 0.7–3.1)
LYMPHOCYTES NFR BLD AUTO: 37.7 % (ref 19.6–45.3)
MCH RBC QN AUTO: 31.3 PG (ref 26.6–33)
MCHC RBC AUTO-ENTMCNC: 34.2 G/DL (ref 31.5–35.7)
MCV RBC AUTO: 91.3 FL (ref 79–97)
MONOCYTES # BLD AUTO: 0.7 10*3/MM3 (ref 0.1–0.9)
MONOCYTES NFR BLD AUTO: 7 % (ref 5–12)
NEUTROPHILS # BLD AUTO: 5.07 10*3/MM3 (ref 1.7–7)
NEUTROPHILS NFR BLD AUTO: 51.1 % (ref 42.7–76)
NRBC BLD AUTO-RTO: 0 /100 WBC (ref 0–0.2)
PLATELET # BLD AUTO: 255 10*3/MM3 (ref 140–450)
POTASSIUM SERPL-SCNC: 4.3 MMOL/L (ref 3.5–5.2)
PROT SERPL-MCNC: 7.4 G/DL (ref 6–8.5)
RBC # BLD AUTO: 5.18 10*6/MM3 (ref 3.77–5.28)
SODIUM SERPL-SCNC: 138 MMOL/L (ref 136–145)
TRIGL SERPL-MCNC: 179 MG/DL (ref 0–150)
TSH SERPL DL<=0.005 MIU/L-ACNC: 2.23 UIU/ML (ref 0.27–4.2)
VLDLC SERPL CALC-MCNC: 33 MG/DL (ref 5–40)
WBC # BLD AUTO: 9.94 10*3/MM3 (ref 3.4–10.8)

## 2022-01-18 ENCOUNTER — TELEPHONE (OUTPATIENT)
Dept: FAMILY MEDICINE CLINIC | Facility: CLINIC | Age: 72
End: 2022-01-18

## 2022-01-18 NOTE — TELEPHONE ENCOUNTER
On 1/11 Hannah had labs done, the patient is calling for results, but it looks like Nevaeh sent you a note asking a questions, but I'm not sure if you got it because there is no response.  Please look at those results & notes on them.  Please advise.

## 2022-01-18 NOTE — TELEPHONE ENCOUNTER
If can cannot afford praluent or repatha--would she like to go back to the lipid clinic and see if there is a new tx for this? And when does she see gi again about her liver enzymes?

## 2022-03-02 ENCOUNTER — TELEPHONE (OUTPATIENT)
Dept: FAMILY MEDICINE CLINIC | Facility: CLINIC | Age: 72
End: 2022-03-02

## 2022-03-02 DIAGNOSIS — J32.4 CHRONIC PANSINUSITIS: ICD-10-CM

## 2022-03-02 RX ORDER — AMOXICILLIN 500 MG/1
500 TABLET, FILM COATED ORAL 3 TIMES DAILY
Qty: 30 TABLET | Refills: 0 | Status: SHIPPED | OUTPATIENT
Start: 2022-03-02 | End: 2022-03-12

## 2022-03-22 NOTE — PROGRESS NOTES
HPI:  Jackie Schafer is in for yearly follow-up breast check. She has not noticed any changes in her breasts. EXAMINATION: DIPAK DIGITAL SCREEN W OR WO CAD BILATERAL 4/24/2019 12:43   PM   HISTORY: 45-year-old female with a weak family history of breast   carcinoma, personal history of benign left breast biopsy in 2014. Report: Today's examination consists of standard craniocaudal and   oblique views of both breasts.  3D tomographic views are also   obtained. Comparison is made with screening digital mammograms   4/23/2018, 4/21/2017 and 4/20/2016. There are scattered parenchymal densities, Pattern B. Multiple skin   markers are present. No dominant mass or parenchymal distortion is   identified. There are several groups of coarse calcifications within   both breasts which appear stable and benign. No new or suspicious   calcifications are identified. There is no skin thickening or nipple   retraction. There is no significant change.       Impression   Impression:    1. Stable mammograms, no suspicious features are identified. Annual   screening is recommended. 2. BI-RADS category 2, benign. BREAST EXAM:  On examination, she has fibrocystic changes throughout both breasts, no dominant masses, no skin or nipple changes and no axillary adenopathy. I see nothing suspicious for breast cancer. ASSESSMENT:  Benign fibrocystic changes                             DISCUSSION:  I have stressed the importance of self breast exam and have explained the technique to her. We also discussed the pathophysiology of fibrocystic disease and breast cancer. She expresses good understanding. We also discussed multiple other issues regarding her and her family's health. PLAN:  I will plan to see her back in 1 year for physical exam and mammograms. She will contact me if anything significant changes. I spent over 50% of visit time counseling patient. 15 minutes of face to face time with patient. Additional Notes: Patient consent was obtained to proceed with the visit and recommended plan of care after discussion of all risks and benefits, including the risks of COVID-19 exposure. Detail Level: Simple Render Risk Assessment In Note?: no

## 2022-03-28 ENCOUNTER — TELEPHONE (OUTPATIENT)
Dept: FAMILY MEDICINE CLINIC | Facility: CLINIC | Age: 72
End: 2022-03-28

## 2022-03-28 RX ORDER — LEVOTHYROXINE SODIUM 0.1 MG/1
100 TABLET ORAL DAILY
Qty: 90 TABLET | Refills: 1 | Status: SHIPPED | OUTPATIENT
Start: 2022-03-28 | End: 2022-09-20

## 2022-03-28 RX ORDER — FLUCONAZOLE 100 MG/1
100 TABLET ORAL DAILY
Qty: 3 TABLET | Refills: 0 | Status: SHIPPED | OUTPATIENT
Start: 2022-03-28 | End: 2022-05-11

## 2022-03-28 RX ORDER — VALSARTAN 320 MG/1
320 TABLET ORAL DAILY
Qty: 90 TABLET | Refills: 1 | Status: SHIPPED | OUTPATIENT
Start: 2022-03-28 | End: 2022-09-20

## 2022-04-11 ENCOUNTER — TELEPHONE (OUTPATIENT)
Dept: FAMILY MEDICINE CLINIC | Facility: CLINIC | Age: 72
End: 2022-04-11

## 2022-04-11 RX ORDER — METHYLPREDNISOLONE 4 MG/1
TABLET ORAL
Qty: 21 TABLET | Refills: 0 | Status: SHIPPED | OUTPATIENT
Start: 2022-04-11 | End: 2022-10-17

## 2022-04-11 NOTE — TELEPHONE ENCOUNTER
Hannah called & req to have something called in for cough & congestion.  She says that it started out w/ a sore throat but that is a little better.   Please advise

## 2022-04-28 ENCOUNTER — TELEPHONE (OUTPATIENT)
Dept: FAMILY MEDICINE CLINIC | Facility: CLINIC | Age: 72
End: 2022-04-28

## 2022-04-28 RX ORDER — AMOXICILLIN 500 MG/1
500 TABLET, FILM COATED ORAL 3 TIMES DAILY
Qty: 30 TABLET | Refills: 0 | Status: SHIPPED | OUTPATIENT
Start: 2022-04-28 | End: 2022-05-08

## 2022-05-11 RX ORDER — FLUCONAZOLE 100 MG/1
100 TABLET ORAL DAILY
Qty: 3 TABLET | Refills: 0 | Status: SHIPPED | OUTPATIENT
Start: 2022-05-11 | End: 2022-10-17

## 2022-05-31 ENCOUNTER — TELEPHONE (OUTPATIENT)
Dept: FAMILY MEDICINE CLINIC | Facility: CLINIC | Age: 72
End: 2022-05-31

## 2022-05-31 RX ORDER — AMOXICILLIN 500 MG/1
500 TABLET, FILM COATED ORAL 3 TIMES DAILY
Qty: 42 TABLET | Refills: 0 | Status: SHIPPED | OUTPATIENT
Start: 2022-05-31 | End: 2022-06-14

## 2022-05-31 RX ORDER — FLUCONAZOLE 100 MG/1
100 TABLET ORAL DAILY
Qty: 3 TABLET | Refills: 0 | Status: SHIPPED | OUTPATIENT
Start: 2022-05-31 | End: 2022-10-17

## 2022-07-05 ENCOUNTER — TELEPHONE (OUTPATIENT)
Dept: OTOLARYNGOLOGY | Facility: CLINIC | Age: 72
End: 2022-07-05

## 2022-07-05 ENCOUNTER — HOSPITAL ENCOUNTER (OUTPATIENT)
Dept: WOMENS IMAGING | Age: 72
Discharge: HOME | End: 2022-07-05
Payer: MEDICARE

## 2022-07-05 ENCOUNTER — OFFICE VISIT (OUTPATIENT)
Dept: SURGERY | Age: 72
End: 2022-07-05
Payer: MEDICARE

## 2022-07-05 VITALS
WEIGHT: 215 LBS | TEMPERATURE: 99.6 F | BODY MASS INDEX: 35.82 KG/M2 | DIASTOLIC BLOOD PRESSURE: 76 MMHG | HEIGHT: 65 IN | OXYGEN SATURATION: 99 % | SYSTOLIC BLOOD PRESSURE: 130 MMHG | HEART RATE: 87 BPM

## 2022-07-05 DIAGNOSIS — Z12.31 VISIT FOR SCREENING MAMMOGRAM: ICD-10-CM

## 2022-07-05 DIAGNOSIS — R09.82 POST-NASAL DRIP: ICD-10-CM

## 2022-07-05 DIAGNOSIS — Z12.31 VISIT FOR SCREENING MAMMOGRAM: Primary | ICD-10-CM

## 2022-07-05 DIAGNOSIS — J30.89 PERENNIAL ALLERGIC RHINITIS: ICD-10-CM

## 2022-07-05 PROCEDURE — 1036F TOBACCO NON-USER: CPT | Performed by: PHYSICIAN ASSISTANT

## 2022-07-05 PROCEDURE — 3017F COLORECTAL CA SCREEN DOC REV: CPT | Performed by: PHYSICIAN ASSISTANT

## 2022-07-05 PROCEDURE — G8427 DOCREV CUR MEDS BY ELIG CLIN: HCPCS | Performed by: PHYSICIAN ASSISTANT

## 2022-07-05 PROCEDURE — G8400 PT W/DXA NO RESULTS DOC: HCPCS | Performed by: PHYSICIAN ASSISTANT

## 2022-07-05 PROCEDURE — 77063 BREAST TOMOSYNTHESIS BI: CPT

## 2022-07-05 PROCEDURE — 1123F ACP DISCUSS/DSCN MKR DOCD: CPT | Performed by: PHYSICIAN ASSISTANT

## 2022-07-05 PROCEDURE — 1090F PRES/ABSN URINE INCON ASSESS: CPT | Performed by: PHYSICIAN ASSISTANT

## 2022-07-05 PROCEDURE — 99213 OFFICE O/P EST LOW 20 MIN: CPT | Performed by: PHYSICIAN ASSISTANT

## 2022-07-05 PROCEDURE — G8417 CALC BMI ABV UP PARAM F/U: HCPCS | Performed by: PHYSICIAN ASSISTANT

## 2022-07-05 RX ORDER — MONTELUKAST SODIUM 10 MG/1
10 TABLET ORAL NIGHTLY
Qty: 30 TABLET | Refills: 8 | Status: SHIPPED | OUTPATIENT
Start: 2022-07-05

## 2022-07-05 NOTE — TELEPHONE ENCOUNTER
Return call received ands informed patient that she would need an appointment for further refills, patient voiced understanding.

## 2022-07-05 NOTE — PROGRESS NOTES
HPI:  Harish Wolf is in for follow-up breast check. She has not noticed any changes in her breasts. Her imaging was reviewed and is noted below. SCREENING BILATERAL DIGITAL MAMMOGRAM WITH TOMOSYNTHESIS 7/5/2022 9:40   AM   CLINICAL HISTORY: Screening. COMPARISON STUDIES: June 24, 2021 June 17, 2020. FINDINGS:    Digital CC and MLO views of bilateral breasts were obtained. Tomosynthesis in the MLO and CC projections was also performed. The breast tissue is heterogeneously dense (approximately 50-75%   glandular tissue) consistent with a type C parenchymal pattern,   limiting the sensitivity of mammography. No suspicious masses or   calcifications are identified. There is no architectural distortion. This study was interpreted with CAD. IMPRESSION AND RECOMMENDATION:    No mammographic evidence of malignancy. Recommendation is for the   patient to return for routine mammography in one year or sooner, if   clinically indicated. Assessment: BI-RADS Category 2 benign     BREAST EXAM:  On examination, she has fibrocystic changes throughout both breasts, no dominant masses, no skin or nipple changes and no axillary adenopathy. I see nothing suspicious for breast cancer. ASSESSMENT:  Benign fibrocystic changes              PLAN:  I will plan to see her back in 1 year for physical exam and mammograms. She will contact me if anything significant changes. 20 minutes spent, which includes face to face with patient, record review, evaluation, planning, and education.

## 2022-07-11 ENCOUNTER — OFFICE VISIT (OUTPATIENT)
Dept: FAMILY MEDICINE CLINIC | Facility: CLINIC | Age: 72
End: 2022-07-11

## 2022-07-11 ENCOUNTER — OFFICE VISIT (OUTPATIENT)
Dept: GASTROENTEROLOGY | Facility: CLINIC | Age: 72
End: 2022-07-11

## 2022-07-11 VITALS
TEMPERATURE: 97.3 F | DIASTOLIC BLOOD PRESSURE: 102 MMHG | BODY MASS INDEX: 35.65 KG/M2 | WEIGHT: 214 LBS | HEIGHT: 65 IN | HEART RATE: 81 BPM | SYSTOLIC BLOOD PRESSURE: 178 MMHG | OXYGEN SATURATION: 98 %

## 2022-07-11 VITALS
HEIGHT: 65 IN | HEART RATE: 77 BPM | SYSTOLIC BLOOD PRESSURE: 122 MMHG | WEIGHT: 215 LBS | DIASTOLIC BLOOD PRESSURE: 68 MMHG | OXYGEN SATURATION: 97 % | BODY MASS INDEX: 35.82 KG/M2

## 2022-07-11 DIAGNOSIS — K21.9 GASTROESOPHAGEAL REFLUX DISEASE WITHOUT ESOPHAGITIS: Primary | ICD-10-CM

## 2022-07-11 DIAGNOSIS — K21.9 GASTROESOPHAGEAL REFLUX DISEASE WITHOUT ESOPHAGITIS: ICD-10-CM

## 2022-07-11 DIAGNOSIS — E55.9 VITAMIN D DEFICIENCY, UNSPECIFIED: ICD-10-CM

## 2022-07-11 DIAGNOSIS — E78.2 MIXED HYPERLIPIDEMIA: ICD-10-CM

## 2022-07-11 DIAGNOSIS — I10 PRIMARY HYPERTENSION: Primary | ICD-10-CM

## 2022-07-11 DIAGNOSIS — D12.6 ADENOMATOUS POLYP OF COLON, UNSPECIFIED PART OF COLON: ICD-10-CM

## 2022-07-11 DIAGNOSIS — K58.9 IRRITABLE BOWEL SYNDROME, UNSPECIFIED TYPE: ICD-10-CM

## 2022-07-11 DIAGNOSIS — R93.7 ABNORMAL FINDINGS ON DIAGNOSTIC IMAGING OF OTHER PARTS OF MUSCULOSKELETAL SYSTEM: ICD-10-CM

## 2022-07-11 DIAGNOSIS — E03.9 ACQUIRED HYPOTHYROIDISM: ICD-10-CM

## 2022-07-11 DIAGNOSIS — Z13.820 SCREENING FOR OSTEOPOROSIS: ICD-10-CM

## 2022-07-11 DIAGNOSIS — K76.0 FATTY LIVER: ICD-10-CM

## 2022-07-11 DIAGNOSIS — Z01.419 ROUTINE GYNECOLOGICAL EXAMINATION: ICD-10-CM

## 2022-07-11 PROCEDURE — 99214 OFFICE O/P EST MOD 30 MIN: CPT | Performed by: FAMILY MEDICINE

## 2022-07-11 PROCEDURE — 99214 OFFICE O/P EST MOD 30 MIN: CPT | Performed by: INTERNAL MEDICINE

## 2022-07-11 RX ORDER — DICYCLOMINE HYDROCHLORIDE 10 MG/1
10 CAPSULE ORAL 3 TIMES DAILY PRN
Qty: 90 CAPSULE | Refills: 6 | Status: SHIPPED | OUTPATIENT
Start: 2022-07-11 | End: 2023-03-29 | Stop reason: SDUPTHER

## 2022-07-11 NOTE — PROGRESS NOTES
[facility]    Chief Complaint   Patient presents with   • Heartburn     Fatty liver       Subjective     HPI    Hannah Maki is a 71 y.o. female who presents with a chief complaint of heartburn, fatty liver and IBS.    She comes in for an annual checkup.  She tells me she is doing well.  She unfortunately has not lost any weight since last year.  She states that she really does not eat much.  She still is swimming routinely.  She states she has arthralgias which keeps her from doing much activity but she is enjoying swimming especially with her grandchildren.    Some of her GI complaints is occasional breakthrough heartburn.  She takes Nexium every day and that works well for her.  She still gets intermittent abdominal cramping and bloating.  Usually depends on if she eats something with gluten or other foods.  She will take a Bentyl as needed and that helps relieve her symptoms.      =========== copy July 8, 2021 HPI====================================  =   HPI     Hannah Maki is a 70 y.o. female who presents with a chief complaint of fatty liver.     She comes in for an annual checkup.  I did review her labs that she had about 6 months ago.  Her liver transaminases remain elevated.  ALT 78 AST 52.  That is about where she runs.  I note that her cholesterol is elevated.  She tells me her PCP did place her on a statin drug.  She stopped that recently on her own because she has significant muscle cramps.  When she stopped that the muscle cramps went away.  She is due to follow-up with her PCP again in a couple weeks.  Regarding weight loss she states she really has not had significant strides in losing weight.  I note that she is down a few pounds from when she was here last year.  As discussed, she needs to lose more.  She states she tries but she is always been skinny until she got in her 50s and now she is had trouble losing weight.     Her heartburn is under control.  She states she  still notes that if she eats gluten products she has more heartburn and indigestion.  Last year we tested her for celiac and she had negative antibodies.  She states as long as she avoids gluten type product she feels pretty well so she is going to continue to avoid it.  She notes that she has symptoms right away if she eats something which would not be consistent with celiac disease but more of an intolerance.  She does have cramps at times and Bentyl has been prescribed she states that works well for her.  She may take it once a week and then some days she may take it 3 times.     Everything else is going well        ============== July 2020 HPI===========================================  HPI     Hannah Maki is a 69 y.o. female who presents with a chief complaint of fatty liver.     She presents for follow-up of her liver.  She tells me she is not been able to lose weight.  She injured her knee gardening and since then she has not been able to exercise or walk much.  She states she did get a steroid injection in her knee that did not help.  She did take meloxicam and that helped take care of the swelling.  She tolerated it well but only took it for 2 weeks.  She was concerned about the side effects of NSAIDs with her knee and liver.  She had a colonoscopy in March and went over that report with her.  Everything else is going well.        -=================== March 2020 HPI=======================  HPI     Hannah Maki is a 69 y.o. female who presents as a referral for preventative maintenance. She has no complaints of nausea or vomiting. No change in bowels. No wt loss. No BRBPR. No melena. No abdominal pain.          Last colonoscopy was 4/2017 noting 2 polyps ( one retrieved with path hyperplastic) and diverticulosis. The patient does have history of colon polyps. The patient does not have history of colon cancer.  There is family history of colon cancer sister in her 50's, another sister in her  50's.     She also history of fatty liver.  Was seen here last year .  Ultrasound elastography score was F1.  Last labs October 2019 and LFTs had improved from previous.  She did lose a little weight but has gained some back.  Does not drink alcohol.  She is not diabetic.        ====================================================================================  Ov  8-5-19  Hannah Maki is a 68 y.o. female who presents with a chief complaint of abnormal LFTs.     She was here 3 months ago.  We felt that she had fatty liver.  She underwent liver serologies which all came back unremarkable.  She had an ultrasound of her liver which did show steatosis.  She had F1 fibrosis.  I advised her to lose 5 to 10 pounds.  She comes in today 6 pounds lighter.     She tells me she has been feeling well.  She does tell me that she has some chronic intermittent abdominal cramping and diarrhea.  She states her son is gluten intolerant.  She thinks he has celiac disease.  She herself is gone on a gluten-free diet and she is felt much better since going on gluten-free diet.  She is never had testing done before.  She has been on a gluten-free diet for about a year.  She does note that when she eats gluten she will have some cramps for about a week.  She states she ate Chinese food and Mexican food this weekend which was supposedly gluten-free.  But she has had some cramps and diarrhea since then.  The diarrhea is cleared except for after she eats she will just have a little bit of loose stools.  It has subsided over the last 3 days.  She was noted to have a little bit of an elevated temperature to 99 degrees at her visit today.  She denies any other symptoms other than some chronic sinus drainage which she attributes to allergies.  She denies dysuria or shortness of breath.     She also tells me she has heartburn.  She is on over-the-counter Nexium daily.  She will have breakthrough symptoms once in a while.  Is not every day.  She  asked what she could take for breakthrough symptoms.       Past Medical History:   Diagnosis Date   • Abnormal liver enzymes    • Achilles bursitis    • Anxiety    • Arthralgia    • Arthritis    • Asthma    • Chronic pansinusitis 10/14/2016   • Colon polyp    • Diverticulosis    • Dysuria    • Fatty liver    • GERD (gastroesophageal reflux disease)    • Hyperlipidemia    • Hypertension    • Hyperthyroidism    • Non-cardiac chest pain    • Osteoarthritis    • Palpitations    • Perennial allergic rhinitis 10/14/2016   • Pneumonia    • PONV (postoperative nausea and vomiting)    • Rhinitis    • Tinnitus    • Trigeminal neuralgia    • Vertigo        Past Surgical History:   Procedure Laterality Date   • APPENDECTOMY     • CATARACT EXTRACTION WITH INTRAOCULAR LENS IMPLANT     • CHOLECYSTECTOMY     • COLONOSCOPY  11/18/2013     six polyps removed or destroyed, Diverticulosis  Recall 3 years   • COLONOSCOPY  11/18/2013   • COLONOSCOPY N/A 4/4/2017    Procedure: COLONOSCOPY WITH ANESTHESIA;  Surgeon: Lew Orona MD;  Location:  [facility];  Service:    • COLONOSCOPY N/A 3/16/2020    Procedure: COLONOSCOPY WITH ANESTHESIA;  Surgeon: Lew Orona MD;  Location: [facility] END[facility]COPY;  Service: Gastroenterology;  Laterality: N/A;  pre op: hx polyps  Post op:polyps  PCP: Deni Henry MD   • EYE SURGERY  Nov 2018    Cataract Removal   • HEMORRHOIDECTOMY     • HYSTERECTOMY     • NECK SURGERY     • NOSE SURGERY      nose bleeding artery    • OTHER SURGICAL HISTORY      ingrown toe nail   • RECTAL FISSURE INCISION AND DRAINAGE           Current Outpatient Medications:   •  dicyclomine (Bentyl) 10 MG capsule, Take 1 capsule by mouth 3 (Three) Times a Day As Needed (abdominal discomfort)., Disp: 90 capsule, Rfl: 6  •  esomeprazole (NexIUM) 20 MG capsule, Take 20 mg by mouth Every Morning Before Breakfast., Disp: , Rfl:   •  levothyroxine (SYNTHROID, LEVOTHROID) 100 MCG tablet, Take 1 tablet by mouth Daily.,  Disp: 90 tablet, Rfl: 1  •  meclizine (ANTIVERT) 25 MG tablet, Take 1 tablet by mouth 3 (Three) Times a Day As Needed for Dizziness (vertigo). Take one by mouth three times a day as needed for vertigo, Disp: 60 tablet, Rfl: 3  •  montelukast (SINGULAIR) 10 MG tablet, Take 1 tablet by mouth Every Night., Disp: 30 tablet, Rfl: 8  •  mupirocin (BACTROBAN) 2 % nasal ointment, Apply intranasally twice a day, Disp: 30 g, Rfl: 2  •  valsartan (DIOVAN) 320 MG tablet, Take 1 tablet by mouth Daily., Disp: 90 tablet, Rfl: 1  •  acetic acid-hydrocortisone (VOSOL-HC) 1-2 % otic solution, Administer 4 drops into ear(s) as directed by provider 2 (Two) Times a Day. 3-4 drops in the affected ear 2-3 times a day, Disp: 35 mL, Rfl: 0  •  clotrimazole-betamethasone (LOTRISONE) 1-0.05 % cream, Apply 1 application topically to the appropriate area as directed 2 (Two) Times a Day., Disp: 15 g, Rfl: 0  •  Evolocumab (REPATHA) solution prefilled syringe injection, Inject 1 mL under the skin into the appropriate area as directed Every 14 (Fourteen) Days., Disp: 2 mL, Rfl: 2  •  fluconazole (Diflucan) 100 MG tablet, Take 1 tablet by mouth Daily., Disp: 3 tablet, Rfl: 0  •  fluconazole (Diflucan) 100 MG tablet, Take 1 tablet by mouth Daily., Disp: 3 tablet, Rfl: 0  •  meloxicam (MOBIC) 15 MG tablet, Take 15 mg by mouth Daily., Disp: , Rfl:   •  methylPREDNISolone (MEDROL) 4 MG dose pack, Take as directed on package instructions., Disp: 21 tablet, Rfl: 0  •  nystatin (MYCOSTATIN) 685182 UNIT/GM powder, Apply  topically to the appropriate area as directed 3 (Three) Times a Day., Disp: 60 g, Rfl: 0  •  saccharomyces boulardii (Florastor) 250 MG capsule, Take 1 capsule by mouth 2 (Two) Times a Day., Disp: 28 capsule, Rfl: 0    Allergies   Allergen Reactions   • Ceftin [Cefuroxime Axetil] Other (See Comments)     Pt does not recall   • Augmentin [Amoxicillin-Pot Clavulanate] Rash   • Azithromycin Nausea Only   • Bactrim  [Sulfamethoxazole-Trimethoprim] Nausea And Vomiting   • Cefuroxime Nausea Only   • Codeine GI Intolerance   • Demerol [Meperidine] Nausea And Vomiting   • Erythromycin Rash   • Latex Other (See Comments)     Patient says it pulls her skin off.   • Tequin [Gatifloxacin] Nausea Only   • Tetracyclines & Related Nausea And Vomiting       Social History     Socioeconomic History   • Marital status:    Tobacco Use   • Smoking status: Never Smoker   • Smokeless tobacco: Never Used   Substance and Sexual Activity   • Alcohol use: Never   • Drug use: Never   • Sexual activity: Never       Family History   Problem Relation Age of Onset   • Diabetes Brother    • Heart disease Brother    • Colon cancer Sister 57   • Cancer Sister    • Colon cancer Sister 55   • Cancer Sister    • Arthritis Mother    • Heart disease Father    • Heart attack Father        Review of Systems  No constipation, no blood in stools, no nausea vomiting, no change in her weight,    Objective     Vitals:    07/11/22 1418   BP: (!) 178/102   Pulse: 81   Temp: 97.3 °F (36.3 °C)   SpO2: 98%       Physical Exam  No acute distress. Vital signs as documented.  Sclera anicteric.  Neck without noticeable JVD. Lungs clear to auscultation. Heart exam notable for regular rhythm, normal sounds. Abdomen is soft, nontender, non distended, normal bowel sounds and without evidence of organomegaly, masses.  Neuro alert, moves extremities.        Assessment & Plan   Problem List Items Addressed This Visit        Gastrointestinal Abdominal     Gastroesophageal reflux disease without esophagitis - Primary    Relevant Medications    dicyclomine (Bentyl) 10 MG capsule    Fatty liver    Overview     U/s 5/2019 F1 fibrosis.            Colon polyp    Overview     2 diminutive polyps were destroyed and colonoscopy March 2020.  Based on history of adenomatous polyps and strong family history of colon cancer involving multiple first-degree relatives surveillance colonoscopy  recommended again approximately March 2023           Irritable bowel syndrome            Clinically she is at her baseline.  Reinforced reflux precautions.  We talked about avoiding eating late at night.  We talked about limiting coffee and caffeine and fatty foods.  Discussed weight loss would be beneficial.  For her breakthrough reflux I did suggest that she could try an over-the-counter Pepcid Complete as needed.  This would be in addition to her daily Nexium but the Pepcid we just as needed and preferably reflux precautions.    Regarding fatty liver once again reinforced the importance of weight loss benefits.  Regarding irritable bowel, she continues to work on her diet.  Plan to continue as needed Bentyl.    She is due for colonoscopy examination in March for surveillance.  We will see her back in the office then.    Lastly, I did discuss with her that her blood pressure is elevated this afternoon.  We did repeat it after she sat for 5 minutes and remained about the same.  I see this morning at her primary care provider's office visit it was significantly lower.  She felt well.  I did suggest that she check her blood pressure at home when she is relaxed.  She has been advised to do this by her PCP in the past.  If her blood pressure remains elevated then she needs to check in with her primary care provider.  Continue ongoing management by primary care provider and other specialists.     Body mass index is 35.61 kg/m².  Elevated BMI, weight loss advised      EMR Dragon/transcription disclaimer:  Much of this encounter note is electronic transcription/translation of spoken language to printed text.  The electronic translation of spoken language may be erroneous, or at times, nonsensical words or phrases may be inadvertently transcribed.  Although I have reviewed the note for such errors, some may still exist.    Lew Orona MD  15:46 CDT  07/11/22

## 2022-07-11 NOTE — PROGRESS NOTES
Subjective   Hannah Maki is a 71 y.o. female.     Chief Complaint   Patient presents with   • Hyperlipidemia   • Hypertension          • Hypothyroidism        History of Present Illness     she note\s good bp control whtout cp o rha---sheis tolerating synthroid without heat or cold inlances--her gerd symptoms are staqble without myalgis   Her arthiriris sym[ptoms are a problem    Current Outpatient Medications:   •  clotrimazole-betamethasone (LOTRISONE) 1-0.05 % cream, Apply 1 application topically to the appropriate area as directed 2 (Two) Times a Day., Disp: 15 g, Rfl: 0  •  dicyclomine (Bentyl) 10 MG capsule, Take 1 capsule by mouth 3 (Three) Times a Day As Needed (abdominal discomfort)., Disp: 90 capsule, Rfl: 3  •  esomeprazole (NexIUM) 20 MG capsule, Take 20 mg by mouth Every Morning Before Breakfast., Disp: , Rfl:   •  Evolocumab (REPATHA) solution prefilled syringe injection, Inject 1 mL under the skin into the appropriate area as directed Every 14 (Fourteen) Days., Disp: 2 mL, Rfl: 2  •  levothyroxine (SYNTHROID, LEVOTHROID) 100 MCG tablet, Take 1 tablet by mouth Daily., Disp: 90 tablet, Rfl: 1  •  meclizine (ANTIVERT) 25 MG tablet, Take 1 tablet by mouth 3 (Three) Times a Day As Needed for Dizziness (vertigo). Take one by mouth three times a day as needed for vertigo, Disp: 60 tablet, Rfl: 3  •  meloxicam (MOBIC) 15 MG tablet, Take 15 mg by mouth Daily., Disp: , Rfl:   •  montelukast (SINGULAIR) 10 MG tablet, Take 1 tablet by mouth Every Night., Disp: 30 tablet, Rfl: 8  •  mupirocin (BACTROBAN) 2 % nasal ointment, Apply intranasally twice a day, Disp: 30 g, Rfl: 2  •  saccharomyces boulardii (Florastor) 250 MG capsule, Take 1 capsule by mouth 2 (Two) Times a Day., Disp: 28 capsule, Rfl: 0  •  valsartan (DIOVAN) 320 MG tablet, Take 1 tablet by mouth Daily., Disp: 90 tablet, Rfl: 1  •  acetic acid-hydrocortisone (VOSOL-HC) 1-2 % otic solution, Administer 4 drops into ear(s) as directed by provider 2  (Two) Times a Day. 3-4 drops in the affected ear 2-3 times a day, Disp: 35 mL, Rfl: 0  •  fluconazole (Diflucan) 100 MG tablet, Take 1 tablet by mouth Daily., Disp: 3 tablet, Rfl: 0  •  fluconazole (Diflucan) 100 MG tablet, Take 1 tablet by mouth Daily., Disp: 3 tablet, Rfl: 0  •  methylPREDNISolone (MEDROL) 4 MG dose pack, Take as directed on package instructions., Disp: 21 tablet, Rfl: 0  •  nystatin (MYCOSTATIN) 944213 UNIT/GM powder, Apply  topically to the appropriate area as directed 3 (Three) Times a Day., Disp: 60 g, Rfl: 0  Allergies   Allergen Reactions   • Ceftin [Cefuroxime Axetil] Other (See Comments)     Pt does not recall   • Augmentin [Amoxicillin-Pot Clavulanate] Rash   • Azithromycin Nausea Only   • Bactrim [Sulfamethoxazole-Trimethoprim] Nausea And Vomiting   • Cefuroxime Nausea Only   • Codeine GI Intolerance   • Demerol [Meperidine] Nausea And Vomiting   • Erythromycin Rash   • Latex Other (See Comments)     Patient says it pulls her skin off.   • Tequin [Gatifloxacin] Nausea Only   • Tetracyclines & Related Nausea And Vomiting       Class 2 Severe Obesity (BMI >=35 and <=39.9). Obesity-related health conditions include the following: hypertension. Obesity is unchanged. BMI is is above average; BMI management plan is completed. We discussed portion control and increasing exercise.      Past Medical History:   Diagnosis Date   • Abnormal liver enzymes    • Achilles bursitis    • Anxiety    • Arthralgia    • Arthritis    • Asthma    • Chronic pansinusitis 10/14/2016   • Colon polyp    • Diverticulosis    • Dysuria    • Fatty liver    • GERD (gastroesophageal reflux disease)    • Hyperlipidemia    • Hypertension    • Hyperthyroidism    • Non-cardiac chest pain    • Osteoarthritis    • Palpitations    • Perennial allergic rhinitis 10/14/2016   • Pneumonia    • PONV (postoperative nausea and vomiting)    • Rhinitis    • Tinnitus    • Trigeminal neuralgia    • Vertigo      Past Surgical History:  "  Procedure Laterality Date   • APPENDECTOMY     • CATARACT EXTRACTION WITH INTRAOCULAR LENS IMPLANT     • CHOLECYSTECTOMY     • COLONOSCOPY  11/18/2013     six polyps removed or destroyed, Diverticulosis  Recall 3 years   • COLONOSCOPY  11/18/2013   • COLONOSCOPY N/A 4/4/2017    Procedure: COLONOSCOPY WITH ANESTHESIA;  Surgeon: Lew Orona MD;  Location:  [facility];  Service:    • COLONOSCOPY N/A 3/16/2020    Procedure: COLONOSCOPY WITH ANESTHESIA;  Surgeon: Lew Orona MD;  Location:  [facility];  Service: Gastroenterology;  Laterality: N/A;  pre op: hx polyps  Post op:polyps  PCP: Deni Henry MD   • EYE SURGERY  Nov 2018    Cataract Removal   • HEMORRHOIDECTOMY     • HYSTERECTOMY     • NECK SURGERY     • NOSE SURGERY      nose bleeding artery    • OTHER SURGICAL HISTORY      ingrown toe nail   • RECTAL FISSURE INCISION AND DRAINAGE         Review of Systems   Constitutional: Negative.    HENT: Negative.    Eyes: Negative.    Respiratory: Negative.    Cardiovascular: Negative.    Gastrointestinal: Negative.    Endocrine: Negative.    Genitourinary: Negative.    Musculoskeletal: Negative.    Skin: Negative.    Allergic/Immunologic: Negative.    Neurological: Negative.    Hematological: Negative.    Psychiatric/Behavioral: Negative.        Objective  /68   Pulse 77   Ht 165.1 cm (65\")   Wt 97.5 kg (215 lb)   LMP 10/04/1981   SpO2 97%   BMI 35.78 kg/m²   Physical Exam  Vitals and nursing note reviewed.   Constitutional:       Appearance: She is normal weight.   HENT:      Head: Normocephalic and atraumatic.      Nose: Nose normal.      Mouth/Throat:      Mouth: Mucous membranes are moist.   Eyes:      Extraocular Movements: Extraocular movements intact.      Conjunctiva/sclera: Conjunctivae normal.      Pupils: Pupils are equal, round, and reactive to light.   Cardiovascular:      Rate and Rhythm: Normal rate and regular rhythm.      Pulses: Normal pulses.      Heart " sounds: Normal heart sounds.   Pulmonary:      Effort: Pulmonary effort is normal.      Breath sounds: Normal breath sounds.   Abdominal:      General: Abdomen is flat. Bowel sounds are normal.      Palpations: Abdomen is soft.   Musculoskeletal:         General: Normal range of motion.      Cervical back: Normal range of motion.   Skin:     General: Skin is warm and dry.      Capillary Refill: Capillary refill takes less than 2 seconds.   Neurological:      General: No focal deficit present.      Mental Status: She is alert and oriented to person, place, and time. Mental status is at baseline.   Psychiatric:         Mood and Affect: Mood normal.         Behavior: Behavior normal.         Thought Content: Thought content normal.         Judgment: Judgment normal.         Assessment & Plan   Diagnoses and all orders for this visit:    1. Primary hypertension (Primary)  -     CBC & Differential  -     Comprehensive metabolic panel  -     Lipid Panel With / Chol / HDL Ratio  -     TSH  -     Vitamin D 25 Hydroxy    2. Acquired hypothyroidism  -     CBC & Differential  -     Comprehensive metabolic panel  -     Lipid Panel With / Chol / HDL Ratio  -     TSH  -     Vitamin D 25 Hydroxy    3. Gastroesophageal reflux disease without esophagitis  -     CBC & Differential  -     Comprehensive metabolic panel  -     Lipid Panel With / Chol / HDL Ratio  -     TSH  -     Vitamin D 25 Hydroxy    4. Mixed hyperlipidemia  -     CBC & Differential  -     Comprehensive metabolic panel  -     Lipid Panel With / Chol / HDL Ratio  -     TSH  -     Vitamin D 25 Hydroxy    5. Screening for osteoporosis  -     DEXA Bone Density Axial    6. Abnormal findings on diagnostic imaging of other parts of musculoskeletal system   -     DEXA Bone Density Axial    7. Routine gynecological examination  -     Ambulatory Referral to Obstetrics / Gynecology    8. Vitamin D deficiency, unspecified   -     Vitamin D 25 Hydroxy    she is will monitor bp  and keep us informd  She will monitoo for heat or cold intnancnes  She will contijue ppi for her gerd symp;tmos.  She is watching her diet for lipid issues.             Orders Placed This Encounter   Procedures   • DEXA Bone Density Axial     Order Specific Question:   Is patient taking or have taken long term Glucocorticoid (steroids)?     Answer:   No     Order Specific Question:   Does the patient have rheumatoid arthritis?     Answer:   No     Order Specific Question:   Does the patient have secondary osteoporosis?     Answer:   No     Order Specific Question:   Reason for Exam:     Answer:   screen for osteoporosis     Order Specific Question:   Does this patient have a diabetic monitoring/medication delivering device on?     Answer:   No     Order Specific Question:   Release to patient     Answer:   Routine Release   • Comprehensive metabolic panel     Order Specific Question:   Release to patient     Answer:   Immediate   • Lipid Panel With / Chol / HDL Ratio     Order Specific Question:   Release to patient     Answer:   Immediate   • TSH     Order Specific Question:   Release to patient     Answer:   Immediate   • Vitamin D 25 Hydroxy     Order Specific Question:   Release to patient     Answer:   Immediate   • Ambulatory Referral to Obstetrics / Gynecology     Referral Priority:   Routine     Referral Type:   Consultation     Referral Reason:   Specialty Services Required     Referred to Provider:   Ananda Cifuentes MD     Requested Specialty:   Obstetrics and Gynecology     Number of Visits Requested:   1   • CBC & Differential       Follow up: 6 month(s)

## 2022-07-12 LAB
25(OH)D3+25(OH)D2 SERPL-MCNC: 34.7 NG/ML (ref 30–100)
ALBUMIN SERPL-MCNC: 4.1 G/DL (ref 3.5–5.2)
ALBUMIN/GLOB SERPL: 1.2 G/DL
ALP SERPL-CCNC: 177 U/L (ref 39–117)
ALT SERPL-CCNC: 85 U/L (ref 1–33)
AST SERPL-CCNC: 61 U/L (ref 1–32)
BASOPHILS # BLD AUTO: 0.12 10*3/MM3 (ref 0–0.2)
BASOPHILS NFR BLD AUTO: 1.3 % (ref 0–1.5)
BILIRUB SERPL-MCNC: 0.8 MG/DL (ref 0–1.2)
BUN SERPL-MCNC: 12 MG/DL (ref 8–23)
BUN/CREAT SERPL: 16.4 (ref 7–25)
CALCIUM SERPL-MCNC: 9.8 MG/DL (ref 8.6–10.5)
CHLORIDE SERPL-SCNC: 102 MMOL/L (ref 98–107)
CHOLEST SERPL-MCNC: 306 MG/DL (ref 0–200)
CHOLEST/HDLC SERPL: 4.14 {RATIO}
CO2 SERPL-SCNC: 24.9 MMOL/L (ref 22–29)
CREAT SERPL-MCNC: 0.73 MG/DL (ref 0.57–1)
EGFRCR SERPLBLD CKD-EPI 2021: 89.2 ML/MIN/1.73
EOSINOPHIL # BLD AUTO: 0.21 10*3/MM3 (ref 0–0.4)
EOSINOPHIL NFR BLD AUTO: 2.2 % (ref 0.3–6.2)
ERYTHROCYTE [DISTWIDTH] IN BLOOD BY AUTOMATED COUNT: 12.5 % (ref 12.3–15.4)
GLOBULIN SER CALC-MCNC: 3.5 GM/DL
GLUCOSE SERPL-MCNC: 137 MG/DL (ref 65–99)
HCT VFR BLD AUTO: 48 % (ref 34–46.6)
HDLC SERPL-MCNC: 74 MG/DL (ref 40–60)
HGB BLD-MCNC: 16.2 G/DL (ref 12–15.9)
IMM GRANULOCYTES # BLD AUTO: 0.04 10*3/MM3 (ref 0–0.05)
IMM GRANULOCYTES NFR BLD AUTO: 0.4 % (ref 0–0.5)
LDLC SERPL CALC-MCNC: 207 MG/DL (ref 0–100)
LYMPHOCYTES # BLD AUTO: 3.94 10*3/MM3 (ref 0.7–3.1)
LYMPHOCYTES NFR BLD AUTO: 41.2 % (ref 19.6–45.3)
MCH RBC QN AUTO: 31.6 PG (ref 26.6–33)
MCHC RBC AUTO-ENTMCNC: 33.8 G/DL (ref 31.5–35.7)
MCV RBC AUTO: 93.6 FL (ref 79–97)
MONOCYTES # BLD AUTO: 0.68 10*3/MM3 (ref 0.1–0.9)
MONOCYTES NFR BLD AUTO: 7.1 % (ref 5–12)
NEUTROPHILS # BLD AUTO: 4.58 10*3/MM3 (ref 1.7–7)
NEUTROPHILS NFR BLD AUTO: 47.8 % (ref 42.7–76)
NRBC BLD AUTO-RTO: 0 /100 WBC (ref 0–0.2)
PLATELET # BLD AUTO: 234 10*3/MM3 (ref 140–450)
POTASSIUM SERPL-SCNC: 4.8 MMOL/L (ref 3.5–5.2)
PROT SERPL-MCNC: 7.6 G/DL (ref 6–8.5)
RBC # BLD AUTO: 5.13 10*6/MM3 (ref 3.77–5.28)
SODIUM SERPL-SCNC: 140 MMOL/L (ref 136–145)
TRIGL SERPL-MCNC: 138 MG/DL (ref 0–150)
TSH SERPL DL<=0.005 MIU/L-ACNC: 1.81 UIU/ML (ref 0.27–4.2)
VLDLC SERPL CALC-MCNC: 25 MG/DL (ref 5–40)
WBC # BLD AUTO: 9.57 10*3/MM3 (ref 3.4–10.8)

## 2022-08-01 ENCOUNTER — TELEPHONE (OUTPATIENT)
Dept: FAMILY MEDICINE CLINIC | Facility: CLINIC | Age: 72
End: 2022-08-01

## 2022-08-01 ENCOUNTER — HOSPITAL ENCOUNTER (OUTPATIENT)
Dept: BONE DENSITY | Facility: HOSPITAL | Age: 72
Discharge: HOME | End: 2022-08-01
Admitting: FAMILY MEDICINE

## 2022-08-01 PROCEDURE — 77080 DXA BONE DENSITY AXIAL: CPT

## 2022-08-01 RX ORDER — AMOXICILLIN 500 MG/1
1000 CAPSULE ORAL 3 TIMES DAILY
Qty: 60 CAPSULE | Refills: 0 | Status: SHIPPED | OUTPATIENT
Start: 2022-08-01 | End: 2022-08-11

## 2022-09-20 RX ORDER — VALSARTAN 320 MG/1
TABLET ORAL
Qty: 90 TABLET | Refills: 0 | Status: SHIPPED | OUTPATIENT
Start: 2022-09-20 | End: 2022-12-27

## 2022-09-20 RX ORDER — LEVOTHYROXINE SODIUM 0.1 MG/1
TABLET ORAL
Qty: 90 TABLET | Refills: 0 | Status: SHIPPED | OUTPATIENT
Start: 2022-09-20 | End: 2022-12-27

## 2022-10-17 ENCOUNTER — OFFICE VISIT (OUTPATIENT)
Dept: OBSTETRICS AND GYNECOLOGY | Facility: CLINIC | Age: 72
End: 2022-10-17

## 2022-10-17 VITALS
BODY MASS INDEX: 35.16 KG/M2 | WEIGHT: 211 LBS | HEIGHT: 65 IN | DIASTOLIC BLOOD PRESSURE: 76 MMHG | SYSTOLIC BLOOD PRESSURE: 138 MMHG

## 2022-10-17 DIAGNOSIS — Z01.419 ENCOUNTER FOR GYNECOLOGICAL EXAMINATION WITHOUT ABNORMAL FINDING: Primary | ICD-10-CM

## 2022-10-17 PROCEDURE — G0101 CA SCREEN;PELVIC/BREAST EXAM: HCPCS | Performed by: OBSTETRICS & GYNECOLOGY

## 2022-10-17 NOTE — PROGRESS NOTES
"CC: annual exam    SUBJECTIVE: Hannah Maki is a 71 y.o. female here today for annual GYN examination. She is menopausal and has had a hysterectomy. She has no history of abnormal Pap smears. She denies any vaginal discharge or bleeding. She is not on hormone replacement therapy.  Her last mammogram was in July and read as BIRADS 2. She has no specific complaints today and denies abdominal or pelvic pain.     HPI      Social History     Tobacco Use   • Smoking status: Never   • Smokeless tobacco: Never   Vaping Use   • Vaping Use: Never used   Substance Use Topics   • Alcohol use: Never   • Drug use: Never        Review of Systems   Constitutional: Negative for fever.   Respiratory: Negative for cough and shortness of breath.    Genitourinary: Negative for vaginal bleeding.   Hematological: Does not bruise/bleed easily.       Visit Vitals  /76   Ht 165.1 cm (65\")   Wt 95.7 kg (211 lb)   LMP 10/04/1981 Comment: age 31   BMI 35.11 kg/m²      Objective   Physical Exam  Vitals and nursing note reviewed. Exam conducted with a chaperone present.   Constitutional:       General: She is not in acute distress.     Appearance: She is well-developed.   HENT:      Head: Normocephalic and atraumatic.   Neck:      Thyroid: No thyromegaly.   Cardiovascular:      Rate and Rhythm: Normal rate and regular rhythm.      Heart sounds: No murmur heard.  Pulmonary:      Effort: Pulmonary effort is normal.      Breath sounds: Normal breath sounds.   Chest:   Breasts:     Right: No inverted nipple or mass.      Left: No inverted nipple or mass.   Abdominal:      General: There is no distension.      Palpations: Abdomen is soft.      Tenderness: There is no abdominal tenderness.   Genitourinary:     General: Normal vulva.      Exam position: Lithotomy position.      Pubic Area: No rash.       Labia:         Right: No tenderness or lesion.         Left: No tenderness or lesion.       Urethra: No prolapse.      Vagina: Normal. No " vaginal discharge or bleeding.      Uterus: Absent.       Adnexa:         Right: No tenderness or fullness.          Left: No tenderness or fullness.        Rectum: No external hemorrhoid or internal hemorrhoid. Normal anal tone.          Comments: Patient s/p hysterectomy:  Uterus and cervix surgically absent.  Vaginal cuff unremarkable.  Labia normal, no lesions noted.  Urethral meatus unremarkable, no prolapse of mucosa.  Anus/perineum normal    Musculoskeletal:         General: Normal range of motion.      Cervical back: Normal range of motion and neck supple.   Skin:     General: Skin is warm and dry.   Neurological:      Mental Status: She is alert and oriented to person, place, and time.   Psychiatric:         Behavior: Behavior normal.         Judgment: Judgment normal.       Assessment/Plan   Diagnoses and all orders for this visit:    1. Encounter for gynecological examination without abnormal finding (Primary)      We have discussed current Pap smear screening guidelines.  She will return in one year. In the meantime if she develops questions or problems, she will notify the office.

## 2022-10-21 DIAGNOSIS — R05.1 ACUTE COUGH: Primary | ICD-10-CM

## 2022-10-21 NOTE — TELEPHONE ENCOUNTER
Hannah called & said that she has cough & congestion.  She has amoxicillin enough for 9 days.  She says that the pharmacy gave her a double amount last time.  She is requesting cough syrup to help w/ sleep.  She has taken tussin otc and it is not helping.

## 2022-12-08 ENCOUNTER — OFFICE VISIT (OUTPATIENT)
Dept: FAMILY MEDICINE CLINIC | Facility: CLINIC | Age: 72
End: 2022-12-08

## 2022-12-08 VITALS
TEMPERATURE: 97.1 F | RESPIRATION RATE: 18 BRPM | OXYGEN SATURATION: 94 % | DIASTOLIC BLOOD PRESSURE: 86 MMHG | WEIGHT: 214.4 LBS | HEART RATE: 89 BPM | SYSTOLIC BLOOD PRESSURE: 130 MMHG | BODY MASS INDEX: 35.72 KG/M2 | HEIGHT: 65 IN

## 2022-12-08 DIAGNOSIS — Z20.822 CLOSE EXPOSURE TO COVID-19 VIRUS: Primary | ICD-10-CM

## 2022-12-08 DIAGNOSIS — J01.90 ACUTE NON-RECURRENT SINUSITIS, UNSPECIFIED LOCATION: ICD-10-CM

## 2022-12-08 LAB
EXPIRATION DATE: NORMAL
INTERNAL CONTROL: NORMAL
Lab: NORMAL
SARS-COV-2 AG UPPER RESP QL IA.RAPID: NOT DETECTED

## 2022-12-08 PROCEDURE — 87426 SARSCOV CORONAVIRUS AG IA: CPT | Performed by: NURSE PRACTITIONER

## 2022-12-08 PROCEDURE — 99213 OFFICE O/P EST LOW 20 MIN: CPT | Performed by: NURSE PRACTITIONER

## 2022-12-08 RX ORDER — DOXYCYCLINE HYCLATE 100 MG/1
100 CAPSULE ORAL 2 TIMES DAILY
Qty: 20 CAPSULE | Refills: 0 | Status: SHIPPED | OUTPATIENT
Start: 2022-12-08 | End: 2022-12-18

## 2022-12-08 NOTE — PROGRESS NOTES
Subjective   Chief Complaint:  Cough and exposure to COVID-19.    History of Present Illness:  This 72 y.o. female was seen in the office today.     The patient presents today with a cough and COVID-19 exposure. She reports onset of symptoms 12/05/2022. She advises that she began with nasal congestion and then 11/07/2022 she had a cough. The patient reports pain and throbbing in her sinuses and cheeks. She advises that the pain and throbbing radiated down her neck. Additionally, she reports bilateral ear drainage and nausea. The patient adds that she had an episode of vomiting this morning while brushing her teeth. She advises that she has had sinusitis previously, most recent was 10/2022.     Allergies   Allergen Reactions   • Ceftin [Cefuroxime Axetil] Other (See Comments)     Pt does not recall   • Augmentin [Amoxicillin-Pot Clavulanate] Rash   • Azithromycin Nausea Only   • Bactrim [Sulfamethoxazole-Trimethoprim] Nausea And Vomiting   • Cefuroxime Nausea Only   • Codeine GI Intolerance   • Demerol [Meperidine] Nausea And Vomiting   • Erythromycin Rash   • Latex Other (See Comments)     Patient says it pulls her skin off.   • Tequin [Gatifloxacin] Nausea Only   • Tetracyclines & Related Nausea And Vomiting      Current Outpatient Medications on File Prior to Visit   Medication Sig   • dicyclomine (Bentyl) 10 MG capsule Take 1 capsule by mouth 3 (Three) Times a Day As Needed (abdominal discomfort).   • esomeprazole (NexIUM) 20 MG capsule Take 20 mg by mouth Every Morning Before Breakfast.   • HYDROcod Polst-CPM Polst ER (Tussionex Pennkinetic ER) 10-8 MG/5ML ER suspension Take 5 mL by mouth Every 12 (Twelve) Hours As Needed for Cough.   • levothyroxine (SYNTHROID, LEVOTHROID) 100 MCG tablet Take 1 tablet by mouth once daily   • meclizine (ANTIVERT) 25 MG tablet Take 1 tablet by mouth 3 (Three) Times a Day As Needed for Dizziness (vertigo). Take one by mouth three times a day as needed for vertigo   • montelukast  (SINGULAIR) 10 MG tablet Take 1 tablet by mouth Every Night.   • mupirocin (BACTROBAN) 2 % nasal ointment Apply intranasally twice a day   • valsartan (DIOVAN) 320 MG tablet Take 1 tablet by mouth once daily     No current facility-administered medications on file prior to visit.      Past Medical, Surgical, Social, and Family History:  Past Medical History:   Diagnosis Date   • Abnormal liver enzymes    • Achilles bursitis    • Allergic    • Anxiety    • Arthralgia    • Arthritis    • Asthma    • Chronic pansinusitis 10/14/2016   • Colon polyp    • Diverticulosis    • Dysuria    • Fatty liver    • GERD (gastroesophageal reflux disease)    • Hyperlipidemia    • Hypertension    • Hyperthyroidism    • Non-cardiac chest pain    • Osteoarthritis    • Palpitations    • Perennial allergic rhinitis 10/14/2016   • Pneumonia    • PONV (postoperative nausea and vomiting)    • Rhinitis    • Tinnitus    • Trigeminal neuralgia    • Vertigo      Past Surgical History:   Procedure Laterality Date   • APPENDECTOMY     • BILATERAL OOPHORECTOMY     • CATARACT EXTRACTION WITH INTRAOCULAR LENS IMPLANT     • CHOLECYSTECTOMY     • COLONOSCOPY  11/18/2013     six polyps removed or destroyed, Diverticulosis  Recall 3 years   • COLONOSCOPY  11/18/2013   • COLONOSCOPY N/A 04/04/2017    Procedure: COLONOSCOPY WITH ANESTHESIA;  Surgeon: Lew Orona MD;  Location: [facility];  Service:    • COLONOSCOPY N/A 03/16/2020    Procedure: COLONOSCOPY WITH ANESTHESIA;  Surgeon: Lew Orona MD;  Location: [facility];  Service: Gastroenterology;  Laterality: N/A;  pre op: hx polyps  Post op:polyps  PCP: Deni Henry MD   • EYE SURGERY  11/2018    Cataract Removal   • HEMORRHOIDECTOMY     • HYSTERECTOMY     • NECK SURGERY     • NOSE SURGERY      nose bleeding artery    • OTHER SURGICAL HISTORY      ingrown toe nail   • RECTAL FISSURE INCISION AND DRAINAGE       Social History     Socioeconomic History   • Marital status:     Tobacco Use   • Smoking status: Never   • Smokeless tobacco: Never   Vaping Use   • Vaping Use: Never used   Substance and Sexual Activity   • Alcohol use: Never   • Drug use: Never   • Sexual activity: Not Currently     Partners: Male     Family History   Problem Relation Age of Onset   • Heart disease Father    • Heart attack Father    • Arthritis Mother    • Diabetes Brother    • Heart disease Brother    • Colon cancer Sister 57   • Cancer Sister    • Colon cancer Sister 55   • Cancer Sister    • Breast cancer Maternal Grandmother    • Breast cancer Maternal Aunt    • Breast cancer Maternal Aunt    • Breast cancer Maternal Aunt    • Ovarian cancer Neg Hx    • Uterine cancer Neg Hx        Prior Visit Notes/Records, Lab, Imaging, and Diagnostic Results Reviewed:  A1C:No results for input(s): HGBA1C in the last 42847 hours.  GLUCOSE:  Lab Results - Last 18 Months   Lab Units 07/11/22  0817 01/10/22  0812 07/12/21  0826   GLUCOSE mg/dL 137* 128* 117*     LIPID:  Lab Results - Last 18 Months   Lab Units 07/11/22  0817 01/10/22  0812 07/12/21  0826   CHOLESTEROL mg/dL 306* 283* 275*   LDL CHOL mg/dL 207* 179* 185*   HDL CHOL mg/dL 74* 71* 64*   TRIGLYCERIDES mg/dL 138 179* 146     PSA:No results for input(s): PSA in the last 32947 hours.  CBC:  Lab Results - Last 18 Months   Lab Units 07/11/22  0817 01/10/22  0812 07/12/21  0826   WBC 10*3/mm3 9.57 9.94 10.17   HEMOGLOBIN g/dL 16.2* 16.2* 16.9*   HEMATOCRIT % 48.0* 47.3* 51.0*   PLATELETS 10*3/mm3 234 255 272      BMP/CMP:  Lab Results - Last 18 Months   Lab Units 07/11/22  0817 01/10/22  0812 07/12/21  0826   SODIUM mmol/L 140 138 138   POTASSIUM mmol/L 4.8 4.3 5.1   CHLORIDE mmol/L 102 102 101   TOTAL CO2 mmol/L 24.9 23.8 24.7   GLUCOSE mg/dL 137* 128* 117*   BUN mg/dL 12 12 11   CREATININE mg/dL 0.73 0.72 0.82   EGFR IF NONAFRICN AM mL/min/1.73  --  80 69   EGFR IF AFRICN AM mL/min/1.73  --  97 84   EGFR RESULT mL/min/1.73 89.2  --   --    CALCIUM mg/dL  "9.8 9.9 10.2     HEPATIC:  Lab Results - Last 18 Months   Lab Units 07/11/22  0817 01/10/22  0812 07/12/21  0826   ALT (SGPT) U/L 85* 94* 94*   AST (SGOT) U/L 61* 51* 76*   ALK PHOS U/L 177* 162* 140*     Vit D:  Lab Results - Last 18 Months   Lab Units 07/11/22  0817   VIT D 25 HYDROXY ng/ml 34.7     THYROID:  Lab Results - Last 18 Months   Lab Units 07/11/22  0817 01/10/22  0812 07/12/21  0826   TSH uIU/mL 1.810 2.230 2.600       Objective   Physical Exam  Constitutional:       General: She is not in acute distress.  HENT:      Mouth/Throat:      Comments: Throat with postnasal drip, palpable sinus tenderness bilaterally.  Pulmonary:      Effort: Pulmonary effort is normal. No respiratory distress.   Neurological:      Mental Status: She is alert.     /86 (BP Location: Left arm, Patient Position: Sitting, Cuff Size: Adult)   Pulse 89   Temp 97.1 °F (36.2 °C) (Infrared)   Resp 18   Ht 165.1 cm (65\")   Wt 97.3 kg (214 lb 6.4 oz)   LMP 10/04/1981 Comment: age 31  SpO2 94%   BMI 35.68 kg/m²     Assessment & Plan   Diagnoses and all orders for this visit:    1. Close exposure to COVID-19 virus (Primary)  -     POCT VERITOR SARS-CoV-2 Antigen    2. Acute non-recurrent sinusitis, unspecified location    Other orders  -     doxycycline (VIBRAMYCIN) 100 MG capsule; Take 1 capsule by mouth 2 (Two) Times a Day for 10 days.  Dispense: 20 capsule; Refill: 0    Discussion:  Advised and educated plan of care. Advised patient firm diagnosis of sinusitis and antibiotics followed by her COVID-19 test is negative.    Follow-up:  Return if symptoms worsen or fail to improve.    Electronically signed by Ned Garcia, 12/08/22, 2:40 PM CST.  "

## 2022-12-13 ENCOUNTER — TELEPHONE (OUTPATIENT)
Dept: FAMILY MEDICINE CLINIC | Facility: CLINIC | Age: 72
End: 2022-12-13

## 2022-12-13 NOTE — TELEPHONE ENCOUNTER
Advised to keep the same antibiotic-would also like her to take antivirals-sent to [facility] - quarantine as follows, ED if any distress.    Covid-19 Isolation Guidelines  Positive COVID-19 test with Symptoms    Isolate 10 Days From the Date SYMPTOMS Began    1.  If symptoms fully resolve, isolation may be shortened and after day 5 on the first day without symptoms.  2.  Wear well fitting facemask for 10 full days since the start of symptoms.  Isolation should not be shortened if a mask cannot be worn properly and consistently.    Per January 3, 2022 Guidelines [id number]    Electronically signed by Ned Garcia, FADI, 12/13/22, 12:41 PM CST.

## 2022-12-13 NOTE — TELEPHONE ENCOUNTER
Caller: Hannah Maki    Relationship: Self    Best call back number: [phone]    What is the best time to reach you: ANYTIME    Who are you requesting to speak with (clinical staff, provider,  specific staff member): CLINICAL    What was the call regarding: PATIENT WAS SEEN 12/08/22 BY SINGH. SHE WAS COVID TESTED AT VISIT BUT IT CAME BACK NEGATIVE. HOWEVER SHE TOOK AT HOME COVID TEST YESTERDAY AND IT CAME UP POSITIVE.    Do you require a callback: YES

## 2022-12-19 ENCOUNTER — TELEPHONE (OUTPATIENT)
Dept: FAMILY MEDICINE CLINIC | Facility: CLINIC | Age: 72
End: 2022-12-19

## 2022-12-19 RX ORDER — FLUCONAZOLE 100 MG/1
100 TABLET ORAL DAILY
Qty: 3 TABLET | Refills: 0 | Status: SHIPPED | OUTPATIENT
Start: 2022-12-19

## 2022-12-19 NOTE — TELEPHONE ENCOUNTER
Caller: Hannah Maki    Relationship: Self    Best call back number: [phone]    What medication are you requesting: DOCTORS SUGGESTION    What are your current symptoms: ITCHING, IRRITATION IN VAGINAL AREA    How long have you been experiencing symptoms:  12.15.2022    Have you had these symptoms before:   [x] Yes  [] No    Have you been treated for these symptoms before:   [x] Yes  [] No    If a prescription is needed, what is your preferred pharmacy and phone number: [facility] [address] [place] [address] [address] [facility] [phone] PH - [phone] FX     PATIENT STATES OCCURRED AFTER TAKING THE DOXYCYCLINE

## 2022-12-27 RX ORDER — LEVOTHYROXINE SODIUM 0.1 MG/1
TABLET ORAL
Qty: 90 TABLET | Refills: 0 | Status: SHIPPED | OUTPATIENT
Start: 2022-12-27 | End: 2023-03-21 | Stop reason: SDUPTHER

## 2022-12-27 RX ORDER — VALSARTAN 320 MG/1
TABLET ORAL
Qty: 90 TABLET | Refills: 0 | Status: SHIPPED | OUTPATIENT
Start: 2022-12-27 | End: 2023-03-21 | Stop reason: SDUPTHER

## 2023-01-12 ENCOUNTER — OFFICE VISIT (OUTPATIENT)
Dept: FAMILY MEDICINE CLINIC | Facility: CLINIC | Age: 73
End: 2023-01-12
Payer: MEDICARE

## 2023-01-12 VITALS
HEART RATE: 76 BPM | OXYGEN SATURATION: 97 % | TEMPERATURE: 98.5 F | SYSTOLIC BLOOD PRESSURE: 138 MMHG | DIASTOLIC BLOOD PRESSURE: 80 MMHG | RESPIRATION RATE: 16 BRPM | BODY MASS INDEX: 35.49 KG/M2 | HEIGHT: 65 IN | WEIGHT: 213 LBS

## 2023-01-12 DIAGNOSIS — I10 PRIMARY HYPERTENSION: Primary | ICD-10-CM

## 2023-01-12 DIAGNOSIS — E78.2 MIXED HYPERLIPIDEMIA: ICD-10-CM

## 2023-01-12 DIAGNOSIS — E03.9 ACQUIRED HYPOTHYROIDISM: ICD-10-CM

## 2023-01-12 PROCEDURE — 1125F AMNT PAIN NOTED PAIN PRSNT: CPT | Performed by: FAMILY MEDICINE

## 2023-01-12 PROCEDURE — 1170F FXNL STATUS ASSESSED: CPT | Performed by: FAMILY MEDICINE

## 2023-01-12 PROCEDURE — G0439 PPPS, SUBSEQ VISIT: HCPCS | Performed by: FAMILY MEDICINE

## 2023-01-12 PROCEDURE — 1159F MED LIST DOCD IN RCRD: CPT | Performed by: FAMILY MEDICINE

## 2023-01-12 PROCEDURE — 1160F RVW MEDS BY RX/DR IN RCRD: CPT | Performed by: FAMILY MEDICINE

## 2023-01-12 PROCEDURE — 1126F AMNT PAIN NOTED NONE PRSNT: CPT | Performed by: FAMILY MEDICINE

## 2023-01-12 NOTE — PROGRESS NOTES
Subjective   Hannah Maki is a 72 y.o. female.     Chief Complaint   Patient presents with   • Hypertension   • Hyperlipidemia   • Medicare Wellness-subsequent        History of Present Illness     she thinks her bp is doing well without cp or ha      Current Outpatient Medications:   •  dicyclomine (Bentyl) 10 MG capsule, Take 1 capsule by mouth 3 (Three) Times a Day As Needed (abdominal discomfort)., Disp: 90 capsule, Rfl: 6  •  esomeprazole (NexIUM) 20 MG capsule, Take 20 mg by mouth Every Morning Before Breakfast., Disp: , Rfl:   •  levothyroxine (SYNTHROID, LEVOTHROID) 100 MCG tablet, Take 1 tablet by mouth once daily, Disp: 90 tablet, Rfl: 0  •  meclizine (ANTIVERT) 25 MG tablet, Take 1 tablet by mouth 3 (Three) Times a Day As Needed for Dizziness (vertigo). Take one by mouth three times a day as needed for vertigo, Disp: 60 tablet, Rfl: 3  •  montelukast (SINGULAIR) 10 MG tablet, Take 1 tablet by mouth Every Night., Disp: 30 tablet, Rfl: 8  •  mupirocin (BACTROBAN) 2 % nasal ointment, Apply intranasally twice a day, Disp: 30 g, Rfl: 2  •  valsartan (DIOVAN) 320 MG tablet, Take 1 tablet by mouth once daily, Disp: 90 tablet, Rfl: 0  •  fluconazole (Diflucan) 100 MG tablet, Take 1 tablet by mouth Daily., Disp: 3 tablet, Rfl: 0  •  HYDROcod Polst-CPM Polst ER (Tussionex Pennkinetic ER) 10-8 MG/5ML ER suspension, Take 5 mL by mouth Every 12 (Twelve) Hours As Needed for Cough., Disp: 118 mL, Rfl: 0  Allergies   Allergen Reactions   • Ceftin [Cefuroxime Axetil] Other (See Comments)     Pt does not recall   • Augmentin [Amoxicillin-Pot Clavulanate] Rash   • Azithromycin Nausea Only   • Bactrim [Sulfamethoxazole-Trimethoprim] Nausea And Vomiting   • Cefuroxime Nausea Only   • Codeine GI Intolerance   • Demerol [Meperidine] Nausea And Vomiting   • Erythromycin Rash   • Latex Other (See Comments)     Patient says it pulls her skin off.   • Tequin [Gatifloxacin] Nausea Only   • Tetracyclines & Related Nausea And  Vomiting       Class 2 Severe Obesity (BMI >=35 and <=39.9). Obesity-related health conditions include the following: hypertension. Obesity is unchanged. BMI is is above average; BMI management plan is completed. We discussed portion control and increasing exercise.      Past Medical History:   Diagnosis Date   • Abnormal liver enzymes    • Achilles bursitis    • Allergic    • Anxiety    • Arthralgia    • Arthritis    • Asthma    • Chronic pansinusitis 10/14/2016   • Colon polyp    • Diverticulosis    • Dysuria    • Fatty liver    • GERD (gastroesophageal reflux disease)    • Hyperlipidemia    • Hypertension    • Hyperthyroidism    • Non-cardiac chest pain    • Osteoarthritis    • Palpitations    • Perennial allergic rhinitis 10/14/2016   • Pneumonia    • PONV (postoperative nausea and vomiting)    • Rhinitis    • Tinnitus    • Trigeminal neuralgia    • Vertigo      Past Surgical History:   Procedure Laterality Date   • APPENDECTOMY     • BILATERAL OOPHORECTOMY     • CATARACT EXTRACTION WITH INTRAOCULAR LENS IMPLANT     • CHOLECYSTECTOMY     • COLONOSCOPY  11/18/2013     six polyps removed or destroyed, Diverticulosis  Recall 3 years   • COLONOSCOPY  11/18/2013   • COLONOSCOPY N/A 04/04/2017    Procedure: COLONOSCOPY WITH ANESTHESIA;  Surgeon: Lew Orona MD;  Location: [facility] ENDOSCOPY;  Service:    • COLONOSCOPY N/A 03/16/2020    Procedure: COLONOSCOPY WITH ANESTHESIA;  Surgeon: Lew Orona MD;  Location: [facility] ENDOSCOPY;  Service: Gastroenterology;  Laterality: N/A;  pre op: hx polyps  Post op:polyps  PCP: Deni Henry MD   • EYE SURGERY  11/2018    Cataract Removal   • HEMORRHOIDECTOMY     • HYSTERECTOMY     • NECK SURGERY     • NOSE SURGERY      nose bleeding artery    • OTHER SURGICAL HISTORY      ingrown toe nail   • RECTAL FISSURE INCISION AND DRAINAGE         Review of Systems   Constitutional: Negative.    HENT: Negative.    Eyes: Negative.    Respiratory: Negative.   "  Cardiovascular: Negative.    Gastrointestinal: Negative.    Endocrine: Negative.    Genitourinary: Negative.    Musculoskeletal: Negative.    Skin: Negative.    Allergic/Immunologic: Negative.    Neurological: Negative.    Hematological: Negative.    Psychiatric/Behavioral: Negative.        Objective  /80   Pulse 76   Temp 98.5 °F (36.9 °C)   Resp 16   Ht 165.1 cm (65\")   Wt 96.6 kg (213 lb)   LMP 10/04/1981 Comment: age 31  SpO2 97%   BMI 35.45 kg/m²   Physical Exam  Vitals and nursing note reviewed.   Constitutional:       Appearance: Normal appearance. She is normal weight.   HENT:      Head: Normocephalic and atraumatic.      Nose: Nose normal.      Mouth/Throat:      Mouth: Mucous membranes are moist.   Eyes:      Extraocular Movements: Extraocular movements intact.      Conjunctiva/sclera: Conjunctivae normal.      Pupils: Pupils are equal, round, and reactive to light.   Cardiovascular:      Rate and Rhythm: Normal rate and regular rhythm.      Pulses: Normal pulses.      Heart sounds: Normal heart sounds.   Pulmonary:      Effort: Pulmonary effort is normal.      Breath sounds: Normal breath sounds.   Abdominal:      General: Abdomen is flat. Bowel sounds are normal.      Palpations: Abdomen is soft.   Musculoskeletal:         General: Normal range of motion.      Cervical back: Normal range of motion and neck supple.   Skin:     General: Skin is warm and dry.      Capillary Refill: Capillary refill takes less than 2 seconds.   Neurological:      General: No focal deficit present.      Mental Status: She is alert and oriented to person, place, and time. Mental status is at baseline.   Psychiatric:         Mood and Affect: Mood normal.         Assessment & Plan   Diagnoses and all orders for this visit:    1. Primary hypertension (Primary)  -     CBC & Differential  -     Comprehensive metabolic panel  -     Lipid Panel With / Chol / HDL Ratio  -     TSH    2. Acquired hypothyroidism  -     " CBC & Differential  -     Comprehensive metabolic panel  -     Lipid Panel With / Chol / HDL Ratio  -     TSH    3. Mixed hyperlipidemia  -     CBC & Differential  -     Comprehensive metabolic panel  -     Lipid Panel With / Chol / HDL Ratio  -     TSH                 Orders Placed This Encounter   Procedures   • Comprehensive metabolic panel     Order Specific Question:   Release to patient     Answer:   Routine Release   • Lipid Panel With / Chol / HDL Ratio     Order Specific Question:   Release to patient     Answer:   Routine Release   • TSH     Order Specific Question:   Release to patient     Answer:   Routine Release   • CBC & Differential       Follow up: 4 month(s)

## 2023-01-12 NOTE — PROGRESS NOTES
The ABCs of the Annual Wellness Visit  Subsequent Medicare Wellness Visit    Subjective      Hannah Maki is a 72 y.o. female who presents for a Subsequent Medicare Wellness Visit.    The following portions of the patient's history were reviewed and   updated as appropriate: allergies, current medications, past family history, past medical history, past social history, past surgical history and problem list.    Compared to one year ago, the patient feels her physical   health is the same.    Compared to one year ago, the patient feels her mental   health is the same.    Recent Hospitalizations:  She was not admitted to the hospital during the last year.       Current Medical Providers:  Patient Care Team:  Deni Henry MD as PCP - General  Deni Henry MD as PCP - Family Medicine  [facility] as Consulting Physician (Otolaryngology)  Deni Henry MD as Referring Physician (Family Medicine)  Asael Suazo MD as Cardiologist (Cardiology)  Lew Orona MD as Consulting Physician (Gastroenterology)  Lew Orona MD as Consulting Physician (Gastroenterology)  Jacquie Jack APRN as Nurse Practitioner (Nurse Practitioner)  Ananda Cifuentes MD as Obstetrician (Obstetrics and Gynecology)    Outpatient Medications Prior to Visit   Medication Sig Dispense Refill   • dicyclomine (Bentyl) 10 MG capsule Take 1 capsule by mouth 3 (Three) Times a Day As Needed (abdominal discomfort). 90 capsule 6   • esomeprazole (NexIUM) 20 MG capsule Take 20 mg by mouth Every Morning Before Breakfast.     • levothyroxine (SYNTHROID, LEVOTHROID) 100 MCG tablet Take 1 tablet by mouth once daily 90 tablet 0   • meclizine (ANTIVERT) 25 MG tablet Take 1 tablet by mouth 3 (Three) Times a Day As Needed for Dizziness (vertigo). Take one by mouth three times a day as needed for vertigo 60 tablet 3   • montelukast (SINGULAIR) 10 MG tablet Take 1 tablet by mouth Every Night. 30  tablet 8   • mupirocin (BACTROBAN) 2 % nasal ointment Apply intranasally twice a day 30 g 2   • valsartan (DIOVAN) 320 MG tablet Take 1 tablet by mouth once daily 90 tablet 0   • fluconazole (Diflucan) 100 MG tablet Take 1 tablet by mouth Daily. 3 tablet 0   • HYDROcod Polst-CPM Polst ER (Tussionex Pennkinetic ER) 10-8 MG/5ML ER suspension Take 5 mL by mouth Every 12 (Twelve) Hours As Needed for Cough. 118 mL 0     No facility-administered medications prior to visit.       No opioid medication identified on active medication list. I have reviewed chart for other potential  high risk medication/s and harmful drug interactions in the elderly.          Aspirin is not on active medication list.  Aspirin use is not indicated based on review of current medical condition/s. Risk of harm outweighs potential benefits.  .    Patient Active Problem List   Diagnosis   • Mixed hyperlipidemia   • Acquired hypothyroidism   • Mild intermittent asthma   • Gastroesophageal reflux disease without esophagitis   • Fatty liver   • Chronic pansinusitis   • Perennial allergic rhinitis   • Post-nasal drip   • Bronchitis   • Rectal fissure   • Sinusitis   • Parotid mass   • Atypical chest pain   • Hypertension   • Dyspnea   • Arthralgia of both ankles   • Cervicalgia   • Scapulalgia   • Adult BMI 35.0-35.9 kg/sq m   • Non-smoker   • Abnormal LFTs   • Colon polyp   • Family history of GI malignancy   • Diarrhea   • Generalized abdominal pain   • Family hx of colon cancer   • Cervical radiculopathy   • Spinal stenosis in cervical region   • BMI 34.0-34.9,adult   • Plantar fasciitis   • Sensorineural hearing loss (SNHL) of both ears   • Tinnitus of both ears   • Acute maxillary sinusitis   • History of adenomatous polyp of colon   • Other specified disorders of carbohydrate metabolism (HCC)   • Morbidly obese (HCC)   • Irritable bowel syndrome   • Avulsion of toenail   • Calcaneal spur   • Chronic venous insufficiency   • Contracture of  "Achilles tendon   • Foot pain   • Ingrown nail   • Kidney stone   • Osteoarthritis of right knee   • Sesamoiditis   • Strain of hamstring muscle   • Varicose veins of leg with pain     Advance Care Planning  Advance Directive is not on file.  ACP discussion was held with the patient during this visit. Patient does not have an advance directive, information provided.     Objective    Vitals:    01/12/23 0803   BP: 138/80   Pulse: 76   Resp: 16   Temp: 98.5 °F (36.9 °C)   SpO2: 97%   Weight: 96.6 kg (213 lb)   Height: 165.1 cm (65\")     Estimated body mass index is 35.45 kg/m² as calculated from the following:    Height as of this encounter: 165.1 cm (65\").    Weight as of this encounter: 96.6 kg (213 lb).    Class 2 Severe Obesity (BMI >=35 and <=39.9). Obesity-related health conditions include the following: hypertension. Obesity is unchanged. BMI is is above average; BMI management plan is completed. We discussed portion control and increasing exercise.      Does the patient have evidence of cognitive impairment?   No            HEALTH RISK ASSESSMENT    Smoking Status:  Social History     Tobacco Use   Smoking Status Never   Smokeless Tobacco Never     Alcohol Consumption:  Social History     Substance and Sexual Activity   Alcohol Use Never     Fall Risk Screen:    [facility], and patient is at LOW risk for falls.Assessment completed on:1/12/2023    Depression Screening:  PHQ-2/PHQ-9 Depression Screening 1/12/2023   Little Interest or Pleasure in Doing Things 0-->not at all   Feeling Down, Depressed or Hopeless 0-->not at all   PHQ-9: Brief Depression Severity Measure Score 0       Health Habits and Functional and Cognitive Screening:  Functional & Cognitive Status 1/12/2023   Do you have difficulty preparing food and eating? No   Do you have difficulty bathing yourself, getting dressed or grooming yourself? No   Do you have difficulty using the toilet? No   Do you have difficulty " moving around from place to place? No   Do you have trouble with steps or getting out of a bed or a chair? No   Current Diet Well Balanced Diet   Dental Exam Up to date   Eye Exam Up to date   Exercise (times per week) 5 times per week   Current Exercises Include House Cleaning   Current Exercise Activities Include -   Do you need help using the phone?  No   Are you deaf or do you have serious difficulty hearing?  No   Do you need help with transportation? No   Do you need help shopping? No   Do you need help preparing meals?  No   Do you need help with housework?  No   Do you need help with laundry? No   Do you need help taking your medications? No   Do you need help managing money? No   Do you ever drive or ride in a car without wearing a seat belt? No   Have you felt unusual stress, anger or loneliness in the last month? No   Who do you live with? Spouse   If you need help, do you have trouble finding someone available to you? No   Have you been bothered in the last four weeks by sexual problems? No   Do you have difficulty concentrating, remembering or making decisions? No       Age-appropriate Screening Schedule:  Refer to the list below for future screening recommendations based on patient's age, sex and/or medical conditions. Orders for these recommended tests are listed in the plan section. The patient has been provided with a written plan.    Health Maintenance   Topic Date Due   • TDAP/TD VACCINES (1 - Tdap) Never done   • ZOSTER VACCINE (1 of 2) Never done   • PAP SMEAR  Never done   • INFLUENZA VACCINE  03/31/2023 (Originally 8/1/2022)   • LIPID PANEL  07/11/2023   • MAMMOGRAM  07/05/2024   • DXA SCAN  08/01/2024                CMS Preventative Services Quick Reference  Risk Factors Identified During Encounter:    Depression/Dysphoria: Current medication is effective, no change recommended    The above risks/problems have been discussed with the patient.  Pertinent information has been shared with the  patient in the After Visit Summary.    Diagnoses and all orders for this visit:    1. Primary hypertension (Primary)  -     CBC & Differential  -     Comprehensive metabolic panel  -     Lipid Panel With / Chol / HDL Ratio  -     TSH    2. Acquired hypothyroidism  -     CBC & Differential  -     Comprehensive metabolic panel  -     Lipid Panel With / Chol / HDL Ratio  -     TSH    3. Mixed hyperlipidemia  -     CBC & Differential  -     Comprehensive metabolic panel  -     Lipid Panel With / Chol / HDL Ratio  -     TSH        Follow Up:   Next Medicare Wellness visit to be scheduled in 1 year.      An After Visit Summary and PPPS were made available to the patient.

## 2023-01-13 LAB
ALBUMIN SERPL-MCNC: 4.2 G/DL (ref 3.5–5.2)
ALBUMIN/GLOB SERPL: 1.2 G/DL
ALP SERPL-CCNC: 165 U/L (ref 39–117)
ALT SERPL-CCNC: 71 U/L (ref 1–33)
AST SERPL-CCNC: 64 U/L (ref 1–32)
BASOPHILS # BLD AUTO: 0.09 10*3/MM3 (ref 0–0.2)
BASOPHILS NFR BLD AUTO: 1.2 % (ref 0–1.5)
BILIRUB SERPL-MCNC: 0.8 MG/DL (ref 0–1.2)
BUN SERPL-MCNC: 11 MG/DL (ref 8–23)
BUN/CREAT SERPL: 13.3 (ref 7–25)
CALCIUM SERPL-MCNC: 9.8 MG/DL (ref 8.6–10.5)
CHLORIDE SERPL-SCNC: 103 MMOL/L (ref 98–107)
CHOLEST SERPL-MCNC: 305 MG/DL (ref 0–200)
CHOLEST/HDLC SERPL: 4.42 {RATIO}
CO2 SERPL-SCNC: 26.7 MMOL/L (ref 22–29)
CREAT SERPL-MCNC: 0.83 MG/DL (ref 0.57–1)
EGFRCR SERPLBLD CKD-EPI 2021: 75.9 ML/MIN/1.73
EOSINOPHIL # BLD AUTO: 0.19 10*3/MM3 (ref 0–0.4)
EOSINOPHIL NFR BLD AUTO: 2.5 % (ref 0.3–6.2)
ERYTHROCYTE [DISTWIDTH] IN BLOOD BY AUTOMATED COUNT: 12.7 % (ref 12.3–15.4)
GLOBULIN SER CALC-MCNC: 3.4 GM/DL
GLUCOSE SERPL-MCNC: 125 MG/DL (ref 65–99)
HCT VFR BLD AUTO: 46.4 % (ref 34–46.6)
HDLC SERPL-MCNC: 69 MG/DL (ref 40–60)
HGB BLD-MCNC: 15.6 G/DL (ref 12–15.9)
IMM GRANULOCYTES # BLD AUTO: 0.03 10*3/MM3 (ref 0–0.05)
IMM GRANULOCYTES NFR BLD AUTO: 0.4 % (ref 0–0.5)
LDLC SERPL CALC-MCNC: 216 MG/DL (ref 0–100)
LYMPHOCYTES # BLD AUTO: 2.81 10*3/MM3 (ref 0.7–3.1)
LYMPHOCYTES NFR BLD AUTO: 36.5 % (ref 19.6–45.3)
MCH RBC QN AUTO: 31.5 PG (ref 26.6–33)
MCHC RBC AUTO-ENTMCNC: 33.6 G/DL (ref 31.5–35.7)
MCV RBC AUTO: 93.5 FL (ref 79–97)
MONOCYTES # BLD AUTO: 0.61 10*3/MM3 (ref 0.1–0.9)
MONOCYTES NFR BLD AUTO: 7.9 % (ref 5–12)
NEUTROPHILS # BLD AUTO: 3.96 10*3/MM3 (ref 1.7–7)
NEUTROPHILS NFR BLD AUTO: 51.5 % (ref 42.7–76)
NRBC BLD AUTO-RTO: 0 /100 WBC (ref 0–0.2)
PLATELET # BLD AUTO: 192 10*3/MM3 (ref 140–450)
POTASSIUM SERPL-SCNC: 4.5 MMOL/L (ref 3.5–5.2)
PROT SERPL-MCNC: 7.6 G/DL (ref 6–8.5)
RBC # BLD AUTO: 4.96 10*6/MM3 (ref 3.77–5.28)
SODIUM SERPL-SCNC: 140 MMOL/L (ref 136–145)
TRIGL SERPL-MCNC: 113 MG/DL (ref 0–150)
TSH SERPL DL<=0.005 MIU/L-ACNC: 2.63 UIU/ML (ref 0.27–4.2)
VLDLC SERPL CALC-MCNC: 20 MG/DL (ref 5–40)
WBC # BLD AUTO: 7.69 10*3/MM3 (ref 3.4–10.8)

## 2023-02-09 DIAGNOSIS — R09.82 POST-NASAL DRIP: ICD-10-CM

## 2023-02-09 DIAGNOSIS — J30.89 PERENNIAL ALLERGIC RHINITIS: ICD-10-CM

## 2023-02-09 RX ORDER — MECLIZINE HYDROCHLORIDE 25 MG/1
25 TABLET ORAL 3 TIMES DAILY PRN
Qty: 60 TABLET | Refills: 3 | Status: SHIPPED | OUTPATIENT
Start: 2023-02-09

## 2023-02-09 NOTE — TELEPHONE ENCOUNTER
Caller: Glynn Hannah BARBI    Relationship: Self    Best call back number: [phone]    Requested Prescriptions:   Requested Prescriptions     Pending Prescriptions Disp Refills   • meclizine (ANTIVERT) 25 MG tablet 60 tablet 3     Sig: Take 1 tablet by mouth 3 (Three) Times a Day As Needed for Dizziness (vertigo). Take one by mouth three times a day as needed for vertigo        Pharmacy where request should be sent: [place] [phone] [facility]         Does the patient have less than a 3 day supply:  [x] Yes  [] No    Would you like a call back once the refill request has been completed: [] Yes [x] No        Tri Guerin Rep   02/09/23 14:30 CST

## 2023-02-21 ENCOUNTER — TELEPHONE (OUTPATIENT)
Dept: FAMILY MEDICINE CLINIC | Facility: CLINIC | Age: 73
End: 2023-02-21

## 2023-02-21 NOTE — TELEPHONE ENCOUNTER
Spoke to pharmacy and gave her the info,I told her she would also need to call dr husain to see if he is ok with her taking the bentyl since he gives her that..pt has taken antivert for a while on/off

## 2023-02-21 NOTE — TELEPHONE ENCOUNTER
Pharmacy Name:  [facility]    Pharmacy representative name: DANY     Pharmacy representative phone number: [phone]    What medication are you calling in regards to: meclizine (ANTIVERT) 25 MG tablet AND dicyclomine (Bentyl) 10 MG capsule    What question does the pharmacy have: DRUG INTERACTION     Who is the provider that prescribed the medication: FIRST MEDICATION BY DR. CRISTINA, SECOND MEDICATION BY JOSEMANUEL    Additional notes: COMBINATION CAN INCREASE ANTICOLONANTICHOLINERGIC

## 2023-03-01 DIAGNOSIS — J32.4 CHRONIC PANSINUSITIS: ICD-10-CM

## 2023-03-01 NOTE — TELEPHONE ENCOUNTER
Caller: NIKOLE RILEY     Relationship: SELF   Best call back number:    [phone] (Home)         Requested Prescriptions:      mupirocin (BACTROBAN) 2 % nasal ointment          Pharmacy where request should be sent:    [address] [phone] [facility] [phone]   [phone]  Additional details provided by patient: NONE     Does the patient have less than a 3 day supply:  [] Yes  [x] No    Would you like a call back once the refill request has been completed: [x] Yes [] No    If the office needs to give you a call back, can they leave a voicemail: [x] Yes [] No    Tri Cadet Rep   03/01/23 09:13 CST

## 2023-03-06 ENCOUNTER — OFFICE VISIT (OUTPATIENT)
Dept: GASTROENTEROLOGY | Facility: CLINIC | Age: 73
End: 2023-03-06
Payer: MEDICARE

## 2023-03-06 VITALS
HEIGHT: 65 IN | BODY MASS INDEX: 35.49 KG/M2 | TEMPERATURE: 96.8 F | SYSTOLIC BLOOD PRESSURE: 130 MMHG | OXYGEN SATURATION: 97 % | DIASTOLIC BLOOD PRESSURE: 70 MMHG | WEIGHT: 213 LBS | HEART RATE: 81 BPM

## 2023-03-06 DIAGNOSIS — D12.6 ADENOMATOUS POLYP OF COLON, UNSPECIFIED PART OF COLON: Primary | ICD-10-CM

## 2023-03-06 DIAGNOSIS — K21.9 GASTROESOPHAGEAL REFLUX DISEASE WITHOUT ESOPHAGITIS: ICD-10-CM

## 2023-03-06 DIAGNOSIS — K76.0 FATTY LIVER: ICD-10-CM

## 2023-03-06 PROCEDURE — 99213 OFFICE O/P EST LOW 20 MIN: CPT | Performed by: INTERNAL MEDICINE

## 2023-03-06 NOTE — PROGRESS NOTES
[facility] Gastroenterology    Chief Complaint   Patient presents with   • Colonoscopy       Subjective     HPI    Hannah Maki is a 72 y.o. female who presents with a chief complaint of IBS, history of polyps and fatty liver.    She tells me she is doing well.  She still has occasional loose stools.  Still takes Bentyl.  Works well for her.  She has not been able to lose weight.  She has tried.  She has not been successful.  She continues to try.  She did have labs done in January.  See copy of LFTs below.  Her weight is the same as it was when she was here last July.  She tells me her reflux is under control.  Everything is going well for her.  She has a 50-year wedding anniversary on the 16th of this month.           Latest Reference Range & Units 07/12/21 08:26 01/10/22 08:12 07/11/22 08:17 01/12/23 07:31   Alkaline Phosphatase 39 - 117 U/L 140 (H) 162 (H) 177 (H) 165 (H)   Total Protein 6.0 - 8.5 g/dL 7.3 7.4 7.6 7.6   ALT (SGPT) 1 - 33 U/L 94 (H) 94 (H) 85 (H) 71 (H)   AST (SGOT) 1 - 32 U/L 76 (H) 51 (H) 61 (H) 64 (H)   Total Bilirubin 0.0 - 1.2 mg/dL 0.6 0.8 0.8 0.8   (H): Data is abnormally high  ======= copy of July 11, 2022 HPI=================================  HPI     Hannah Maki is a 71 y.o. female who presents with a chief complaint of heartburn, fatty liver and IBS.     She comes in for an annual checkup.  She tells me she is doing well.  She unfortunately has not lost any weight since last year.  She states that she really does not eat much.  She still is swimming routinely.  She states she has arthralgias which keeps her from doing much activity but she is enjoying swimming especially with her grandchildren.     Some of her GI complaints is occasional breakthrough heartburn.  She takes Nexium every day and that works well for her.  She still gets intermittent abdominal cramping and bloating.  Usually depends on if she eats something with gluten or other foods.  She will take a Bentyl as  needed and that helps relieve her symptoms.        =========== copy July 8, 2021 HPI====================================  =   HPI     Hannah Maki is a 70 y.o. female who presents with a chief complaint of fatty liver.     She comes in for an annual checkup.  I did review her labs that she had about 6 months ago.  Her liver transaminases remain elevated.  ALT 78 AST 52.  That is about where she runs.  I note that her cholesterol is elevated.  She tells me her PCP did place her on a statin drug.  She stopped that recently on her own because she has significant muscle cramps.  When she stopped that the muscle cramps went away.  She is due to follow-up with her PCP again in a couple weeks.  Regarding weight loss she states she really has not had significant strides in losing weight.  I note that she is down a few pounds from when she was here last year.  As discussed, she needs to lose more.  She states she tries but she is always been skinny until she got in her 50s and now she is had trouble losing weight.     Her heartburn is under control.  She states she still notes that if she eats gluten products she has more heartburn and indigestion.  Last year we tested her for celiac and she had negative antibodies.  She states as long as she avoids gluten type product she feels pretty well so she is going to continue to avoid it.  She notes that she has symptoms right away if she eats something which would not be consistent with celiac disease but more of an intolerance.  She does have cramps at times and Bentyl has been prescribed she states that works well for her.  She may take it once a week and then some days she may take it 3 times.     Everything else is going well        ============== July 2020 HPI===========================================  HPI     Hannah Maki is a 69 y.o. female who presents with a chief complaint of fatty liver.     She presents for follow-up of her liver.  She tells me she is not been able  to lose weight.  She injured her knee gardening and since then she has not been able to exercise or walk much.  She states she did get a steroid injection in her knee that did not help.  She did take meloxicam and that helped take care of the swelling.  She tolerated it well but only took it for 2 weeks.  She was concerned about the side effects of NSAIDs with her knee and liver.  She had a colonoscopy in March and went over that report with her.  Everything else is going well.        -=================== March 2020 HPI=======================  HPI     Hannah Maki is a 69 y.o. female who presents as a referral for preventative maintenance. She has no complaints of nausea or vomiting. No change in bowels. No wt loss. No BRBPR. No melena. No abdominal pain.          Last colonoscopy was 4/2017 noting 2 polyps ( one retrieved with path hyperplastic) and diverticulosis. The patient does have history of colon polyps. The patient does not have history of colon cancer.  There is family history of colon cancer sister in her 50's, another sister in her 50's.     She also history of fatty liver.  Was seen here last year .  Ultrasound elastography score was F1.  Last labs October 2019 and LFTs had improved from previous.  She did lose a little weight but has gained some back.  Does not drink alcohol.  She is not diabetic.        ====================================================================================  Ov  8-5-19  Hannah Maki is a 68 y.o. female who presents with a chief complaint of abnormal LFTs.     She was here 3 months ago.  We felt that she had fatty liver.  She underwent liver serologies which all came back unremarkable.  She had an ultrasound of her liver which did show steatosis.  She had F1 fibrosis.  I advised her to lose 5 to 10 pounds.  She comes in today 6 pounds lighter.     She tells me she has been feeling well.  She does tell me that she has some chronic intermittent abdominal cramping and  diarrhea.  She states her son is gluten intolerant.  She thinks he has celiac disease.  She herself is gone on a gluten-free diet and she is felt much better since going on gluten-free diet.  She is never had testing done before.  She has been on a gluten-free diet for about a year.  She does note that when she eats gluten she will have some cramps for about a week.  She states she ate Chinese food and Mexican food this weekend which was supposedly gluten-free.  But she has had some cramps and diarrhea since then.  The diarrhea is cleared except for after she eats she will just have a little bit of loose stools.  It has subsided over the last 3 days.  She was noted to have a little bit of an elevated temperature to 99 degrees at her visit today.  She denies any other symptoms other than some chronic sinus drainage which she attributes to allergies.  She denies dysuria or shortness of breath.     She also tells me she has heartburn.  She is on over-the-counter Nexium daily.  She will have breakthrough symptoms once in a while.  Is not every day.  She asked what she could take for breakthrough symptoms.          Past Medical History:   Diagnosis Date   • Abnormal liver enzymes    • Achilles bursitis    • Allergic    • Anxiety    • Arthralgia    • Arthritis    • Asthma    • Chronic pansinusitis 10/14/2016   • Colon polyp    • COVID    • Diverticulosis    • Dysuria    • Fatty liver    • GERD (gastroesophageal reflux disease)    • Hyperlipidemia    • Hypertension    • Hyperthyroidism    • Non-cardiac chest pain    • Osteoarthritis    • Palpitations    • Perennial allergic rhinitis 10/14/2016   • Pneumonia    • PONV (postoperative nausea and vomiting)    • Rhinitis    • Tinnitus    • Trigeminal neuralgia    • Vertigo        Past Surgical History:   Procedure Laterality Date   • APPENDECTOMY     • BILATERAL OOPHORECTOMY     • CATARACT EXTRACTION WITH INTRAOCULAR LENS IMPLANT     • CHOLECYSTECTOMY     • COLONOSCOPY   11/18/2013     six polyps removed or destroyed, Diverticulosis  Recall 3 years   • COLONOSCOPY  11/18/2013   • COLONOSCOPY N/A 04/04/2017    Procedure: COLONOSCOPY WITH ANESTHESIA;  Surgeon: Lew Orona MD;  Location:  [facility];  Service:    • COLONOSCOPY N/A 03/16/2020    Procedure: COLONOSCOPY WITH ANESTHESIA;  Surgeon: Lew Orona MD;  Location: [facility];  Service: Gastroenterology;  Laterality: N/A;  pre op: hx polyps  Post op:polyps  PCP: Deni Henry MD   • EYE SURGERY  11/2018    Cataract Removal   • HEMORRHOIDECTOMY     • HYSTERECTOMY     • NECK SURGERY     • NOSE SURGERY      nose bleeding artery    • OTHER SURGICAL HISTORY      ingrown toe nail   • RECTAL FISSURE INCISION AND DRAINAGE           Current Outpatient Medications:   •  dicyclomine (Bentyl) 10 MG capsule, Take 1 capsule by mouth 3 (Three) Times a Day As Needed (abdominal discomfort)., Disp: 90 capsule, Rfl: 6  •  esomeprazole (NexIUM) 20 MG capsule, Take 1 capsule by mouth Every Morning Before Breakfast., Disp: , Rfl:   •  levothyroxine (SYNTHROID, LEVOTHROID) 100 MCG tablet, Take 1 tablet by mouth once daily, Disp: 90 tablet, Rfl: 0  •  meclizine (ANTIVERT) 25 MG tablet, Take 1 tablet by mouth 3 (Three) Times a Day As Needed for Dizziness (vertigo). Take one by mouth three times a day as needed for vertigo, Disp: 60 tablet, Rfl: 3  •  montelukast (SINGULAIR) 10 MG tablet, Take 1 tablet by mouth Every Night., Disp: 30 tablet, Rfl: 8  •  mupirocin (BACTROBAN) 2 % nasal ointment, Apply intranasally twice a day, Disp: 30 g, Rfl: 2  •  valsartan (DIOVAN) 320 MG tablet, Take 1 tablet by mouth once daily, Disp: 90 tablet, Rfl: 0  •  fluconazole (Diflucan) 100 MG tablet, Take 1 tablet by mouth Daily., Disp: 3 tablet, Rfl: 0  •  HYDROcod Polst-CPM Polst ER (Tussionex Pennkinetic ER) 10-8 MG/5ML ER suspension, Take 5 mL by mouth Every 12 (Twelve) Hours As Needed for Cough., Disp: 118 mL, Rfl: 0    Allergies    Allergen Reactions   • Ceftin [Cefuroxime Axetil] Other (See Comments)     Pt does not recall   • Augmentin [Amoxicillin-Pot Clavulanate] Rash   • Azithromycin Nausea Only   • Bactrim [Sulfamethoxazole-Trimethoprim] Nausea And Vomiting   • Cefuroxime Nausea Only   • Codeine GI Intolerance   • Demerol [Meperidine] Nausea And Vomiting   • Erythromycin Rash   • Latex Other (See Comments)     Patient says it pulls her skin off.   • Tequin [Gatifloxacin] Nausea Only   • Tetracyclines & Related Nausea And Vomiting       Social History     Socioeconomic History   • Marital status:    Tobacco Use   • Smoking status: Never   • Smokeless tobacco: Never   Vaping Use   • Vaping Use: Never used   Substance and Sexual Activity   • Alcohol use: Never   • Drug use: Never   • Sexual activity: Not Currently     Partners: Male       Family History   Problem Relation Age of Onset   • Heart disease Father    • Heart attack Father    • Arthritis Mother    • Diabetes Brother    • Heart disease Brother    • Colon cancer Sister 57   • Cancer Sister    • Colon cancer Sister 55   • Cancer Sister    • Breast cancer Maternal Grandmother    • Breast cancer Maternal Aunt    • Breast cancer Maternal Aunt    • Breast cancer Maternal Aunt    • Ovarian cancer Neg Hx    • Uterine cancer Neg Hx        Review of Systems  No chest pains, no abdominal pain, no blood in stools    Objective     Vitals:    03/06/23 1345   BP: 130/70   Pulse: 81   Temp: 96.8 °F (36 °C)   SpO2: 97%       Physical Exam  Vitals reviewed.   Constitutional:       Appearance: Normal appearance. She is not ill-appearing or diaphoretic.   Pulmonary:      Effort: Pulmonary effort is normal.   Abdominal:      General: Abdomen is flat. There is no distension.      Palpations: Abdomen is soft. There is no mass.      Tenderness: There is no abdominal tenderness.   Neurological:      Mental Status: She is alert.   Psychiatric:         Thought Content: Thought content normal.          Judgment: Judgment normal.               Assessment & Plan   Problem List Items Addressed This Visit        Gastrointestinal Abdominal     Gastroesophageal reflux disease without esophagitis    Fatty liver    Overview     U/s 5/2019 F1 fibrosis.          Colon polyp - Primary    Overview     2 diminutive polyps were destroyed and colonoscopy March 2020.  Based on history of adenomatous polyps and strong family history of colon cancer involving multiple first-degree relatives surveillance colonoscopy recommended again approximately March 2023         Relevant Orders    Case Request (Completed)         First, we talked about pursuing surveillance colonoscopy exam.  We talked about prep.  She wishes to proceed.  Plan MiraLAX prep.  Plan for this in April.    Her reflux disease under control.  IBS is under control.  Talked about diet.  Continue as needed Bentyl.  Regarding her history of fatty liver disease she continues to have chronically elevated liver transaminases.  I discussed with her again the importance of weight loss.  We talked about different ways to lose weight.  We talked about intermittent fasting we talked about low carbohydrate diet.  She will continue to try    We will see her back in the office in 1 year  Continue ongoing management by primary care provider and other specialists.     Body mass index is 35.45 kg/m².  Elevated BMI, weight loss advised as above    The risk benefits and alternatives of colonoscopy were provided  EMR Dragon/transcription disclaimer:  Much of this encounter note is electronic transcription/translation of spoken language to printed text.  The electronic translation of spoken language may be erroneous, or at times, nonsensical words or phrases may be inadvertently transcribed.  Although I have reviewed the note for such errors, some may still exist.    Lew Orona MD  16:23 CST  03/06/23

## 2023-03-21 RX ORDER — VALSARTAN 320 MG/1
320 TABLET ORAL DAILY
Qty: 90 TABLET | Refills: 0 | Status: SHIPPED | OUTPATIENT
Start: 2023-03-21

## 2023-03-21 RX ORDER — LEVOTHYROXINE SODIUM 0.1 MG/1
100 TABLET ORAL DAILY
Qty: 90 TABLET | Refills: 0 | Status: SHIPPED | OUTPATIENT
Start: 2023-03-21

## 2023-03-21 NOTE — TELEPHONE ENCOUNTER
Caller: Hannah Maki    Relationship: Self    Best call back number: [phone]    Requested Prescriptions:   Requested Prescriptions     Pending Prescriptions Disp Refills   • valsartan (DIOVAN) 320 MG tablet 90 tablet 0     Sig: Take 1 tablet by mouth Daily.   • levothyroxine (SYNTHROID, LEVOTHROID) 100 MCG tablet 90 tablet 0     Sig: Take 1 tablet by mouth Daily.        Pharmacy where request should be sent: [place] [id number] [phone]     Last office visit with prescribing clinician: 1/12/2023   Last telemedicine visit with prescribing clinician: 7/17/2023   Next office visit with prescribing clinician: 7/17/2023     Does the patient have less than a 3 day supply:  [] Yes  [x] No    Tri Perez Rep   03/21/23 09:56 CDT

## 2023-03-24 ENCOUNTER — TELEPHONE (OUTPATIENT)
Dept: FAMILY MEDICINE CLINIC | Facility: CLINIC | Age: 73
End: 2023-03-24
Payer: MEDICARE

## 2023-03-24 RX ORDER — FLUCONAZOLE 100 MG/1
100 TABLET ORAL DAILY
Qty: 3 TABLET | Refills: 0 | Status: SHIPPED | OUTPATIENT
Start: 2023-03-24 | End: 2023-03-27

## 2023-03-24 RX ORDER — CEPHALEXIN 500 MG/1
500 CAPSULE ORAL 2 TIMES DAILY
Qty: 20 CAPSULE | Refills: 0 | Status: SHIPPED | OUTPATIENT
Start: 2023-03-24 | End: 2023-04-03

## 2023-03-24 NOTE — TELEPHONE ENCOUNTER
Caller: Hannah Mkai    Relationship: Self    Best call back number: [phone]    What medication are you requesting: provider preference  - she also said that if abx are sent, please send diflucan also     What are your current symptoms: congestion, sinus pressure, sore throat, no fever    How long have you been experiencing symptoms: sinus pressure/congestion 2+ weeks and sore throat started about 2 days ago     If a prescription is needed, what is your preferred pharmacy and phone number: [facility] [address] [place] [address] [address] [facility] [phone] PH - [phone]      Additional notes: call when sent or if appt is needed.

## 2023-03-29 RX ORDER — DICYCLOMINE HYDROCHLORIDE 10 MG/1
10 CAPSULE ORAL 3 TIMES DAILY PRN
Qty: 90 CAPSULE | Refills: 11 | Status: SHIPPED | OUTPATIENT
Start: 2023-03-29

## 2023-04-25 ENCOUNTER — HOSPITAL ENCOUNTER (OUTPATIENT)
Facility: HOSPITAL | Age: 73
Setting detail: HOSPITAL OUTPATIENT SURGERY
Discharge: HOME | End: 2023-04-25
Attending: INTERNAL MEDICINE | Admitting: INTERNAL MEDICINE
Payer: MEDICARE

## 2023-04-25 ENCOUNTER — ANESTHESIA (OUTPATIENT)
Dept: GASTROENTEROLOGY | Facility: HOSPITAL | Age: 73
End: 2023-04-25
Payer: MEDICARE

## 2023-04-25 ENCOUNTER — ANESTHESIA EVENT (OUTPATIENT)
Dept: GASTROENTEROLOGY | Facility: HOSPITAL | Age: 73
End: 2023-04-25
Payer: MEDICARE

## 2023-04-25 VITALS
OXYGEN SATURATION: 95 % | HEART RATE: 78 BPM | DIASTOLIC BLOOD PRESSURE: 54 MMHG | BODY MASS INDEX: 34.32 KG/M2 | HEIGHT: 65 IN | SYSTOLIC BLOOD PRESSURE: 109 MMHG | WEIGHT: 206 LBS | TEMPERATURE: 97.5 F | RESPIRATION RATE: 16 BRPM

## 2023-04-25 DIAGNOSIS — D12.6 ADENOMATOUS POLYP OF COLON, UNSPECIFIED PART OF COLON: ICD-10-CM

## 2023-04-25 PROCEDURE — 25010000002 PROPOFOL 10 MG/ML EMULSION: Performed by: NURSE ANESTHETIST, CERTIFIED REGISTERED

## 2023-04-25 PROCEDURE — 88305 TISSUE EXAM BY PATHOLOGIST: CPT | Performed by: INTERNAL MEDICINE

## 2023-04-25 RX ORDER — LIDOCAINE HYDROCHLORIDE 20 MG/ML
INJECTION, SOLUTION EPIDURAL; INFILTRATION; INTRACAUDAL; PERINEURAL AS NEEDED
Status: DISCONTINUED | OUTPATIENT
Start: 2023-04-25 | End: 2023-04-25 | Stop reason: SURG

## 2023-04-25 RX ORDER — PROPOFOL 10 MG/ML
VIAL (ML) INTRAVENOUS AS NEEDED
Status: DISCONTINUED | OUTPATIENT
Start: 2023-04-25 | End: 2023-04-25 | Stop reason: SURG

## 2023-04-25 RX ORDER — ONDANSETRON 2 MG/ML
4 INJECTION INTRAMUSCULAR; INTRAVENOUS ONCE AS NEEDED
Status: DISCONTINUED | OUTPATIENT
Start: 2023-04-25 | End: 2023-04-25 | Stop reason: HOSPADM

## 2023-04-25 RX ORDER — SODIUM CHLORIDE 0.9 % (FLUSH) 0.9 %
10 SYRINGE (ML) INJECTION AS NEEDED
Status: DISCONTINUED | OUTPATIENT
Start: 2023-04-25 | End: 2023-04-25 | Stop reason: HOSPADM

## 2023-04-25 RX ORDER — SODIUM CHLORIDE 9 MG/ML
500 INJECTION, SOLUTION INTRAVENOUS CONTINUOUS PRN
Status: DISCONTINUED | OUTPATIENT
Start: 2023-04-25 | End: 2023-04-25 | Stop reason: HOSPADM

## 2023-04-25 RX ADMIN — SODIUM CHLORIDE 500 ML: 9 INJECTION, SOLUTION INTRAVENOUS at 07:26

## 2023-04-25 RX ADMIN — LIDOCAINE HYDROCHLORIDE 100 MG: 20 INJECTION, SOLUTION EPIDURAL; INFILTRATION; INTRACAUDAL; PERINEURAL at 07:57

## 2023-04-25 RX ADMIN — PROPOFOL INJECTABLE EMULSION 350 MG: 10 INJECTION, EMULSION INTRAVENOUS at 07:57

## 2023-04-25 NOTE — ANESTHESIA POSTPROCEDURE EVALUATION
"Patient: Hannah Maki    Procedure Summary     Date: 04/25/23 Room / Location: [facility]COPY 2 /  [facility]    Anesthesia Start: 0755 Anesthesia Stop: 0819    Procedure: COLONOSCOPY Diagnosis:       Adenomatous polyp of colon, unspecified part of colon      (Adenomatous polyp of colon, unspecified part of colon [D12.6])    Surgeons: Lew Orona MD Provider: Meliza Brito CRNA    Anesthesia Type: MAC ASA Status: 2          Anesthesia Type: MAC    Vitals  Vitals Value Taken Time   /54 04/25/23 0836   Temp     Pulse 65 04/25/23 0837   Resp 16 04/25/23 0835   SpO2 94 % 04/25/23 0837   Vitals shown include unvalidated device data.        Post Anesthesia Care and Evaluation    Patient location during evaluation: bedside  Patient participation: complete - patient participated  Level of consciousness: awake and awake and alert  Pain score: 0  Pain management: adequate    Airway patency: patent  Anesthetic complications: No anesthetic complications  PONV Status: none  Cardiovascular status: acceptable  Respiratory status: acceptable  Hydration status: acceptable    Comments: Patient discharged according to acceptable Marilin score per RN assessment. See nursing records for further information.     Blood pressure 109/54, pulse 78, temperature 97.5 °F (36.4 °C), temperature source Temporal, resp. rate 16, height 165.1 cm (65\"), weight 93.4 kg (206 lb), last menstrual period 10/04/1981, SpO2 95 %, not currently breastfeeding.        "

## 2023-04-25 NOTE — ANESTHESIA PREPROCEDURE EVALUATION
Anesthesia Evaluation     Patient summary reviewed   history of anesthetic complications: PONV  NPO Solid Status: > 8 hours  NPO Liquid Status: > 8 hours           Airway   Mallampati: I  TM distance: >3 FB  Neck ROM: full  No difficulty expected  Dental - normal exam   (+) partials    Pulmonary - normal exam   (+) pneumonia resolved , asthma,shortness of breath,   Cardiovascular - normal exam  Exercise tolerance: good (4-7 METS)    (+) hypertension well controlled less than 2 medications, hyperlipidemia,       Neuro/Psych  GI/Hepatic/Renal/Endo    (+) obesity, morbid obesity, GERD poorly controlled,  liver disease fatty liver disease, renal disease stones, thyroid problem hypothyroidism    Musculoskeletal     (+) neck pain,   Abdominal  - normal exam   Substance History - negative use     OB/GYN          Other   arthritis,                      Anesthesia Plan    ASA 2     MAC     intravenous induction     Anesthetic plan, risks, benefits, and alternatives have been provided, discussed and informed consent has been obtained with: patient.        CODE STATUS:

## 2023-04-25 NOTE — H&P
[facility] Gastroenterology  Pre Procedure History & Physical    Chief Complaint:   Colon polyps    Subjective     HPI:   The patient has a history of colon polyps who presents for exam.    Past Medical History:   Past Medical History:   Diagnosis Date   • Abnormal liver enzymes    • Achilles bursitis    • Allergic    • Anxiety    • Arthralgia    • Arthritis    • Asthma    • Chronic pansinusitis 10/14/2016   • Colon polyp    • COVID    • Diverticulosis    • Dysuria    • Fatty liver    • GERD (gastroesophageal reflux disease)    • Hyperlipidemia    • Hypertension    • Hyperthyroidism    • Non-cardiac chest pain    • Osteoarthritis    • Palpitations    • Perennial allergic rhinitis 10/14/2016   • Pneumonia    • PONV (postoperative nausea and vomiting)    • Rhinitis    • Tinnitus    • Trigeminal neuralgia    • Vertigo        Past Surgical History:  Past Surgical History:   Procedure Laterality Date   • APPENDECTOMY     • BILATERAL OOPHORECTOMY     • CATARACT EXTRACTION WITH INTRAOCULAR LENS IMPLANT     • CHOLECYSTECTOMY     • COLONOSCOPY  11/18/2013     six polyps removed or destroyed, Diverticulosis  Recall 3 years   • COLONOSCOPY  11/18/2013   • COLONOSCOPY N/A 04/04/2017    Procedure: COLONOSCOPY WITH ANESTHESIA;  Surgeon: Lew Orona MD;  Location: [facility] ENDOSCOPY;  Service:    • COLONOSCOPY N/A 03/16/2020    Procedure: COLONOSCOPY WITH ANESTHESIA;  Surgeon: Lew Orona MD;  Location: [facility] ENDOSCOPY;  Service: Gastroenterology;  Laterality: N/A;  pre op: hx polyps  Post op:polyps  PCP: Deni Henry MD   • EYE SURGERY  11/2018    Cataract Removal   • HEMORRHOIDECTOMY     • HYSTERECTOMY     • NECK SURGERY     • NOSE SURGERY      nose bleeding artery    • OTHER SURGICAL HISTORY      ingrown toe nail   • RECTAL FISSURE INCISION AND DRAINAGE         Family History:  Family History   Problem Relation Age of Onset   • Heart disease Father    • Heart attack Father    • Arthritis Mother    •  Diabetes Brother    • Heart disease Brother    • Colon cancer Sister 57   • Cancer Sister    • Colon cancer Sister 55   • Cancer Sister    • Breast cancer Maternal Grandmother    • Breast cancer Maternal Aunt    • Breast cancer Maternal Aunt    • Breast cancer Maternal Aunt    • Ovarian cancer Neg Hx    • Uterine cancer Neg Hx        Social History:   reports that she has never smoked. She has never used smokeless tobacco. She reports that she does not drink alcohol and does not use drugs.    Medications:   Prior to Admission medications    Medication Sig Start Date End Date Taking? Authorizing Provider   dicyclomine (Bentyl) 10 MG capsule Take 1 capsule by mouth 3 (Three) Times a Day As Needed (abdominal discomfort). 3/29/23  Yes Lew Orona MD   esomeprazole (NexIUM) 20 MG capsule Take 1 capsule by mouth Every Morning Before Breakfast.   Yes ProviderGirish MD   fluconazole (Diflucan) 100 MG tablet Take 1 tablet by mouth Daily. 12/19/22  Yes Deni Henry MD   levothyroxine (SYNTHROID, LEVOTHROID) 100 MCG tablet Take 1 tablet by mouth Daily. 3/21/23  Yes Deni Henry MD   meclizine (ANTIVERT) 25 MG tablet Take 1 tablet by mouth 3 (Three) Times a Day As Needed for Dizziness (vertigo). Take one by mouth three times a day as needed for vertigo 2/9/23  Yes Deni Henry MD   montelukast (SINGULAIR) 10 MG tablet Take 1 tablet by mouth Every Night. 7/5/22  Yes J Luis Boston APRN   mupirocin (BACTROBAN) 2 % nasal ointment Apply intranasally twice a day 3/1/23  Yes Deni Henry MD   valsartan (DIOVAN) 320 MG tablet Take 1 tablet by mouth Daily. 3/21/23  Yes Deni Henry MD   HYDROcod Polst-CPM Polst ER (Tussionex Pennkinetic ER) 10-8 MG/5ML ER suspension Take 5 mL by mouth Every 12 (Twelve) Hours As Needed for Cough. 10/21/22   Deni Henry MD       Allergies:  Ceftin [cefuroxime axetil], Augmentin [amoxicillin-pot clavulanate], Azithromycin,  "Bactrim [sulfamethoxazole-trimethoprim], Cefuroxime, Codeine, Demerol [meperidine], Erythromycin, Latex, Tequin [gatifloxacin], and Tetracyclines & related    ROS:    General: Weight stable  Resp: No SOA  Cardiovascular: No CP    Objective     Blood pressure 152/75, pulse 86, temperature 97.5 °F (36.4 °C), temperature source Temporal, resp. rate 20, height 165.1 cm (65\"), weight 93.4 kg (206 lb), last menstrual period 10/04/1981, SpO2 96 %, not currently breastfeeding.    Physical Exam   Constitutional: Pt is oriented to person, place, and in no distress.   Cardiovascular: Normal rate, regular rhythm.    Pulmonary/Chest: Effort normal. No respiratory distress.   Abdominal:  Non-distended.  Psychiatric: Mood, memory, affect and judgment appear normal.     Assessment & Plan     Diagnosis:  Colon polyps    Anticipated Surgical Procedure:  Colonoscopy    The risks, benefits, and alternatives of this procedure have been discussed with the patient or the responsible party- the patient understands and agrees to proceed.      EMR Dragon/transcription disclaimer:  Much of this encounter note is electronic transcription/translation of spoken language to printed text.  The electronic translation of spoken language may be erroneous, or at times, nonsensical words or phrases may be inadvertently transcribed.  Although I have reviewed the note for such errors, some may still exist.  "

## 2023-04-26 LAB
CYTO UR: NORMAL
LAB AP CASE REPORT: NORMAL
Lab: NORMAL
PATH REPORT.FINAL DX SPEC: NORMAL
PATH REPORT.GROSS SPEC: NORMAL

## 2023-04-29 DIAGNOSIS — J32.4 CHRONIC PANSINUSITIS: ICD-10-CM

## 2023-05-08 ENCOUNTER — OFFICE VISIT (OUTPATIENT)
Dept: FAMILY MEDICINE CLINIC | Facility: CLINIC | Age: 73
End: 2023-05-08
Payer: MEDICARE

## 2023-05-08 ENCOUNTER — HOSPITAL ENCOUNTER (OUTPATIENT)
Dept: ULTRASOUND IMAGING | Facility: HOSPITAL | Age: 73
Discharge: HOME | End: 2023-05-08
Admitting: NURSE PRACTITIONER
Payer: MEDICARE

## 2023-05-08 VITALS
RESPIRATION RATE: 18 BRPM | WEIGHT: 210.4 LBS | DIASTOLIC BLOOD PRESSURE: 86 MMHG | OXYGEN SATURATION: 96 % | BODY MASS INDEX: 35.06 KG/M2 | HEART RATE: 86 BPM | TEMPERATURE: 96.9 F | SYSTOLIC BLOOD PRESSURE: 126 MMHG | HEIGHT: 65 IN
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
## 2023-05-08 DIAGNOSIS — M79.605 LEFT LEG PAIN: ICD-10-CM

## 2023-05-08 DIAGNOSIS — M79.605 LEFT LEG PAIN: Primary | ICD-10-CM

## 2023-05-08 DIAGNOSIS — S30.860A TICK BITE OF LOWER BACK, INITIAL ENCOUNTER: ICD-10-CM

## 2023-05-08 PROCEDURE — 93971 EXTREMITY STUDY: CPT

## 2023-05-08 RX ORDER — DOXYCYCLINE HYCLATE 100 MG/1
100 CAPSULE ORAL 2 TIMES DAILY
Qty: 20 CAPSULE | Refills: 0 | Status: SHIPPED | OUTPATIENT
Start: 2023-05-08 | End: 2023-05-18

## 2023-05-08 RX ORDER — TRIAMCINOLONE ACETONIDE 1 MG/G
1 CREAM TOPICAL 2 TIMES DAILY
Qty: 45 G | Refills: 0 | Status: SHIPPED | OUTPATIENT
Start: 2023-05-08

## 2023-05-08 NOTE — PROGRESS NOTES
"Subjective   Chief Complaint:  Tick bite and leg pain.    History of Present Illness:  This 72 y.o. female was seen in the office today.      The patient presents today with leg pain for approximately 10 days. She reports the pain is \"deep\" and \"behind the knee\". She reports she felt a possible cyst at one point. She reports the tick bite is on her middle back and was attached for presumably over 24 hours. She reports the rash remains, and possibly the head of the tick may still be in.    Allergies   Allergen Reactions   • Ceftin [Cefuroxime Axetil] Other (See Comments)     Pt does not recall   • Augmentin [Amoxicillin-Pot Clavulanate] Rash   • Azithromycin Nausea Only   • Bactrim [Sulfamethoxazole-Trimethoprim] Nausea And Vomiting   • Cefuroxime Nausea Only   • Codeine GI Intolerance   • Demerol [Meperidine] Nausea And Vomiting   • Erythromycin Rash   • Latex Other (See Comments)     Patient says it pulls her skin off.   • Tequin [Gatifloxacin] Nausea Only   • Tetracyclines & Related Nausea And Vomiting      Current Outpatient Medications on File Prior to Visit   Medication Sig   • dicyclomine (Bentyl) 10 MG capsule Take 1 capsule by mouth 3 (Three) Times a Day As Needed (abdominal discomfort).   • esomeprazole (NexIUM) 20 MG capsule Take 1 capsule by mouth Every Morning Before Breakfast.   • fluconazole (Diflucan) 100 MG tablet Take 1 tablet by mouth Daily.   • HYDROcod Polst-CPM Polst ER (Tussionex Pennkinetic ER) 10-8 MG/5ML ER suspension Take 5 mL by mouth Every 12 (Twelve) Hours As Needed for Cough.   • levothyroxine (SYNTHROID, LEVOTHROID) 100 MCG tablet Take 1 tablet by mouth Daily.   • meclizine (ANTIVERT) 25 MG tablet Take 1 tablet by mouth 3 (Three) Times a Day As Needed for Dizziness (vertigo). Take one by mouth three times a day as needed for vertigo   • montelukast (SINGULAIR) 10 MG tablet Take 1 tablet by mouth Every Night.   • mupirocin (BACTROBAN) 2 % ointment APPLY  OINTMENT INTRANASALLY TWICE " DAILY   • valsartan (DIOVAN) 320 MG tablet Take 1 tablet by mouth Daily.     No current facility-administered medications on file prior to visit.      Past Medical, Surgical, Social, and Family History:  Past Medical History:   Diagnosis Date   • Abnormal liver enzymes    • Achilles bursitis    • Allergic    • Anxiety    • Arthralgia    • Arthritis    • Asthma    • Chronic pansinusitis 10/14/2016   • Colon polyp    • COVID    • Diverticulosis    • Dysuria    • Fatty liver    • GERD (gastroesophageal reflux disease)    • Hyperlipidemia    • Hypertension    • Hyperthyroidism    • Non-cardiac chest pain    • Osteoarthritis    • Palpitations    • Perennial allergic rhinitis 10/14/2016   • Pneumonia    • PONV (postoperative nausea and vomiting)    • Rhinitis    • Tinnitus    • Trigeminal neuralgia    • Vertigo      Past Surgical History:   Procedure Laterality Date   • APPENDECTOMY     • BILATERAL OOPHORECTOMY     • CATARACT EXTRACTION WITH INTRAOCULAR LENS IMPLANT     • CHOLECYSTECTOMY     • COLONOSCOPY  11/18/2013     six polyps removed or destroyed, Diverticulosis  Recall 3 years   • COLONOSCOPY  11/18/2013   • COLONOSCOPY N/A 04/04/2017    Procedure: COLONOSCOPY WITH ANESTHESIA;  Surgeon: Lew Orona MD;  Location: [facility] ENDOSCOPY;  Service:    • COLONOSCOPY N/A 03/16/2020    Procedure: COLONOSCOPY WITH ANESTHESIA;  Surgeon: Lew Orona MD;  Location: [facility] ENDOSCOPY;  Service: Gastroenterology;  Laterality: N/A;  pre op: hx polyps  Post op:polyps  PCP: Deni Henry MD   • COLONOSCOPY N/A 4/25/2023    Procedure: COLONOSCOPY;  Surgeon: Lew Orona MD;  Location: [facility] ENDOSCOPY;  Service: Gastroenterology;  Laterality: N/A;  Pre: Colon polyp, Family hx of colon cancer  Post: Polyp  Deni Henry MD   • EYE SURGERY  11/2018    Cataract Removal   • HEMORRHOIDECTOMY     • HYSTERECTOMY     • NECK SURGERY     • NOSE SURGERY      nose bleeding artery    • OTHER SURGICAL HISTORY       "ingrown toe nail   • RECTAL FISSURE INCISION AND DRAINAGE       Social History     Socioeconomic History   • Marital status:    Tobacco Use   • Smoking status: Never   • Smokeless tobacco: Never   Vaping Use   • Vaping Use: Never used   Substance and Sexual Activity   • Alcohol use: Never   • Drug use: Never   • Sexual activity: Not Currently     Partners: Male     Family History   Problem Relation Age of Onset   • Heart disease Father    • Heart attack Father    • Arthritis Mother    • Diabetes Brother    • Heart disease Brother    • Colon cancer Sister 57   • Cancer Sister    • Colon cancer Sister 55   • Cancer Sister    • Breast cancer Maternal Grandmother    • Breast cancer Maternal Aunt    • Breast cancer Maternal Aunt    • Breast cancer Maternal Aunt    • Ovarian cancer Neg Hx    • Uterine cancer Neg Hx        Objective   Physical Exam  Vitals reviewed.   Constitutional:       General: She is not in acute distress.     Appearance: Normal appearance. She is obese.   Cardiovascular:      Rate and Rhythm: Normal rate and regular rhythm.   Pulmonary:      Effort: Pulmonary effort is normal.      Breath sounds: Normal breath sounds.   Skin:     Findings: Rash (Mid back - Quarter sized rash with some surrounding blistering in her skin. ) present.     /86 (BP Location: Left arm, Patient Position: Sitting, Cuff Size: Adult)   Pulse 86   Temp 96.9 °F (36.1 °C) (Infrared)   Resp 18   Ht 165.1 cm (65\")   Wt 95.4 kg (210 lb 6.4 oz)   LMP 10/04/1981 Comment: age 31  SpO2 96%   BMI 35.01 kg/m²     Prior Visit Notes/Records, Lab, Imaging, and Diagnostic Results Reviewed:  CBC:  Lab Results - Last 18 Months   Lab Units 01/12/23  0731 07/11/22  0817 01/10/22  0812   WBC 10*3/mm3 7.69 9.57 9.94   HEMOGLOBIN g/dL 15.6 16.2* 16.2*   HEMATOCRIT % 46.4 48.0* 47.3*   PLATELETS 10*3/mm3 192 234 255      Chemistry:  Lab Results - Last 18 Months   Lab Units 01/12/23  0731 07/11/22  0817 01/10/22  0812   SODIUM " mmol/L 140 140 138   POTASSIUM mmol/L 4.5 4.8 4.3   CHLORIDE mmol/L 103 102 102   TOTAL CO2 mmol/L 26.7 24.9 23.8   GLUCOSE mg/dL 125* 137* 128*   BUN mg/dL 11 12 12   CREATININE mg/dL 0.83 0.73 0.72   EGFR IF NONAFRICN AM mL/min/1.73  --   --  80   EGFR IF AFRICN AM mL/min/1.73  --   --  97   EGFR RESULT mL/min/1.73 75.9 89.2  --    CALCIUM mg/dL 9.8 9.8 9.9     BMI Trend:  BMI Readings from Last 10 Encounters:   05/08/23 35.01 kg/m²   04/25/23 34.28 kg/m²   03/06/23 35.45 kg/m²   01/12/23 35.45 kg/m²   12/08/22 35.68 kg/m²   10/17/22 35.11 kg/m²   07/11/22 35.61 kg/m²   07/11/22 35.78 kg/m²   01/10/22 35.78 kg/m²   08/19/21 35.28 kg/m²     Assessment & Plan   Diagnoses and all orders for this visit:    1. Left leg pain (Primary)  -     US Venous Doppler Lower Extremity Left (duplex); Future    2. Tick bite of lower back, initial encounter    Other orders  -     doxycycline (VIBRAMYCIN) 100 MG capsule; Take 1 capsule by mouth 2 (Two) Times a Day for 10 days.  Dispense: 20 capsule; Refill: 0  -     triamcinolone (KENALOG) 0.1 % cream; Apply 1 application topically to the appropriate area as directed 2 (Two) Times a Day.  Dispense: 45 g; Refill: 0    Discussion:  Advised and educated plan of care. The tick bite insertion point was cleansed with alcohol and shaved with a 10 blade scalpel and any further debris was shaved and removed off a light Band-Aid was applied. Advised to proceed with doxycycline for empiric treatment due to the length of the time the tick was attached. She will apply Kenalog twice a day to the rash. She will get a STAT ultrasound Doppler of the lower extremity today.    Follow-up:  Return for follow-up as needed pending results - we will call.    Transcribed from ambient dictation for FADI Lynch by Krysta Perea.  05/08/23   16:50 CDT    I have personally performed the services described in this document as transcribed by the above individual, and it is both accurate and  complete.    Electronically signed by FADI Esposito 05/08/23, 4:50 PM CDT.

## 2023-05-08 NOTE — PROGRESS NOTES
Please call result - No DVT - cyst as suspected, see if willing to go see ortho as next step?    Electronically signed by FADI Lynch, 05/08/23, 4:29 PM CDT.

## 2023-05-09 DIAGNOSIS — M71.22 SYNOVIAL CYST OF LEFT POPLITEAL SPACE: ICD-10-CM

## 2023-05-09 DIAGNOSIS — M79.605 LEFT LEG PAIN: Primary | ICD-10-CM

## 2023-05-16 DIAGNOSIS — J30.89 PERENNIAL ALLERGIC RHINITIS: ICD-10-CM

## 2023-05-16 DIAGNOSIS — R09.82 POST-NASAL DRIP: ICD-10-CM

## 2023-05-16 RX ORDER — MONTELUKAST SODIUM 10 MG/1
10 TABLET ORAL NIGHTLY
Qty: 30 TABLET | Refills: 8 | Status: SHIPPED | OUTPATIENT
Start: 2023-05-16

## 2023-05-16 NOTE — TELEPHONE ENCOUNTER
Caller: Glynn Hannah BARBI    Relationship: Self    Best call back number: [phone]    Requested Prescriptions:   Requested Prescriptions     Pending Prescriptions Disp Refills   • montelukast (SINGULAIR) 10 MG tablet 30 tablet 8     Sig: Take 1 tablet by mouth Every Night.        Pharmacy where request should be sent: [facility] [address] [place] [address] [phone] PH - [phone] FX     Last office visit with prescribing clinician: 1/12/2023   Last telemedicine visit with prescribing clinician: 5/8/2023   Next office visit with prescribing clinician: 7/17/2023     Does the patient have less than a 3 day supply:  [x] Yes  [] No    Would you like a call back once the refill request has been completed: [x] Yes [] No    If the office needs to give you a call back, can they leave a voicemail: [x] Yes [] No    Tri Calixto Rep   05/16/23 14:57 CDT

## 2023-06-13 RX ORDER — LEVOTHYROXINE SODIUM 0.1 MG/1
100 TABLET ORAL DAILY
Qty: 90 TABLET | Refills: 0 | Status: SHIPPED | OUTPATIENT
Start: 2023-06-13

## 2023-06-13 RX ORDER — VALSARTAN 320 MG/1
320 TABLET ORAL DAILY
Qty: 90 TABLET | Refills: 0 | Status: SHIPPED | OUTPATIENT
Start: 2023-06-13

## 2023-06-13 NOTE — TELEPHONE ENCOUNTER
Caller: Hannah Maki BARBI    Relationship: Self    Best call back number: [phone]     Requested Prescriptions:   Requested Prescriptions     Pending Prescriptions Disp Refills    valsartan (DIOVAN) 320 MG tablet 90 tablet 0     Sig: Take 1 tablet by mouth Daily.    levothyroxine (SYNTHROID, LEVOTHROID) 100 MCG tablet 90 tablet 0     Sig: Take 1 tablet by mouth Daily.        Pharmacy where request should be sent: [place] [phone] [address]     Last office visit with prescribing clinician: 1/12/2023   Last telemedicine visit with prescribing clinician: Visit date not found   Next office visit with prescribing clinician: 7/17/2023     Additional details provided by patient: HAS ABOUT A WEEK LEFT     Does the patient have less than a 3 day supply:  [] Yes  [x] No    Would you like a call back once the refill request has been completed: [x] Yes [] No    If the office needs to give you a call back, can they leave a voicemail: [x] Yes [] No    Tri Danielle   06/13/23 11:02 CDT

## 2023-06-26 ENCOUNTER — TELEPHONE (OUTPATIENT)
Dept: FAMILY MEDICINE CLINIC | Facility: CLINIC | Age: 73
End: 2023-06-26

## 2023-06-26 NOTE — TELEPHONE ENCOUNTER
Caller: Hannah Maki    Relationship: Self    Best call back number: [phone]     What medication are you requesting: ANTIBIOTIC THAT DR CRISTINA RECOMMENDS    What are your current symptoms: SINUS PAIN UNDER THE EYES, FOREHEAD, AND BEHIND THE EYES. SWOLLEN WITH PRESSURE    How long have you been experiencing symptoms: ABOUT 5 DAYS    Have you had these symptoms before:    [x] Yes  [] No    Have you been treated for these symptoms before:   [x] Yes  [] No    If a prescription is needed, what is your preferred pharmacy and phone number: [facility] [address] [place] [address] [facility] [phone] [facility] [phone] FX     Additional notes:

## 2023-07-10 ENCOUNTER — HOSPITAL ENCOUNTER (OUTPATIENT)
Dept: WOMENS IMAGING | Age: 73
Discharge: HOME | End: 2023-07-10
Payer: MEDICARE

## 2023-07-10 ENCOUNTER — OFFICE VISIT (OUTPATIENT)
Dept: SURGERY | Age: 73
End: 2023-07-10
Payer: MEDICARE

## 2023-07-10 VITALS
HEIGHT: 65 IN | OXYGEN SATURATION: 92 % | WEIGHT: 207.6 LBS | TEMPERATURE: 98.3 F | HEART RATE: 95 BPM | BODY MASS INDEX: 34.59 KG/M2

## 2023-07-10 DIAGNOSIS — Z12.31 VISIT FOR SCREENING MAMMOGRAM: Primary | ICD-10-CM

## 2023-07-10 DIAGNOSIS — Z12.31 VISIT FOR SCREENING MAMMOGRAM: ICD-10-CM

## 2023-07-10 PROCEDURE — 1090F PRES/ABSN URINE INCON ASSESS: CPT | Performed by: PHYSICIAN ASSISTANT

## 2023-07-10 PROCEDURE — 1123F ACP DISCUSS/DSCN MKR DOCD: CPT | Performed by: PHYSICIAN ASSISTANT

## 2023-07-10 PROCEDURE — 3017F COLORECTAL CA SCREEN DOC REV: CPT | Performed by: PHYSICIAN ASSISTANT

## 2023-07-10 PROCEDURE — G8419 CALC BMI OUT NRM PARAM NOF/U: HCPCS | Performed by: PHYSICIAN ASSISTANT

## 2023-07-10 PROCEDURE — 77063 BREAST TOMOSYNTHESIS BI: CPT

## 2023-07-10 PROCEDURE — 99212 OFFICE O/P EST SF 10 MIN: CPT | Performed by: PHYSICIAN ASSISTANT

## 2023-07-10 PROCEDURE — G8400 PT W/DXA NO RESULTS DOC: HCPCS | Performed by: PHYSICIAN ASSISTANT

## 2023-07-10 PROCEDURE — 1036F TOBACCO NON-USER: CPT | Performed by: PHYSICIAN ASSISTANT

## 2023-07-10 PROCEDURE — G8427 DOCREV CUR MEDS BY ELIG CLIN: HCPCS | Performed by: PHYSICIAN ASSISTANT

## 2023-07-10 NOTE — PROGRESS NOTES
HPI:  Gerhardt Camp is in for follow-up breast check. She has not noticed any changes in her breasts. Her imaging was reviewed and is noted below. SCREENING BILATERAL DIGITAL MAMMOGRAM WITH TOMOSYNTHESIS 7/10/2023  2:10 PM  CLINICAL HISTORY: Screening. COMPARISON STUDIES: July 5, 2022 June 24, 2021 June 17, 2020. FINDINGS:   Digital CC and MLO views of bilateral breasts were obtained. Tomosynthesis in the MLO and CC projections was also performed. The breast tissue is heterogeneously dense (approximately 50-75%  glandular tissue) consistent with a type C parenchymal pattern,  limiting the sensitivity of mammography. No suspicious masses or  calcifications are identified. There is no architectural distortion. This study was interpreted with CAD. IMPRESSION AND RECOMMENDATION:   No mammographic evidence of malignancy. Recommendation is for the  patient to return for routine mammography in one year or sooner, if  clinically indicated. Assessment: BI-RADS Category 2 benign     BREAST EXAM:  On examination, she has fibrocystic changes throughout both breasts, no dominant masses, no skin or nipple changes and no axillary adenopathy. I see nothing suspicious for breast cancer. ASSESSMENT:      ICD-10-CM    1. Visit for screening mammogram  Z12.31                     PLAN:  I will plan to see her back in 1 year for physical exam and mammograms. She will contact me if anything significant changes.

## 2023-07-31 ENCOUNTER — TRANSCRIBE ORDERS (OUTPATIENT)
Dept: ADMINISTRATIVE | Facility: HOSPITAL | Age: 73
End: 2023-07-31
Payer: MEDICARE

## 2023-07-31 ENCOUNTER — LAB (OUTPATIENT)
Dept: LAB | Facility: HOSPITAL | Age: 73
End: 2023-07-31
Payer: MEDICARE

## 2023-07-31 DIAGNOSIS — M17.12 UNILATERAL PRIMARY OSTEOARTHRITIS, LEFT KNEE: ICD-10-CM

## 2023-07-31 DIAGNOSIS — M17.12 UNILATERAL PRIMARY OSTEOARTHRITIS, LEFT KNEE: Primary | ICD-10-CM

## 2023-07-31 PROCEDURE — 86431 RHEUMATOID FACTOR QUANT: CPT

## 2023-07-31 PROCEDURE — 36415 COLL VENOUS BLD VENIPUNCTURE: CPT

## 2023-08-01 LAB — CHROMATIN AB SERPL-ACNC: <10 IU/ML (ref 0–14)

## 2023-09-19 ENCOUNTER — PRE-ADMISSION TESTING (OUTPATIENT)
Dept: PREADMISSION TESTING | Facility: HOSPITAL | Age: 73
End: 2023-09-19
Payer: MEDICARE

## 2023-09-19 VITALS
RESPIRATION RATE: 18 BRPM | WEIGHT: 209.88 LBS | BODY MASS INDEX: 35.83 KG/M2 | DIASTOLIC BLOOD PRESSURE: 57 MMHG | OXYGEN SATURATION: 94 % | HEIGHT: 64 IN | HEART RATE: 79 BPM | SYSTOLIC BLOOD PRESSURE: 161 MMHG

## 2023-09-19 LAB
ANION GAP SERPL CALCULATED.3IONS-SCNC: 11 MMOL/L (ref 5–15)
BUN SERPL-MCNC: 11 MG/DL (ref 8–23)
BUN/CREAT SERPL: 16.9 (ref 7–25)
CALCIUM SPEC-SCNC: 9.5 MG/DL (ref 8.6–10.5)
CHLORIDE SERPL-SCNC: 102 MMOL/L (ref 98–107)
CO2 SERPL-SCNC: 25 MMOL/L (ref 22–29)
CREAT SERPL-MCNC: 0.65 MG/DL (ref 0.57–1)
DEPRECATED RDW RBC AUTO: 43.8 FL (ref 37–54)
EGFRCR SERPLBLD CKD-EPI 2021: 94.9 ML/MIN/1.73
ERYTHROCYTE [DISTWIDTH] IN BLOOD BY AUTOMATED COUNT: 12.8 % (ref 12.3–15.4)
GLUCOSE SERPL-MCNC: 89 MG/DL (ref 65–99)
HCT VFR BLD AUTO: 45.6 % (ref 34–46.6)
HGB BLD-MCNC: 14.8 G/DL (ref 12–15.9)
MCH RBC QN AUTO: 30.2 PG (ref 26.6–33)
MCHC RBC AUTO-ENTMCNC: 32.5 G/DL (ref 31.5–35.7)
MCV RBC AUTO: 93.1 FL (ref 79–97)
PLATELET # BLD AUTO: 184 10*3/MM3 (ref 140–450)
PMV BLD AUTO: 10.2 FL (ref 6–12)
POTASSIUM SERPL-SCNC: 4.4 MMOL/L (ref 3.5–5.2)
RBC # BLD AUTO: 4.9 10*6/MM3 (ref 3.77–5.28)
SODIUM SERPL-SCNC: 138 MMOL/L (ref 136–145)
WBC NRBC COR # BLD: 9.16 10*3/MM3 (ref 3.4–10.8)

## 2023-09-19 PROCEDURE — 85027 COMPLETE CBC AUTOMATED: CPT

## 2023-09-19 PROCEDURE — 36415 COLL VENOUS BLD VENIPUNCTURE: CPT

## 2023-09-19 PROCEDURE — 80048 BASIC METABOLIC PNL TOTAL CA: CPT

## 2023-09-19 RX ORDER — ACETAMINOPHEN 500 MG
1000 TABLET ORAL EVERY 6 HOURS PRN
COMMUNITY

## 2023-09-19 RX ORDER — VALSARTAN 320 MG/1
320 TABLET ORAL DAILY
Qty: 90 TABLET | Refills: 0 | Status: SHIPPED | OUTPATIENT
Start: 2023-09-19

## 2023-09-19 RX ORDER — LEVOTHYROXINE SODIUM 0.1 MG/1
100 TABLET ORAL DAILY
Qty: 90 TABLET | Refills: 0 | Status: SHIPPED | OUTPATIENT
Start: 2023-09-19

## 2023-09-19 NOTE — TELEPHONE ENCOUNTER
Caller: Glynn Hannah BARBI    Relationship: Self    Best call back number: [phone]     Requested Prescriptions:   Requested Prescriptions     Pending Prescriptions Disp Refills    valsartan (DIOVAN) 320 MG tablet 90 tablet 0     Sig: Take 1 tablet by mouth Daily.    levothyroxine (SYNTHROID, LEVOTHROID) 100 MCG tablet 90 tablet 0     Sig: Take 1 tablet by mouth Daily.        Pharmacy where request should be sent: [place] [phone] [phone]     Last office visit with prescribing clinician: 7/17/2023   Last telemedicine visit with prescribing clinician: Visit date not found   Next office visit with prescribing clinician: 1/22/2024     Additional details provided by patient:     Does the patient have less than a 3 day supply:  [x] Yes  [] No    Would you like a call back once the refill request has been completed: [x] Yes [] No      Tri Perez   09/19/23 09:33 CDT

## 2023-09-19 NOTE — DISCHARGE INSTRUCTIONS
Before you come to the hospital        Arrival time: AS DIRECTED BY OFFICE     YOU MAY TAKE THE FOLLOWING MEDICATION(S) THE MORNING OF SURGERY WITH A SIP OF WATER: ESOMEPRAZOLE      **DO NOT TAKE VALSARTAN FOR 24 HOURS PRIOR TO SURGERY**               ALL OTHER HOME MEDICATION CHECK WITH YOUR PHYSICIAN (especially if   you are taking diabetes medicines or blood thinners)        If you were given and instructed to use a germ- killing soap, use as directed the night before surgery and again the morning of surgery or as directed by your surgeon. (Use one-half of the bottle with each shower.)   See attached information for How to Use Chlorhexidine for Bathing if applicable.            Eating and drinking restrictions prior to scheduled arrival time    2 Hours before arrival time STOP   Drinking Clear liquids (water, black coffee-NO CREAM,  apple juice-no pulp)      8 Hours before arrival time STOP   All food, full liquids, and dairy products    (It is extremely important that you follow these guidelines to prevent delay or cancelation of your procedure)     Clear Liquids  Water and flavored water                                                                      Clear Fruit juices, such as cranberry juice and apple juice.  Black coffee (NO cream of any kind, including powdered).  Plain tea  Clear bouillon or broth.  Flavored gelatin.  Soda.  Gatorade or Powerade.  Full liquid examples  Juices that have pulp.  Frozen ice pops that contain fruit pieces.  Coffee with creamer  Milk.  Yogurt.                MANAGING PAIN AFTER SURGERY    We know you are probably wondering what your pain will be like after surgery.  Following surgery it is unrealistic to expect you will not have pain.   Pain is how our bodies let us know that something is wrong or cautions us to be careful.  That said, our goal is to make your pain tolerable.    Methods we may use to treat your pain include (oral or IV medications, PCAs, epidurals, nerve  blocks, etc.)   While some procedures require IV pain medications for a short time after surgery, transitioning to pain medications by mouth allows for better management of pain.   Your nurse will encourage you to take oral pain medications whenever possible.  IV medications work almost immediately, but only last a short while.  Taking medications by mouth allows for a more constant level of medication in your blood stream for a longer period of time.      Once your pain is out of control it is harder to get back under control.  It is important you are aware when your next dose of pain medication is due.  If you are admitted, your nurse may write the time of your next dose on the white board in your room to help you remember.      We are interested in your pain and encourage you to inform us about aggravating factors during your visit.   Many times a simple repositioning every few hours can make a big difference.    If your physician says it is okay, do not let your pain prevent you from getting out of bed. Be sure to call your nurse for assistance prior to getting up so you do not fall.      Before surgery, please decide your tolerable pain goal.  These faces help describe the pain ratings we use on a 0-10 scale.   Be prepared to tell us your goal and whether or not you take pain or anxiety medications at home.          Preparing for Surgery  Preparing for surgery is an important part of your care. It can make things go more smoothly and help you avoid complications. The steps leading up to surgery may vary among hospitals. Follow all instructions given to you by your health care providers. Ask questions if you do not understand something. Talk about any concerns that you have.  Here are some questions to consider asking before your surgery:  If my surgery is not an emergency (is elective), when would be the best time to have the surgery?  What arrangements do I need to make for work, home, or school?  What will my  recovery be like? How long will it be before I can return to normal activities?  Will I need to prepare my home? Will I need to arrange care for me or my children?  Should I expect to have pain after surgery? What are my pain management options? Are there nonmedical options that I can try for pain?  Tell a health care provider about:  Any allergies you have.  All medicines you are taking, including vitamins, herbs, eye drops, creams, and over-the-counter medicines.  Any problems you or family members have had with anesthetic medicines.  Any blood disorders you have.  Any surgeries you have had.  Any medical conditions you have.  Whether you are pregnant or may be pregnant.  What are the risks?  The risks and complications of surgery depend on the specific procedure that you have. Discuss all the risks with your health care providers before your surgery. Ask about common surgical complications, which may include:  Infection.  Bleeding or a need for blood replacement (transfusion).  Allergic reactions to medicines.  Damage to surrounding nerves, tissues, or structures.  A blood clot.  Scarring.  Failure of the surgery to correct the problem.  Follow these instructions before the procedure:  Several days or weeks before your procedure  You may have a physical exam by your primary health care provider to make sure it is safe for you to have surgery.  You may have testing. This may include a chest X-ray, blood and urine tests, electrocardiogram (ECG), or other testing.  Ask your health care provider about:  Changing or stopping your regular medicines. This is especially important if you are taking diabetes medicines or blood thinners.  Taking medicines such as aspirin and ibuprofen. These medicines can thin your blood. Do not take these medicines unless your health care provider tells you to take them.  Taking over-the-counter medicines, vitamins, herbs, and supplements.  Do not use any products that contain nicotine or  tobacco, such as cigarettes and e-cigarettes. If you need help quitting, ask your health care provider.  Avoid alcohol.  Ask your health care provider if there are exercises you can do to prepare for surgery.  Eat a healthy diet.   Plan to have someone 18 years of age or older to take you home from the hospital. We will need to verify your ride on the morning of surgery if you are being discharged home on the same day. Tell your ride to be expecting a call from the hospital prior to your procedure.   Plan to have a responsible adult care for you for at least 24 hours after you leave the hospital or clinic. This is important.  The day before your procedure  You may be given antibiotic medicine to take by mouth to help prevent infection. Take it as told by your health care provider.  You may be asked to shower with a germ-killing soap.  Follow instructions from your health care provider about eating and drinking restrictions. This includes gum, mints and hard candy.  Pack comfortable clothes according to your procedure.   The day of your procedure  You may need to take another shower with a germ-killing soap before you leave home in the morning.  With a small sip of water, take only the medicines that you are told to take.  Remove all jewelry including rings.   Leave anything you consider valuable at home except hearing aids if needed.  You do not need to bring your home medications into the hospital.   Do not wear any makeup, nail polish, powder, deodorant, lotion, hair accessories, or anything on your skin or body except your clothes.  If you will be staying in the hospital, bring a case to hold your glasses, contacts, or dentures. You may also want to bring your robe and non-skid footwear.       (Do not use denture adhesives since you will be asked to remove them during  surgery).   If you wear oxygen at home, bring it with you the day of surgery.  If instructed by your health care provider, bring your sleep apnea  device with you on the day of your surgery (if this applies to you).  You may want to leave your suitcase and sleep apnea device in the car until after surgery.   Arrive at the hospital as scheduled.  Bring a friend or family member with you who can help to answer questions and be present while you meet with your health care provider.  At the hospital  When you arrive at the hospital:  Go to registration located at the main entrance of the hospital. You will be registered and given a beeper and a sticker sheet. Take the stickers to the Outpatient nurses desk and place in the black tray. This is to notify staff that you have arrived. Then return to the lobby to wait.   When your beeper lights up and vibrates proceed through the double doors, under the stairs, and a member of the Outpatient Surgery staff will escort you to your preoperative room.  You may have to wear compression sleeves. These help to prevent blood clots and reduce swelling in your legs.  An IV may be inserted into one of your veins.              In the operating room, you may be given one or more of the following:        A medicine to help you relax (sedative).        A medicine to numb the area (local anesthetic).        A medicine to make you fall asleep (general anesthetic).        A medicine that is injected into an area of your body to numb everything below the                      injection site (regional anesthetic).  You may be given an antibiotic through your IV to help prevent infection.  Your surgical site will be marked or identified.    Contact a health care provider if you:  Develop a fever of more than 100.4°F (38°C) or other feelings of illness during the 48 hours before your surgery.  Have symptoms that get worse.  Have questions or concerns about your surgery.  Summary  Preparing for surgery can make the procedure go more smoothly and lower your risk of complications.  Before surgery, make a list of questions and concerns to  discuss with your surgeon. Ask about the risks and possible complications.  In the days or weeks before your surgery, follow all instructions from your health care provider. You may need to stop smoking, avoid alcohol, follow eating restrictions, and change or stop your regular medicines.  Contact your surgeon if you develop a fever or other signs of illness during the few days before your surgery.  This information is not intended to replace advice given to you by your health care provider. Make sure you discuss any questions you have with your health care provider.  Document Revised: 12/21/2018 Document Reviewed: 10/23/2018  Elsevier Patient Education © 2021 [employer]

## 2023-09-28 ENCOUNTER — APPOINTMENT (OUTPATIENT)
Dept: GENERAL RADIOLOGY | Facility: HOSPITAL | Age: 73
End: 2023-09-28
Payer: MEDICARE

## 2023-09-28 ENCOUNTER — HOSPITAL ENCOUNTER (OUTPATIENT)
Facility: HOSPITAL | Age: 73
Setting detail: HOSPITAL OUTPATIENT SURGERY
Discharge: HOME | End: 2023-09-28
Attending: PODIATRIST | Admitting: PODIATRIST
Payer: MEDICARE

## 2023-09-28 ENCOUNTER — ANESTHESIA EVENT (OUTPATIENT)
Dept: PERIOP | Facility: HOSPITAL | Age: 73
End: 2023-09-28
Payer: MEDICARE

## 2023-09-28 ENCOUNTER — ANESTHESIA (OUTPATIENT)
Dept: PERIOP | Facility: HOSPITAL | Age: 73
End: 2023-09-28
Payer: MEDICARE

## 2023-09-28 VITALS
SYSTOLIC BLOOD PRESSURE: 124 MMHG | DIASTOLIC BLOOD PRESSURE: 76 MMHG | OXYGEN SATURATION: 92 % | RESPIRATION RATE: 16 BRPM | TEMPERATURE: 98 F | HEART RATE: 77 BPM

## 2023-09-28 DIAGNOSIS — M25.372 ANKLE INSTABILITY, LEFT: Primary | ICD-10-CM

## 2023-09-28 PROCEDURE — 25010000002 MIDAZOLAM PER 1 MG: Performed by: ANESTHESIOLOGY

## 2023-09-28 PROCEDURE — 25010000002 FENTANYL CITRATE (PF) 50 MCG/ML SOLUTION: Performed by: ANESTHESIOLOGY

## 2023-09-28 PROCEDURE — 25010000002 ROPIVACAINE PER 1 MG: Performed by: ANESTHESIOLOGY

## 2023-09-28 PROCEDURE — 25010000002 ONDANSETRON PER 1 MG

## 2023-09-28 PROCEDURE — 25010000002 ONDANSETRON PER 1 MG: Performed by: ANESTHESIOLOGY

## 2023-09-28 PROCEDURE — 25010000002 DEXAMETHASONE PER 1 MG

## 2023-09-28 PROCEDURE — 25010000002 PROPOFOL 10 MG/ML EMULSION

## 2023-09-28 PROCEDURE — 25010000002 FENTANYL CITRATE (PF) 100 MCG/2ML SOLUTION

## 2023-09-28 PROCEDURE — 25010000002 CEFAZOLIN PER 500 MG: Performed by: PODIATRIST

## 2023-09-28 PROCEDURE — C1713 ANCHOR/SCREW BN/BN,TIS/BN: HCPCS | Performed by: PODIATRIST

## 2023-09-28 PROCEDURE — 25010000002 KETOROLAC TROMETHAMINE PER 15 MG

## 2023-09-28 PROCEDURE — 25010000002 DROPERIDOL PER 5 MG: Performed by: ANESTHESIOLOGY

## 2023-09-28 DEVICE — SUT FW 2/0 TPR NDL 18MM AR7220: Type: IMPLANTABLE DEVICE | Site: ANKLE | Status: FUNCTIONAL

## 2023-09-28 DEVICE — Y-KNOT RC ALL-SUTURE ANCHOR W/TWO #2 (5 METRIC) HI-FI SUTURES W/NEEDLES
Type: IMPLANTABLE DEVICE | Site: ANKLE | Status: FUNCTIONAL
Brand: Y-KNOT

## 2023-09-28 RX ORDER — FENTANYL CITRATE 50 UG/ML
INJECTION, SOLUTION INTRAMUSCULAR; INTRAVENOUS AS NEEDED
Status: DISCONTINUED | OUTPATIENT
Start: 2023-09-28 | End: 2023-09-28 | Stop reason: SURG

## 2023-09-28 RX ORDER — SODIUM CHLORIDE, SODIUM LACTATE, POTASSIUM CHLORIDE, CALCIUM CHLORIDE 600; 310; 30; 20 MG/100ML; MG/100ML; MG/100ML; MG/100ML
100 INJECTION, SOLUTION INTRAVENOUS CONTINUOUS PRN
Status: CANCELLED | OUTPATIENT
Start: 2023-09-28

## 2023-09-28 RX ORDER — ONDANSETRON 2 MG/ML
INJECTION INTRAMUSCULAR; INTRAVENOUS AS NEEDED
Status: DISCONTINUED | OUTPATIENT
Start: 2023-09-28 | End: 2023-09-28 | Stop reason: SURG

## 2023-09-28 RX ORDER — OXYCODONE AND ACETAMINOPHEN 10; 325 MG/1; MG/1
1 TABLET ORAL ONCE AS NEEDED
Status: DISCONTINUED | OUTPATIENT
Start: 2023-09-28 | End: 2023-09-28 | Stop reason: HOSPADM

## 2023-09-28 RX ORDER — EPHEDRINE SULFATE 50 MG/ML
INJECTION, SOLUTION INTRAVENOUS AS NEEDED
Status: DISCONTINUED | OUTPATIENT
Start: 2023-09-28 | End: 2023-09-28 | Stop reason: SURG

## 2023-09-28 RX ORDER — IBUPROFEN 600 MG/1
600 TABLET ORAL ONCE AS NEEDED
Status: DISCONTINUED | OUTPATIENT
Start: 2023-09-28 | End: 2023-09-28 | Stop reason: HOSPADM

## 2023-09-28 RX ORDER — SODIUM CHLORIDE 9 MG/ML
40 INJECTION, SOLUTION INTRAVENOUS AS NEEDED
Status: DISCONTINUED | OUTPATIENT
Start: 2023-09-28 | End: 2023-09-28 | Stop reason: HOSPADM

## 2023-09-28 RX ORDER — MIDAZOLAM HYDROCHLORIDE 1 MG/ML
0.5 INJECTION INTRAMUSCULAR; INTRAVENOUS
Status: DISCONTINUED | OUTPATIENT
Start: 2023-09-28 | End: 2023-09-28 | Stop reason: HOSPADM

## 2023-09-28 RX ORDER — SODIUM CHLORIDE 9 MG/ML
40 INJECTION, SOLUTION INTRAVENOUS AS NEEDED
Status: CANCELLED | OUTPATIENT
Start: 2023-09-28

## 2023-09-28 RX ORDER — SODIUM CHLORIDE 0.9 % (FLUSH) 0.9 %
10 SYRINGE (ML) INJECTION AS NEEDED
Status: CANCELLED | OUTPATIENT
Start: 2023-09-28

## 2023-09-28 RX ORDER — SODIUM CHLORIDE 0.9 % (FLUSH) 0.9 %
10 SYRINGE (ML) INJECTION EVERY 12 HOURS SCHEDULED
Status: DISCONTINUED | OUTPATIENT
Start: 2023-09-28 | End: 2023-09-28 | Stop reason: HOSPADM

## 2023-09-28 RX ORDER — DROPERIDOL 2.5 MG/ML
0.62 INJECTION, SOLUTION INTRAMUSCULAR; INTRAVENOUS ONCE AS NEEDED
Status: COMPLETED | OUTPATIENT
Start: 2023-09-28 | End: 2023-09-28

## 2023-09-28 RX ORDER — SODIUM CHLORIDE 0.9 % (FLUSH) 0.9 %
3 SYRINGE (ML) INJECTION AS NEEDED
Status: DISCONTINUED | OUTPATIENT
Start: 2023-09-28 | End: 2023-09-28 | Stop reason: HOSPADM

## 2023-09-28 RX ORDER — SODIUM CHLORIDE 0.9 % (FLUSH) 0.9 %
10 SYRINGE (ML) INJECTION AS NEEDED
Status: DISCONTINUED | OUTPATIENT
Start: 2023-09-28 | End: 2023-09-28 | Stop reason: HOSPADM

## 2023-09-28 RX ORDER — OXYCODONE AND ACETAMINOPHEN 10; 325 MG/1; MG/1
1 TABLET ORAL EVERY 4 HOURS PRN
Qty: 28 TABLET | Refills: 0 | Status: SHIPPED | OUTPATIENT
Start: 2023-09-28

## 2023-09-28 RX ORDER — NALOXONE HYDROCHLORIDE 4 MG/.1ML
SPRAY NASAL
Qty: 2 EACH | Refills: 0 | Status: SHIPPED | OUTPATIENT
Start: 2023-09-28

## 2023-09-28 RX ORDER — ONDANSETRON 2 MG/ML
4 INJECTION INTRAMUSCULAR; INTRAVENOUS
Status: DISCONTINUED | OUTPATIENT
Start: 2023-09-28 | End: 2023-09-28 | Stop reason: HOSPADM

## 2023-09-28 RX ORDER — SODIUM CHLORIDE 0.9 % (FLUSH) 0.9 %
3-10 SYRINGE (ML) INJECTION AS NEEDED
Status: DISCONTINUED | OUTPATIENT
Start: 2023-09-28 | End: 2023-09-28 | Stop reason: HOSPADM

## 2023-09-28 RX ORDER — NEOSTIGMINE METHYLSULFATE 5 MG/5 ML
SYRINGE (ML) INTRAVENOUS AS NEEDED
Status: DISCONTINUED | OUTPATIENT
Start: 2023-09-28 | End: 2023-09-28 | Stop reason: SURG

## 2023-09-28 RX ORDER — PROPOFOL 10 MG/ML
VIAL (ML) INTRAVENOUS AS NEEDED
Status: DISCONTINUED | OUTPATIENT
Start: 2023-09-28 | End: 2023-09-28 | Stop reason: SURG

## 2023-09-28 RX ORDER — MIDAZOLAM HYDROCHLORIDE 1 MG/ML
2 INJECTION INTRAMUSCULAR; INTRAVENOUS ONCE
Status: COMPLETED | OUTPATIENT
Start: 2023-09-28 | End: 2023-09-28

## 2023-09-28 RX ORDER — LIDOCAINE HYDROCHLORIDE 10 MG/ML
0.5 INJECTION, SOLUTION EPIDURAL; INFILTRATION; INTRACAUDAL; PERINEURAL ONCE AS NEEDED
Status: DISCONTINUED | OUTPATIENT
Start: 2023-09-28 | End: 2023-09-28

## 2023-09-28 RX ORDER — HYDROMORPHONE HYDROCHLORIDE 1 MG/ML
0.5 INJECTION, SOLUTION INTRAMUSCULAR; INTRAVENOUS; SUBCUTANEOUS
Status: DISCONTINUED | OUTPATIENT
Start: 2023-09-28 | End: 2023-09-28 | Stop reason: HOSPADM

## 2023-09-28 RX ORDER — NALOXONE HCL 0.4 MG/ML
0.04 VIAL (ML) INJECTION AS NEEDED
Status: DISCONTINUED | OUTPATIENT
Start: 2023-09-28 | End: 2023-09-28 | Stop reason: HOSPADM

## 2023-09-28 RX ORDER — FLUMAZENIL 0.1 MG/ML
0.2 INJECTION INTRAVENOUS AS NEEDED
Status: DISCONTINUED | OUTPATIENT
Start: 2023-09-28 | End: 2023-09-28 | Stop reason: HOSPADM

## 2023-09-28 RX ORDER — ROCURONIUM BROMIDE 10 MG/ML
INJECTION, SOLUTION INTRAVENOUS AS NEEDED
Status: DISCONTINUED | OUTPATIENT
Start: 2023-09-28 | End: 2023-09-28 | Stop reason: SURG

## 2023-09-28 RX ORDER — ONDANSETRON 4 MG/1
4 TABLET, FILM COATED ORAL EVERY 4 HOURS
Qty: 30 TABLET | Refills: 0 | Status: SHIPPED | OUTPATIENT
Start: 2023-09-28

## 2023-09-28 RX ORDER — MAGNESIUM HYDROXIDE 1200 MG/15ML
LIQUID ORAL AS NEEDED
Status: DISCONTINUED | OUTPATIENT
Start: 2023-09-28 | End: 2023-09-28 | Stop reason: HOSPADM

## 2023-09-28 RX ORDER — DEXAMETHASONE SODIUM PHOSPHATE 4 MG/ML
INJECTION, SOLUTION INTRA-ARTICULAR; INTRALESIONAL; INTRAMUSCULAR; INTRAVENOUS; SOFT TISSUE AS NEEDED
Status: DISCONTINUED | OUTPATIENT
Start: 2023-09-28 | End: 2023-09-28 | Stop reason: SURG

## 2023-09-28 RX ORDER — LABETALOL HYDROCHLORIDE 5 MG/ML
5 INJECTION, SOLUTION INTRAVENOUS
Status: DISCONTINUED | OUTPATIENT
Start: 2023-09-28 | End: 2023-09-28 | Stop reason: HOSPADM

## 2023-09-28 RX ORDER — SODIUM CHLORIDE, SODIUM LACTATE, POTASSIUM CHLORIDE, CALCIUM CHLORIDE 600; 310; 30; 20 MG/100ML; MG/100ML; MG/100ML; MG/100ML
1000 INJECTION, SOLUTION INTRAVENOUS CONTINUOUS
Status: DISCONTINUED | OUTPATIENT
Start: 2023-09-28 | End: 2023-09-28 | Stop reason: HOSPADM

## 2023-09-28 RX ORDER — LIDOCAINE HYDROCHLORIDE 20 MG/ML
INJECTION, SOLUTION EPIDURAL; INFILTRATION; INTRACAUDAL; PERINEURAL AS NEEDED
Status: DISCONTINUED | OUTPATIENT
Start: 2023-09-28 | End: 2023-09-28 | Stop reason: SURG

## 2023-09-28 RX ORDER — ROPIVACAINE HYDROCHLORIDE 5 MG/ML
INJECTION, SOLUTION EPIDURAL; INFILTRATION; PERINEURAL
Status: COMPLETED | OUTPATIENT
Start: 2023-09-28 | End: 2023-09-28

## 2023-09-28 RX ORDER — LIDOCAINE HYDROCHLORIDE 10 MG/ML
0.5 INJECTION, SOLUTION EPIDURAL; INFILTRATION; INTRACAUDAL; PERINEURAL ONCE AS NEEDED
Status: DISCONTINUED | OUTPATIENT
Start: 2023-09-28 | End: 2023-09-28 | Stop reason: HOSPADM

## 2023-09-28 RX ORDER — KETOROLAC TROMETHAMINE 30 MG/ML
INJECTION, SOLUTION INTRAMUSCULAR; INTRAVENOUS AS NEEDED
Status: DISCONTINUED | OUTPATIENT
Start: 2023-09-28 | End: 2023-09-28 | Stop reason: SURG

## 2023-09-28 RX ORDER — SODIUM CHLORIDE 0.9 % (FLUSH) 0.9 %
3 SYRINGE (ML) INJECTION EVERY 12 HOURS SCHEDULED
Status: DISCONTINUED | OUTPATIENT
Start: 2023-09-28 | End: 2023-09-28 | Stop reason: HOSPADM

## 2023-09-28 RX ORDER — DOCUSATE SODIUM 100 MG/1
100 CAPSULE, LIQUID FILLED ORAL 2 TIMES DAILY
Qty: 60 CAPSULE | Refills: 0 | Status: SHIPPED | OUTPATIENT
Start: 2023-09-28

## 2023-09-28 RX ORDER — FENTANYL CITRATE 50 UG/ML
50 INJECTION, SOLUTION INTRAMUSCULAR; INTRAVENOUS ONCE
Status: COMPLETED | OUTPATIENT
Start: 2023-09-28 | End: 2023-09-28

## 2023-09-28 RX ORDER — SODIUM CHLORIDE, SODIUM LACTATE, POTASSIUM CHLORIDE, CALCIUM CHLORIDE 600; 310; 30; 20 MG/100ML; MG/100ML; MG/100ML; MG/100ML
100 INJECTION, SOLUTION INTRAVENOUS CONTINUOUS PRN
Status: DISCONTINUED | OUTPATIENT
Start: 2023-09-28 | End: 2023-09-28 | Stop reason: HOSPADM

## 2023-09-28 RX ORDER — SODIUM CHLORIDE 0.9 % (FLUSH) 0.9 %
10 SYRINGE (ML) INJECTION EVERY 12 HOURS SCHEDULED
Status: CANCELLED | OUTPATIENT
Start: 2023-09-28

## 2023-09-28 RX ORDER — ACETAMINOPHEN 500 MG
1000 TABLET ORAL ONCE
Status: COMPLETED | OUTPATIENT
Start: 2023-09-28 | End: 2023-09-28

## 2023-09-28 RX ORDER — SODIUM CHLORIDE, SODIUM LACTATE, POTASSIUM CHLORIDE, CALCIUM CHLORIDE 600; 310; 30; 20 MG/100ML; MG/100ML; MG/100ML; MG/100ML
100 INJECTION, SOLUTION INTRAVENOUS CONTINUOUS
Status: DISCONTINUED | OUTPATIENT
Start: 2023-09-28 | End: 2023-09-28 | Stop reason: HOSPADM

## 2023-09-28 RX ORDER — ENOXAPARIN SODIUM 100 MG/ML
40 INJECTION SUBCUTANEOUS DAILY
Qty: 12.4 ML | Refills: 0 | Status: SHIPPED | OUTPATIENT
Start: 2023-09-28 | End: 2023-10-28

## 2023-09-28 RX ORDER — FENTANYL CITRATE 50 UG/ML
25 INJECTION, SOLUTION INTRAMUSCULAR; INTRAVENOUS
Status: DISCONTINUED | OUTPATIENT
Start: 2023-09-28 | End: 2023-09-28 | Stop reason: HOSPADM

## 2023-09-28 RX ADMIN — KETOROLAC TROMETHAMINE 15 MG: 30 INJECTION, SOLUTION INTRAMUSCULAR; INTRAVENOUS at 09:58

## 2023-09-28 RX ADMIN — PROPOFOL 170 MG: 10 INJECTION, EMULSION INTRAVENOUS at 08:27

## 2023-09-28 RX ADMIN — ONDANSETRON 4 MG: 2 INJECTION INTRAMUSCULAR; INTRAVENOUS at 11:01

## 2023-09-28 RX ADMIN — Medication 3 MG: at 09:56

## 2023-09-28 RX ADMIN — LIDOCAINE HYDROCHLORIDE 100 MG: 20 INJECTION, SOLUTION EPIDURAL; INFILTRATION; INTRACAUDAL; PERINEURAL at 08:27

## 2023-09-28 RX ADMIN — SODIUM CHLORIDE, POTASSIUM CHLORIDE, SODIUM LACTATE AND CALCIUM CHLORIDE 1000 ML: 600; 310; 30; 20 INJECTION, SOLUTION INTRAVENOUS at 07:29

## 2023-09-28 RX ADMIN — ROCURONIUM BROMIDE 50 MG: 10 SOLUTION INTRAVENOUS at 08:27

## 2023-09-28 RX ADMIN — ONDANSETRON 4 MG: 2 INJECTION INTRAMUSCULAR; INTRAVENOUS at 09:56

## 2023-09-28 RX ADMIN — DROPERIDOL 0.62 MG: 2.5 INJECTION, SOLUTION INTRAMUSCULAR; INTRAVENOUS at 10:34

## 2023-09-28 RX ADMIN — MIDAZOLAM 2 MG: 1 INJECTION INTRAMUSCULAR; INTRAVENOUS at 07:47

## 2023-09-28 RX ADMIN — GLYCOPYRROLATE 0.4 MG: 0.2 INJECTION INTRAMUSCULAR; INTRAVENOUS at 09:56

## 2023-09-28 RX ADMIN — DEXAMETHASONE SODIUM PHOSPHATE 4 MG: 4 INJECTION, SOLUTION INTRA-ARTICULAR; INTRALESIONAL; INTRAMUSCULAR; INTRAVENOUS; SOFT TISSUE at 08:57

## 2023-09-28 RX ADMIN — FENTANYL CITRATE 100 MCG: 50 INJECTION, SOLUTION INTRAMUSCULAR; INTRAVENOUS at 08:25

## 2023-09-28 RX ADMIN — FENTANYL CITRATE 50 MCG: 50 INJECTION, SOLUTION INTRAMUSCULAR; INTRAVENOUS at 07:47

## 2023-09-28 RX ADMIN — CEFAZOLIN 2000 MG: 2 INJECTION, POWDER, FOR SOLUTION INTRAMUSCULAR; INTRAVENOUS at 08:33

## 2023-09-28 RX ADMIN — ROPIVACAINE HYDROCHLORIDE 20 ML: 5 INJECTION, SOLUTION EPIDURAL; INFILTRATION; PERINEURAL at 07:49

## 2023-09-28 RX ADMIN — EPHEDRINE SULFATE 20 MG: 50 INJECTION INTRAVENOUS at 08:49

## 2023-09-28 RX ADMIN — ACETAMINOPHEN 1000 MG: 500 TABLET, FILM COATED ORAL at 07:44

## 2023-09-28 NOTE — ANESTHESIA POSTPROCEDURE EVALUATION
Patient: Hannah Maki    Procedure Summary       Date: 09/28/23 Room / Location:  [facility] OR  /  [facility]    Anesthesia Start: 0824 Anesthesia Stop: 1012    Procedures:       ANKLE ARTHROSCOPY WITH EXTENSIVE DEBRIDEMENT, PERONEUS BREVIS TENDON REPAIR, LATERAL ANKLE STABILIZATION WITH REPAIR OF ANTERIOR TALOFIBULAR  AND CALCANEOFIBULAR LIGAMENT, LEFT ANKLE (Left: Ankle)      PERONEUS BREVIS TENDON REPAIR (Left: Ankle)      REPAIR OF ANTERIOR TALOFIBULAR AND CALCANEOFIBULAR LIGAMENT, LEFT ANKLE (Left: Ankle) Diagnosis:       Peroneal tendon injury, left, sequela      Ankle instability, left      Joint derangement of ankle or foot      Chronic ankle pain      (Peroneal tendon injury sequela left, ankle instability left, ankle joint derangement left, left ankle pain)    Surgeons: Sandor Hedrick DPM Provider: Guero Maynard CRNA    Anesthesia Type: general with block ASA Status: 3            Anesthesia Type: general with block    Vitals  Vitals Value Taken Time   /75 09/28/23 1109   Temp 98.1 °F (36.7 °C) 09/28/23 1010   Pulse 70 09/28/23 1110   Resp 14 09/28/23 1108   SpO2 94 % 09/28/23 1110   Vitals shown include unvalidated device data.        Post Anesthesia Care and Evaluation    Patient location during evaluation: PACU  Patient participation: complete - patient participated  Level of consciousness: awake and alert  Pain management: adequate    Airway patency: patent  Anesthetic complications: No anesthetic complications    Cardiovascular status: acceptable  Respiratory status: acceptable  Hydration status: acceptable    Comments: Blood pressure 120/75, pulse 69, temperature 98.1 °F (36.7 °C), temperature source Temporal, resp. rate 14, last menstrual period 10/04/1981, SpO2 96 %, not currently breastfeeding.    Pt discharged from PACU based on kaykay score >8

## 2023-09-28 NOTE — ANESTHESIA PROCEDURE NOTES
Airway  Urgency: elective    Date/Time: 9/28/2023 8:31 AM  Airway not difficult    General Information and Staff    Patient location during procedure: OR  CRNA/CAA: Guero Maynard CRNA    Indications and Patient Condition  Indications for airway management: airway protection    Preoxygenated: yes  Mask difficulty assessment: 1 - vent by mask    Final Airway Details  Final airway type: endotracheal airway      Successful airway: ETT  Cuffed: yes   Successful intubation technique: direct laryngoscopy  Facilitating devices/methods: Bougie  Blade: Abel  Blade size: 2  ETT size (mm): 7.5  Cormack-Lehane Classification: grade I - full view of glottis  Placement verified by: chest auscultation and capnometry   Measured from: lips  ETT/EBT  to lips (cm): 21  Number of attempts at approach: 1  Assessment: lips, teeth, and gum same as pre-op and atraumatic intubation

## 2023-09-28 NOTE — OP NOTE
ANKLE ARTHROSCOPY, ANKLE TENDON REPAIR, ANKLE LIGAMENT RECONSTRUCTION  Procedure Note    Hannah Maki  9/28/2023    Pre-op Diagnosis:   Peroneal tendon injury sequela left, ankle instability left, ankle joint derangement left, left ankle pain    Post-op Diagnosis:     Post-Op Diagnosis Codes:     * Peroneal tendon injury, left, sequela [S86.302S]     * Ankle instability, left [M25.372]     * Joint derangement of ankle or foot [M24.173, M24.176]     * Chronic ankle pain [M25.579, G89.29]     Procedure/CPT Codes:       Procedure(s):  1) ANKLE ARTHROSCOPY WITH EXTENSIVE DEBRIDEMENT  2)  PERONEUS BREVIS TENDON REPAIR left ankle  3) LATERAL ANKLE STABILIZATION WITH REPAIR OF ANTERIOR TALOFIBULAR  AND CALCANEOFIBULAR LIGAMENT, LEFT ANKLE      Surgeon(s):  Sandor Hedrick DPM    Anesthesia: General with Block    Staff:   Circulator: Roger Baldwin RN  Scrub Person: Sesar Taylor; Mag Liriano CST     was responsible for performing the following activities: Retraction and their skilled assistance was necessary for the success of this case.    Indications for procedure:  Pain and chronic instability left ankle.    Procedure details:  The patient brought the operating room placed under general anesthesia.  The patient did have a preoperative regional block per anesthesia in preoperative area.  Left leg prepped and draped in usual sterile fashion.  Following procedures were performed.    Procedure #1 ankle arthroscopy with extensive debridement left ankle    Attention was then directed to the patient's left lower extremity.  Was placed in a limb hastings.  The distractor was then placed.  An anteromedial portal was created and the trocar cannula was introduced.  The trocar was removed and the camera was introduced.  The joint was visualized.  A lateral portal was created under direct visualization.  The shaver was introduced and the significant anterior soft tissue impingement was resected.  Hemostasis was achieved  with the wand.  A probe was then introduced and the cartilage was evaluated.  There is no evidence of cartilage defect or subchondral injury.  The camera was then introduced laterally and additional debridement was performed medially.  Hemostasis was achieved with the wand.  Camera was removed and skin is repaired with 3-0 nylon.    Procedure #2 peroneus brevis tendon repair left ankle    Attention was then directed lateral ankle where a curvilinear incision was made.  Deepened with sharp and blunt dissection technique.  A linear incision was created in the peroneal retinaculum.  It was opened both proximally and distally with a Metzenbaum scissor.  The peroneus brevis and peroneus longus tendons were identified.  The peroneus longus tendon was intact in its entirety.  The peroneus brevis tendon had a longitudinal split that was approximately 5 cm in length.  It was debrided with a #15 blade.  It was then repaired with 2-0 FiberWire.  Wound was then irrigated.    Procedure #3 lateral ankle stabilization with repair of the anterior talofibular and calcaneofibular ligaments.    Attention was then directed to the lateral ankle was where dissection was carried over the lateral ankle ligaments.  The peroneal tendons were retracted.  A linear incision was created over the lateral ankle capsule including the anterior talofibular and calcaneofibular ligament.  The calcaneal fib ligament was found to be ruptured off of the insertion on the fibula.  2 ConMed all suture anchors were then introduced at the insertion of the anterior talofibular and calcaneofibular ligaments.  The calcaneofibular ligament and anterior talofibular ligament as well as the lateral ankle capsule were then advanced to the fibula.  The extensor retinaculum was advanced with 0 Vicryl.  The peroneal tendon sheath was repaired with 0 Vicryl.  Closure performed in layers.  Well-padded compression bandage with posterior splint was applied.    Estimated  Blood Loss: none    Specimens:                None      Drains: * No LDAs found *    Implants:   Implant Name Type Inv. Item Serial No.  Lot No. LRB No. Used Action   SUT FW 2/0 TPR NDL 18MM [id number] - [id number] Implant SUT FW 2/0 TPR NDL 18MM [id number]  [employer]  Left 2 Implanted   SUT/ANCH Y/KNOT RC HI/FI NMBR2 W/NDL 2.8MM KERMIT B/W - [id number] Implant SUT/ANCH Y/KNOT RC HI/FI NMBR2 W/NDL 2.8MM KERMIT B/W  [employer] [id number] Left 1 Implanted   SUT/ANCH Y/KNOT RC HI/FI NMBR2 W/NDL 2.8MM KERMIT B/W - [id number] Implant SUT/ANCH Y/KNOT RC HI/FI NMBR2 W/NDL 2.8MM KERMIT B/W  [employer] [id number] Left 1 Implanted        Complications: none           Follow up:   Nonweightbearing.  3 to 5 days for follow-up.  Prescriptions for Percocet, Zofran, Lovenox were given.    Sandor Hedrick DPM     Date: 9/28/2023  Time: 10:09 CDT

## 2023-09-28 NOTE — ANESTHESIA PROCEDURE NOTES
Peripheral Block    Pre-sedation assessment completed: 9/28/2023 7:45 AM    Patient reassessed immediately prior to procedure    Patient location during procedure: holding area  Start time: 9/28/2023 7:49 AM  Stop time: 9/28/2023 7:49 AM  Reason for block: procedure for pain, at surgeon's request, post-op pain management and Requested by Dr. Hedrick  Performed by  Anesthesiologist: John Schroeder MD  Preanesthetic Checklist  Completed: patient identified, IV checked, site marked, risks and benefits discussed, surgical consent, monitors and equipment checked, pre-op evaluation and timeout performed  Prep:  Pt Position: supine  Sterile barriers:mask, gloves and cap  Prep: ChloraPrep  Patient monitoring: blood pressure monitoring, continuous pulse oximetry and EKG  Procedure    Sedation: yes  Performed under: MAC  Guidance:ultrasound guided and Sciatic nerve identified and local anesthetic seen surrounding nerve    ULTRASOUND INTERPRETATION.  Using ultrasound guidance a 20 G gauge needle was placed in close proximity to the sciatic nerve, at which point, under ultrasound guidance anesthetic was injected in the area of the nerve and spread of the anesthesia was seen on ultrasound in close proximity thereto.  There were no abnormalities seen on ultrasound; a digital image was taken; and the patient tolerated the procedure with no complications. Images:still images obtained (picture printed and placed in patients chart)    Laterality:left  Block Type:sciatic and popliteal  Injection Technique:single-shot  Needle Type:echogenic  Needle Gauge:20 G  Resistance on Injection: none    Medications Used: ropivacaine (NAROPIN) injection 0.5 % - Injection   20 mL - 9/28/2023 7:49:00 AM      Post Assessment  Injection Assessment: negative aspiration for heme, no paresthesia on injection and incremental injection  Patient Tolerance:comfortable throughout block  Complications:no

## 2023-09-28 NOTE — ANESTHESIA PREPROCEDURE EVALUATION
Anesthesia Evaluation     Patient summary reviewed   history of anesthetic complications:  PONV  NPO Solid Status: > 8 hours  NPO Liquid Status: > 8 hours           Airway   Mallampati: I  TM distance: >3 FB  Neck ROM: full  No difficulty expected  Dental - normal exam   (+) partials    Pulmonary    (+) pneumonia resolved , asthma,shortness of breath  Cardiovascular   Exercise tolerance: good (4-7 METS)    (+) hypertension well controlled less than 2 medications, hyperlipidemia      Neuro/Psych  GI/Hepatic/Renal/Endo    (+) obesity, morbid obesity, GERD poorly controlled, liver disease fatty liver disease, renal disease stones, thyroid problem hypothyroidism    Musculoskeletal     (+) chronic pain, neck pain  Abdominal   (+) obese   Substance History - negative use     OB/GYN          Other   arthritis,                     Anesthesia Plan    ASA 3     general with block     intravenous induction     Anesthetic plan, risks, benefits, and alternatives have been provided, discussed and informed consent has been obtained with: patient.      CODE STATUS:

## 2023-11-02 ENCOUNTER — HOME HEALTH ADMISSION (OUTPATIENT)
Dept: HOME HEALTH SERVICES | Facility: HOME HEALTHCARE | Age: 73
End: 2023-11-02
Payer: MEDICARE

## 2023-11-02 ENCOUNTER — TRANSCRIBE ORDERS (OUTPATIENT)
Dept: HOME HEALTH SERVICES | Facility: HOME HEALTHCARE | Age: 73
End: 2023-11-02
Payer: MEDICARE

## 2023-11-02 DIAGNOSIS — Z47.89 UNSPECIFIED ORTHOPEDIC AFTERCARE: Primary | ICD-10-CM

## 2023-11-06 ENCOUNTER — HOME CARE VISIT (OUTPATIENT)
Dept: HOME HEALTH SERVICES | Facility: CLINIC | Age: 73
End: 2023-11-06
Payer: MEDICARE

## 2023-11-06 PROCEDURE — G0151 HHCP-SERV OF PT,EA 15 MIN: HCPCS

## 2023-11-06 NOTE — Clinical Note
"SOC Note:Pt is a 72 yo female that had L ankle surgery for tendon repair on 9/28/23. She has been NWB for several weeks. Currently wearing CAM bioot and has been placing weight on her L foot while using SW. She is unclear on WB status and how much weight to bear. No orders are found in MR for WB. Pt instructed to TTWB until her F/U appointment with Dr Hedrick 11/9/23. PT has given patient specific questions and were written down by patient for her appointment. Patient is asking for written documentation following her appointment as well as having it faxed to [facility]. PT to reassess following her 11/9 appointment and new directives.     Home Health ordered for: PT     Reason for Hosp/Primary Dx/Co-morbidities: aftercare following L ankle surgery     Focus of Care: L ankle aftercare Patient's goal(s): \" be able to walk and get her life back\", \" go shopping\"     Current Functional status/mobility/DME: See DME     HB status/Living Arrangements: Pt lives with spouse in private residence [address] Steps in and out of home.  has make-shift ramp for wc access.     Skin Integrity/wound status: well approximatred and healing , no s/s of infection , no dressings     Code Status: FULL CODE     Fall Risk/Safety concerns: Weight beraing L ankle, mod falls risk     Medication issues/Concerns: NONE     Additional Problems/Concerns: NONE     SDOH Barriers (i.e. caregiver concerns, social isolation, transportation, food insecurity, environment, income etc.)/Need for MSW: NONE     Plan for next visit: aftercare L ankle surgery, WB status, pt/cg education  "

## 2023-11-07 VITALS
BODY MASS INDEX: 35.85 KG/M2 | SYSTOLIC BLOOD PRESSURE: 144 MMHG | HEART RATE: 72 BPM | HEIGHT: 64 IN | OXYGEN SATURATION: 99 % | RESPIRATION RATE: 18 BRPM | WEIGHT: 210 LBS | DIASTOLIC BLOOD PRESSURE: 76 MMHG | TEMPERATURE: 98.2 F

## 2023-11-07 NOTE — HOME HEALTH
"SOC Note:Pt is a 72 yo female that had L ankle surgery for tendon repair on 9/28/23. She has been NWB for several weeks. Currently wearing CAM bioot and has been placing weight on her L foot while using SW. She is unclear on WB status and how much weight to bear. No orders are found in MR for WB. Pt instructed to TTWB until her F/U appointment with Dr Hedrick 11/9/23. PT has given patient specific questions and were written down by patient for her appointment. Patient is asking for written documentation following her appointment as well as having it faxed to [facility]. PT to reassess following her 11/9 appointment and new directives.    Home Health ordered for: PT    Reason for Hosp/Primary Dx/Co-morbidities: aftercare following L ankle surgery    Focus of Care: L ankle aftercare    Patient's goal(s): \" be able to walk and get her life back\", \" go shopping\"    Current Functional status/mobility/DME: See DME    HB status/Living Arrangements: Pt lives with spouse in private residence [address] Steps in and out of home.  has make-shift ramp for wc access.    Skin Integrity/wound status: well approximatred and healing , no s/s of infection , no dressings    Code Status: FULL CODE    Fall Risk/Safety concerns: Weight beraing L ankle, mod falls risk    Medication issues/Concerns: NONE    Additional Problems/Concerns: NONE    SDOH Barriers (i.e. caregiver concerns, social isolation, transportation, food insecurity, environment, income etc.)/Need for MSW: NONE    Plan for next visit: aftercare L ankle surgery, WB status, pt/cg education"

## 2023-11-10 ENCOUNTER — HOME CARE VISIT (OUTPATIENT)
Dept: HOME HEALTH SERVICES | Facility: CLINIC | Age: 73
End: 2023-11-10
Payer: MEDICARE

## 2023-11-10 ENCOUNTER — HOME CARE VISIT (OUTPATIENT)
Dept: HOME HEALTH SERVICES | Facility: HOME HEALTHCARE | Age: 73
End: 2023-11-10
Payer: MEDICARE

## 2023-11-10 PROCEDURE — G0151 HHCP-SERV OF PT,EA 15 MIN: HCPCS

## 2023-11-13 ENCOUNTER — HOME CARE VISIT (OUTPATIENT)
Dept: HOME HEALTH SERVICES | Facility: CLINIC | Age: 73
End: 2023-11-13
Payer: MEDICARE

## 2023-11-13 VITALS
TEMPERATURE: 96.8 F | DIASTOLIC BLOOD PRESSURE: 70 MMHG | RESPIRATION RATE: 16 BRPM | HEART RATE: 90 BPM | OXYGEN SATURATION: 96 % | SYSTOLIC BLOOD PRESSURE: 122 MMHG

## 2023-11-13 VITALS
RESPIRATION RATE: 16 BRPM | DIASTOLIC BLOOD PRESSURE: 68 MMHG | HEART RATE: 68 BPM | OXYGEN SATURATION: 98 % | SYSTOLIC BLOOD PRESSURE: 122 MMHG | TEMPERATURE: 98.5 F

## 2023-11-13 PROCEDURE — G0151 HHCP-SERV OF PT,EA 15 MIN: HCPCS

## 2023-11-13 NOTE — HOME HEALTH
pre-screened covid 19    Orders from Ortho: Pt can place full weight in boot using a walker. After 2 weeks of FWB in boot, she can transition into a lace up walking or running shoe. Pt can do active and passive motion of L ankle DF and PF only. Can get wet with soap and water. Per patient and written directives.

## 2023-11-15 ENCOUNTER — HOME CARE VISIT (OUTPATIENT)
Dept: HOME HEALTH SERVICES | Facility: CLINIC | Age: 73
End: 2023-11-15
Payer: MEDICARE

## 2023-11-15 VITALS
HEART RATE: 70 BPM | OXYGEN SATURATION: 96 % | TEMPERATURE: 96.8 F | RESPIRATION RATE: 16 BRPM | SYSTOLIC BLOOD PRESSURE: 128 MMHG | DIASTOLIC BLOOD PRESSURE: 70 MMHG

## 2023-11-15 PROCEDURE — G0151 HHCP-SERV OF PT,EA 15 MIN: HCPCS

## 2023-11-15 NOTE — HOME HEALTH
pre-screened covid 19    Patient reports she had some increased left ankle soreness post last visit, but she has returned to baseline today.  1/10 left ankle pain today.

## 2023-11-20 ENCOUNTER — HOME CARE VISIT (OUTPATIENT)
Dept: HOME HEALTH SERVICES | Facility: HOME HEALTHCARE | Age: 73
End: 2023-11-20
Payer: MEDICARE

## 2023-11-21 ENCOUNTER — HOME CARE VISIT (OUTPATIENT)
Dept: HOME HEALTH SERVICES | Facility: CLINIC | Age: 73
End: 2023-11-21
Payer: MEDICARE

## 2023-11-21 VITALS
OXYGEN SATURATION: 96 % | DIASTOLIC BLOOD PRESSURE: 82 MMHG | TEMPERATURE: 98.6 F | RESPIRATION RATE: 20 BRPM | HEART RATE: 80 BPM | SYSTOLIC BLOOD PRESSURE: 132 MMHG

## 2023-11-21 PROCEDURE — G0157 HHC PT ASSISTANT EA 15: HCPCS

## 2023-11-21 NOTE — HOME HEALTH
COVID SCREENING: no s/s  FOCUS OF CARE/SKILLED NEED: gait, ther ex  TEACHING/INTERVENTIONS: ROM  PROGRESS TOWARD GOALS: pt is progressing toward goals  PHYSICIAN CONTACT: n/a  INSURANCE CHANGES: no  MEDICATION CHANGES SINCE LAST HH VISIT? no  PLAN FOR NEXT VISIT: gait, ther ex    Patient has no new c/o today.

## 2023-11-27 ENCOUNTER — HOME CARE VISIT (OUTPATIENT)
Dept: HOME HEALTH SERVICES | Facility: CLINIC | Age: 73
End: 2023-11-27
Payer: MEDICARE

## 2023-11-27 VITALS
HEART RATE: 72 BPM | OXYGEN SATURATION: 97 % | RESPIRATION RATE: 16 BRPM | SYSTOLIC BLOOD PRESSURE: 128 MMHG | TEMPERATURE: 96.8 F | DIASTOLIC BLOOD PRESSURE: 70 MMHG

## 2023-11-27 PROCEDURE — G0151 HHCP-SERV OF PT,EA 15 MIN: HCPCS

## 2023-11-27 NOTE — HOME HEALTH
pre-screened covid 19    Patient reports she has transitioned out of walking boot to left ankle brace per MD orders.   Patient is ambulating today with left ankle brace and shoe.

## 2023-12-01 ENCOUNTER — HOME CARE VISIT (OUTPATIENT)
Dept: HOME HEALTH SERVICES | Facility: CLINIC | Age: 73
End: 2023-12-01
Payer: MEDICARE

## 2023-12-01 VITALS
RESPIRATION RATE: 18 BRPM | SYSTOLIC BLOOD PRESSURE: 152 MMHG | HEART RATE: 81 BPM | TEMPERATURE: 98.8 F | DIASTOLIC BLOOD PRESSURE: 84 MMHG | OXYGEN SATURATION: 97 %

## 2023-12-01 PROCEDURE — G0157 HHC PT ASSISTANT EA 15: HCPCS

## 2023-12-01 NOTE — HOME HEALTH
COVID SCREENING: no s/s  FOCUS OF CARE/SKILLED NEED: gait, ther ex  TEACHING/INTERVENTIONS: HEP  PROGRESS TOWARD GOALS: patient is progressing toward goals  PHYSICIAN CONTACT: n/a  INSURANCE CHANGES: no  MEDICATION CHANGES SINCE LAST HH VISIT? no  PLAN FOR NEXT VISIT: gait, ther ex    Patient c/o increased left ankle pain, says that it is too difficult to get the ankle brace and shoe on the left foot at the same time due to swelling.

## 2023-12-04 ENCOUNTER — HOME CARE VISIT (OUTPATIENT)
Dept: HOME HEALTH SERVICES | Facility: CLINIC | Age: 73
End: 2023-12-04
Payer: MEDICARE

## 2023-12-04 VITALS
RESPIRATION RATE: 18 BRPM | OXYGEN SATURATION: 95 % | SYSTOLIC BLOOD PRESSURE: 140 MMHG | HEART RATE: 80 BPM | DIASTOLIC BLOOD PRESSURE: 86 MMHG | TEMPERATURE: 97.5 F

## 2023-12-04 PROCEDURE — G0157 HHC PT ASSISTANT EA 15: HCPCS

## 2023-12-04 NOTE — HOME HEALTH
COVID SCREENING: no s/s  FOCUS OF CARE/SKILLED NEED: gait, ther ex  TEACHING/INTERVENTIONS: ROM  PROGRESS TOWARD GOALS: patient is progressing toward goals  PHYSICIAN CONTACT: n/a  INSURANCE CHANGES: no  MEDICATION CHANGES SINCE LAST HH VISIT? no  PLAN FOR NEXT VISIT: gait, ther ex    Patient says that the swelling has decreased since last week. Patient is able to wear the ankle brace and show at the same time.

## 2023-12-04 NOTE — Clinical Note
Patient is making good progress, but is not quite ready for d/c. Please make the next visit a reassessment.

## 2023-12-08 ENCOUNTER — HOME CARE VISIT (OUTPATIENT)
Dept: HOME HEALTH SERVICES | Facility: CLINIC | Age: 73
End: 2023-12-08
Payer: MEDICARE

## 2023-12-08 VITALS
DIASTOLIC BLOOD PRESSURE: 70 MMHG | RESPIRATION RATE: 16 BRPM | SYSTOLIC BLOOD PRESSURE: 120 MMHG | HEART RATE: 78 BPM | TEMPERATURE: 97.5 F | OXYGEN SATURATION: 94 %

## 2023-12-08 PROCEDURE — G0151 HHCP-SERV OF PT,EA 15 MIN: HCPCS

## 2023-12-08 NOTE — HOME HEALTH
"pre-screened covid 19    Patient has MD appointment yesterday with FADI Kebede.  Patient has new orders to continue with physical therapy.  Per MD orders: \"Patient does not need brace.\" \"ROM exercises without restrictions.\""

## 2023-12-13 ENCOUNTER — HOME CARE VISIT (OUTPATIENT)
Dept: HOME HEALTH SERVICES | Facility: CLINIC | Age: 73
End: 2023-12-13
Payer: MEDICARE

## 2023-12-13 VITALS
DIASTOLIC BLOOD PRESSURE: 82 MMHG | RESPIRATION RATE: 16 BRPM | HEART RATE: 70 BPM | OXYGEN SATURATION: 95 % | TEMPERATURE: 97.8 F | SYSTOLIC BLOOD PRESSURE: 130 MMHG

## 2023-12-13 DIAGNOSIS — J30.89 PERENNIAL ALLERGIC RHINITIS: ICD-10-CM

## 2023-12-13 DIAGNOSIS — R09.82 POST-NASAL DRIP: ICD-10-CM

## 2023-12-13 PROCEDURE — G0157 HHC PT ASSISTANT EA 15: HCPCS

## 2023-12-13 RX ORDER — MONTELUKAST SODIUM 10 MG/1
10 TABLET ORAL NIGHTLY
Qty: 30 TABLET | Refills: 5 | Status: SHIPPED | OUTPATIENT
Start: 2023-12-13

## 2023-12-13 RX ORDER — VALSARTAN 320 MG/1
320 TABLET ORAL DAILY
Qty: 90 TABLET | Refills: 0 | Status: SHIPPED | OUTPATIENT
Start: 2023-12-13

## 2023-12-13 RX ORDER — LEVOTHYROXINE SODIUM 0.1 MG/1
100 TABLET ORAL DAILY
Qty: 90 TABLET | Refills: 0 | Status: SHIPPED | OUTPATIENT
Start: 2023-12-13

## 2023-12-13 NOTE — HOME HEALTH
COVID SCREENING: no s/s  FOCUS OF CARE/SKILLED NEED: gait, ROM  TEACHING/INTERVENTIONS: HEP for left ankle  PROGRESS TOWARD GOALS: patient is progressing toward goals  PHYSICIAN CONTACT: n/a  INSURANCE CHANGES: no  MEDICATION CHANGES SINCE LAST HH VISIT? no  PLAN FOR NEXT VISIT: gait, ROM, walk with cane in the near future    Patient no longer has to wear the brace and is able to do left ankle ROM without restrictions.

## 2023-12-15 ENCOUNTER — HOME CARE VISIT (OUTPATIENT)
Dept: HOME HEALTH SERVICES | Facility: CLINIC | Age: 73
End: 2023-12-15
Payer: MEDICARE

## 2023-12-15 VITALS
TEMPERATURE: 98.3 F | DIASTOLIC BLOOD PRESSURE: 78 MMHG | HEART RATE: 74 BPM | OXYGEN SATURATION: 95 % | RESPIRATION RATE: 16 BRPM | SYSTOLIC BLOOD PRESSURE: 140 MMHG

## 2023-12-15 PROCEDURE — G0157 HHC PT ASSISTANT EA 15: HCPCS

## 2023-12-15 NOTE — HOME HEALTH
COVID SCREENING: no s/s  FOCUS OF CARE/SKILLED NEED: gait, ther ex  TEACHING/INTERVENTIONS: left ankle ROM  PROGRESS TOWARD GOALS: patient is progressing toward goals  PHYSICIAN CONTACT: n/a  INSURANCE CHANGES: no  MEDICATION CHANGES SINCE LAST HH VISIT? no  PLAN FOR NEXT VISIT: gait, ther ex, walk with a cane soon    Patient c/o a bad night 2 nights ago and a bad day yesterday due to left ankle pain. Patient has been using ice and Tylenol and says that it is feeling some better today.

## 2023-12-20 ENCOUNTER — HOME CARE VISIT (OUTPATIENT)
Dept: HOME HEALTH SERVICES | Facility: CLINIC | Age: 73
End: 2023-12-20
Payer: MEDICARE

## 2023-12-20 VITALS
RESPIRATION RATE: 20 BRPM | SYSTOLIC BLOOD PRESSURE: 152 MMHG | TEMPERATURE: 97.6 F | OXYGEN SATURATION: 97 % | DIASTOLIC BLOOD PRESSURE: 90 MMHG | HEART RATE: 78 BPM

## 2023-12-20 PROCEDURE — G0157 HHC PT ASSISTANT EA 15: HCPCS

## 2023-12-20 NOTE — Clinical Note
Patient's BP was 152/90 at the start of today's visit. I rechecked it after patient walked and it was 170/92. Patient did c/o a headache after walking, but it did not last very long. I advised patient to take it easy for an hour or two and, if she started having a headache at 6/10 or higher, to go to urgent care. Patient does not have her own BP cuff to monitor. I advised her to look in to getting one. Patient verbalized understanding and we will continue to monitor BP.

## 2023-12-20 NOTE — HOME HEALTH
COVID SCREENING: no s/s  FOCUS OF CARE/SKILLED NEED: gait, ROM  TEACHING/INTERVENTIONS: gait with cane  PROGRESS TOWARD GOALS: patient is progressing toward goals  PHYSICIAN CONTACT: Dr Henry notified of patient's elevated BP.  INSURANCE CHANGES: no  MEDICATION CHANGES SINCE LAST HH VISIT? no  PLAN FOR NEXT VISIT: gait, ROM    Patient reports decreased left foot pain today.

## 2023-12-22 ENCOUNTER — HOME CARE VISIT (OUTPATIENT)
Dept: HOME HEALTH SERVICES | Facility: CLINIC | Age: 73
End: 2023-12-22
Payer: MEDICARE

## 2023-12-22 VITALS
TEMPERATURE: 97.4 F | SYSTOLIC BLOOD PRESSURE: 152 MMHG | RESPIRATION RATE: 16 BRPM | HEART RATE: 84 BPM | DIASTOLIC BLOOD PRESSURE: 84 MMHG | OXYGEN SATURATION: 97 %

## 2023-12-22 PROCEDURE — G0157 HHC PT ASSISTANT EA 15: HCPCS

## 2023-12-22 NOTE — Clinical Note
Patient should be ready for d/c next week. It's just a question of whether or not she needs OP for further left ankle strengthening.

## 2023-12-22 NOTE — HOME HEALTH
COVID SCREENING: no s/s  FOCUS OF CARE/SKILLED NEED: gait with cane  TEACHING/INTERVENTIONS: ROM  PROGRESS TOWARD GOALS: patient is progressing toward goals  PHYSICIAN CONTACT: n/a  INSURANCE CHANGES: no  MEDICATION CHANGES SINCE LAST HH VISIT? no  PLAN FOR NEXT VISIT: gait, ROM    Patient says that her left ankle continues to be painful and swollen, but she says that she has been up on it quite a bit.

## 2023-12-27 ENCOUNTER — HOME CARE VISIT (OUTPATIENT)
Dept: HOME HEALTH SERVICES | Facility: CLINIC | Age: 73
End: 2023-12-27
Payer: MEDICARE

## 2023-12-27 VITALS
DIASTOLIC BLOOD PRESSURE: 80 MMHG | OXYGEN SATURATION: 97 % | TEMPERATURE: 97.1 F | RESPIRATION RATE: 16 BRPM | HEART RATE: 87 BPM | SYSTOLIC BLOOD PRESSURE: 128 MMHG

## 2023-12-27 PROCEDURE — G0151 HHCP-SERV OF PT,EA 15 MIN: HCPCS

## 2023-12-27 NOTE — HOME HEALTH
pre-screened covid 19    Patient reports she has follow up with MD in 2/1//2024.  Patient reports she is doing well, but she continues to have intermittent left ankle pain 2/10.

## 2023-12-29 ENCOUNTER — HOME CARE VISIT (OUTPATIENT)
Dept: HOME HEALTH SERVICES | Facility: CLINIC | Age: 73
End: 2023-12-29
Payer: MEDICARE

## 2023-12-29 PROCEDURE — G0151 HHCP-SERV OF PT,EA 15 MIN: HCPCS

## 2024-01-01 VITALS
SYSTOLIC BLOOD PRESSURE: 144 MMHG | RESPIRATION RATE: 18 BRPM | HEART RATE: 93 BPM | TEMPERATURE: 96.7 F | DIASTOLIC BLOOD PRESSURE: 80 MMHG | OXYGEN SATURATION: 96 %

## 2024-01-01 NOTE — HOME HEALTH
"  pt agrees to dc with HEP, AD use prn and family A   p declines need or outpt PT at this time      SOC Note:Pt is a 72 yo female that had L ankle surgery for tendon repair on 9/28/23.   Reason for Hosp/Primary Dx/Co-morbidities: aftercare following L ankle surgery   Focus of Care: L ankle aftercare   Patient's goal(s): \" be able to walk and get her life back\", \" go shopping\"   Current Functional status/mobility/DME: See DME   HB status/Living Arrangements: Pt lives with spouse - make-shift ramp for wc access.   Skin Integrity/wound status: well approximatred and healing , no s/s of infection , no dressings   Code Status: FULL CODE   Fall Risk/Safety concerns: L ankle hx   Pmhx : synthroid - vertigo   Medication issues/Concerns: NONE   Additional Problems/Concerns: NONE"

## 2024-01-22 ENCOUNTER — OFFICE VISIT (OUTPATIENT)
Dept: FAMILY MEDICINE CLINIC | Facility: CLINIC | Age: 74
End: 2024-01-22
Payer: MEDICARE

## 2024-01-22 VITALS
OXYGEN SATURATION: 96 % | DIASTOLIC BLOOD PRESSURE: 86 MMHG | SYSTOLIC BLOOD PRESSURE: 142 MMHG | HEART RATE: 77 BPM | TEMPERATURE: 98.1 F | BODY MASS INDEX: 35.49 KG/M2 | HEIGHT: 65 IN | WEIGHT: 213 LBS

## 2024-01-22 DIAGNOSIS — E78.2 MIXED HYPERLIPIDEMIA: ICD-10-CM

## 2024-01-22 DIAGNOSIS — R09.82 POST-NASAL DRIP: ICD-10-CM

## 2024-01-22 DIAGNOSIS — G89.29 CHRONIC ANKLE PAIN, UNSPECIFIED LATERALITY: ICD-10-CM

## 2024-01-22 DIAGNOSIS — J30.89 PERENNIAL ALLERGIC RHINITIS: ICD-10-CM

## 2024-01-22 DIAGNOSIS — M25.579 CHRONIC ANKLE PAIN, UNSPECIFIED LATERALITY: ICD-10-CM

## 2024-01-22 DIAGNOSIS — E66.01 CLASS 2 SEVERE OBESITY DUE TO EXCESS CALORIES WITH SERIOUS COMORBIDITY AND BODY MASS INDEX (BMI) OF 35.0 TO 35.9 IN ADULT: ICD-10-CM

## 2024-01-22 DIAGNOSIS — I10 PRIMARY HYPERTENSION: Primary | ICD-10-CM

## 2024-01-22 DIAGNOSIS — K21.9 GASTROESOPHAGEAL REFLUX DISEASE WITHOUT ESOPHAGITIS: ICD-10-CM

## 2024-01-22 DIAGNOSIS — E03.9 ACQUIRED HYPOTHYROIDISM: ICD-10-CM

## 2024-01-22 DIAGNOSIS — R42 VERTIGO: ICD-10-CM

## 2024-01-22 PROBLEM — E66.9 OBESITY (BMI 35.0-39.9 WITHOUT COMORBIDITY): Status: ACTIVE | Noted: 2024-01-22

## 2024-01-22 LAB
ALBUMIN SERPL-MCNC: 4.4 G/DL (ref 3.5–5.2)
ALBUMIN/GLOB SERPL: 1.5 G/DL
ALP SERPL-CCNC: 183 U/L (ref 39–117)
ALT SERPL-CCNC: 59 U/L (ref 1–33)
AST SERPL-CCNC: 43 U/L (ref 1–32)
BASOPHILS # BLD AUTO: 0.1 10*3/MM3 (ref 0–0.2)
BASOPHILS NFR BLD AUTO: 1.1 % (ref 0–1.5)
BILIRUB SERPL-MCNC: 0.9 MG/DL (ref 0–1.2)
BUN SERPL-MCNC: 13 MG/DL (ref 8–23)
BUN/CREAT SERPL: 14.4 (ref 7–25)
CALCIUM SERPL-MCNC: 10.2 MG/DL (ref 8.6–10.5)
CHLORIDE SERPL-SCNC: 102 MMOL/L (ref 98–107)
CHOLEST SERPL-MCNC: 307 MG/DL (ref 0–200)
CHOLEST/HDLC SERPL: 3.94 {RATIO}
CO2 SERPL-SCNC: 27.5 MMOL/L (ref 22–29)
CREAT SERPL-MCNC: 0.9 MG/DL (ref 0.57–1)
EGFRCR SERPLBLD CKD-EPI 2021: 68.5 ML/MIN/1.73
EOSINOPHIL # BLD AUTO: 0.19 10*3/MM3 (ref 0–0.4)
EOSINOPHIL NFR BLD AUTO: 2.1 % (ref 0.3–6.2)
ERYTHROCYTE [DISTWIDTH] IN BLOOD BY AUTOMATED COUNT: 12.9 % (ref 12.3–15.4)
GLOBULIN SER CALC-MCNC: 3 GM/DL
GLUCOSE SERPL-MCNC: 116 MG/DL (ref 65–99)
HCT VFR BLD AUTO: 46.2 % (ref 34–46.6)
HDLC SERPL-MCNC: 78 MG/DL (ref 40–60)
HGB BLD-MCNC: 16.5 G/DL (ref 12–15.9)
IMM GRANULOCYTES # BLD AUTO: 0.06 10*3/MM3 (ref 0–0.05)
IMM GRANULOCYTES NFR BLD AUTO: 0.7 % (ref 0–0.5)
LDLC SERPL CALC-MCNC: 198 MG/DL (ref 0–100)
LYMPHOCYTES # BLD AUTO: 3.58 10*3/MM3 (ref 0.7–3.1)
LYMPHOCYTES NFR BLD AUTO: 39 % (ref 19.6–45.3)
MCH RBC QN AUTO: 32.9 PG (ref 26.6–33)
MCHC RBC AUTO-ENTMCNC: 35.7 G/DL (ref 31.5–35.7)
MCV RBC AUTO: 92.2 FL (ref 79–97)
MONOCYTES # BLD AUTO: 0.74 10*3/MM3 (ref 0.1–0.9)
MONOCYTES NFR BLD AUTO: 8.1 % (ref 5–12)
NEUTROPHILS # BLD AUTO: 4.51 10*3/MM3 (ref 1.7–7)
NEUTROPHILS NFR BLD AUTO: 49 % (ref 42.7–76)
NRBC BLD AUTO-RTO: 0 /100 WBC (ref 0–0.2)
PLATELET # BLD AUTO: 221 10*3/MM3 (ref 140–450)
POTASSIUM SERPL-SCNC: 5 MMOL/L (ref 3.5–5.2)
PROT SERPL-MCNC: 7.4 G/DL (ref 6–8.5)
RBC # BLD AUTO: 5.01 10*6/MM3 (ref 3.77–5.28)
SODIUM SERPL-SCNC: 139 MMOL/L (ref 136–145)
TRIGL SERPL-MCNC: 168 MG/DL (ref 0–150)
TSH SERPL DL<=0.005 MIU/L-ACNC: 2.62 UIU/ML (ref 0.27–4.2)
VLDLC SERPL CALC-MCNC: 31 MG/DL (ref 5–40)
WBC # BLD AUTO: 9.18 10*3/MM3 (ref 3.4–10.8)

## 2024-01-22 RX ORDER — MECLIZINE HYDROCHLORIDE 25 MG/1
25 TABLET ORAL 3 TIMES DAILY PRN
Qty: 60 TABLET | Refills: 3 | Status: SHIPPED | OUTPATIENT
Start: 2024-01-22

## 2024-01-22 NOTE — PROGRESS NOTES
"Subjective   Hannah Maki is a 73 y.o. female.     Chief Complaint   Patient presents with    Hypertension    Hyperlipidemia    Hypothyroidism    Follow-up        Hypertension    Hyperlipidemia  Exacerbating diseases include hypothyroidism.   Hypothyroidism         She thiniks her bp is stable without cpro ha--she is toeaing her thyroid replacment without cp ro ah..her gerd symptoms are stable without dyspahgia--she has hyperlipidemia witjhout angina or ha      Current Outpatient Medications:     acetaminophen (TYLENOL) 500 MG tablet, Take 2 tablets by mouth Every 6 (Six) Hours As Needed for Mild Pain. Indications: Fever, Pain, Disp: , Rfl:     dicyclomine (Bentyl) 10 MG capsule, Take 1 capsule by mouth 3 (Three) Times a Day As Needed (abdominal discomfort)., Disp: 90 capsule, Rfl: 11    esomeprazole (NexIUM) 20 MG capsule, Take 1 capsule by mouth Every Morning Before Breakfast. Indications: Gastroesophageal Reflux Disease, Heartburn, Disp: , Rfl:     levothyroxine (SYNTHROID, LEVOTHROID) 100 MCG tablet, Take 1 tablet by mouth once daily, Disp: 90 tablet, Rfl: 0    meclizine (ANTIVERT) 25 MG tablet, Take 1 tablet by mouth 3 (Three) Times a Day As Needed for Dizziness (vertigo). Take one by mouth three times a day as needed for vertigo  Indications: Sensation of Spinning or Whirling, Disp: 60 tablet, Rfl: 3    montelukast (SINGULAIR) 10 MG tablet, Take 1 tablet by mouth Every Night. Indications: Hayfever, Disp: 30 tablet, Rfl: 5    valsartan (DIOVAN) 320 MG tablet, Take 1 tablet by mouth once daily, Disp: 90 tablet, Rfl: 0  Allergies   Allergen Reactions    Adhesive Tape Other (See Comments)     \"Pulls skin off\"    Augmentin [Amoxicillin-Pot Clavulanate] Rash     Pt unsure if she has taken po keflex     Azithromycin Nausea Only    Bactrim [Sulfamethoxazole-Trimethoprim] Nausea And Vomiting    Ceftin [Cefuroxime Axetil] Other (See Comments)     Pt does not recall    Cefuroxime Nausea Only    Codeine GI Intolerance "    Demerol [Meperidine] Nausea And Vomiting    Erythromycin Rash    Tequin [Gatifloxacin] Nausea Only    Tetracyclines & Related Nausea And Vomiting       Class 2 Severe Obesity (BMI >=35 and <=39.9). Obesity-related health conditions include the following: hypertension, dyslipidemias, and GERD. Obesity is unchanged. BMI is is above average; BMI management plan is completed. We discussed portion control and increasing exercise.      Facility age limit for growth %verena is 20 years.    Past Medical History:   Diagnosis Date    Abnormal liver enzymes     Achilles bursitis     Allergic     Anxiety     Arthralgia     Arthritis     Asthma     pt states very mild - does not do anything for it    Chronic pansinusitis 10/14/2016    Colon polyp     COVID     Diverticulosis     Dizziness     Fatty liver     GERD (gastroesophageal reflux disease)     Hyperlipidemia     Hypertension     Hypothyroidism     Kidney stones     Osteoarthritis     Perennial allergic rhinitis 10/14/2016    PONV (postoperative nausea and vomiting)     Rhinitis     Tinnitus     Urinary frequency     Vertigo      Past Surgical History:   Procedure Laterality Date    ANKLE ARTHROSCOPY WITH FUSION Left 9/28/2023    Procedure: ANKLE ARTHROSCOPY WITH EXTENSIVE DEBRIDEMENT, PERONEUS BREVIS TENDON REPAIR, LATERAL ANKLE STABILIZATION WITH REPAIR OF ANTERIOR TALOFIBULAR  AND CALCANEOFIBULAR LIGAMENT, LEFT ANKLE;  Surgeon: Sandor Hedrick DPM;  Location: [facility] OR;  Service: Podiatry;  Laterality: Left;    ANKLE LIGAMENT RECONSTRUCTION Left 9/28/2023    Procedure: REPAIR OF ANTERIOR TALOFIBULAR AND CALCANEOFIBULAR LIGAMENT, LEFT ANKLE;  Surgeon: Sandor Hedrick DPM;  Location:  [facility] OR;  Service: Podiatry;  Laterality: Left;    ANKLE TENDON REPAIR Left 9/28/2023    Procedure: PERONEUS BREVIS TENDON REPAIR;  Surgeon: Sandor Hedrick DPM;  Location:  [facility] OR;  Service: Podiatry;  Laterality: Left;    APPENDECTOMY      BILATERAL OOPHORECTOMY      BREAST  "LUMPECTOMY Left 2014    benign    CATARACT EXTRACTION WITH INTRAOCULAR LENS IMPLANT      CERVICAL FUSION      CHOLECYSTECTOMY      COLONOSCOPY  11/18/2013     six polyps removed or destroyed, Diverticulosis  Recall 3 years    COLONOSCOPY  11/18/2013    COLONOSCOPY N/A 04/04/2017    Procedure: COLONOSCOPY WITH ANESTHESIA;  Surgeon: Lew Orona MD;  Location:  [facility];  Service:     COLONOSCOPY N/A 03/16/2020    Procedure: COLONOSCOPY WITH ANESTHESIA;  Surgeon: Lew Orona MD;  Location:  [facility];  Service: Gastroenterology;  Laterality: N/A;  pre op: hx polyps  Post op:polyps  PCP: Deni Henry MD    COLONOSCOPY N/A 04/25/2023    Procedure: COLONOSCOPY;  Surgeon: Lew Orona MD;  Location:  [facility];  Service: Gastroenterology;  Laterality: N/A;  Pre: Colon polyp, Family hx of colon cancer  Post: Polyp  Deni Henry MD    EYE SURGERY  11/2018    Cataract Removal    HEMORRHOIDECTOMY      HYSTERECTOMY      NOSE SURGERY      ligation of maxillary artery    OTHER SURGICAL HISTORY      ingrown toe nail    RECTAL FISSURE INCISION AND DRAINAGE         Review of Systems   Constitutional: Negative.    HENT: Negative.     Eyes: Negative.    Respiratory: Negative.     Cardiovascular: Negative.    Gastrointestinal: Negative.    Endocrine: Negative.    Genitourinary: Negative.    Musculoskeletal: Negative.    Skin: Negative.    Allergic/Immunologic: Negative.    Neurological: Negative.    Hematological: Negative.    Psychiatric/Behavioral: Negative.         Objective /86   Pulse 77   Temp 98.1 °F (36.7 °C)   Ht 165.1 cm (65\")   Wt 96.6 kg (213 lb)   LMP 10/04/1981 Comment: age 31  SpO2 96%   BMI 35.45 kg/m²    Physical Exam  Vitals and nursing note reviewed.   Constitutional:       Appearance: Normal appearance.   HENT:      Head: Normocephalic.      Nose: Nose normal.      Mouth/Throat:      Mouth: Mucous membranes are moist.   Eyes:      Extraocular " Movements: Extraocular movements intact.      Pupils: Pupils are equal, round, and reactive to light.   Cardiovascular:      Rate and Rhythm: Normal rate and regular rhythm.      Pulses: Normal pulses.      Heart sounds: Normal heart sounds.   Pulmonary:      Effort: Pulmonary effort is normal.      Breath sounds: Normal breath sounds.   Abdominal:      General: Abdomen is flat. Bowel sounds are normal.      Palpations: Abdomen is soft.   Musculoskeletal:         General: Normal range of motion.      Cervical back: Normal range of motion and neck supple.   Skin:     General: Skin is warm and dry.      Capillary Refill: Capillary refill takes less than 2 seconds.   Neurological:      General: No focal deficit present.      Mental Status: She is alert and oriented to person, place, and time. Mental status is at baseline.   Psychiatric:         Mood and Affect: Mood normal.         Assessment & Plan   Diagnoses and all orders for this visit:    1. Primary hypertension (Primary)  -     CBC & Differential  -     Comprehensive metabolic panel  -     Lipid Panel With / Chol / HDL Ratio  -     TSH    2. Perennial allergic rhinitis  -     meclizine (ANTIVERT) 25 MG tablet; Take 1 tablet by mouth 3 (Three) Times a Day As Needed for Dizziness (vertigo). Take one by mouth three times a day as needed for vertigo  Indications: Sensation of Spinning or Whirling  Dispense: 60 tablet; Refill: 3  -     CBC & Differential  -     Comprehensive metabolic panel  -     Lipid Panel With / Chol / HDL Ratio  -     TSH    3. Post-nasal drip  -     meclizine (ANTIVERT) 25 MG tablet; Take 1 tablet by mouth 3 (Three) Times a Day As Needed for Dizziness (vertigo). Take one by mouth three times a day as needed for vertigo  Indications: Sensation of Spinning or Whirling  Dispense: 60 tablet; Refill: 3    4. Mixed hyperlipidemia  -     CBC & Differential  -     Comprehensive metabolic panel  -     Lipid Panel With / Chol / HDL Ratio  -     TSH    5.  Acquired hypothyroidism  -     CBC & Differential  -     Comprehensive metabolic panel  -     Lipid Panel With / Chol / HDL Ratio  -     TSH    6. Gastroesophageal reflux disease without esophagitis  -     CBC & Differential  -     Comprehensive metabolic panel  -     Lipid Panel With / Chol / HDL Ratio  -     TSH    7. Vertigo  -     CBC & Differential  -     Comprehensive metabolic panel  -     Lipid Panel With / Chol / HDL Ratio  -     TSH    8. Class 2 severe obesity due to excess calories with serious comorbidity and body mass index (BMI) of 35.0 to 35.9 in adult                 Orders Placed This Encounter   Procedures    Comprehensive metabolic panel     Order Specific Question:   Release to patient     Answer:   Routine Release [[id number]]    Lipid Panel With / Chol / HDL Ratio     Order Specific Question:   Release to patient     Answer:   Routine Release [[id number]]    TSH     Order Specific Question:   Release to patient     Answer:   Routine Release [[id number]]    CBC & Differential     Order Specific Question:   Release to patient     Answer:   Routine Release [[id number]]       Follow up: 5 month(s)

## 2024-01-22 NOTE — PATIENT INSTRUCTIONS
Obesity, Adult  Obesity is the condition of having too much total body fat. Being overweight or obese means that your weight is greater than what is considered healthy for your body size. Obesity is determined by a measurement called BMI (body mass index). BMI is an estimate of body fat and is calculated from height and weight. For adults, a BMI of 30 or higher is considered obese.  Obesity can lead to other health concerns and major illnesses, including:  Stroke.  Coronary artery disease (CAD).  Type 2 diabetes.  Some types of cancer, including cancers of the colon, breast, uterus, and gallbladder.  High blood pressure (hypertension).  High cholesterol.  Gallbladder stones.  Obesity can also contribute to:  Osteoarthritis.  Sleep apnea.  Infertility problems.  What are the causes?  Common causes of this condition include:  Eating daily meals that are high in calories, sugar, and fat.  Drinking high amounts of sugar-sweetened beverages, such as soft drinks.  Being born with genes that may make you more likely to become obese.  Having a medical condition that causes obesity, including:  Hypothyroidism.  Polycystic ovarian syndrome (PCOS).  Binge-eating disorder.  Cushing syndrome.  Taking certain medicines, such as steroids, antidepressants, and seizure medicines.  Not being physically active (sedentary lifestyle).  Not getting enough sleep.  What increases the risk?  The following factors may make you more likely to develop this condition:  Having a family history of obesity.  Living in an area with limited access to:  [place], recreation centers, or sidewalks.  Healthy food choices, such as grocery stores and [employer].  What are the signs or symptoms?  The main sign of this condition is having too much body fat.  How is this diagnosed?  This condition is diagnosed based on:  Your BMI. If you are an adult with a BMI of 30 or higher, you are considered obese.  Your waist circumference. This measures the  distance around your waistline.  Your skinfold thickness. Your health care provider may gently pinch a fold of your skin and measure it.  You may have other tests to check for underlying conditions.  How is this treated?  Treatment for this condition often includes changing your lifestyle. Treatment may include some or all of the following:  Dietary changes. This may include developing a healthy meal plan.  Regular physical activity. This may include activity that causes your heart to beat faster (aerobic exercise) and strength training. Work with your health care provider to design an exercise program that works for you.  Medicine to help you lose weight if you are unable to lose one pound a week after six weeks of healthy eating and more physical activity.  Treating conditions that cause the obesity (underlying conditions).  Surgery. Surgical options may include gastric banding and gastric bypass. Surgery may be done if:  Other treatments have not helped to improve your condition.  You have a BMI of 40 or higher.  You have life-threatening health problems related to obesity.  Follow these instructions at home:  Eating and drinking    Follow recommendations from your health care provider about what you eat and drink. Your health care provider may advise you to:  Limit fast food, sweets, and processed snack foods.  Choose low-fat options, such as low-fat milk instead of whole milk.  Eat five or more servings of fruits or vegetables every day.  Choose healthy foods when you eat out.  Keep low-fat snacks available.  Limit sugary drinks, such as soda, fruit juice, sweetened iced tea, and flavored milk.  Drink enough water to keep your urine pale yellow.  Do not follow a fad diet. Fad diets can be unhealthy and even dangerous.  Other healthful choices include:  Eat at home more often. This gives you more control over what you eat.  Learn to read food labels. This will help you understand how much food is considered one  serving.  Learn what a healthy serving size is.  Physical activity  Exercise regularly, as told by your health care provider.  Most adults should get up to 150 minutes of moderate-intensity exercise every week.  Ask your health care provider what types of exercise are safe for you and how often you should exercise.  Warm up and stretch before being active.  Cool down and stretch after being active.  Rest between periods of activity.  Lifestyle  Work with your health care provider and a dietitian to set a weight-loss goal that is healthy and reasonable for you.  Limit your screen time.  Find ways to reward yourself that do not involve food.  Do not drink alcohol if:  Your health care provider tells you not to drink.  You are pregnant, may be pregnant, or are planning to become pregnant.  If you drink alcohol:  Limit how much you have to:  0-1 drink a day for women.  0-2 drinks a day for men.  Know how much alcohol is in your drink. In the [place] one drink equals one 12 oz bottle of beer (355 mL), one 5 oz glass of wine (148 mL), or one 1½ oz glass of hard liquor (44 mL).  General instructions  Keep a weight-loss journal to keep track of the food you eat and how much exercise you get.  Take over-the-counter and prescription medicines only as told by your health care provider.  Take vitamins and supplements only as told by your health care provider.  Consider joining a support group. Your health care provider may be able to recommend a support group.  Pay attention to your mental health as obesity can lead to depression or self esteem issues.  Keep all follow-up visits. This is important.  Contact a health care provider if:  You are unable to meet your weight-loss goal after six weeks of dietary and lifestyle changes.  You have trouble breathing.  Summary  Obesity is the condition of having too much total body fat.  Being overweight or obese means that your weight is greater than what is considered healthy for your body  size.  Work with your health care provider and a dietitian to set a weight-loss goal that is healthy and reasonable for you.  Exercise regularly, as told by your health care provider. Ask your health care provider what types of exercise are safe for you and how often you should exercise.  This information is not intended to replace advice given to you by your health care provider. Make sure you discuss any questions you have with your health care provider.  Document Revised: 07/26/2022 Document Reviewed: 07/26/2022  [employer] Patient Education © 2023 [employer]    Exercising to Lose Weight  Getting regular exercise is important for everyone. It is especially important if you are overweight. Being overweight increases your risk of heart disease, stroke, diabetes, high blood pressure, and several types of cancer. Exercising, and reducing the calories you consume, can help you lose weight and improve fitness and health.  Exercise can be moderate or vigorous intensity. To lose weight, most people need to do a certain amount of moderate or vigorous-intensity exercise each week.  How can exercise affect me?  You lose weight when you exercise enough to burn more calories than you eat. Exercise also reduces body fat and builds muscle. The more muscle you have, the more calories you burn. Exercise also:  Improves mood.  Reduces stress and tension.  Improves your overall fitness, flexibility, and endurance.  Increases bone strength.  Moderate-intensity exercise    Moderate-intensity exercise is any activity that gets you moving enough to burn at least three times more energy (calories) than if you were sitting.  Examples of moderate exercise include:  Walking a mile in 15 minutes.  Doing light yard work.  Biking at an easy pace.  Most people should get at least 150 minutes of moderate-intensity exercise a week to maintain their body weight.  Vigorous-intensity exercise  Vigorous-intensity exercise is any activity that gets  you moving enough to burn at least six times more calories than if you were sitting. When you exercise at this intensity, you should be working hard enough that you are not able to carry on a conversation.  Examples of vigorous exercise include:  Running.  Playing a team sport, such as football, basketball, and soccer.  Jumping rope.  Most people should get at least 75 minutes a week of vigorous exercise to maintain their body weight.  What actions can I take to lose weight?  The amount of exercise you need to lose weight depends on:  Your age.  The type of exercise.  Any health conditions you have.  Your overall physical ability.  Talk to your health care provider about how much exercise you need and what types of activities are safe for you.  Nutrition    Make changes to your diet as told by your health care provider or diet and nutrition specialist (dietitian). This may include:  Eating fewer calories.  Eating more protein.  Eating less unhealthy fats.  Eating a diet that includes fresh fruits and vegetables, whole grains, low-fat dairy products, and lean protein.  Avoiding foods with added fat, salt, and sugar.  Drink plenty of water while you exercise to prevent dehydration or heat stroke.  Activity  Choose an activity that you enjoy and set realistic goals. Your health care provider can help you make an exercise plan that works for you.  Exercise at a moderate or vigorous intensity most days of the week.  The intensity of exercise may vary from person to person. You can tell how intense a workout is for you by paying attention to your breathing and heartbeat. Most people will notice their breathing and heartbeat get faster with more intense exercise.  Do resistance training twice each week, such as:  Push-ups.  Sit-ups.  Lifting weights.  Using resistance bands.  Getting short amounts of exercise can be just as helpful as long, structured periods of exercise. If you have trouble finding time to exercise, try  doing these things as part of your daily routine:  Get up, stretch, and walk around every 30 minutes throughout the day.  Go for a walk during your lunch break.  Park your car farther away from your destination.  If you take public transportation, get off one stop early and walk the rest of the way.  Make phone calls while standing up and walking around.  Take the stairs instead of elevators or escalators.  Wear comfortable clothes and shoes with good support.  Do not exercise so much that you hurt yourself, feel dizzy, or get very short of breath.  Where to find more information  [place] Department of Health and Human Services: www.hhs.gov  Centers for Disease Control and Prevention: www.cdc.gov  Contact a health care provider:  Before starting a new exercise program.  If you have questions or concerns about your weight.  If you have a medical problem that keeps you from exercising.  Get help right away if:  You have any of the following while exercising:  Injury.  Dizziness.  Difficulty breathing or shortness of breath that does not go away when you stop exercising.  Chest pain.  Rapid heartbeat.  These symptoms may represent a serious problem that is an emergency. Do not wait to see if the symptoms will go away. Get medical help right away. Call your local emergency services (911 in the [place] Do not drive yourself to the hospital.  Summary  Getting regular exercise is especially important if you are overweight.  Being overweight increases your risk of heart disease, stroke, diabetes, high blood pressure, and several types of cancer.  Losing weight happens when you burn more calories than you eat.  Reducing the amount of calories you eat, and getting regular moderate or vigorous exercise each week, helps you lose weight.  This information is not intended to replace advice given to you by your health care provider. Make sure you discuss any questions you have with your health care provider.  Document Revised:  02/13/2022 Document Reviewed: 02/13/2022  [employer] Patient Education © 2023 [employer]

## 2024-01-23 NOTE — PROGRESS NOTES
Patient has been notified of results, she stated she is scheduled to see Dr. Orona on 04/15, she also stated she is willing to try the Leviq for her chol. She would like that sent to [facility] on [address]

## 2024-03-08 RX ORDER — VALSARTAN 320 MG/1
320 TABLET ORAL DAILY
Qty: 90 TABLET | Refills: 0 | Status: SHIPPED | OUTPATIENT
Start: 2024-03-08

## 2024-03-08 RX ORDER — LEVOTHYROXINE SODIUM 0.1 MG/1
100 TABLET ORAL DAILY
Qty: 90 TABLET | Refills: 0 | Status: SHIPPED | OUTPATIENT
Start: 2024-03-08

## 2024-04-15 ENCOUNTER — OFFICE VISIT (OUTPATIENT)
Dept: GASTROENTEROLOGY | Facility: CLINIC | Age: 74
End: 2024-04-15
Payer: MEDICARE

## 2024-04-15 VITALS
WEIGHT: 211 LBS | TEMPERATURE: 97.8 F | HEART RATE: 75 BPM | DIASTOLIC BLOOD PRESSURE: 82 MMHG | SYSTOLIC BLOOD PRESSURE: 146 MMHG | BODY MASS INDEX: 36.02 KG/M2 | OXYGEN SATURATION: 95 % | HEIGHT: 64 IN

## 2024-04-15 DIAGNOSIS — K76.0 FATTY LIVER: Primary | ICD-10-CM

## 2024-04-15 DIAGNOSIS — K21.9 GASTROESOPHAGEAL REFLUX DISEASE WITHOUT ESOPHAGITIS: ICD-10-CM

## 2024-04-15 DIAGNOSIS — D12.6 ADENOMATOUS POLYP OF COLON, UNSPECIFIED PART OF COLON: ICD-10-CM

## 2024-04-15 DIAGNOSIS — K58.9 IRRITABLE BOWEL SYNDROME, UNSPECIFIED TYPE: ICD-10-CM

## 2024-04-15 PROCEDURE — 1160F RVW MEDS BY RX/DR IN RCRD: CPT | Performed by: INTERNAL MEDICINE

## 2024-04-15 PROCEDURE — 3079F DIAST BP 80-89 MM HG: CPT | Performed by: INTERNAL MEDICINE

## 2024-04-15 PROCEDURE — 99213 OFFICE O/P EST LOW 20 MIN: CPT | Performed by: INTERNAL MEDICINE

## 2024-04-15 PROCEDURE — 3077F SYST BP >= 140 MM HG: CPT | Performed by: INTERNAL MEDICINE

## 2024-04-15 PROCEDURE — 1159F MED LIST DOCD IN RCRD: CPT | Performed by: INTERNAL MEDICINE

## 2024-04-15 RX ORDER — GABAPENTIN 100 MG/1
100 CAPSULE ORAL NIGHTLY
COMMUNITY

## 2024-04-15 NOTE — PROGRESS NOTES
[facility] Gastroenterology    Chief Complaint   Patient presents with    Elevated Hepatic Enzymes       Subjective     HPI    Hannah Maki is a 73 y.o. female who presents with a chief complaint of elevated LFTs and IBS    When she was here last March in 2023 she was doing well from a GI standpoint.  Her IBS was under control, reflux under control.  She underwent colonoscopy last April noting a couple small polyps.  We did talk about weight loss last year to help control her fatty liver.  I reviewed her outside LFTs, see copy below from most recent today once and I can see    She comes in today for follow-up annual office visit.  She tells me she is doing well.  She had left foot surgery back in September.  She has had a difficult long road to recovery.  She still wearing her brace.  She just got back from physical therapy.  She states she has trouble ambulating still.  She is using a cane.  Because she was laid up, she did lose some weight initially but she is gaining weight back.  She has been avoiding NSAIDs.  She does not want to use NSAIDs for treatment because we have talked about side effects of NSAIDs in the past.  She did not take any pain medicines because they made her sick.  She does use occasional Tylenol.    She does use her Bentyl every once in a while.  She has been traveling to her grandchildren soccer matches and been to [place] for an [employer] tournament.  She will go weeks without having to use the dicyclomine then she will have to use it for 3 times in a day.  It usually works well for her.  She states, she does not like to take medications.          Latest Reference Range & Units 07/17/23 08:56 01/22/24 08:58   AST (SGOT) 1 - 32 U/L 49 (H) 43 (H)   ALT (SGPT) 1 - 33 U/L 62 (H) 59 (H)   (H): Data is abnormally high  ========== copy of March 6, 2023 HPI============  HPI     Hannah Maki is a 72 y.o. female who presents with a chief complaint of IBS, history of polyps and fatty liver.      She tells me she is doing well.  She still has occasional loose stools.  Still takes Bentyl.  Works well for her.  She has not been able to lose weight.  She has tried.  She has not been successful.  She continues to try.  She did have labs done in January.  See copy of LFTs below.  Her weight is the same as it was when she was here last July.  She tells me her reflux is under control.  Everything is going well for her.  She has a 50-year wedding anniversary on the 16th of this month.                Latest Reference Range & Units 07/12/21 08:26 01/10/22 08:12 07/11/22 08:17 01/12/23 07:31   Alkaline Phosphatase 39 - 117 U/L 140 (H) 162 (H) 177 (H) 165 (H)   Total Protein 6.0 - 8.5 g/dL 7.3 7.4 7.6 7.6   ALT (SGPT) 1 - 33 U/L 94 (H) 94 (H) 85 (H) 71 (H)   AST (SGOT) 1 - 32 U/L 76 (H) 51 (H) 61 (H) 64 (H)   Total Bilirubin 0.0 - 1.2 mg/dL 0.6 0.8 0.8 0.8   (H): Data is abnormally high  ======= copy of July 11, 2022 HPI=================================  HPI     Hannah Maki is a 71 y.o. female who presents with a chief complaint of heartburn, fatty liver and IBS.     She comes in for an annual checkup.  She tells me she is doing well.  She unfortunately has not lost any weight since last year.  She states that she really does not eat much.  She still is swimming routinely.  She states she has arthralgias which keeps her from doing much activity but she is enjoying swimming especially with her grandchildren.     Some of her GI complaints is occasional breakthrough heartburn.  She takes Nexium every day and that works well for her.  She still gets intermittent abdominal cramping and bloating.  Usually depends on if she eats something with gluten or other foods.  She will take a Bentyl as needed and that helps relieve her symptoms.        =========== copy July 8, 2021 HPI====================================  =   HPI     Hannah Maki is a 70 y.o. female who presents with a chief complaint of fatty liver.     She  comes in for an annual checkup.  I did review her labs that she had about 6 months ago.  Her liver transaminases remain elevated.  ALT 78 AST 52.  That is about where she runs.  I note that her cholesterol is elevated.  She tells me her PCP did place her on a statin drug.  She stopped that recently on her own because she has significant muscle cramps.  When she stopped that the muscle cramps went away.  She is due to follow-up with her PCP again in a couple weeks.  Regarding weight loss she states she really has not had significant strides in losing weight.  I note that she is down a few pounds from when she was here last year.  As discussed, she needs to lose more.  She states she tries but she is always been skinny until she got in her 50s and now she is had trouble losing weight.     Her heartburn is under control.  She states she still notes that if she eats gluten products she has more heartburn and indigestion.  Last year we tested her for celiac and she had negative antibodies.  She states as long as she avoids gluten type product she feels pretty well so she is going to continue to avoid it.  She notes that she has symptoms right away if she eats something which would not be consistent with celiac disease but more of an intolerance.  She does have cramps at times and Bentyl has been prescribed she states that works well for her.  She may take it once a week and then some days she may take it 3 times.     Everything else is going well        ============== July 2020 HPI===========================================  HPI     Hannah Maki is a 69 y.o. female who presents with a chief complaint of fatty liver.     She presents for follow-up of her liver.  She tells me she is not been able to lose weight.  She injured her knee gardening and since then she has not been able to exercise or walk much.  She states she did get a steroid injection in her knee that did not help.  She did take meloxicam and that helped  take care of the swelling.  She tolerated it well but only took it for 2 weeks.  She was concerned about the side effects of NSAIDs with her knee and liver.  She had a colonoscopy in March and went over that report with her.  Everything else is going well.        -=================== March 2020 HPI=======================  HPI     Hannah Maki is a 69 y.o. female who presents as a referral for preventative maintenance. She has no complaints of nausea or vomiting. No change in bowels. No wt loss. No BRBPR. No melena. No abdominal pain.          Last colonoscopy was 4/2017 noting 2 polyps ( one retrieved with path hyperplastic) and diverticulosis. The patient does have history of colon polyps. The patient does not have history of colon cancer.  There is family history of colon cancer sister in her 50's, another sister in her 50's.     She also history of fatty liver.  Was seen here last year .  Ultrasound elastography score was F1.  Last labs October 2019 and LFTs had improved from previous.  She did lose a little weight but has gained some back.  Does not drink alcohol.  She is not diabetic.        ====================================================================================  Ov  8-5-19  Hannah Maki is a 68 y.o. female who presents with a chief complaint of abnormal LFTs.     She was here 3 months ago.  We felt that she had fatty liver.  She underwent liver serologies which all came back unremarkable.  She had an ultrasound of her liver which did show steatosis.  She had F1 fibrosis.  I advised her to lose 5 to 10 pounds.  She comes in today 6 pounds lighter.     She tells me she has been feeling well.  She does tell me that she has some chronic intermittent abdominal cramping and diarrhea.  She states her son is gluten intolerant.  She thinks he has celiac disease.  She herself is gone on a gluten-free diet and she is felt much better since going on gluten-free diet.  She is never had testing done before.   She has been on a gluten-free diet for about a year.  She does note that when she eats gluten she will have some cramps for about a week.  She states she ate Chinese food and Mexican food this weekend which was supposedly gluten-free.  But she has had some cramps and diarrhea since then.  The diarrhea is cleared except for after she eats she will just have a little bit of loose stools.  It has subsided over the last 3 days.  She was noted to have a little bit of an elevated temperature to 99 degrees at her visit today.  She denies any other symptoms other than some chronic sinus drainage which she attributes to allergies.  She denies dysuria or shortness of breath.     She also tells me she has heartburn.  She is on over-the-counter Nexium daily.  She will have breakthrough symptoms once in a while.  Is not every day.  She asked what she could take for breakthrough symptoms.          Past Medical History:   Diagnosis Date    Abnormal liver enzymes     Achilles bursitis     Allergic     Anxiety     Arthralgia     Arthritis     Asthma     pt states very mild - does not do anything for it    Chronic pansinusitis 10/14/2016    Colon polyp     COVID     Diverticulosis     Dizziness     Fatty liver     GERD (gastroesophageal reflux disease)     Hyperlipidemia     Hypertension     Hypothyroidism     Kidney stones     Osteoarthritis     Perennial allergic rhinitis 10/14/2016    PONV (postoperative nausea and vomiting)     Rhinitis     Tinnitus     Urinary frequency     Vertigo        Past Surgical History:   Procedure Laterality Date    ANKLE ARTHROSCOPY WITH FUSION Left 9/28/2023    Procedure: ANKLE ARTHROSCOPY WITH EXTENSIVE DEBRIDEMENT, PERONEUS BREVIS TENDON REPAIR, LATERAL ANKLE STABILIZATION WITH REPAIR OF ANTERIOR TALOFIBULAR  AND CALCANEOFIBULAR LIGAMENT, LEFT ANKLE;  Surgeon: Sandor Hedrick DPM;  Location: [facility] OR;  Service: Podiatry;  Laterality: Left;    ANKLE LIGAMENT RECONSTRUCTION Left 9/28/2023     Procedure: REPAIR OF ANTERIOR TALOFIBULAR AND CALCANEOFIBULAR LIGAMENT, LEFT ANKLE;  Surgeon: Sandor Hedrick DPM;  Location:  [facility] OR;  Service: Podiatry;  Laterality: Left;    ANKLE TENDON REPAIR Left 9/28/2023    Procedure: PERONEUS BREVIS TENDON REPAIR;  Surgeon: Sandor Hedrick DPM;  Location:  [facility] OR;  Service: Podiatry;  Laterality: Left;    APPENDECTOMY      BILATERAL OOPHORECTOMY      BREAST LUMPECTOMY Left 2014    benign    CATARACT EXTRACTION WITH INTRAOCULAR LENS IMPLANT      CERVICAL FUSION      CHOLECYSTECTOMY      COLONOSCOPY  11/18/2013     six polyps removed or destroyed, Diverticulosis  Recall 3 years    COLONOSCOPY  11/18/2013    COLONOSCOPY N/A 04/04/2017    Procedure: COLONOSCOPY WITH ANESTHESIA;  Surgeon: Lew Orona MD;  Location:  [facility];  Service:     COLONOSCOPY N/A 03/16/2020    Procedure: COLONOSCOPY WITH ANESTHESIA;  Surgeon: Lew Orona MD;  Location:  [facility];  Service: Gastroenterology;  Laterality: N/A;  pre op: hx polyps  Post op:polyps  PCP: Deni Henry MD    COLONOSCOPY N/A 04/25/2023    Procedure: COLONOSCOPY;  Surgeon: Lew Orona MD;  Location:  [facility];  Service: Gastroenterology;  Laterality: N/A;  Pre: Colon polyp, Family hx of colon cancer  Post: Polyp  Deni Henry MD    EYE SURGERY  11/2018    Cataract Removal    HEMORRHOIDECTOMY      HYSTERECTOMY      NOSE SURGERY      ligation of maxillary artery    OTHER SURGICAL HISTORY      ingrown toe nail    RECTAL FISSURE INCISION AND DRAINAGE           Current Outpatient Medications:     dicyclomine (Bentyl) 10 MG capsule, Take 1 capsule by mouth 3 (Three) Times a Day As Needed (abdominal discomfort)., Disp: 90 capsule, Rfl: 11    esomeprazole (NexIUM) 20 MG capsule, Take 1 capsule by mouth Every Morning Before Breakfast. Indications: Gastroesophageal Reflux Disease, Heartburn, Disp: , Rfl:     gabapentin (NEURONTIN) 100 MG capsule, Take 1 capsule by mouth  "Every Night., Disp: , Rfl:     levothyroxine (SYNTHROID, LEVOTHROID) 100 MCG tablet, Take 1 tablet by mouth once daily, Disp: 90 tablet, Rfl: 0    meclizine (ANTIVERT) 25 MG tablet, Take 1 tablet by mouth 3 (Three) Times a Day As Needed for Dizziness (vertigo). Take one by mouth three times a day as needed for vertigo  Indications: Sensation of Spinning or Whirling, Disp: 60 tablet, Rfl: 3    montelukast (SINGULAIR) 10 MG tablet, Take 1 tablet by mouth Every Night. Indications: Hayfever, Disp: 30 tablet, Rfl: 5    valsartan (DIOVAN) 320 MG tablet, Take 1 tablet by mouth once daily, Disp: 90 tablet, Rfl: 0    acetaminophen (TYLENOL) 500 MG tablet, Take 2 tablets by mouth Every 6 (Six) Hours As Needed for Mild Pain. Indications: Fever, Pain, Disp: , Rfl:     traMADol-acetaminophen (Ultracet) 37.5-325 MG per tablet, Take 1 tablet by mouth Every 6 (Six) Hours As Needed for Moderate Pain., Disp: 10 tablet, Rfl: 0    Allergies   Allergen Reactions    Adhesive Tape Other (See Comments)     \"Pulls skin off\"    Augmentin [Amoxicillin-Pot Clavulanate] Rash     Pt unsure if she has taken po keflex     Azithromycin Nausea Only    Bactrim [Sulfamethoxazole-Trimethoprim] Nausea And Vomiting    Ceftin [Cefuroxime Axetil] Other (See Comments)     Pt does not recall    Cefuroxime Nausea Only    Codeine GI Intolerance    Demerol [Meperidine] Nausea And Vomiting    Erythromycin Rash    Tequin [Gatifloxacin] Nausea Only    Tetracyclines & Related Nausea And Vomiting       Social History     Socioeconomic History    Marital status:    Tobacco Use    Smoking status: Never    Smokeless tobacco: Never   Vaping Use    Vaping status: Never Used   Substance and Sexual Activity    Alcohol use: Never    Drug use: Never    Sexual activity: Not Currently     Partners: Male       Family History   Problem Relation Age of Onset    Heart disease Father     Heart attack Father     Arthritis Mother     Diabetes Brother     Heart disease Brother  "    Colon cancer Sister 57    Cancer Sister     Colon cancer Sister 55    Cancer Sister     Breast cancer Maternal Grandmother     Breast cancer Maternal Aunt     Breast cancer Maternal Aunt     Breast cancer Maternal Aunt     Ovarian cancer Neg Hx     Uterine cancer Neg Hx        Review of Systems  No blood in stools    Objective     Vitals:    04/15/24 1429   BP: 146/82   Pulse: 75   Temp: 97.8 °F (36.6 °C)   SpO2: 95%       Physical Exam  Vitals reviewed.   Constitutional:       Appearance: Normal appearance. She is not ill-appearing or diaphoretic.   Pulmonary:      Effort: Pulmonary effort is normal.   Neurological:      Mental Status: She is alert.   Psychiatric:         Thought Content: Thought content normal.         Judgment: Judgment normal.               Assessment & Plan   Problem List Items Addressed This Visit          Gastrointestinal Abdominal     Gastroesophageal reflux disease without esophagitis    Fatty liver - Primary    Overview     U/s 5/2019 F1 fibrosis.          Colon polyp    Overview     2 diminutive polyps were destroyed and colonoscopy March 2020.  Based on history of adenomatous polyps and strong family history of colon cancer involving multiple first-degree relatives surveillance colonoscopy recommended again approximately March 2023.  Colonoscopy April 2023 revealed 2 small polyps and AVM.  Surveillance colonoscopy recommended again approximately April 2026         Irritable bowel syndrome         First, regarding her IBS this is stable.  She will continue with as needed dicyclomine.  Reflux is under control.  She is up-to-date on her colonoscopy exams.    Regarding her elevated LFTs we once again talked about fatty liver.  We discussed her LFTs being elevated.  She tried to lose weight but unfortunately had trouble with her ankle and so she was not able to exercise at all.  She is just now getting to where she is somewhat ambulatory.  Hopefully she will be able to ambulate more in the  future.  We did talk about diet.  I talked her about limiting carbohydrates and sugars.  She does avoid gluten.  She has spoken with her PCP in the past about getting on medications such as Ozempic but she has declined.  I think it is a reasonable option if she should choose to go that route.    We also talked about using NSAIDs.  Ideally I would rather her not use NSAIDs because of side effects but there are times when patients need to.  We talked about quality of life versus potential side effects of medications.  We had long conversation about this.  She expressed good understanding.  I think is reasonable for her to use NSAIDs at times knowing that there are potential downfalls but benefits probably outweigh the risk.  Obviously if she would start to have any symptoms related to NSAID she should stop them.    Will see her back in the office in 1 year.    Continue ongoing management by primary care provider and other specialists.     Body mass index is 36.22 kg/m².  Elevated BMI, weight loss advised      EMR Dragon/transcription disclaimer:  Much of this encounter note is electronic transcription/translation of spoken language to printed text.  The electronic translation of spoken language may be erroneous, or at times, nonsensical words or phrases may be inadvertently transcribed.  Although I have reviewed the note for such errors, some may still exist.    Lew Orona MD  15:42 CDT  04/15/24

## 2024-05-09 ENCOUNTER — PATIENT OUTREACH (OUTPATIENT)
Dept: CASE MANAGEMENT | Facility: OTHER | Age: 74
End: 2024-05-09
Payer: MEDICARE

## 2024-05-20 ENCOUNTER — OFFICE VISIT (OUTPATIENT)
Dept: FAMILY MEDICINE CLINIC | Facility: CLINIC | Age: 74
End: 2024-05-20
Payer: MEDICARE

## 2024-05-20 VITALS
BODY MASS INDEX: 35.34 KG/M2 | WEIGHT: 207 LBS | SYSTOLIC BLOOD PRESSURE: 130 MMHG | TEMPERATURE: 97.3 F | OXYGEN SATURATION: 97 % | HEART RATE: 78 BPM | DIASTOLIC BLOOD PRESSURE: 76 MMHG | HEIGHT: 64 IN

## 2024-05-20 DIAGNOSIS — I10 PRIMARY HYPERTENSION: Primary | ICD-10-CM

## 2024-05-20 DIAGNOSIS — K76.0 STEATOSIS OF LIVER: ICD-10-CM

## 2024-05-20 DIAGNOSIS — E03.9 ACQUIRED HYPOTHYROIDISM: ICD-10-CM

## 2024-05-20 DIAGNOSIS — R79.9 ABNORMAL FINDING OF BLOOD CHEMISTRY, UNSPECIFIED: ICD-10-CM

## 2024-05-20 DIAGNOSIS — E78.2 MIXED HYPERLIPIDEMIA: ICD-10-CM

## 2024-05-20 DIAGNOSIS — K21.9 GASTROESOPHAGEAL REFLUX DISEASE WITHOUT ESOPHAGITIS: ICD-10-CM

## 2024-05-20 PROCEDURE — 3075F SYST BP GE 130 - 139MM HG: CPT | Performed by: FAMILY MEDICINE

## 2024-05-20 PROCEDURE — 1160F RVW MEDS BY RX/DR IN RCRD: CPT | Performed by: FAMILY MEDICINE

## 2024-05-20 PROCEDURE — 1125F AMNT PAIN NOTED PAIN PRSNT: CPT | Performed by: FAMILY MEDICINE

## 2024-05-20 PROCEDURE — 3078F DIAST BP <80 MM HG: CPT | Performed by: FAMILY MEDICINE

## 2024-05-20 PROCEDURE — 1159F MED LIST DOCD IN RCRD: CPT | Performed by: FAMILY MEDICINE

## 2024-05-20 PROCEDURE — 99214 OFFICE O/P EST MOD 30 MIN: CPT | Performed by: FAMILY MEDICINE

## 2024-05-20 NOTE — PROGRESS NOTES
"Subjective   Hannah Maki is a 73 y.o. female.     Chief Complaint   Patient presents with    Hypertension    Hyperlipidemia    Hypothyroidism        Hypertension    Hyperlipidemia  Exacerbating diseases include hypothyroidism.   Hypothyroidism         She nots good bp control without cp or ha..she is tolerianf synthroid without heat or cold itninacnls--her gerd symptoms are stablewithotu dysphaga      Current Outpatient Medications:     acetaminophen (TYLENOL) 500 MG tablet, Take 2 tablets by mouth Every 6 (Six) Hours As Needed for Mild Pain. Indications: Fever, Pain, Disp: , Rfl:     dicyclomine (Bentyl) 10 MG capsule, Take 1 capsule by mouth 3 (Three) Times a Day As Needed (abdominal discomfort)., Disp: 90 capsule, Rfl: 11    esomeprazole (NexIUM) 20 MG capsule, Take 1 capsule by mouth Every Morning Before Breakfast. Indications: Gastroesophageal Reflux Disease, Heartburn, Disp: , Rfl:     levothyroxine (SYNTHROID, LEVOTHROID) 100 MCG tablet, Take 1 tablet by mouth once daily, Disp: 90 tablet, Rfl: 0    meclizine (ANTIVERT) 25 MG tablet, Take 1 tablet by mouth 3 (Three) Times a Day As Needed for Dizziness (vertigo). Take one by mouth three times a day as needed for vertigo  Indications: Sensation of Spinning or Whirling, Disp: 60 tablet, Rfl: 3    montelukast (SINGULAIR) 10 MG tablet, Take 1 tablet by mouth Every Night. Indications: Hayfever, Disp: 30 tablet, Rfl: 5    valsartan (DIOVAN) 320 MG tablet, Take 1 tablet by mouth once daily, Disp: 90 tablet, Rfl: 0  Allergies   Allergen Reactions    Adhesive Tape Other (See Comments)     \"Pulls skin off\"    Augmentin [Amoxicillin-Pot Clavulanate] Rash     Pt unsure if she has taken po keflex     Azithromycin Nausea Only    Bactrim [Sulfamethoxazole-Trimethoprim] Nausea And Vomiting    Ceftin [Cefuroxime Axetil] Other (See Comments)     Pt does not recall    Cefuroxime Nausea Only    Codeine GI Intolerance    Demerol [Meperidine] Nausea And Vomiting    Erythromycin " Rash    Tequin [Gatifloxacin] Nausea Only    Tetracyclines & Related Nausea And Vomiting              Facility age limit for growth %verena is 20 years.    Past Medical History:   Diagnosis Date    Abnormal liver enzymes     Achilles bursitis     Allergic     Anxiety     Arthralgia     Arthritis     Asthma     pt states very mild - does not do anything for it    Chronic pansinusitis 10/14/2016    Colon polyp     COVID     Diverticulosis     Dizziness     Fatty liver     GERD (gastroesophageal reflux disease)     Hyperlipidemia     Hypertension     Hypothyroidism     Kidney stones     Osteoarthritis     Perennial allergic rhinitis 10/14/2016    PONV (postoperative nausea and vomiting)     Rhinitis     Tinnitus     Urinary frequency     Vertigo      Past Surgical History:   Procedure Laterality Date    ANKLE ARTHROSCOPY WITH FUSION Left 9/28/2023    Procedure: ANKLE ARTHROSCOPY WITH EXTENSIVE DEBRIDEMENT, PERONEUS BREVIS TENDON REPAIR, LATERAL ANKLE STABILIZATION WITH REPAIR OF ANTERIOR TALOFIBULAR  AND CALCANEOFIBULAR LIGAMENT, LEFT ANKLE;  Surgeon: Sandor Hedrick DPM;  Location:  [facility];  Service: Podiatry;  Laterality: Left;    ANKLE LIGAMENT RECONSTRUCTION Left 9/28/2023    Procedure: REPAIR OF ANTERIOR TALOFIBULAR AND CALCANEOFIBULAR LIGAMENT, LEFT ANKLE;  Surgeon: Sandor Hedrick DPM;  Location:  [facility];  Service: Podiatry;  Laterality: Left;    ANKLE TENDON REPAIR Left 9/28/2023    Procedure: PERONEUS BREVIS TENDON REPAIR;  Surgeon: Sandor Hedrick DPM;  Location:  [facility];  Service: Podiatry;  Laterality: Left;    APPENDECTOMY      BILATERAL OOPHORECTOMY      BREAST LUMPECTOMY Left 2014    benign    CATARACT EXTRACTION WITH INTRAOCULAR LENS IMPLANT      CERVICAL FUSION      CHOLECYSTECTOMY      COLONOSCOPY  11/18/2013     six polyps removed or destroyed, Diverticulosis  Recall 3 years    COLONOSCOPY  11/18/2013    COLONOSCOPY N/A 04/04/2017    Procedure: COLONOSCOPY WITH ANESTHESIA;  Surgeon:  "Lew Orona MD;  Location: [facility] ENDOSCOPY;  Service:     COLONOSCOPY N/A 03/16/2020    Procedure: COLONOSCOPY WITH ANESTHESIA;  Surgeon: Lew Orona MD;  Location: [facility] ENDOSCOPY;  Service: Gastroenterology;  Laterality: N/A;  pre op: hx polyps  Post op:polyps  PCP: Deni Henry MD    COLONOSCOPY N/A 04/25/2023    Procedure: COLONOSCOPY;  Surgeon: Lew Orona MD;  Location: [facility] ENDOSCOPY;  Service: Gastroenterology;  Laterality: N/A;  Pre: Colon polyp, Family hx of colon cancer  Post: Polyp  Deni Henry MD    EYE SURGERY  11/2018    Cataract Removal    HEMORRHOIDECTOMY      HYSTERECTOMY      NOSE SURGERY      ligation of maxillary artery    OTHER SURGICAL HISTORY      ingrown toe nail    RECTAL FISSURE INCISION AND DRAINAGE         Review of Systems   Constitutional: Negative.    HENT: Negative.     Eyes: Negative.    Respiratory: Negative.     Cardiovascular: Negative.    Gastrointestinal: Negative.    Endocrine: Negative.    Genitourinary: Negative.    Musculoskeletal: Negative.    Skin: Negative.    Allergic/Immunologic: Negative.    Neurological: Negative.    Hematological: Negative.    Psychiatric/Behavioral: Negative.         Objective /76   Pulse 78   Temp 97.3 °F (36.3 °C)   Ht 162.6 cm (64\")   Wt 93.9 kg (207 lb)   LMP 10/04/1981 Comment: age 31  SpO2 97%   BMI 35.53 kg/m²    Physical Exam  Vitals and nursing note reviewed.   Constitutional:       Appearance: Normal appearance.   HENT:      Head: Normocephalic and atraumatic.      Nose: Nose normal.      Mouth/Throat:      Mouth: Mucous membranes are moist.   Eyes:      Pupils: Pupils are equal, round, and reactive to light.   Cardiovascular:      Rate and Rhythm: Normal rate.      Pulses: Normal pulses.   Pulmonary:      Effort: Pulmonary effort is normal.   Abdominal:      General: Abdomen is flat.   Musculoskeletal:         General: Normal range of motion.      Cervical back: Normal range of " motion and neck supple.   Skin:     General: Skin is warm.      Capillary Refill: Capillary refill takes less than 2 seconds.   Neurological:      General: No focal deficit present.      Mental Status: She is alert.   Psychiatric:         Mood and Affect: Mood normal.         Assessment & Plan   Diagnoses and all orders for this visit:    1. Primary hypertension (Primary)  -     Enroll patient in hypertension care plan  -     CBC & Differential  -     Comprehensive metabolic panel  -     TSH  -     Hemoglobin A1c  -     Lipid Panel With / Chol / HDL Ratio    2. Mixed hyperlipidemia  -     CBC & Differential  -     Comprehensive metabolic panel  -     TSH  -     Hemoglobin A1c  -     Lipid Panel With / Chol / HDL Ratio    3. Acquired hypothyroidism  -     CBC & Differential  -     Comprehensive metabolic panel  -     TSH  -     Hemoglobin A1c  -     Lipid Panel With / Chol / HDL Ratio    4. Gastroesophageal reflux disease without esophagitis    5. Steatosis of liver    6. Abnormal finding of blood chemistry, unspecified  -     Hemoglobin A1c      She will see dr rodrigue santamaria for her liver              Orders Placed This Encounter   Procedures    Enroll patient in hypertension care plan    Comprehensive metabolic panel     Order Specific Question:   Release to patient     Answer:   Routine Release [[id number]]    TSH     Order Specific Question:   Release to patient     Answer:   Routine Release [[id number]]    Hemoglobin A1c     Order Specific Question:   Release to patient     Answer:   Routine Release [[id number]]    Lipid Panel With / Chol / HDL Ratio     Order Specific Question:   Release to patient     Answer:   Routine Release [[id number]]    CBC & Differential     Order Specific Question:   Release to patient     Answer:   Routine Release [[id number]]       Follow up: 6month(s)

## 2024-05-21 LAB
ALBUMIN SERPL-MCNC: 4.3 G/DL (ref 3.5–5.2)
ALBUMIN/GLOB SERPL: 1.5 G/DL
ALP SERPL-CCNC: 189 U/L (ref 39–117)
ALT SERPL-CCNC: 66 U/L (ref 1–33)
AST SERPL-CCNC: 54 U/L (ref 1–32)
BASOPHILS # BLD AUTO: 0.04 10*3/MM3 (ref 0–0.2)
BASOPHILS NFR BLD AUTO: 0.5 % (ref 0–1.5)
BILIRUB SERPL-MCNC: 1.3 MG/DL (ref 0–1.2)
BUN SERPL-MCNC: 12 MG/DL (ref 8–23)
BUN/CREAT SERPL: 14.8 (ref 7–25)
CALCIUM SERPL-MCNC: 9.8 MG/DL (ref 8.6–10.5)
CHLORIDE SERPL-SCNC: 103 MMOL/L (ref 98–107)
CHOLEST SERPL-MCNC: 286 MG/DL (ref 0–200)
CHOLEST/HDLC SERPL: 3.08 {RATIO}
CO2 SERPL-SCNC: 26.3 MMOL/L (ref 22–29)
CREAT SERPL-MCNC: 0.81 MG/DL (ref 0.57–1)
EGFRCR SERPLBLD CKD-EPI 2021: 77.7 ML/MIN/1.73
EOSINOPHIL # BLD AUTO: 0.21 10*3/MM3 (ref 0–0.4)
EOSINOPHIL NFR BLD AUTO: 2.5 % (ref 0.3–6.2)
ERYTHROCYTE [DISTWIDTH] IN BLOOD BY AUTOMATED COUNT: 13 % (ref 12.3–15.4)
GLOBULIN SER CALC-MCNC: 2.9 GM/DL
GLUCOSE SERPL-MCNC: 116 MG/DL (ref 65–99)
HBA1C MFR BLD: 6.2 % (ref 4.8–5.6)
HCT VFR BLD AUTO: 47.4 % (ref 34–46.6)
HDLC SERPL-MCNC: 93 MG/DL (ref 40–60)
HGB BLD-MCNC: 16.2 G/DL (ref 12–15.9)
IMM GRANULOCYTES # BLD AUTO: 0.03 10*3/MM3 (ref 0–0.05)
IMM GRANULOCYTES NFR BLD AUTO: 0.4 % (ref 0–0.5)
LDLC SERPL CALC-MCNC: 175 MG/DL (ref 0–100)
LYMPHOCYTES # BLD AUTO: 2.96 10*3/MM3 (ref 0.7–3.1)
LYMPHOCYTES NFR BLD AUTO: 35.3 % (ref 19.6–45.3)
MCH RBC QN AUTO: 31.5 PG (ref 26.6–33)
MCHC RBC AUTO-ENTMCNC: 34.2 G/DL (ref 31.5–35.7)
MCV RBC AUTO: 92.2 FL (ref 79–97)
MONOCYTES # BLD AUTO: 0.62 10*3/MM3 (ref 0.1–0.9)
MONOCYTES NFR BLD AUTO: 7.4 % (ref 5–12)
NEUTROPHILS # BLD AUTO: 4.52 10*3/MM3 (ref 1.7–7)
NEUTROPHILS NFR BLD AUTO: 53.9 % (ref 42.7–76)
NRBC BLD AUTO-RTO: 0 /100 WBC (ref 0–0.2)
PLATELET # BLD AUTO: 200 10*3/MM3 (ref 140–450)
POTASSIUM SERPL-SCNC: 4.5 MMOL/L (ref 3.5–5.2)
PROT SERPL-MCNC: 7.2 G/DL (ref 6–8.5)
RBC # BLD AUTO: 5.14 10*6/MM3 (ref 3.77–5.28)
SODIUM SERPL-SCNC: 142 MMOL/L (ref 136–145)
TRIGL SERPL-MCNC: 108 MG/DL (ref 0–150)
TSH SERPL DL<=0.005 MIU/L-ACNC: 0.83 UIU/ML (ref 0.27–4.2)
VLDLC SERPL CALC-MCNC: 18 MG/DL (ref 5–40)
WBC # BLD AUTO: 8.38 10*3/MM3 (ref 3.4–10.8)

## 2024-05-22 ENCOUNTER — PATIENT OUTREACH (OUTPATIENT)
Dept: CASE MANAGEMENT | Facility: OTHER | Age: 74
End: 2024-05-22
Payer: MEDICARE

## 2024-05-22 NOTE — OUTREACH NOTE
[email or web address]    Patient is aware the [employer] Care Companion BP program was ordered. However, she has not been able to figure out how to enter BP readings. ACM will send tip sheet via [employer] for program questions and follow up later this week.     Adult Patient Profile  Questions/Answers      Flowsheet Row Most Recent Value   Symptoms/Conditions Managed at Home cardiovascular   Barriers to Managing Health none   Cardiovascular Symptoms/Conditions hypertension   Cardiovascular Management Strategies medication therapy, routine screening          Fannie WOODS  Ambulatory Case Management    5/22/2024, 13:58 CDT

## 2024-05-24 ENCOUNTER — PATIENT OUTREACH (OUTPATIENT)
Dept: CASE MANAGEMENT | Facility: OTHER | Age: 74
End: 2024-05-24
Payer: MEDICARE

## 2024-05-24 NOTE — OUTREACH NOTE
[facility] Hypertension Care Companion Follow-up    Spoke with patient and reviewed chart. She was able to begin the [facility] Care Companion BP monitoring program. She has entered readings and had no questions at this time.     Fannie WOODS  Ambulatory Case Management    5/24/2024, 12:14 CDT

## 2024-06-07 RX ORDER — LEVOTHYROXINE SODIUM 0.1 MG/1
100 TABLET ORAL DAILY
Qty: 90 TABLET | Refills: 0 | Status: SHIPPED | OUTPATIENT
Start: 2024-06-07

## 2024-06-07 RX ORDER — VALSARTAN 320 MG/1
320 TABLET ORAL DAILY
Qty: 90 TABLET | Refills: 0 | Status: SHIPPED | OUTPATIENT
Start: 2024-06-07

## 2024-06-18 DIAGNOSIS — J30.89 PERENNIAL ALLERGIC RHINITIS: ICD-10-CM

## 2024-06-18 DIAGNOSIS — R09.82 POST-NASAL DRIP: ICD-10-CM

## 2024-06-18 RX ORDER — MONTELUKAST SODIUM 10 MG/1
10 TABLET ORAL NIGHTLY
Qty: 30 TABLET | Refills: 0 | Status: SHIPPED | OUTPATIENT
Start: 2024-06-18

## 2024-07-05 ENCOUNTER — HOSPITAL ENCOUNTER (EMERGENCY)
Age: 74
Discharge: HOME | End: 2024-07-05
Payer: MEDICARE

## 2024-07-05 ENCOUNTER — APPOINTMENT (OUTPATIENT)
Dept: GENERAL RADIOLOGY | Age: 74
End: 2024-07-05
Payer: MEDICARE

## 2024-07-05 VITALS
HEART RATE: 71 BPM | TEMPERATURE: 97.9 F | SYSTOLIC BLOOD PRESSURE: 143 MMHG | OXYGEN SATURATION: 96 % | HEIGHT: 64 IN | BODY MASS INDEX: 35.34 KG/M2 | RESPIRATION RATE: 20 BRPM | DIASTOLIC BLOOD PRESSURE: 71 MMHG | WEIGHT: 207 LBS

## 2024-07-05 DIAGNOSIS — S93.492A SPRAIN OF OTHER LIGAMENT OF LEFT ANKLE, INITIAL ENCOUNTER: ICD-10-CM

## 2024-07-05 DIAGNOSIS — M25.562 ACUTE PAIN OF LEFT KNEE: ICD-10-CM
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
## 2024-07-05 PROCEDURE — 6370000000 HC RX 637 (ALT 250 FOR IP): Performed by: NURSE PRACTITIONER

## 2024-07-05 PROCEDURE — 73590 X-RAY EXAM OF LOWER LEG: CPT

## 2024-07-05 PROCEDURE — 73560 X-RAY EXAM OF KNEE 1 OR 2: CPT

## 2024-07-05 PROCEDURE — 73610 X-RAY EXAM OF ANKLE: CPT

## 2024-07-05 PROCEDURE — 99283 EMERGENCY DEPT VISIT LOW MDM: CPT

## 2024-07-05 RX ORDER — HYDROCODONE BITARTRATE AND ACETAMINOPHEN 5; 325 MG/1; MG/1
1 TABLET ORAL ONCE
Status: COMPLETED | OUTPATIENT
Start: 2024-07-05 | End: 2024-07-05

## 2024-07-05 RX ORDER — GABAPENTIN 100 MG/1
100 CAPSULE ORAL NIGHTLY
COMMUNITY

## 2024-07-05 RX ORDER — ONDANSETRON 4 MG/1
4 TABLET, ORALLY DISINTEGRATING ORAL ONCE
Status: COMPLETED | OUTPATIENT
Start: 2024-07-05 | End: 2024-07-05

## 2024-07-05 RX ADMIN — ONDANSETRON 4 MG: 4 TABLET, ORALLY DISINTEGRATING ORAL at 16:42

## 2024-07-05 RX ADMIN — HYDROCODONE BITARTRATE AND ACETAMINOPHEN 1 TABLET: 5; 325 TABLET ORAL at 16:42

## 2024-07-05 ASSESSMENT — PAIN SCALES - GENERAL: PAINLEVEL_OUTOF10: 7

## 2024-07-05 ASSESSMENT — PAIN DESCRIPTION - ORIENTATION: ORIENTATION: LEFT

## 2024-07-05 ASSESSMENT — PAIN DESCRIPTION - LOCATION: LOCATION: LEG

## 2024-07-05 NOTE — ED PROVIDER NOTES
[facility] EMERGENCY DEPT  eMERGENCY dEPARTMENT eNCOUnter      Pt Name: Leslie Ramírez  MRN: [id number]  Birthdate 1950  Date of evaluation: 7/5/2024  Provider: KARLA Smalls    CHIEF COMPLAINT       Chief Complaint   Patient presents with    Fall     Patient had a mechanical fall today going down stairs. Patient landed on left knee/foot. Patient had surgery on that leg x9 months ago.          HISTORY OF PRESENT ILLNESS   (Location/Symptom, Timing/Onset,Context/Setting, Quality, Duration, Modifying Factors, Severity)  Note limiting factors.   Leslie Ramírez is a 73 y.o. female who presents to the emergency department with knee pain after a fall today.  Pt went down a step carrying two things and lost her balance and dropped what she was carrying wihich then caused her to fall.  Landed on her knees.  Had ankle surgery to left ankle 9 months ago and also having more ankle pain than unusual.  Is ambulatory     The history is provided by the patient.   Fall  The accident occurred 3 to 5 hours ago. The fall occurred while walking. She fell from a height of 3 to 5 ft.       NursingNotes were reviewed.    REVIEW OF SYSTEMS    (2-9 systems for level 4, 10 or more for level 5)     Review of Systems   Musculoskeletal:  Positive for arthralgias, gait problem and myalgias.       Except as noted above the remainder of the review of systems was reviewed and negative.       PAST MEDICAL HISTORY     Past Medical History:   Diagnosis Date    Asthma     Fatty liver     GERD (gastroesophageal reflux disease)     Hyperlipidemia     Hypertension     Hypothyroidism     Osteoarthritis     Pre-diabetes     Seasonal allergic rhinitis     Sinus problem     Thyroid disease     Wears glasses          SURGICALHISTORY       Past Surgical History:   Procedure Laterality Date    ARTERY SURGERY      ligation- due to nose bleed    BREAST BIOPSY Left 2014    fibroadenoma    CHOLECYSTECTOMY      HEMORRHOID SURGERY      HYSTERECTOMY (CERVIX STATUS

## 2024-07-11 ENCOUNTER — OFFICE VISIT (OUTPATIENT)
Dept: FAMILY MEDICINE CLINIC | Facility: CLINIC | Age: 74
End: 2024-07-11
Payer: MEDICARE

## 2024-07-11 VITALS
DIASTOLIC BLOOD PRESSURE: 72 MMHG | HEART RATE: 97 BPM | HEIGHT: 64 IN | OXYGEN SATURATION: 95 % | BODY MASS INDEX: 35.96 KG/M2 | SYSTOLIC BLOOD PRESSURE: 134 MMHG | WEIGHT: 210.6 LBS

## 2024-07-11 DIAGNOSIS — R69 MULTIPLE CHRONIC DISEASES: ICD-10-CM

## 2024-07-11 DIAGNOSIS — S80.02XA HEMATOMA OF LEFT KNEE REGION: ICD-10-CM

## 2024-07-11 DIAGNOSIS — M19.90 OSTEOARTHRITIS, UNSPECIFIED OSTEOARTHRITIS TYPE, UNSPECIFIED SITE: ICD-10-CM

## 2024-07-11 DIAGNOSIS — S80.02XA CONTUSION OF LEFT KNEE, INITIAL ENCOUNTER: ICD-10-CM

## 2024-07-11 DIAGNOSIS — E66.01 CLASS 2 SEVERE OBESITY DUE TO EXCESS CALORIES WITH SERIOUS COMORBIDITY AND BODY MASS INDEX (BMI) OF 36.0 TO 36.9 IN ADULT: ICD-10-CM
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
## 2024-07-11 PROCEDURE — 1125F AMNT PAIN NOTED PAIN PRSNT: CPT | Performed by: FAMILY MEDICINE

## 2024-07-11 PROCEDURE — 3075F SYST BP GE 130 - 139MM HG: CPT | Performed by: FAMILY MEDICINE

## 2024-07-11 PROCEDURE — 3078F DIAST BP <80 MM HG: CPT | Performed by: FAMILY MEDICINE

## 2024-07-11 PROCEDURE — 99213 OFFICE O/P EST LOW 20 MIN: CPT | Performed by: FAMILY MEDICINE

## 2024-07-11 RX ORDER — ONDANSETRON 4 MG/1
4 TABLET, FILM COATED ORAL AS NEEDED
COMMUNITY
Start: 2024-06-21

## 2024-07-11 RX ORDER — GABAPENTIN 100 MG/1
100 CAPSULE ORAL NIGHTLY
COMMUNITY

## 2024-07-11 NOTE — PROGRESS NOTES
JEFF”.   Subjective   Hannah Maki is a 73 y.o. female presenting with chief complaint of:   Chief Complaint   Patient presents with    Fall     Follow up from fall on 07/05.  Pt  states she is still having left knee pain and ankle pain.  Pt states she went to [facility] ER and xrays were taken.     AWV NA  Last Completed Annual Wellness Visit       This patient has no relevant Health Maintenance data.             History of Present Illness :  With .  Here for primarily an acute issue above.       Has multiple chronic problems to consider that might have a bearing on today's issues; not an interval appointment.       Chronic/acute problems reviewed today:   1. Multiple chronic diseases: Has multiple chronic problems with increased risks for management (involves polypharmacy, multiple providers, many required labs/imaging/ to manage)     2. Fall, initial encounter: first visit here.   [facility] xrays no fracture.     3. Osteoarthritis, unspecified osteoarthritis type, unspecified site; chronic/prior   4. Class 2 severe obesity due to excess calories with serious comorbidity and body mass index (BMI) of 36.0 to 36.9 in adult : contributes to post fall gait difficulty   5. Contusion of left knee, initial encounter: with fall   6. Hematoma of left knee region -associated with fall     Has an/another acute issue today: none.    The following portions of the patient's history were reviewed and updated as appropriate: allergies, current medications, past family history, past medical history, past social history, past surgical history, and problem list.      Current Outpatient Medications:     acetaminophen (TYLENOL) 500 MG tablet, Take 2 tablets by mouth Every 6 (Six) Hours As Needed for Mild Pain. Indications: Fever, Pain, Disp: , Rfl:     dicyclomine (Bentyl) 10 MG capsule, Take 1 capsule by mouth 3 (Three) Times a Day As Needed (abdominal discomfort)., Disp: 90 capsule, Rfl: 11    esomeprazole (NexIUM) 20 MG  "capsule, Take 1 capsule by mouth Every Morning Before Breakfast. Indications: Gastroesophageal Reflux Disease, Heartburn, Disp: , Rfl:     gabapentin (NEURONTIN) 100 MG capsule, Take 1 capsule by mouth Every Night., Disp: , Rfl:     levothyroxine (SYNTHROID, LEVOTHROID) 100 MCG tablet, Take 1 tablet by mouth once daily, Disp: 90 tablet, Rfl: 0    meclizine (ANTIVERT) 25 MG tablet, Take 1 tablet by mouth 3 (Three) Times a Day As Needed for Dizziness (vertigo). Take one by mouth three times a day as needed for vertigo  Indications: Sensation of Spinning or Whirling, Disp: 60 tablet, Rfl: 3    montelukast (SINGULAIR) 10 MG tablet, TAKE 1 TABLET BY MOUTH ONCE DAILY AT NIGHT, Disp: 30 tablet, Rfl: 0    ondansetron (ZOFRAN) 4 MG tablet, Take 1 tablet by mouth As Needed., Disp: , Rfl:     valsartan (DIOVAN) 320 MG tablet, Take 1 tablet by mouth once daily, Disp: 90 tablet, Rfl: 0    No problems with medications.    Allergies   Allergen Reactions    Adhesive Tape Other (See Comments)     \"Pulls skin off\"    Augmentin [Amoxicillin-Pot Clavulanate] Rash     Pt unsure if she has taken po keflex     Azithromycin Nausea Only    Bactrim [Sulfamethoxazole-Trimethoprim] Nausea And Vomiting    Ceftin [Cefuroxime Axetil] Other (See Comments)     Pt does not recall    Cefuroxime Nausea Only    Codeine GI Intolerance    Demerol [Meperidine] Nausea And Vomiting    Erythromycin Rash    Tequin [Gatifloxacin] Nausea Only    Tetracyclines & Related Nausea And Vomiting       Review of Systems   Constitutional:  Positive for fatigue.   Musculoskeletal:  Positive for arthralgias (L > R knee), gait problem and joint swelling (L knee).   Neurological:  Negative for syncope, speech difficulty and headaches.     Copy/paste function used for ROS/exam AND each area of these were reviewed, updated, confirmed and supplemented as needed.  Data reviewed:   Recent admit/ER/MD visits: [facility] Xrays  Last cardiac testing:   Echo:   Results for orders placed " during the hospital encounter of 03/23/18    Adult Stress Echo W/ Cont or Stress Agent if Necessary Per Protocol    Interpretation Summary  · Normal baseline ECG noted.  · No chest pain with Dobutamine infusion.  · There was no clinically significant ST segment deviation noted during stress.  · There were no stress induced wall motion abnormalities.  · Low risk stress echocardiogram, given no ECG or echocardiographic evidence of ischemia.    Radiology considered:   XR Knee 1 or 2 View Left    Result Date: 7/5/2024  No acute fracture or dislocation with mild tricompartmental degenerative changes in the left knee. Soft tissue density abutting the patellar tendon extending anteriorly.  May represent subcutaneous edema or contusion.  The patellar tendon itself appears grossly intact on radiograph. Atherosclerotic vascular disease. ______________________________________ Electronically signed by: MANDI CLEMENS M.D. Date:     07/05/2024 Time:    16:19     XR Ankle 3+ View Left    Result Date: 7/5/2024  No fracture or dislocation. ______________________________________ Electronically signed by: MARICRUZ PITT M.D. Date:     07/05/2024 Time:    16:20     XR Tibia Fibula 2 View Left    Result Date: 7/5/2024  No acute fracture. Repeat evaluation in 7-10 days recommended if symptoms persist. ______________________________________ Electronically signed by: INA GONZALES M.D. Date:     07/05/2024 Time:    16:19     XR Knee 1 or 2 View Right    Result Date: 7/5/2024  1.  No acute osseous abnormality of the right knee. 2.   Minimal joint effusion. 3.  Mild medial compartment osteoarthritic changes. ______________________________________ Electronically signed by: CARINA RODRIGUEZ M.D. Date:     07/05/2024 Time:    16:13      Lab Results:  Results for orders placed or performed in visit on 05/20/24   Comprehensive metabolic panel    Specimen: Blood   Result Value Ref Range    Glucose 116 (H) 65 - 99 mg/dL    BUN 12 8 - 23 mg/dL     Creatinine 0.81 0.57 - 1.00 mg/dL    EGFR Result 77.7 >60.0 mL/min/1.73    BUN/Creatinine Ratio 14.8 7.0 - 25.0    Sodium 142 136 - 145 mmol/L    Potassium 4.5 3.5 - 5.2 mmol/L    Chloride 103 98 - 107 mmol/L    Total CO2 26.3 22.0 - 29.0 mmol/L    Calcium 9.8 8.6 - 10.5 mg/dL    Total Protein 7.2 6.0 - 8.5 g/dL    Albumin 4.3 3.5 - 5.2 g/dL    Globulin 2.9 gm/dL    A/G Ratio 1.5 g/dL    Total Bilirubin 1.3 (H) 0.0 - 1.2 mg/dL    Alkaline Phosphatase 189 (H) 39 - 117 U/L    AST (SGOT) 54 (H) 1 - 32 U/L    ALT (SGPT) 66 (H) 1 - 33 U/L   TSH    Specimen: Blood   Result Value Ref Range    TSH 0.828 0.270 - 4.200 uIU/mL   Hemoglobin A1c    Specimen: Blood   Result Value Ref Range    Hemoglobin A1C 6.20 (H) 4.80 - 5.60 %   Lipid Panel With / Chol / HDL Ratio    Specimen: Blood   Result Value Ref Range    Total Cholesterol 286 (H) 0 - 200 mg/dL    Triglycerides 108 0 - 150 mg/dL    HDL Cholesterol 93 (H) 40 - 60 mg/dL    VLDL Cholesterol Alex 18 5 - 40 mg/dL    LDL Chol Calc (NIH) 175 (H) 0 - 100 mg/dL    Chol/HDL Ratio 3.08    CBC & Differential    Specimen: Blood   Result Value Ref Range    WBC 8.38 3.40 - 10.80 10*3/mm3    RBC 5.14 3.77 - 5.28 10*6/mm3    Hemoglobin 16.2 (H) 12.0 - 15.9 g/dL    Hematocrit 47.4 (H) 34.0 - 46.6 %    MCV 92.2 79.0 - 97.0 fL    MCH 31.5 26.6 - 33.0 pg    MCHC 34.2 31.5 - 35.7 g/dL    RDW 13.0 12.3 - 15.4 %    Platelets 200 140 - 450 10*3/mm3    Neutrophil Rel % 53.9 42.7 - 76.0 %    Lymphocyte Rel % 35.3 19.6 - 45.3 %    Monocyte Rel % 7.4 5.0 - 12.0 %    Eosinophil Rel % 2.5 0.3 - 6.2 %    Basophil Rel % 0.5 0.0 - 1.5 %    Neutrophils Absolute 4.52 1.70 - 7.00 10*3/mm3    Lymphocytes Absolute 2.96 0.70 - 3.10 10*3/mm3    Monocytes Absolute 0.62 0.10 - 0.90 10*3/mm3    Eosinophils Absolute 0.21 0.00 - 0.40 10*3/mm3    Basophils Absolute 0.04 0.00 - 0.20 10*3/mm3    Immature Granulocyte Rel % 0.4 0.0 - 0.5 %    Immature Grans Absolute 0.03 0.00 - 0.05 10*3/mm3    nRBC 0.0 0.0 - 0.2 /100 WBC  "      A1C:  Lab Results - Last 18 Months   Lab Units 05/20/24  0831   HEMOGLOBIN A1C % 6.20*     GLUCOSE:  Lab Results - Last 18 Months   Lab Units 05/20/24  0831 01/22/24  0858 09/19/23  1353 07/17/23  0856 01/12/23  0731   GLUCOSE mg/dL 116* 116* 89 124* 125*     LIPID:  Lab Results - Last 18 Months   Lab Units 05/20/24  0831 01/22/24  0858 07/17/23  0856 01/12/23  0731   CHOLESTEROL mg/dL 286* 307* 308* 305*   LDL CHOL mg/dL 175* 198* 210* 216*   HDL CHOL mg/dL 93* 78* 76* 69*   TRIGLYCERIDES mg/dL 108 168* 123 113     PSA:No results found for: \"PSA\"    CBC:  Lab Results - Last 18 Months   Lab Units 05/20/24  0831 01/22/24  0858 09/19/23  1353 07/17/23  0856 01/12/23  0731   WBC 10*3/mm3 8.38 9.18 9.16 8.82 7.69   HEMOGLOBIN g/dL 16.2* 16.5* 14.8 15.1 15.6   HEMATOCRIT % 47.4* 46.2 45.6 44.9 46.4   PLATELETS 10*3/mm3 200 221 184 189 192     BMP/CMP:  Lab Results - Last 18 Months   Lab Units 05/20/24  0831 01/22/24  0858 09/19/23  1353 07/17/23  0856 01/12/23  0731   SODIUM mmol/L 142 139 138 140 140   POTASSIUM mmol/L 4.5 5.0 4.4 5.2 4.5   CHLORIDE mmol/L 103 102 102 102 103   CO2 mmol/L 26.3 27.5 25.0 25.7 26.7   GLUCOSE mg/dL 116* 116* 89 124* 125*   BUN mg/dL 12 13 11 13 11   CREATININE mg/dL 0.81 0.90 0.65 0.75 0.83   EGFR RESULT mL/min/1.73 77.7 68.5  --  85.8 75.9   CALCIUM mg/dL 9.8 10.2 9.5 10.1 9.8       HEPATIC:  Lab Results - Last 18 Months   Lab Units 05/20/24  0831 01/22/24  0858 07/17/23  0856 01/12/23  0731   ALT (SGPT) U/L 66* 59* 62* 71*   AST (SGOT) U/L 54* 43* 49* 64*   ALK PHOS U/L 189* 183* 162* 165*     HEPATITIS C ANTIBODY:   Lab Results   Component Value Date/Time    HEPCVIRUSABY 0.1 07/13/2020 09:02 AM    HEPCVIRUSABY Negative 04/29/2019 05:02 PM    HEPCVIRUSABY <0.1 10/04/2016 08:10 AM     Vit D:No results for input(s): \"EDVA76QL\" in the last 37515 hours.  THYROID:  Lab Results - Last 18 Months   Lab Units 05/20/24  0831 01/22/24  0858 07/17/23  0856 01/12/23  0731   TSH uIU/mL 0.828 " "2.620 3.340 2.630       Objective   /72   Pulse 97   Ht 162.6 cm (64\")   Wt 95.5 kg (210 lb 9.6 oz)   LMP 10/04/1981 Comment: age 31  SpO2 95%   BMI 36.15 kg/m²   Body mass index is 36.15 kg/m².    Recent Vitals         4/15/2024 5/20/2024 7/11/2024       BP: 146/82 130/76 134/72     Pulse: 75 78 97     Temp: 97.8 °F (36.6 °C) 97.3 °F (36.3 °C) --     Weight: 95.7 kg (211 lb) 93.9 kg (207 lb) 95.5 kg (210 lb 9.6 oz)     BMI (Calculated): 36.2 35.5 36.1             Physical Exam  Constitutional:       General: She is not in acute distress.     Appearance: She is obese.   HENT:      Head: Atraumatic.      Mouth/Throat:      Mouth: Mucous membranes are moist.   Eyes:      Extraocular Movements: Extraocular movements intact.      Conjunctiva/sclera: Conjunctivae normal.      Pupils: Pupils are equal, round, and reactive to light.   Cardiovascular:      Rate and Rhythm: Normal rate and regular rhythm.      Heart sounds: Normal heart sounds.   Pulmonary:      Effort: Pulmonary effort is normal.      Breath sounds: Normal breath sounds.   Abdominal:      Tenderness: There is no abdominal tenderness.   Musculoskeletal:         General: Swelling (L knee), tenderness (L anterior knee > rest of knee) and signs of injury present. No deformity.      Cervical back: Neck supple. No tenderness.      Right lower leg: Edema present.      Left lower leg: Edema present.      Comments: L knee no drawer/lateral-medial instability   Skin:     Findings: Bruising (dorsal L knee) present.   Neurological:      Mental Status: She is alert and oriented to person, place, and time.         Assessment & Plan     1. Multiple chronic diseases    2. Fall, initial encounter    3. Osteoarthritis, unspecified osteoarthritis type, unspecified site    4. Class 2 severe obesity due to excess calories with serious comorbidity and body mass index (BMI) of 36.0 to 36.9 in adult    5. Contusion of left knee, initial encounter    6. Hematoma of left " knee region        Data review above:   Discussions/medical decisions/reviews:  Recent Vitals         4/15/2024 5/20/2024 7/11/2024       BP: 146/82 130/76 134/72     Pulse: 75 78 97     Temp: 97.8 °F (36.6 °C) 97.3 °F (36.3 °C) --     Weight: 95.7 kg (211 lb) 93.9 kg (207 lb) 95.5 kg (210 lb 9.6 oz)     BMI (Calculated): 36.2 35.5 36.1             Wt Readings from Last 15 Encounters:   07/11/24 1448 95.5 kg (210 lb 9.6 oz)   05/20/24 0855 93.9 kg (207 lb)   04/15/24 1429 95.7 kg (211 lb)   01/22/24 0823 96.6 kg (213 lb)   11/06/23 0850 95.3 kg (210 lb)   09/19/23 1332 95.2 kg (209 lb 14.1 oz)   07/17/23 0822 94.3 kg (208 lb)   05/08/23 1339 95.4 kg (210 lb 6.4 oz)   04/25/23 0656 93.4 kg (206 lb)   03/06/23 1345 96.6 kg (213 lb)   01/12/23 0803 96.6 kg (213 lb)   12/08/22 1316 97.3 kg (214 lb 6.4 oz)   10/17/22 0813 95.7 kg (211 lb)   07/11/22 1418 97.1 kg (214 lb)   07/11/22 0841 97.5 kg (215 lb)       BP ok  Other vitals ok  Weight stable  If no significant improvement early next week call so we can order MRI and referral to ortho    Follow up: Return for lab/return as planned .  Future Appointments   Date Time Provider Department Center   11/18/2024  8:00 AM Lew Palacios MD [facility]     Data review above:   Rx: reviewed and decisions:   Rx new/changes: none  No orders of the defined types were placed in this encounter.      Orders placed:   LAB/Testing/Referrals: reviewed/orders:   Today:   No orders of the defined types were placed in this encounter.    Chronic/recurrent labs above or change to:   Same     Immunization History   Administered Date(s) Administered    COVID-19 (MODERNA) 1st,2nd,3rd Dose Monovalent 03/19/2021, 04/19/2021    Flu Vaccine Quad PF >36MO 10/18/2017    Flu Vaccine Split Quad 10/18/2017    Fluzone (or Fluarix & Flulaval for VFC) >6mos 10/18/2017     We advised/reaffirmed our support/suggestion for staying complete with covid- covid boosters, seasonal flu/yearly and any missing  "vaccine from list we supplied; when cannot be given here we suggest contact with local health department office or pharmacy to review missing/needed vaccines and then bring nursing documentation for these vaccines to this office or call this information in. Shingles became \"free\" 1.1.23 for medicare insurance.    Health maintenance:   Body mass index is 36.15 kg/m².         Tobacco use reviewed:   Hannah BARBI ParmarGlynn  reports that she has never smoked. She has never used smokeless tobacco.     There are no Patient Instructions on file for this visit.          "

## 2024-07-12 ENCOUNTER — HOSPITAL ENCOUNTER (OUTPATIENT)
Dept: WOMENS IMAGING | Age: 74
Discharge: HOME | End: 2024-07-12
Payer: MEDICARE

## 2024-07-12 VITALS — WEIGHT: 210 LBS | BODY MASS INDEX: 36.05 KG/M2

## 2024-07-12 DIAGNOSIS — Z12.31 VISIT FOR SCREENING MAMMOGRAM: ICD-10-CM

## 2024-07-12 PROCEDURE — 77063 BREAST TOMOSYNTHESIS BI: CPT

## 2024-07-17 ENCOUNTER — TELEPHONE (OUTPATIENT)
Dept: FAMILY MEDICINE CLINIC | Facility: CLINIC | Age: 74
End: 2024-07-17

## 2024-07-17 DIAGNOSIS — S80.02XA HEMATOMA OF LEFT KNEE REGION: ICD-10-CM
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
## 2024-07-17 DIAGNOSIS — S80.02XA CONTUSION OF LEFT KNEE, INITIAL ENCOUNTER: ICD-10-CM

## 2024-07-17 NOTE — TELEPHONE ENCOUNTER
Pt is all aware and will let us know if she dont hear from  soon about date and time.. pt asked if this would cover from the knee down to the ankle due to ankle issues also
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
## 2024-07-17 NOTE — TELEPHONE ENCOUNTER
Mri knee ordered  Change to external/[facility] if  A. Delay at  too long  B. Patient would go to [facility]

## 2024-07-18 ENCOUNTER — HOSPITAL ENCOUNTER (OUTPATIENT)
Dept: MRI IMAGING | Facility: HOSPITAL | Age: 74
Discharge: HOME | End: 2024-07-18
Admitting: FAMILY MEDICINE
Payer: MEDICARE

## 2024-07-18 DIAGNOSIS — S80.02XA CONTUSION OF LEFT KNEE, INITIAL ENCOUNTER: ICD-10-CM
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
## 2024-07-18 DIAGNOSIS — S80.02XA HEMATOMA OF LEFT KNEE REGION: ICD-10-CM

## 2024-07-18 PROCEDURE — 73721 MRI JNT OF LWR EXTRE W/O DYE: CPT

## 2024-07-18 NOTE — PROGRESS NOTES
Leslie Ramírez comes today for her follow-up breast exam.  She has had no new breast complaints.  She reports no new palpable masses.  There is no skin or nipple changes.  There is no nipple discharge.  She has no appreciable evidence of supraclavicular or axillary adenopathy.    Patient Active Problem List    Diagnosis Date Noted    Varicose veins of leg with pain 06/07/2019    Chronic venous insufficiency 06/05/2019    Varicose veins with pain 05/01/2019       Current Outpatient Medications   Medication Sig Dispense Refill    gabapentin (NEURONTIN) 100 MG capsule Take 1 capsule by mouth at bedtime. Max Daily Amount: 100 mg      montelukast (SINGULAIR) 10 MG tablet Take 1 tablet by mouth nightly      valsartan (DIOVAN) 320 MG tablet TAKE 1 TABLET BY MOUTH ONCE DAILY      meclizine (ANTIVERT) 25 MG tablet Take 1 tablet by mouth      esomeprazole (NEXIUM) 20 MG delayed release capsule Take 1 capsule by mouth      levothyroxine (SYNTHROID) 100 MCG tablet Take 1 tablet by mouth       No current facility-administered medications for this visit.       Allergies Latex, Augmentin [amoxicillin-pot clavulanate], Bactrim [sulfamethoxazole-trimethoprim], Ceftin [cefuroxime], Codeine, Demerol hcl [meperidine], Erythromycin, Tequin [gatifloxacin], Tetracyclines & related, and Zithromax [azithromycin]    Past Medical History:   Diagnosis Date    Asthma     Fatty liver     GERD (gastroesophageal reflux disease)     Hyperlipidemia     Hypertension     Hypothyroidism     Osteoarthritis     Pre-diabetes     Seasonal allergic rhinitis     Sinus problem     Thyroid disease     Wears glasses        Past Surgical History:   Procedure Laterality Date    ARTERY SURGERY      ligation- due to nose bleed    BREAST BIOPSY Left 2014    fibroadenoma    CHOLECYSTECTOMY      HEMORRHOID SURGERY      HYSTERECTOMY (CERVIX STATUS UNKNOWN)  1981    partial-age 31    NECK SURGERY      fusion of disc    OVARY REMOVAL Bilateral 1990    age 40

## 2024-07-22 ENCOUNTER — OFFICE VISIT (OUTPATIENT)
Dept: SURGERY | Age: 74
End: 2024-07-22
Payer: MEDICARE

## 2024-07-22 VITALS
WEIGHT: 207 LBS | HEIGHT: 64 IN | SYSTOLIC BLOOD PRESSURE: 132 MMHG | DIASTOLIC BLOOD PRESSURE: 74 MMHG | BODY MASS INDEX: 35.34 KG/M2

## 2024-07-22 DIAGNOSIS — N60.12 FIBROCYSTIC BREAST CHANGES OF BOTH BREASTS: ICD-10-CM

## 2024-07-22 DIAGNOSIS — Z12.31 VISIT FOR SCREENING MAMMOGRAM: Primary | ICD-10-CM

## 2024-07-22 DIAGNOSIS — N60.11 FIBROCYSTIC BREAST CHANGES OF BOTH BREASTS: ICD-10-CM

## 2024-07-22 PROCEDURE — G8399 PT W/DXA RESULTS DOCUMENT: HCPCS | Performed by: PHYSICIAN ASSISTANT

## 2024-07-22 PROCEDURE — 1090F PRES/ABSN URINE INCON ASSESS: CPT | Performed by: PHYSICIAN ASSISTANT

## 2024-07-22 PROCEDURE — 1036F TOBACCO NON-USER: CPT | Performed by: PHYSICIAN ASSISTANT

## 2024-07-22 PROCEDURE — 3017F COLORECTAL CA SCREEN DOC REV: CPT | Performed by: PHYSICIAN ASSISTANT

## 2024-07-22 PROCEDURE — G8417 CALC BMI ABV UP PARAM F/U: HCPCS | Performed by: PHYSICIAN ASSISTANT

## 2024-07-22 PROCEDURE — 99213 OFFICE O/P EST LOW 20 MIN: CPT | Performed by: PHYSICIAN ASSISTANT

## 2024-07-22 PROCEDURE — G8427 DOCREV CUR MEDS BY ELIG CLIN: HCPCS | Performed by: PHYSICIAN ASSISTANT

## 2024-07-22 PROCEDURE — 1123F ACP DISCUSS/DSCN MKR DOCD: CPT | Performed by: PHYSICIAN ASSISTANT

## 2024-07-24 DIAGNOSIS — J30.89 PERENNIAL ALLERGIC RHINITIS: ICD-10-CM

## 2024-07-24 DIAGNOSIS — R09.82 POST-NASAL DRIP: ICD-10-CM

## 2024-07-24 RX ORDER — MONTELUKAST SODIUM 10 MG/1
10 TABLET ORAL NIGHTLY
Qty: 30 TABLET | Refills: 0 | Status: SHIPPED | OUTPATIENT
Start: 2024-07-24

## 2024-07-24 NOTE — TELEPHONE ENCOUNTER
Rx Refill Note  Requested Prescriptions     Pending Prescriptions Disp Refills    montelukast (SINGULAIR) 10 MG tablet 30 tablet 0     Sig: Take 1 tablet by mouth Every Night.      Last office visit with office: 07/11/2024  Next office visit with office: 11/18/2024    UDS:     DATE OF LAST REFILL: 06/20/2024    Controlled Substance Agreement:     LORAINE OR THO:          {TIP  Is Refill Pharmacy correct?:  Vaishali Craig MA  07/24/24, 09:04 CDT

## 2024-08-20 DIAGNOSIS — J30.89 PERENNIAL ALLERGIC RHINITIS: ICD-10-CM

## 2024-08-20 DIAGNOSIS — R09.82 POST-NASAL DRIP: ICD-10-CM

## 2024-08-20 RX ORDER — MONTELUKAST SODIUM 10 MG/1
10 TABLET ORAL NIGHTLY
Qty: 30 TABLET | Refills: 0 | Status: SHIPPED | OUTPATIENT
Start: 2024-08-20

## 2024-08-28 ENCOUNTER — PATIENT OUTREACH (OUTPATIENT)
Dept: CASE MANAGEMENT | Facility: OTHER | Age: 74
End: 2024-08-28
Payer: MEDICARE

## 2024-08-28 NOTE — OUTREACH NOTE
[facility] Hypertension Care Companion Follow-up    Patient has been compliant with entering BP readings since program enrollment. The BP program is nearing auto completion date and patient is aware. Readings reviewed with no escalations noted.     Fannie WOODS  Ambulatory Case Management    8/28/2024, 12:21 CDT

## 2024-09-03 ENCOUNTER — TELEPHONE (OUTPATIENT)
Dept: FAMILY MEDICINE CLINIC | Facility: CLINIC | Age: 74
End: 2024-09-03

## 2024-09-03 RX ORDER — CEPHALEXIN 500 MG/1
500 CAPSULE ORAL 3 TIMES DAILY
Qty: 30 CAPSULE | Refills: 0 | Status: SHIPPED | OUTPATIENT
Start: 2024-09-03 | End: 2024-09-13

## 2024-09-03 NOTE — TELEPHONE ENCOUNTER
Tolerated Keflex in past-Keflex is sent.    Electronically signed by FADI Lynch, 09/03/24, 12:47 PM CDT.

## 2024-09-03 NOTE — TELEPHONE ENCOUNTER
Caller: Hannah Maki    Relationship: Self    Best call back number: [phone]     What medication are you requesting: WHATEVER IS RECOMMENDED    What are your current symptoms: SORE THROAT, SINUS CONGESTION/PRESSURE    How long have you been experiencing symptoms: PAST COUPLE WEEKS FOR SINUS, SORE THROAT PAST 2 DAYS     Have you had these symptoms before:    [x] Yes  [] No    Have you been treated for these symptoms before:   [x] Yes  [] No    If a prescription is needed, what is your preferred pharmacy and phone number: [facility] [address] [place] [address] [address] [facility] [phone] [facility] [phone]      Additional notes:

## 2024-09-03 NOTE — TELEPHONE ENCOUNTER
Please address/pt said she has not tested for covid and she knows its not covid no fever she asked for meds

## 2024-09-06 RX ORDER — LEVOTHYROXINE SODIUM 100 UG/1
100 TABLET ORAL DAILY
Qty: 90 TABLET | Refills: 0 | Status: SHIPPED | OUTPATIENT
Start: 2024-09-06

## 2024-09-06 RX ORDER — VALSARTAN 320 MG/1
320 TABLET ORAL DAILY
Qty: 90 TABLET | Refills: 0 | Status: SHIPPED | OUTPATIENT
Start: 2024-09-06

## 2024-09-06 NOTE — TELEPHONE ENCOUNTER
Rx Refill Note  Requested Prescriptions     Pending Prescriptions Disp Refills    levothyroxine (SYNTHROID, LEVOTHROID) 100 MCG tablet 90 tablet 0     Sig: Take 1 tablet by mouth Daily.    valsartan (DIOVAN) 320 MG tablet 90 tablet 0     Sig: Take 1 tablet by mouth Daily.      Last office visit with office: 07/11/2024  Next office visit with office: 11/18/2024    UDS:     DATE OF LAST REFILL: 06/09/2024    Controlled Substance Agreement:     LORAINE OR THO:          {TIP  Is Refill Pharmacy correct?:  Vaishali Craig MA  09/06/24, 15:23 CDT

## 2024-09-12 ENCOUNTER — TELEPHONE (OUTPATIENT)
Dept: FAMILY MEDICINE CLINIC | Facility: CLINIC | Age: 74
End: 2024-09-12
Payer: MEDICARE

## 2024-09-12 RX ORDER — FLUCONAZOLE 150 MG/1
150 TABLET ORAL
Qty: 3 TABLET | Refills: 0 | Status: SHIPPED | OUTPATIENT
Start: 2024-09-12

## 2024-09-12 NOTE — TELEPHONE ENCOUNTER
Caller: Nikole Maki I    Relationship: Self    Best call back number: [phone]     What medication are you requesting: FLUCONAZOLE 100MG (3 TABLETS)    What are your current symptoms: BURNING AND ITCHING IN VAGINAL AREA    How long have you been experiencing symptoms: 2 DAYS AGO    Have you had these symptoms before:    [x] Yes  [] No    Have you been treated for these symptoms before:   [x] Yes  [] No    If a prescription is needed, what is your preferred pharmacy and phone number: [address] [facility] [phone] PH - [phone]      Additional notes: NIKOLE IS TAKING ANTIBIOTICS AND HAS DEVELOPED BURNING AND ITCHING IN THE VAGINAL AREA.

## 2024-09-25 DIAGNOSIS — R09.82 POST-NASAL DRIP: ICD-10-CM

## 2024-09-25 DIAGNOSIS — J30.89 PERENNIAL ALLERGIC RHINITIS: ICD-10-CM

## 2024-09-25 RX ORDER — MONTELUKAST SODIUM 10 MG/1
10 TABLET ORAL NIGHTLY
Qty: 30 TABLET | Refills: 0 | Status: SHIPPED | OUTPATIENT
Start: 2024-09-25

## 2024-10-23 DIAGNOSIS — J30.89 PERENNIAL ALLERGIC RHINITIS: ICD-10-CM

## 2024-10-23 DIAGNOSIS — R09.82 POST-NASAL DRIP: ICD-10-CM

## 2024-10-23 RX ORDER — MONTELUKAST SODIUM 10 MG/1
10 TABLET ORAL NIGHTLY
Qty: 30 TABLET | Refills: 0 | Status: SHIPPED | OUTPATIENT
Start: 2024-10-23

## 2024-11-18 ENCOUNTER — OFFICE VISIT (OUTPATIENT)
Dept: FAMILY MEDICINE CLINIC | Facility: CLINIC | Age: 74
End: 2024-11-18
Payer: MEDICARE

## 2024-11-18 VITALS
HEIGHT: 64 IN | WEIGHT: 215 LBS | HEART RATE: 68 BPM | OXYGEN SATURATION: 97 % | BODY MASS INDEX: 36.7 KG/M2 | DIASTOLIC BLOOD PRESSURE: 78 MMHG | SYSTOLIC BLOOD PRESSURE: 134 MMHG

## 2024-11-18 DIAGNOSIS — I10 PRIMARY HYPERTENSION: Primary | ICD-10-CM

## 2024-11-18 DIAGNOSIS — G57.72 COMPLEX REGIONAL PAIN SYNDROME TYPE 2 OF LEFT LOWER EXTREMITY: ICD-10-CM

## 2024-11-18 DIAGNOSIS — E78.2 MIXED HYPERLIPIDEMIA: ICD-10-CM

## 2024-11-18 DIAGNOSIS — E03.9 ACQUIRED HYPOTHYROIDISM: ICD-10-CM

## 2024-11-18 DIAGNOSIS — D75.1 ERYTHROCYTOSIS: ICD-10-CM

## 2024-11-18 DIAGNOSIS — K76.0 FATTY LIVER: ICD-10-CM

## 2024-11-18 PROBLEM — J01.00 ACUTE MAXILLARY SINUSITIS: Status: RESOLVED | Noted: 2019-11-18 | Resolved: 2024-11-18

## 2024-11-18 PROBLEM — S76.319A STRAIN OF HAMSTRING MUSCLE: Status: RESOLVED | Noted: 2021-07-22 | Resolved: 2024-11-18

## 2024-11-18 PROBLEM — J32.9 SINUSITIS: Status: RESOLVED | Noted: 2017-08-31 | Resolved: 2024-11-18

## 2024-11-18 PROCEDURE — G2211 COMPLEX E/M VISIT ADD ON: HCPCS

## 2024-11-18 PROCEDURE — 1125F AMNT PAIN NOTED PAIN PRSNT: CPT

## 2024-11-18 PROCEDURE — 99214 OFFICE O/P EST MOD 30 MIN: CPT

## 2024-11-18 PROCEDURE — 3078F DIAST BP <80 MM HG: CPT

## 2024-11-18 PROCEDURE — 3075F SYST BP GE 130 - 139MM HG: CPT

## 2024-11-18 RX ORDER — GABAPENTIN 300 MG/1
300 CAPSULE ORAL NIGHTLY
COMMUNITY
Start: 2024-10-31

## 2024-11-18 RX ORDER — AMITRIPTYLINE HYDROCHLORIDE 10 MG/1
10 TABLET ORAL NIGHTLY
Qty: 30 TABLET | Refills: 1 | Status: SHIPPED | OUTPATIENT
Start: 2024-11-18

## 2024-11-18 NOTE — PROGRESS NOTES
"Chief Complaint  Hyperlipidemia, Hypothyroidism, and Hypertension    Subjective      Hannah Maki presents to [facility]  History of Present Illness  The patient is a 74-year-old female here today for follow-up.    She underwent surgery on 09/28/2023 to repair several tendons and ligaments in her ankle. Since then, she has been experiencing constant pain. Physical therapy was attempted but it exacerbated her condition. Dr. Hedrick diagnosed her with Complex Regional Pain Syndrome (CRPS). Despite taking gabapentin 300 mg, her pain persists. She has not taken any other pain medication as they induce nausea. The pain is constant and intensifies when she is sitting or lying down. She had a fall in 07/2024, which resulted in a swollen leg that remains sensitive to touch. An MRI was conducted, but no ultrasound was performed. She has had to wear shoes two sizes larger due to the swelling in her ankle. Dr. Hedrick suggested a spinal injection and referred her to a pain management specialist, but she has not yet been contacted. She has a scheduled appointment with Dr. Hedrick next month. She also suffers from fibromyalgia.    She has elevated liver enzymes and is under the care of Dr. Mcdonald, a gastroenterologist. She has been advised to lose weight to help manage her condition.    SOCIAL HISTORY  She does not smoke.      Objective   Vital Signs:  /78   Pulse 68   Ht 162.6 cm (64\")   Wt 97.5 kg (215 lb)   SpO2 97%   BMI 36.90 kg/m²   Estimated body mass index is 36.9 kg/m² as calculated from the following:    Height as of this encounter: 162.6 cm (64\").    Weight as of this encounter: 97.5 kg (215 lb).            Physical Exam     Physical Exam  Vital Signs  Blood pressure reading is 134/78.      Result Review :  The following labs/imaging/notes were reviewed and discussed with the patient by Lew Palacios MD on 11/18/2024:   Latest Reference Range & Units 05/20/24 08:31   Sodium 136 - " 145 mmol/L 142   Potassium 3.5 - 5.2 mmol/L 4.5   Chloride 98 - 107 mmol/L 103   CO2 22.0 - 29.0 mmol/L 26.3   BUN 8 - 23 mg/dL 12   Creatinine 0.57 - 1.00 mg/dL 0.81   BUN/Creatinine Ratio 7.0 - 25.0  14.8   EGFR Result >60.0 mL/min/1.73 77.7   Glucose 65 - 99 mg/dL 116 (H)   Calcium 8.6 - 10.5 mg/dL 9.8   Alkaline Phosphatase 39 - 117 U/L 189 (H)   Total Protein 6.0 - 8.5 g/dL 7.2   Albumin 3.5 - 5.2 g/dL 4.3   A/G Ratio g/dL 1.5   AST (SGOT) 1 - 32 U/L 54 (H)   ALT (SGPT) 1 - 33 U/L 66 (H)   Total Bilirubin 0.0 - 1.2 mg/dL 1.3 (H)   Hemoglobin A1C 4.80 - 5.60 % 6.20 (H)   TSH Baseline 0.270 - 4.200 uIU/mL 0.828   Total Cholesterol 0 - 200 mg/dL 286 (H)   HDL Cholesterol 40 - 60 mg/dL 93 (H)   LDL Cholesterol  0 - 100 mg/dL 175 (H)   Triglycerides 0 - 150 mg/dL 108   Chol/HDL Ratio  3.08   VLDL Cholesterol Alex 5 - 40 mg/dL 18   (H): Data is abnormally high     Latest Reference Range & Units 05/20/24 08:31   Globulin gm/dL 2.9   WBC 3.40 - 10.80 10*3/mm3 8.38   RBC 3.77 - 5.28 10*6/mm3 5.14   Hemoglobin 12.0 - 15.9 g/dL 16.2 (H)   Hematocrit 34.0 - 46.6 % 47.4 (H)   Platelets 140 - 450 10*3/mm3 200   RDW 12.3 - 15.4 % 13.0   MCV 79.0 - 97.0 fL 92.2   MCH 26.6 - 33.0 pg 31.5   MCHC 31.5 - 35.7 g/dL 34.2   (H): Data is abnormally high        Assessment      Diagnoses and all orders for this visit:    1. Primary hypertension (Primary)    2. Mixed hyperlipidemia    3. Acquired hypothyroidism    4. Complex regional pain syndrome type 2 of left lower extremity    5. Fatty liver    6. Erythrocytosis    Other orders  -     amitriptyline (ELAVIL) 10 MG tablet; Take 1 tablet by mouth Every Night.  Dispense: 30 tablet; Refill: 1             Assessment & Plan  1. Complex Regional Pain Syndrome.  Amitriptyline 10 mg has been prescribed to be taken at night before bed. It was explained that this medication may induce drowsiness and could potentially aid in sleep. If there are no side effects but also no benefit, the dosage may  be increased to 25 mg. If symptoms persist, a referral to pain management will be considered for options such as nerve ablation or spinal stimulation.    2. Fatty Liver.  Liver enzymes will be rechecked. Weight loss was recommended as it may help improve the condition.    3. Erythrocytosis.  A CBC and iron levels will be rechecked today to monitor elevated hemoglobin and hematocrit levels.    4. Elevated Cholesterol.  Cholesterol levels are slightly elevated, but HDL is protective. No immediate treatment will be started due to the initiation of new medication for pain management. Monitoring will continue.    5. Hypothyroidism.  Continue current medication, levothyroxine 100 mcg. The last TSH level was 0.828, indicating good control. TSH will be checked annually.    6. Medication Management.  Continue current medication, gabapentin 300 mg, as it helps with nerve pain.    Follow-up  Return in 1 month for follow up.    No orders of the defined types were placed in this encounter.      Follow Up   Return in about 1 month (around 12/18/2024) for CRPS w/ new med. .  Patient was given instructions and counseling regarding her condition or for health maintenance advice. Please see specific information pulled into the AVS if appropriate.         Patient or patient representative verbalized consent for the use of Ambient Listening during the visit with  Lew Palacios MD for chart documentation. 11/18/2024  08:43 CST

## 2024-11-19 LAB
ALBUMIN SERPL-MCNC: 4.2 G/DL (ref 3.5–5.2)
ALBUMIN/GLOB SERPL: 1.4 G/DL
ALP SERPL-CCNC: 173 U/L (ref 39–117)
ALT SERPL-CCNC: 51 U/L (ref 1–33)
AST SERPL-CCNC: 47 U/L (ref 1–32)
BASOPHILS # BLD AUTO: 0.07 10*3/MM3 (ref 0–0.2)
BASOPHILS NFR BLD AUTO: 1 % (ref 0–1.5)
BILIRUB SERPL-MCNC: 1.2 MG/DL (ref 0–1.2)
BUN SERPL-MCNC: 12 MG/DL (ref 8–23)
BUN/CREAT SERPL: 16.4 (ref 7–25)
CALCIUM SERPL-MCNC: 9.6 MG/DL (ref 8.6–10.5)
CHLORIDE SERPL-SCNC: 106 MMOL/L (ref 98–107)
CO2 SERPL-SCNC: 26.1 MMOL/L (ref 22–29)
CREAT SERPL-MCNC: 0.73 MG/DL (ref 0.57–1)
EGFRCR SERPLBLD CKD-EPI 2021: 87.5 ML/MIN/1.73
EOSINOPHIL # BLD AUTO: 0.2 10*3/MM3 (ref 0–0.4)
EOSINOPHIL NFR BLD AUTO: 3 % (ref 0.3–6.2)
ERYTHROCYTE [DISTWIDTH] IN BLOOD BY AUTOMATED COUNT: 12.4 % (ref 12.3–15.4)
FERRITIN SERPL-MCNC: 210 NG/ML (ref 13–150)
GLOBULIN SER CALC-MCNC: 3 GM/DL
GLUCOSE SERPL-MCNC: 122 MG/DL (ref 65–99)
HCT VFR BLD AUTO: 46.2 % (ref 34–46.6)
HGB BLD-MCNC: 15.8 G/DL (ref 12–15.9)
IMM GRANULOCYTES # BLD AUTO: 0.01 10*3/MM3 (ref 0–0.05)
IMM GRANULOCYTES NFR BLD AUTO: 0.1 % (ref 0–0.5)
IRON SATN MFR SERPL: 35 % (ref 20–50)
IRON SERPL-MCNC: 144 MCG/DL (ref 37–145)
LYMPHOCYTES # BLD AUTO: 2.86 10*3/MM3 (ref 0.7–3.1)
LYMPHOCYTES NFR BLD AUTO: 42.5 % (ref 19.6–45.3)
MCH RBC QN AUTO: 30.9 PG (ref 26.6–33)
MCHC RBC AUTO-ENTMCNC: 34.2 G/DL (ref 31.5–35.7)
MCV RBC AUTO: 90.4 FL (ref 79–97)
MONOCYTES # BLD AUTO: 0.66 10*3/MM3 (ref 0.1–0.9)
MONOCYTES NFR BLD AUTO: 9.8 % (ref 5–12)
NEUTROPHILS # BLD AUTO: 2.93 10*3/MM3 (ref 1.7–7)
NEUTROPHILS NFR BLD AUTO: 43.6 % (ref 42.7–76)
NRBC BLD AUTO-RTO: 0 /100 WBC (ref 0–0.2)
PLATELET # BLD AUTO: 164 10*3/MM3 (ref 140–450)
POTASSIUM SERPL-SCNC: 4.8 MMOL/L (ref 3.5–5.2)
PROT SERPL-MCNC: 7.2 G/DL (ref 6–8.5)
RBC # BLD AUTO: 5.11 10*6/MM3 (ref 3.77–5.28)
SODIUM SERPL-SCNC: 142 MMOL/L (ref 136–145)
TIBC SERPL-MCNC: 414 MCG/DL
TRANSFERRIN SERPL-MCNC: 289 MG/DL (ref 192–364)
UIBC SERPL-MCNC: 270 MCG/DL (ref 112–346)
WBC # BLD AUTO: 6.73 10*3/MM3 (ref 3.4–10.8)

## 2024-11-23 DIAGNOSIS — J30.89 PERENNIAL ALLERGIC RHINITIS: ICD-10-CM

## 2024-11-23 DIAGNOSIS — R09.82 POST-NASAL DRIP: ICD-10-CM

## 2024-11-25 RX ORDER — MONTELUKAST SODIUM 10 MG/1
10 TABLET ORAL NIGHTLY
Qty: 30 TABLET | Refills: 0 | Status: SHIPPED | OUTPATIENT
Start: 2024-11-25

## 2024-12-05 RX ORDER — LEVOTHYROXINE SODIUM 100 UG/1
100 TABLET ORAL DAILY
Qty: 90 TABLET | Refills: 0 | Status: SHIPPED | OUTPATIENT
Start: 2024-12-05

## 2024-12-05 RX ORDER — VALSARTAN 320 MG/1
320 TABLET ORAL DAILY
Qty: 90 TABLET | Refills: 0 | Status: SHIPPED | OUTPATIENT
Start: 2024-12-05

## 2024-12-16 ENCOUNTER — OFFICE VISIT (OUTPATIENT)
Dept: FAMILY MEDICINE CLINIC | Facility: CLINIC | Age: 74
End: 2024-12-16
Payer: MEDICARE

## 2024-12-16 VITALS
BODY MASS INDEX: 36.37 KG/M2 | HEART RATE: 83 BPM | WEIGHT: 213 LBS | SYSTOLIC BLOOD PRESSURE: 122 MMHG | DIASTOLIC BLOOD PRESSURE: 74 MMHG | OXYGEN SATURATION: 93 % | HEIGHT: 64 IN

## 2024-12-16 DIAGNOSIS — R73.03 PREDIABETES: ICD-10-CM

## 2024-12-16 DIAGNOSIS — K76.0 FATTY LIVER: ICD-10-CM

## 2024-12-16 DIAGNOSIS — E83.119 HEMOCHROMATOSIS, UNSPECIFIED HEMOCHROMATOSIS TYPE: ICD-10-CM

## 2024-12-16 DIAGNOSIS — G57.72 COMPLEX REGIONAL PAIN SYNDROME TYPE 2 OF LEFT LOWER EXTREMITY: Primary | ICD-10-CM

## 2024-12-16 PROBLEM — G90.522 COMPLEX REGIONAL PAIN SYNDROME TYPE 1 OF LEFT LOWER EXTREMITY: Status: ACTIVE | Noted: 2024-12-16

## 2024-12-16 PROCEDURE — 3074F SYST BP LT 130 MM HG: CPT

## 2024-12-16 PROCEDURE — 99214 OFFICE O/P EST MOD 30 MIN: CPT

## 2024-12-16 PROCEDURE — G2211 COMPLEX E/M VISIT ADD ON: HCPCS

## 2024-12-16 PROCEDURE — 3078F DIAST BP <80 MM HG: CPT

## 2024-12-16 PROCEDURE — 1125F AMNT PAIN NOTED PAIN PRSNT: CPT

## 2024-12-16 RX ORDER — AMITRIPTYLINE HYDROCHLORIDE 10 MG/1
10 TABLET ORAL NIGHTLY
Qty: 30 TABLET | Refills: 1 | Status: SHIPPED | OUTPATIENT
Start: 2024-12-16

## 2024-12-16 NOTE — PROGRESS NOTES
"Chief Complaint  Complex maximus pain syndrom (Follow up on medication change.) and Med Refill    Subjective      Hannah Maki presents to [facility]  History of Present Illness  The patient is a 74-year-old female who presents today for follow-up on a new medication for the treatment of complex regional pain syndrome. She was last seen on 11/18/2024, where she was started on amitriptyline 10 mg daily.    She reports experiencing fatigue and has been prescribed amitriptyline 10 mg daily, which she finds beneficial without any adverse effects. She estimates a 20 to 30 percent improvement in her symptoms. She experiences pain in her left foot and leg, which she attributes to an ankle surgery performed in 09/2023. She also reports frequent leg cramps. She is on amitriptyline 10 mg daily.    She expresses concern about her elevated red blood cell count, which has since normalized. She does not take iron supplements and has no history of blood transfusions or iron supplementation. She recalls a fall in 07/2024, which resulted in persistent swelling and bruising. She does not take any vitamins. She has a history of anemia in childhood, for which she received injections.    She has a known history of fatty liver disease and is under the care of Dr. Puente from Gastroenterology, with a follow-up appointment scheduled for next year.    She has been experiencing persistent thirst, which she attributes to the dry weather. She occasionally observes dark urine, which clears upon increased water intake. She has been experiencing dry mouth for the past month but does not find it bothersome enough to discontinue amitriptyline.    FAMILY HISTORY  Her brother has type 2 diabetes.    MEDICATIONS  Current: amitriptyline, gabapentin      Objective   Vital Signs:  /74   Pulse 83   Ht 162.6 cm (64\")   Wt 96.6 kg (213 lb)   SpO2 93%   BMI 36.56 kg/m²   Estimated body mass index is 36.56 kg/m² " "as calculated from the following:    Height as of this encounter: 162.6 cm (64\").    Weight as of this encounter: 96.6 kg (213 lb).            Physical Exam     Physical Exam        Result Review :  The following labs/imaging/notes were reviewed and discussed with the patient by Lew Palacios MD on 12/16/2024:      Latest Reference Range & Units 11/18/24 07:49   Sodium 136 - 145 mmol/L 142   Potassium 3.5 - 5.2 mmol/L 4.8   Chloride 98 - 107 mmol/L 106   CO2 22.0 - 29.0 mmol/L 26.1   BUN 8 - 23 mg/dL 12   Creatinine 0.57 - 1.00 mg/dL 0.73   BUN/Creatinine Ratio 7.0 - 25.0  16.4   EGFR Result >60.0 mL/min/1.73 87.5   Glucose 65 - 99 mg/dL 122 (H)   Calcium 8.6 - 10.5 mg/dL 9.6   Alkaline Phosphatase 39 - 117 U/L 173 (H)   Total Protein 6.0 - 8.5 g/dL 7.2   Albumin 3.5 - 5.2 g/dL 4.2   A/G Ratio g/dL 1.4   AST (SGOT) 1 - 32 U/L 47 (H)   ALT (SGPT) 1 - 33 U/L 51 (H)   Total Bilirubin 0.0 - 1.2 mg/dL 1.2   (H): Data is abnormally high       Latest Reference Range & Units 11/18/24 07:49   Globulin gm/dL 3.0   WBC 3.40 - 10.80 10*3/mm3 6.73   RBC 3.77 - 5.28 10*6/mm3 5.11   Hemoglobin 12.0 - 15.9 g/dL 15.8   Hematocrit 34.0 - 46.6 % 46.2   Platelets 140 - 450 10*3/mm3 164   RDW 12.3 - 15.4 % 12.4   MCV 79.0 - 97.0 fL 90.4   MCH 26.6 - 33.0 pg 30.9   MCHC 31.5 - 35.7 g/dL 34.2   Neutrophil Rel % 42.7 - 76.0 % 43.6   Lymphocyte Rel % 19.6 - 45.3 % 42.5   Monocyte Rel % 5.0 - 12.0 % 9.8   Eosinophil Rel % 0.3 - 6.2 % 3.0   Basophil Rel % 0.0 - 1.5 % 1.0   Immature Granulocyte Rel % 0.0 - 0.5 % 0.1   Neutrophils Absolute 1.70 - 7.00 10*3/mm3 2.93   Lymphocytes Absolute 0.70 - 3.10 10*3/mm3 2.86   Monocytes Absolute 0.10 - 0.90 10*3/mm3 0.66   Eosinophils Absolute 0.00 - 0.40 10*3/mm3 0.20   Basophils Absolute 0.00 - 0.20 10*3/mm3 0.07   Immature Grans, Absolute 0.00 - 0.05 10*3/mm3 0.01   nRBC 0.0 - 0.2 /100 WBC 0.0      Latest Reference Range & Units 05/20/24 08:31   Hemoglobin A1C 4.80 - 5.60 % 6.20 (H)   (H): Data is " abnormally high      Assessment      Diagnoses and all orders for this visit:    1. Complex regional pain syndrome type 2 of left lower extremity (Primary)    2. Fatty liver  -     Hemoglobin A1c  -     Hemochromatosis Mutation  -     Ambulatory Referral to Hematology    3. Prediabetes  -     Hemoglobin A1c  -     Hemochromatosis Mutation  -     Ambulatory Referral to Hematology    4. Hemochromatosis, unspecified hemochromatosis type  -     Hemoglobin A1c  -     Hemochromatosis Mutation  -     Ambulatory Referral to Hematology    Other orders  -     amitriptyline (ELAVIL) 10 MG tablet; Take 1 tablet by mouth Every Night.  Dispense: 30 tablet; Refill: 1             Assessment & Plan  1. Complex regional pain syndrome.  She reports a 20-30% improvement in symptoms with the current dose of amitriptyline 10 mg daily. To enhance therapeutic outcomes, she will increase the dosage to 20 mg daily. If she experiences intolerable side effects, she is advised to revert to the original dosage and inform the clinic. She can continue taking gabapentin concurrently.    2. Prediabetes.  Her A1c level was previously recorded at 6.2, indicating a prediabetic state. She reports increased thirst and dry mouth, which could be related to her blood sugar levels. A re-evaluation of her A1c levels will be conducted today to monitor her condition.    3. Elevated iron levels.  Her iron levels were noted to be slightly elevated at 210, which could be attributed to her liver disease or fatty liver. The possibility of hemochromatosis can not be ruled out at this stage. She is advised to limit her intake of red meat and avoid iron-fortified cereals. Genetic testing will be conducted to determine if there is a hereditary component to her condition. A referral to a hematologist will be made for further evaluation and management.    4. Elevated liver enzymes.  Her liver enzymes were previously elevated but have shown some improvement. Continued  monitoring of her liver function will be necessary. She is advised to follow up with her GI specialist, Dr. Puente, next year.    Orders Placed This Encounter   Procedures    Hemoglobin A1c     Order Specific Question:   Release to patient     Answer:   Routine Release [[id number]]    Hemochromatosis Mutation     Order Specific Question:   Release to patient     Answer:   Routine Release [[id number]]    Ambulatory Referral to Hematology     Referral Priority:   Routine     Referral Type:   Consultation     Referral Reason:   Specialty Services Required     Requested Specialty:   Hematology     Number of Visits Requested:   1       Follow Up   Return in about 3 months (around 3/16/2025) for Hemachromatosis w/ gene test and hem referral, CRPS w/ med adjust, prediabetes. .  Patient was given instructions and counseling regarding her condition or for health maintenance advice. Please see specific information pulled into the AVS if appropriate.         Patient or patient representative verbalized consent for the use of Ambient Listening during the visit with  Lew Palacios MD for chart documentation. 12/16/2024  18:24 CST

## 2024-12-21 NOTE — PROGRESS NOTES
Subjective   Hannah Maki is a 66 y.o. female.     Chief Complaint   Patient presents with   • Influenza     fever, body aches, chills, headache, cough       History of Present Illness     uri symptoms with cough and chest congestion..[id number] up yellow sputum with henmotphysis or dypmea..fever has resvoled      Current Outpatient Prescriptions:   •  azelastine (ASTELIN) 0.1 % nasal spray, 2 sprays into each nostril 2 (Two) Times a Day. Use in each nostril as directed, Disp: 1 each, Rfl: 2  •  budesonide-formoterol (SYMBICORT) 160-4.5 MCG/ACT inhaler, Inhale into the lungs, Disp: , Rfl:   •  esomeprazole (NexIUM) 20 MG capsule, Take 20 mg by mouth Every Morning Before Breakfast., Disp: , Rfl:   •  ezetimibe (ZETIA) 10 MG tablet, , Disp: , Rfl:   •  irbesartan (AVAPRO) 300 MG tablet, Take 1 tablet by mouth Daily., Disp: 30 tablet, Rfl: 5  •  levothyroxine (SYNTHROID, LEVOTHROID) 100 MCG tablet, Take 1 tablet by mouth Daily., Disp: 30 tablet, Rfl: 2  •  montelukast (SINGULAIR) 10 MG tablet, Take 10 mg by mouth nightly., Disp: , Rfl:   •  polyethylene glycol-electrolytes (NULYTELY WITH FLAVOR PACKS) 420 G solution, Take 4,000 mL by mouth See Admin Instructions., Disp: 4000 mL, Rfl: 0  •  pravastatin (PRAVACHOL) 20 MG tablet, , Disp: , Rfl:   Allergies   Allergen Reactions   • Azithromycin    • Augmentin  [Amoxicillin-Pot Clavulanate]    • Bactrim  [Sulfamethoxazole-Trimethoprim]    • Cefuroxime    • Codeine    • Erythromycin    • Gatifloxacin    • Latex    • Meperidine    • Tetracyclines & Related        Past Medical History   Diagnosis Date   • Anxiety    • Colon polyp    • Diverticulosis    • Dysuria    • GERD (gastroesophageal reflux disease)    • Hyperlipidemia    • Hypertension    • Hyperthyroidism    • Rhinitis    • Tinnitus    • Vertigo      Past Surgical History   Procedure Laterality Date   • Appendectomy     • Hysterectomy     • Cholecystectomy     • Nose surgery       nose bleeding artery    • Neck  surgery     • Colonoscopy  11/18/2013      six polyps removed or destroyed, Diverticulosis  Recall 3 years   • Hemorrhoidectomy     • Hysterectomy     • Rectal fissure incision and drainage     • Colonoscopy  11/18/2013       Review of Systems   Constitutional: Negative.    HENT: Positive for rhinorrhea and sore throat.    Eyes: Negative.    Respiratory: Positive for cough.    Cardiovascular: Negative.    Gastrointestinal: Negative.    Endocrine: Negative.    Genitourinary: Negative.    Musculoskeletal: Negative.    Skin: Negative.    Allergic/Immunologic: Negative.    Neurological: Negative.    Hematological: Negative.    Psychiatric/Behavioral: Negative.        Objective    Visit Vitals   • /82   • Pulse 96   • Resp 16   • Wt 194 lb (88 kg)   • SpO2 98%   • BMI 32.28 kg/m2     Physical Exam   Constitutional: She is oriented to person, place, and time. She appears well-developed and well-nourished.   HENT:   Head: Normocephalic and atraumatic.   Right Ear: External ear normal.   Left Ear: External ear normal.   Nose: Nose normal.   Mouth/Throat: Oropharynx is clear and moist.   Eyes: Conjunctivae and EOM are normal. Pupils are equal, round, and reactive to light.   Neck: Normal range of motion. Neck supple.   Cardiovascular: Normal rate, regular rhythm, normal heart sounds and intact distal pulses.    Pulmonary/Chest: Effort normal.   Scattered rhonchi   Abdominal: Soft. Bowel sounds are normal.   Musculoskeletal: Normal range of motion.   Neurological: She is alert and oriented to person, place, and time. She has normal reflexes.   Skin: Skin is warm and dry.   Psychiatric: She has a normal mood and affect. Her behavior is normal. Judgment and thought content normal.   Nursing note and vitals reviewed.      Assessment/Plan   Hannah was seen today for influenza.    Diagnoses and all orders for this visit:    Bronchitis    Other orders  -     HYDROcod Polst-CPM Polst ER (TUSSIONEX PENNKINETIC ER) 10-8 MG/5ML ER  suspension; Take 5 mL by mouth Every 12 (Twelve) Hours As Needed for cough.  -     amoxicillin (AMOXIL) 500 MG capsule; Take 1 capsule by mouth 3 (Three) Times a Day.  -     predniSONE (DELTASONE) 10 MG tablet; Take 1 tablet by mouth Daily. 30mg x 2 dasy then 20mg x 2 days then 10mg x 2 days then 5mg x 2 days        Keep me informed       No orders of the defined types were placed in this encounter.      Follow up: prn   04:38

## 2024-12-22 DIAGNOSIS — R09.82 POST-NASAL DRIP: ICD-10-CM

## 2024-12-22 DIAGNOSIS — J30.89 PERENNIAL ALLERGIC RHINITIS: ICD-10-CM

## 2024-12-23 LAB
HBA1C MFR BLD: 6.3 % (ref 4.8–5.6)
HFE GENE MUT ANL BLD/T: NORMAL
IMP & REVIEW OF LAB RESULTS: NORMAL

## 2024-12-23 RX ORDER — MONTELUKAST SODIUM 10 MG/1
10 TABLET ORAL NIGHTLY
Qty: 30 TABLET | Refills: 0 | Status: SHIPPED | OUTPATIENT
Start: 2024-12-23

## 2024-12-30 ENCOUNTER — OFFICE VISIT (OUTPATIENT)
Age: 74
End: 2024-12-30
Payer: MEDICARE

## 2024-12-30 VITALS — WEIGHT: 210 LBS | BODY MASS INDEX: 35.85 KG/M2 | HEIGHT: 64 IN

## 2024-12-30 DIAGNOSIS — M79.89 PAIN AND SWELLING OF LEFT LOWER EXTREMITY: ICD-10-CM

## 2024-12-30 DIAGNOSIS — G90.522 COMPLEX REGIONAL PAIN SYNDROME TYPE 1 OF LEFT LOWER EXTREMITY: Primary | ICD-10-CM

## 2024-12-30 DIAGNOSIS — M79.605 PAIN AND SWELLING OF LEFT LOWER EXTREMITY: ICD-10-CM

## 2024-12-30 PROCEDURE — G8484 FLU IMMUNIZE NO ADMIN: HCPCS | Performed by: PODIATRIST

## 2024-12-30 PROCEDURE — G8399 PT W/DXA RESULTS DOCUMENT: HCPCS | Performed by: PODIATRIST

## 2024-12-30 PROCEDURE — G8417 CALC BMI ABV UP PARAM F/U: HCPCS | Performed by: PODIATRIST

## 2024-12-30 PROCEDURE — 1123F ACP DISCUSS/DSCN MKR DOCD: CPT | Performed by: PODIATRIST

## 2024-12-30 PROCEDURE — 99213 OFFICE O/P EST LOW 20 MIN: CPT | Performed by: PODIATRIST

## 2024-12-30 PROCEDURE — 1036F TOBACCO NON-USER: CPT | Performed by: PODIATRIST

## 2024-12-30 PROCEDURE — 1159F MED LIST DOCD IN RCRD: CPT | Performed by: PODIATRIST

## 2024-12-30 PROCEDURE — 1090F PRES/ABSN URINE INCON ASSESS: CPT | Performed by: PODIATRIST

## 2024-12-30 PROCEDURE — G8427 DOCREV CUR MEDS BY ELIG CLIN: HCPCS | Performed by: PODIATRIST

## 2024-12-30 PROCEDURE — 3017F COLORECTAL CA SCREEN DOC REV: CPT | Performed by: PODIATRIST

## 2024-12-30 RX ORDER — GABAPENTIN 300 MG/1
300 CAPSULE ORAL 3 TIMES DAILY
COMMUNITY
Start: 2024-11-30

## 2024-12-30 RX ORDER — GABAPENTIN 300 MG/1
300 CAPSULE ORAL NIGHTLY
Qty: 30 CAPSULE | Refills: 3 | Status: SHIPPED | OUTPATIENT
Start: 2024-12-30 | End: 2025-04-29

## 2024-12-30 RX ORDER — AMITRIPTYLINE HYDROCHLORIDE 10 MG/1
10 TABLET ORAL NIGHTLY
COMMUNITY

## 2025-01-24 DIAGNOSIS — J30.89 PERENNIAL ALLERGIC RHINITIS: ICD-10-CM

## 2025-01-24 DIAGNOSIS — R09.82 POST-NASAL DRIP: ICD-10-CM

## 2025-01-24 RX ORDER — MONTELUKAST SODIUM 10 MG/1
10 TABLET ORAL NIGHTLY
Qty: 30 TABLET | Refills: 0 | Status: SHIPPED | OUTPATIENT
Start: 2025-01-24

## 2025-02-21 DIAGNOSIS — E83.119 HEMOCHROMATOSIS, UNSPECIFIED HEMOCHROMATOSIS TYPE: Primary | ICD-10-CM

## 2025-02-26 ENCOUNTER — LAB (OUTPATIENT)
Dept: LAB | Facility: HOSPITAL | Age: 75
End: 2025-02-26
Payer: MEDICARE

## 2025-02-26 ENCOUNTER — CONSULT (OUTPATIENT)
Dept: ONCOLOGY | Facility: CLINIC | Age: 75
End: 2025-02-26
Payer: MEDICARE

## 2025-02-26 VITALS
OXYGEN SATURATION: 97 % | SYSTOLIC BLOOD PRESSURE: 148 MMHG | RESPIRATION RATE: 16 BRPM | TEMPERATURE: 97.1 F | BODY MASS INDEX: 37.17 KG/M2 | DIASTOLIC BLOOD PRESSURE: 78 MMHG | HEIGHT: 64 IN | WEIGHT: 217.7 LBS | HEART RATE: 89 BPM

## 2025-02-26 DIAGNOSIS — K74.00 LIVER FIBROSIS: ICD-10-CM

## 2025-02-26 DIAGNOSIS — K76.0 FATTY LIVER: ICD-10-CM

## 2025-02-26 DIAGNOSIS — E83.119 HEMOCHROMATOSIS, UNSPECIFIED HEMOCHROMATOSIS TYPE: Primary | ICD-10-CM

## 2025-02-26 DIAGNOSIS — R79.89 ELEVATED FERRITIN: ICD-10-CM

## 2025-02-26 DIAGNOSIS — E03.9 HYPOTHYROIDISM, UNSPECIFIED TYPE: ICD-10-CM

## 2025-02-26 DIAGNOSIS — Z14.8 CARRIER OF HEMOCHROMATOSIS HFE GENE MUTATION: Primary | ICD-10-CM

## 2025-02-26 DIAGNOSIS — R74.8 ELEVATED LIVER ENZYMES: ICD-10-CM

## 2025-02-26 LAB
ALBUMIN SERPL-MCNC: 3.6 G/DL (ref 3.5–5.2)
ALBUMIN/GLOB SERPL: 0.9 G/DL
ALP SERPL-CCNC: 170 U/L (ref 39–117)
ALT SERPL W P-5'-P-CCNC: 37 U/L (ref 1–33)
ANION GAP SERPL CALCULATED.3IONS-SCNC: 11 MMOL/L (ref 5–15)
AST SERPL-CCNC: 43 U/L (ref 1–32)
BASOPHILS # BLD AUTO: 0.07 10*3/MM3 (ref 0–0.2)
BASOPHILS NFR BLD AUTO: 1.1 % (ref 0–1.5)
BILIRUB SERPL-MCNC: 1.3 MG/DL (ref 0–1.2)
BUN SERPL-MCNC: 9 MG/DL (ref 8–23)
BUN/CREAT SERPL: 15.5 (ref 7–25)
CALCIUM SPEC-SCNC: 9.3 MG/DL (ref 8.6–10.5)
CHLORIDE SERPL-SCNC: 104 MMOL/L (ref 98–107)
CO2 SERPL-SCNC: 24 MMOL/L (ref 22–29)
CREAT SERPL-MCNC: 0.58 MG/DL (ref 0.57–1)
DEPRECATED RDW RBC AUTO: 45.4 FL (ref 37–54)
EGFRCR SERPLBLD CKD-EPI 2021: 96.3 ML/MIN/1.73
EOSINOPHIL # BLD AUTO: 0.21 10*3/MM3 (ref 0–0.4)
EOSINOPHIL NFR BLD AUTO: 3.4 % (ref 0.3–6.2)
ERYTHROCYTE [DISTWIDTH] IN BLOOD BY AUTOMATED COUNT: 13.4 % (ref 12.3–15.4)
FERRITIN SERPL-MCNC: 229.8 NG/ML (ref 13–150)
GLOBULIN UR ELPH-MCNC: 3.8 GM/DL
GLUCOSE SERPL-MCNC: 132 MG/DL (ref 65–99)
HCT VFR BLD AUTO: 44.8 % (ref 34–46.6)
HGB BLD-MCNC: 14.8 G/DL (ref 12–15.9)
HOLD SPECIMEN: NORMAL
IMM GRANULOCYTES # BLD AUTO: 0.01 10*3/MM3 (ref 0–0.05)
IMM GRANULOCYTES NFR BLD AUTO: 0.2 % (ref 0–0.5)
IRON 24H UR-MRATE: 126 MCG/DL (ref 37–145)
IRON SATN MFR SERPL: 36 % (ref 20–50)
LYMPHOCYTES # BLD AUTO: 2.76 10*3/MM3 (ref 0.7–3.1)
LYMPHOCYTES NFR BLD AUTO: 44.8 % (ref 19.6–45.3)
MCH RBC QN AUTO: 30.6 PG (ref 26.6–33)
MCHC RBC AUTO-ENTMCNC: 33 G/DL (ref 31.5–35.7)
MCV RBC AUTO: 92.6 FL (ref 79–97)
MONOCYTES # BLD AUTO: 0.57 10*3/MM3 (ref 0.1–0.9)
MONOCYTES NFR BLD AUTO: 9.3 % (ref 5–12)
NEUTROPHILS NFR BLD AUTO: 2.54 10*3/MM3 (ref 1.7–7)
NEUTROPHILS NFR BLD AUTO: 41.2 % (ref 42.7–76)
NRBC BLD AUTO-RTO: 0 /100 WBC (ref 0–0.2)
PLATELET # BLD AUTO: 144 10*3/MM3 (ref 140–450)
PMV BLD AUTO: 10.5 FL (ref 6–12)
POTASSIUM SERPL-SCNC: 4.2 MMOL/L (ref 3.5–5.2)
PROT SERPL-MCNC: 7.4 G/DL (ref 6–8.5)
RBC # BLD AUTO: 4.84 10*6/MM3 (ref 3.77–5.28)
SODIUM SERPL-SCNC: 139 MMOL/L (ref 136–145)
TIBC SERPL-MCNC: 355 MCG/DL (ref 298–536)
TRANSFERRIN SERPL-MCNC: 238 MG/DL (ref 200–360)
WBC NRBC COR # BLD AUTO: 6.16 10*3/MM3 (ref 3.4–10.8)

## 2025-02-26 PROCEDURE — 36415 COLL VENOUS BLD VENIPUNCTURE: CPT

## 2025-02-26 PROCEDURE — 85025 COMPLETE CBC W/AUTO DIFF WBC: CPT

## 2025-02-26 PROCEDURE — 80053 COMPREHEN METABOLIC PANEL: CPT

## 2025-02-26 PROCEDURE — 82728 ASSAY OF FERRITIN: CPT

## 2025-02-26 PROCEDURE — 83540 ASSAY OF IRON: CPT

## 2025-02-26 PROCEDURE — 84466 ASSAY OF TRANSFERRIN: CPT

## 2025-02-26 NOTE — PROGRESS NOTES
[facility] HEMATOLOGY & ONCOLOGY  [address]  [place] [address] [phone]    Patient Name: Hannah Maki  Encounter Date: 02/26/2025   YOB: 1950  Patient Number: [id number]    Hematology / Oncology Note    HPI / REASON FOR VISIT: Hannah Maki is a 74 y.o. female was referred to this office by Dr. Palacios for Elevated Ferritin.    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        History of Present Illness  The patient is a 74-year-old female who presents for her initial consultation appointment. She was referred due to elevated ferritin levels and being a carrier of hemochromatosis.    She has been under the care of Dr. Palacios, who identified an elevated ferritin. She has a history of fatty liver disease and hypothyroidism, managed with Synthroid 100 mcg. She is prediabetic but is not on any medication for this condition. She reports persistent fatigue, which she attributes to a recent recovery from COVID-19. She has a history of CRPS following tendon surgery 17 months ago, for which she takes gabapentin and amitriptyline. She experiences knee and back pain, necessitating the use of a gait aid post-surgery. She reports no chest pain, palpitations, or cardiac issues but does have leg swelling, which she believes is related to her CRPS. She is under the care of Dr. Singh for her fatty liver disease and has not undergone a liver biopsy. She has a history of gluten intolerance, causing swelling and pain lasting up to 10 days after consumption. She undergoes annual mammograms and colonoscopies every 3 years. She reports no hematuria, pelvic issues, or breast problems. She has a history of benign breast tumors and underwent a hysterectomy at age 31 due to fibroids, after which she was on hormone therapy for 20 years. She has a history of sensitive skin and experiences itching,  "which she manages with fragrance-free products. She reports no confusion, dizziness, memory issues, or neurological symptoms. She reports occasional anxiety but no depression or suicidal ideation. She has a history of hyperthyroidism, managed with medication.        LABS    Lab Results - Last 18 Months   Lab Units 02/26/25  0757 11/18/24  0749 05/20/24  0831 01/22/24  0858 09/19/23  1353   HEMOGLOBIN g/dL 14.8 15.8 16.2* 16.5* 14.8   HEMATOCRIT % 44.8 46.2 47.4* 46.2 45.6   MCV fL 92.6 90.4 92.2 92.2 93.1   WBC 10*3/mm3 6.16 6.73 8.38 9.18 9.16   RDW % 13.4 12.4 13.0 12.9 12.8   MPV fL 10.5  --   --   --  10.2   PLATELETS 10*3/mm3 144 164 200 221 184   IMM GRAN % % 0.2  --   --   --   --    NEUTROS ABS 10*3/mm3 2.54 2.93 4.52 4.51  --    LYMPHS ABS 10*3/mm3 2.76 2.86 2.96 3.58*  --    MONOS ABS 10*3/mm3 0.57 0.66 0.62 0.74  --    EOS ABS 10*3/mm3 0.21 0.20 0.21 0.19  --    BASOS ABS 10*3/mm3 0.07 0.07 0.04 0.10  --    IMMATURE GRANS (ABS) 10*3/mm3 0.01  --   --   --   --    NRBC /100 WBC 0.0 0.0 0.0 0.0  --        Lab Results - Last 18 Months   Lab Units 02/26/25  0757 11/18/24  0749 05/20/24  0831 01/22/24  0858 09/19/23  1353   GLUCOSE mg/dL 132* 122* 116* 116* 89   SODIUM mmol/L 139 142 142 139 138   POTASSIUM mmol/L 4.2 4.8 4.5 5.0 4.4   CO2 mmol/L 24.0 26.1 26.3 27.5 25.0   CHLORIDE mmol/L 104 106 103 102 102   ANION GAP mmol/L 11.0  --   --   --  11.0   CREATININE mg/dL 0.58 0.73 0.81 0.90 0.65   BUN mg/dL 9 12 12 13 11   BUN / CREAT RATIO  15.5 16.4 14.8 14.4 16.9   CALCIUM mg/dL 9.3 9.6 9.8 10.2 9.5   ALK PHOS U/L 170* 173* 189* 183*  --    TOTAL PROTEIN g/dL 7.4 7.2 7.2 7.4  --    ALT (SGPT) U/L 37* 51* 66* 59*  --    AST (SGOT) U/L 43* 47* 54* 43*  --    BILIRUBIN mg/dL 1.3* 1.2 1.3* 0.9  --    ALBUMIN g/dL 3.6 4.2 4.3 4.4  --    GLOBULIN gm/dL 3.8  --   --   --   --    GLOBULINREF gm/dL  --  3.0 2.9 3.0  --        No results for input(s): \"MSPIKE\", \"KAPPALAMB\", \"IGLFLC\", \"URICACID\", \"FREEKAPPAL\", \"CEA\", " "\"LDH\", \"REFLABREPO\" in the last [id number] hours.    Lab Results - Last 18 Months   Lab Units 02/26/25  0757 11/18/24  0749 05/20/24  0831 01/22/24  0858   IRON mcg/dL 126  --   --   --    TIBC mcg/dL 355 414  --   --    IRON SATURATION %  --  35  --   --    IRON SATURATION (TSAT) % 36  --   --   --    FERRITIN ng/mL 229.80* 210.00*  --   --    TSH uIU/mL  --   --  0.828 2.620         PAST MEDICAL HISTORY:  ALLERGIES:  Allergies   Allergen Reactions    Adhesive Tape Other (See Comments)     \"Pulls skin off\"    Augmentin [Amoxicillin-Pot Clavulanate] Rash     Pt unsure if she has taken po keflex     Azithromycin Nausea Only    Bactrim [Sulfamethoxazole-Trimethoprim] Nausea And Vomiting    Cefuroxime Nausea Only    Codeine GI Intolerance    Demerol [Meperidine] Nausea And Vomiting    Erythromycin Rash    Tequin [Gatifloxacin] Nausea Only    Tetracyclines & Related Nausea And Vomiting     CURRENT MEDICATIONS:  Outpatient Encounter Medications as of 2/26/2025   Medication Sig Dispense Refill    acetaminophen (TYLENOL) 500 MG tablet Take 2 tablets by mouth Every 6 (Six) Hours As Needed for Mild Pain. Indications: Fever, Pain      amitriptyline (ELAVIL) 10 MG tablet Take 1 tablet by mouth Every Night. 30 tablet 1    brompheniramine-pseudoephedrine-DM 30-2-10 MG/5ML syrup Take 10 mL by mouth 4 (Four) Times a Day As Needed for Congestion or Cough. 250 mL 0    dicyclomine (Bentyl) 10 MG capsule Take 1 capsule by mouth 3 (Three) Times a Day As Needed (abdominal discomfort). 90 capsule 11    esomeprazole (NexIUM) 20 MG capsule Take 1 capsule by mouth Every Morning Before Breakfast. Indications: Gastroesophageal Reflux Disease, Heartburn      gabapentin (NEURONTIN) 300 MG capsule Take 1 capsule by mouth Every Night.      levothyroxine (SYNTHROID, LEVOTHROID) 100 MCG tablet Take 1 tablet by mouth once daily 90 tablet 0    meclizine (ANTIVERT) 25 MG tablet Take 1 tablet by mouth 3 (Three) Times a Day As Needed for Dizziness (vertigo). " "Take one by mouth three times a day as needed for vertigo  Indications: Sensation of Spinning or Whirling 60 tablet 3    montelukast (SINGULAIR) 10 MG tablet TAKE 1 TABLET BY MOUTH ONCE DAILY AT NIGHT 30 tablet 0    ondansetron (ZOFRAN) 4 MG tablet Take 1 tablet by mouth As Needed.      valsartan (DIOVAN) 320 MG tablet Take 1 tablet by mouth once daily 90 tablet 0     No facility-administered encounter medications on file as of 2/26/2025.     <no information>    SOCIAL HISTORY:  Social History     Socioeconomic History    Marital status:    Tobacco Use    Smoking status: Never    Smokeless tobacco: Never   Vaping Use    Vaping status: Never Used   Substance and Sexual Activity    Alcohol use: Never    Drug use: Never    Sexual activity: Not Currently     Partners: Male       REVIEW OF SYSTEMS:  Review of Systems   Constitutional:  Positive for fatigue. Negative for fever.   HENT:  Negative for trouble swallowing.    Respiratory:  Negative for cough and shortness of breath.         Recent Covid infection   Cardiovascular:  Negative for chest pain, palpitations and leg swelling.   Gastrointestinal:  Negative for nausea and vomiting.        Fatty Liver   Endocrine:        Hypothyroidism, Prediabetic    Genitourinary:  Negative for hematuria.            Musculoskeletal:  Positive for arthralgias, joint swelling and myalgias.   Skin:  Negative for rash, skin lesions and wound.        Reports itching   Neurological:  Positive for speech difficulty (Studder). Negative for dizziness, syncope, memory problem and confusion.   Psychiatric/Behavioral:  Negative for suicidal ideas and depressed mood. The patient is nervous/anxious.        /78   Pulse 89   Temp 97.1 °F (36.2 °C)   Resp 16   Ht 162.6 cm (64\")   Wt 98.7 kg (217 lb 11.2 oz)   LMP 10/04/1981 Comment: age 31  SpO2 97%   BMI 37.37 kg/m²  Body surface area is 2.03 meters squared.    Pain Score    02/26/25 0805   PainSc: 3    PainLoc: Generalized "        Physical Exam  Constitutional:       Appearance: Normal appearance. She is obese.   HENT:      Head: Normocephalic and atraumatic.   Cardiovascular:      Rate and Rhythm: Normal rate and regular rhythm.   Pulmonary:      Effort: Pulmonary effort is normal.      Breath sounds: Normal breath sounds.   Abdominal:      General: Bowel sounds are normal.      Palpations: Abdomen is soft.   Musculoskeletal:      Right lower leg: No edema.      Left lower leg: No edema.   Skin:     General: Skin is warm and dry.   Neurological:      Mental Status: She is alert and oriented to person, place, and time.   Psychiatric:         Attention and Perception: Attention normal.         Mood and Affect: Mood normal.         Judgment: Judgment normal.         Hannah Maki reports a pain score of 3.  Given her pain assessment as noted, treatment options were discussed and the following options were decided upon as a follow-up plan to address the patient's pain: continuation of current treatment plan for pain and Takes Gabapentin and Amytriptyline .        ASSESSMENT / PLAN    1. Carrier of hemochromatosis HFE gene mutation    2. Elevated ferritin    3. Elevated liver enzymes    4. Fatty liver    5. Hypothyroidism, unspecified type    6. Liver fibrosis          Assessment & Plan  1. Elevated ferritin levels  2. Carrier of hemochromatosis.       -Patient is a heterozygous carrier of H63D Hemochromatosis Gene.   This is not associated with increased risk to develop clinical symptoms of Hereditary Hemochromatosis. In symptomatic individuals, other causes of iron overload should be evaluated.   -Individuals with heterozygosity for an HFE mutation who have a high ferritin level due to another condition.  Other causes of high ferritin include metabolic syndrome, inflammatory states, acute or chronic hepatitis, alcoholic liver disease, and others. Ideally, these individuals are treated for the underlying cause of their high ferritin and  an improvement is documented. However, If these individuals have iron overload (as evidenced by iron studies and/or MRI), they are then diagnosed with HH and are treated as such.     -Pt has co-morbidities of Hypothyroidism, Prediabetes (A1C 6.3), Obesity, Fatty Liver Disease.  These inflammatory states could increase hepcidin and subsequently Ferritin.    -I will order MRI of abdomen to quantify iron.    -As Ferritin is < 1000, If no iron deposits are found, we would continue observation and encourage pt to work on weight loss, and decreasing inflammation.    - If iron deposits are found, we will consider therapeutic phlebotomy to decrease iron burden.        3. Fatty liver disease / Fibrosis   -2019 Metavir score:  F-1   - Recommend weight loss to reduce liver fat  - Continue follow-up with gastroenterologist, Dr. Orona      4. Hypothyroidism.  - Continue Synthroid 100 mcg daily per PCP    4. Prediabetes.  - Recommend lifestyle modifications including diet and exercise to manage blood sugar levels    5. Chronic regional pain syndrome (CRPS).  - Continue gabapentin and amitriptyline  - Monitor effectiveness and side effects of medications    6. Recent COVID-19 infection.  - Monitor ongoing sinus issues and fatigue         Follow-up  - Follow up in 6 weeks to discuss MRI results and subsequent treatment plan     Care discussed with patient.  Understanding expressed.  Patient agreeable with plan.        ACP discussion was held with the patient during this visit. Patient does not have an advance directive, information provided. Via AVS    I spent 45 minutes caring for the patient on this date of service (35 mins face to face).  This time includes time spent by me in the following activities: preparing for the visit, reviewing tests, performing a medically appropriate examination and/or evaluation, counseling and educating the patient/family/caregiver, ordering medications, tests, or procedures, documenting  information in the medical record, independently interpreting results and communicating that information with the patient/family/caregiver, and care coordination.        FADI James   02/26/2025     Patient or patient representative verbalized consent for the use of Ambient Listening during the visit with  FADI James for chart documentation. 2/26/2025  09:00 CST

## 2025-02-26 NOTE — PATIENT INSTRUCTIONS
So nice to meet you today Ms. Maki.      We will schedule a MRI to check for iron in your spleen and/or liver.  While you are a carrier of one hemochromatosis gene, your elevated ferritin is likely related to inflammatory things such as fatty liver, hypothyroidism.  When there is inflammation your body makes a protein which can cause your ferritin to react and elevate.      I will see you back in 6 weeks to discuss results of MRI and treatment plan, if needed.      Have a great day!   Raven

## 2025-02-26 NOTE — LETTER
February 26, 2025     Lew Palacios MD  [address] [place] [id number]    Patient: Hannah Maki   YOB: 1950   Date of Visit: 2/26/2025     Dear Lew Palacios MD:       Thank you for referring Hannah Maki to me for evaluation. Below are the relevant portions of my assessment and plan of care.    If you have questions, please do not hesitate to call me. I look forward to following Hannah along with you.         Sincerely,        FADI James        CC: No Recipients    Raven York APRN  02/26/25 0914  Sign when Signing Visit   [facility] HEMATOLOGY & ONCOLOGY  [address]  [facility] [id number] [phone]    Patient Name: Hannah Maki  Encounter Date: 02/26/2025   YOB: 1950  Patient Number: [id number]    Hematology / Oncology Note    HPI / REASON FOR VISIT: Hannah Maki is a 74 y.o. female was referred to this office by Dr. Palacios for Elevated Ferritin.    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        History of Present Illness  The patient is a 74-year-old female who presents for her initial consultation appointment. She was referred due to elevated ferritin levels and being a carrier of hemochromatosis.    She has been under the care of Dr. Palacios, who identified an elevated ferritin. She has a history of fatty liver disease and hypothyroidism, managed with Synthroid 100 mcg. She is prediabetic but is not on any medication for this condition. She reports persistent fatigue, which she attributes to a recent recovery from COVID-19. She has a history of CRPS following tendon surgery 17 months ago, for which she takes gabapentin and amitriptyline. She experiences knee and back pain, necessitating the use of a gait aid post-surgery. She reports no chest pain, palpitations, or cardiac issues but does have leg swelling, which she believes is related  to her CRPS. She is under the care of Dr. Singh for her fatty liver disease and has not undergone a liver biopsy. She has a history of gluten intolerance, causing swelling and pain lasting up to 10 days after consumption. She undergoes annual mammograms and colonoscopies every 3 years. She reports no hematuria, pelvic issues, or breast problems. She has a history of benign breast tumors and underwent a hysterectomy at age 31 due to fibroids, after which she was on hormone therapy for 20 years. She has a history of sensitive skin and experiences itching, which she manages with fragrance-free products. She reports no confusion, dizziness, memory issues, or neurological symptoms. She reports occasional anxiety but no depression or suicidal ideation. She has a history of hyperthyroidism, managed with medication.        LABS    Lab Results - Last 18 Months   Lab Units 02/26/25  0757 11/18/24  0749 05/20/24  0831 01/22/24  0858 09/19/23  1353   HEMOGLOBIN g/dL 14.8 15.8 16.2* 16.5* 14.8   HEMATOCRIT % 44.8 46.2 47.4* 46.2 45.6   MCV fL 92.6 90.4 92.2 92.2 93.1   WBC 10*3/mm3 6.16 6.73 8.38 9.18 9.16   RDW % 13.4 12.4 13.0 12.9 12.8   MPV fL 10.5  --   --   --  10.2   PLATELETS 10*3/mm3 144 164 200 221 184   IMM GRAN % % 0.2  --   --   --   --    NEUTROS ABS 10*3/mm3 2.54 2.93 4.52 4.51  --    LYMPHS ABS 10*3/mm3 2.76 2.86 2.96 3.58*  --    MONOS ABS 10*3/mm3 0.57 0.66 0.62 0.74  --    EOS ABS 10*3/mm3 0.21 0.20 0.21 0.19  --    BASOS ABS 10*3/mm3 0.07 0.07 0.04 0.10  --    IMMATURE GRANS (ABS) 10*3/mm3 0.01  --   --   --   --    NRBC /100 WBC 0.0 0.0 0.0 0.0  --        Lab Results - Last 18 Months   Lab Units 02/26/25  0757 11/18/24  0749 05/20/24  0831 01/22/24  0858 09/19/23  1353   GLUCOSE mg/dL 132* 122* 116* 116* 89   SODIUM mmol/L 139 142 142 139 138   POTASSIUM mmol/L 4.2 4.8 4.5 5.0 4.4   CO2 mmol/L 24.0 26.1 26.3 27.5 25.0   CHLORIDE mmol/L 104 106 103 102 102   ANION GAP mmol/L 11.0  --   --   --  11.0  "  CREATININE mg/dL 0.58 0.73 0.81 0.90 0.65   BUN mg/dL 9 12 12 13 11   BUN / CREAT RATIO  15.5 16.4 14.8 14.4 16.9   CALCIUM mg/dL 9.3 9.6 9.8 10.2 9.5   ALK PHOS U/L 170* 173* 189* 183*  --    TOTAL PROTEIN g/dL 7.4 7.2 7.2 7.4  --    ALT (SGPT) U/L 37* 51* 66* 59*  --    AST (SGOT) U/L 43* 47* 54* 43*  --    BILIRUBIN mg/dL 1.3* 1.2 1.3* 0.9  --    ALBUMIN g/dL 3.6 4.2 4.3 4.4  --    GLOBULIN gm/dL 3.8  --   --   --   --    GLOBULINREF gm/dL  --  3.0 2.9 3.0  --        No results for input(s): \"MSPIKE\", \"KAPPALAMB\", \"IGLFLC\", \"URICACID\", \"FREEKAPPAL\", \"CEA\", \"LDH\", \"REFLABREPO\" in the last 64330 hours.    Lab Results - Last 18 Months   Lab Units 02/26/25  0757 11/18/24  0749 05/20/24  0831 01/22/24  0858   IRON mcg/dL 126  --   --   --    TIBC mcg/dL 355 414  --   --    IRON SATURATION %  --  35  --   --    IRON SATURATION (TSAT) % 36  --   --   --    FERRITIN ng/mL 229.80* 210.00*  --   --    TSH uIU/mL  --   --  0.828 2.620         PAST MEDICAL HISTORY:  ALLERGIES:  Allergies   Allergen Reactions   • Adhesive Tape Other (See Comments)     \"Pulls skin off\"   • Augmentin [Amoxicillin-Pot Clavulanate] Rash     Pt unsure if she has taken po keflex    • Azithromycin Nausea Only   • Bactrim [Sulfamethoxazole-Trimethoprim] Nausea And Vomiting   • Cefuroxime Nausea Only   • Codeine GI Intolerance   • Demerol [Meperidine] Nausea And Vomiting   • Erythromycin Rash   • Tequin [Gatifloxacin] Nausea Only   • Tetracyclines & Related Nausea And Vomiting     CURRENT MEDICATIONS:  Outpatient Encounter Medications as of 2/26/2025   Medication Sig Dispense Refill   • acetaminophen (TYLENOL) 500 MG tablet Take 2 tablets by mouth Every 6 (Six) Hours As Needed for Mild Pain. Indications: Fever, Pain     • amitriptyline (ELAVIL) 10 MG tablet Take 1 tablet by mouth Every Night. 30 tablet 1   • brompheniramine-pseudoephedrine-DM 30-2-10 MG/5ML syrup Take 10 mL by mouth 4 (Four) Times a Day As Needed for Congestion or Cough. 250 mL 0 "   • dicyclomine (Bentyl) 10 MG capsule Take 1 capsule by mouth 3 (Three) Times a Day As Needed (abdominal discomfort). 90 capsule 11   • esomeprazole (NexIUM) 20 MG capsule Take 1 capsule by mouth Every Morning Before Breakfast. Indications: Gastroesophageal Reflux Disease, Heartburn     • gabapentin (NEURONTIN) 300 MG capsule Take 1 capsule by mouth Every Night.     • levothyroxine (SYNTHROID, LEVOTHROID) 100 MCG tablet Take 1 tablet by mouth once daily 90 tablet 0   • meclizine (ANTIVERT) 25 MG tablet Take 1 tablet by mouth 3 (Three) Times a Day As Needed for Dizziness (vertigo). Take one by mouth three times a day as needed for vertigo  Indications: Sensation of Spinning or Whirling 60 tablet 3   • montelukast (SINGULAIR) 10 MG tablet TAKE 1 TABLET BY MOUTH ONCE DAILY AT NIGHT 30 tablet 0   • ondansetron (ZOFRAN) 4 MG tablet Take 1 tablet by mouth As Needed.     • valsartan (DIOVAN) 320 MG tablet Take 1 tablet by mouth once daily 90 tablet 0     No facility-administered encounter medications on file as of 2/26/2025.     <no information>    SOCIAL HISTORY:  Social History     Socioeconomic History   • Marital status:    Tobacco Use   • Smoking status: Never   • Smokeless tobacco: Never   Vaping Use   • Vaping status: Never Used   Substance and Sexual Activity   • Alcohol use: Never   • Drug use: Never   • Sexual activity: Not Currently     Partners: Male       REVIEW OF SYSTEMS:  Review of Systems   Constitutional:  Positive for fatigue. Negative for fever.   HENT:  Negative for trouble swallowing.    Respiratory:  Negative for cough and shortness of breath.         Recent Covid infection   Cardiovascular:  Negative for chest pain, palpitations and leg swelling.   Gastrointestinal:  Negative for nausea and vomiting.        Fatty Liver   Endocrine:        Hypothyroidism, Prediabetic    Genitourinary:  Negative for hematuria.            Musculoskeletal:  Positive for arthralgias, joint swelling and myalgias.  "  Skin:  Negative for rash, skin lesions and wound.        Reports itching   Neurological:  Positive for speech difficulty (Studder). Negative for dizziness, syncope, memory problem and confusion.   Psychiatric/Behavioral:  Negative for suicidal ideas and depressed mood. The patient is nervous/anxious.        /78   Pulse 89   Temp 97.1 °F (36.2 °C)   Resp 16   Ht 162.6 cm (64\")   Wt 98.7 kg (217 lb 11.2 oz)   LMP 10/04/1981 Comment: age 31  SpO2 97%   BMI 37.37 kg/m²  Body surface area is 2.03 meters squared.    Pain Score    02/26/25 0805   PainSc: 3    PainLoc: Generalized        Physical Exam  Constitutional:       Appearance: Normal appearance. She is obese.   HENT:      Head: Normocephalic and atraumatic.   Cardiovascular:      Rate and Rhythm: Normal rate and regular rhythm.   Pulmonary:      Effort: Pulmonary effort is normal.      Breath sounds: Normal breath sounds.   Abdominal:      General: Bowel sounds are normal.      Palpations: Abdomen is soft.   Musculoskeletal:      Right lower leg: No edema.      Left lower leg: No edema.   Skin:     General: Skin is warm and dry.   Neurological:      Mental Status: She is alert and oriented to person, place, and time.   Psychiatric:         Attention and Perception: Attention normal.         Mood and Affect: Mood normal.         Judgment: Judgment normal.         Hannah Maki reports a pain score of 3.  Given her pain assessment as noted, treatment options were discussed and the following options were decided upon as a follow-up plan to address the patient's pain: continuation of current treatment plan for pain and Takes Gabapentin and Amytriptyline .        ASSESSMENT / PLAN    1. Carrier of hemochromatosis HFE gene mutation    2. Elevated ferritin    3. Elevated liver enzymes    4. Fatty liver    5. Hypothyroidism, unspecified type    6. Liver fibrosis          Assessment & Plan  1. Elevated ferritin levels  2. Carrier of hemochromatosis.   "     -Patient is a heterozygous carrier of H63D Hemochromatosis Gene.   This is not associated with increased risk to develop clinical symptoms of Hereditary Hemochromatosis. In symptomatic individuals, other causes of iron overload should be evaluated.   -Individuals with heterozygosity for an HFE mutation who have a high ferritin level due to another condition.  Other causes of high ferritin include metabolic syndrome, inflammatory states, acute or chronic hepatitis, alcoholic liver disease, and others. Ideally, these individuals are treated for the underlying cause of their high ferritin and an improvement is documented. However, If these individuals have iron overload (as evidenced by iron studies and/or MRI), they are then diagnosed with HH and are treated as such.     -Pt has co-morbidities of Hypothyroidism, Prediabetes (A1C 6.3), Obesity, Fatty Liver Disease.  These inflammatory states could increase hepcidin and subsequently Ferritin.    -I will order MRI of abdomen to quantify iron.    -As Ferritin is < 1000, If no iron deposits are found, we would continue observation and encourage pt to work on weight loss, and decreasing inflammation.    - If iron deposits are found, we will consider therapeutic phlebotomy to decrease iron burden.        3. Fatty liver disease / Fibrosis   -2019 Metavir score:  F-1   - Recommend weight loss to reduce liver fat  - Continue follow-up with gastroenterologist, Dr. Orona      4. Hypothyroidism.  - Continue Synthroid 100 mcg daily per PCP    4. Prediabetes.  - Recommend lifestyle modifications including diet and exercise to manage blood sugar levels    5. Chronic regional pain syndrome (CRPS).  - Continue gabapentin and amitriptyline  - Monitor effectiveness and side effects of medications    6. Recent COVID-19 infection.  - Monitor ongoing sinus issues and fatigue         Follow-up  - Follow up in 6 weeks to discuss MRI results and subsequent treatment plan     Care  discussed with patient.  Understanding expressed.  Patient agreeable with plan.        ACP discussion was held with the patient during this visit. Patient does not have an advance directive, information provided. Via AVS    I spent 45 minutes caring for the patient on this date of service (35 mins face to face).  This time includes time spent by me in the following activities: preparing for the visit, reviewing tests, performing a medically appropriate examination and/or evaluation, counseling and educating the patient/family/caregiver, ordering medications, tests, or procedures, documenting information in the medical record, independently interpreting results and communicating that information with the patient/family/caregiver, and care coordination.        FADI James   02/26/2025     Patient or patient representative verbalized consent for the use of Ambient Listening during the visit with  FADI James for chart documentation. 2/26/2025  09:00 CST

## 2025-02-27 ENCOUNTER — PATIENT ROUNDING (BHMG ONLY) (OUTPATIENT)
Dept: ONCOLOGY | Facility: CLINIC | Age: 75
End: 2025-02-27
Payer: MEDICARE

## 2025-02-27 NOTE — PROGRESS NOTES
February 27, 2025    Hello, may I speak with Hannah BARBI Glynn?    My name is Alda      I am  with [facility] HEMATOLOGY & ONCOLOGY  [address]  [facility] [id number] [phone].    Before we get started may I verify your date of birth? 1950    I am calling to officially welcome you to our practice and ask about your recent visit. Is this a good time to talk? yes    Tell me about your visit with us. What things went well?  It was perfect. It went very good, she put me as ease and she went over everything really good. Everyone was super nice.        We're always looking for ways to make our patients' experiences even better. Do you have recommendations on ways we may improve?  no    Overall were you satisfied with your first visit to our practice? yes       I appreciate you taking the time to speak with me today. Is there anything else I can do for you? no      Thank you, and have a great day.

## 2025-02-28 DIAGNOSIS — R09.82 POST-NASAL DRIP: ICD-10-CM

## 2025-02-28 DIAGNOSIS — J30.89 PERENNIAL ALLERGIC RHINITIS: ICD-10-CM

## 2025-02-28 RX ORDER — MONTELUKAST SODIUM 10 MG/1
10 TABLET ORAL NIGHTLY
Qty: 30 TABLET | Refills: 0 | Status: SHIPPED | OUTPATIENT
Start: 2025-02-28

## 2025-03-06 RX ORDER — VALSARTAN 320 MG/1
320 TABLET ORAL DAILY
Qty: 90 TABLET | Refills: 0 | Status: SHIPPED | OUTPATIENT
Start: 2025-03-06

## 2025-03-06 RX ORDER — LEVOTHYROXINE SODIUM 100 UG/1
100 TABLET ORAL DAILY
Qty: 90 TABLET | Refills: 0 | Status: SHIPPED | OUTPATIENT
Start: 2025-03-06

## 2025-03-11 RX ORDER — AMITRIPTYLINE HYDROCHLORIDE 10 MG/1
10 TABLET ORAL NIGHTLY
Qty: 30 TABLET | Refills: 0 | Status: SHIPPED | OUTPATIENT
Start: 2025-03-11

## 2025-03-18 ENCOUNTER — HOSPITAL ENCOUNTER (OUTPATIENT)
Dept: MRI IMAGING | Facility: HOSPITAL | Age: 75
Discharge: HOME | End: 2025-03-18
Admitting: NURSE PRACTITIONER
Payer: MEDICARE

## 2025-03-18 DIAGNOSIS — Z14.8 CARRIER OF HEMOCHROMATOSIS HFE GENE MUTATION: ICD-10-CM

## 2025-03-18 DIAGNOSIS — R79.89 ELEVATED FERRITIN: ICD-10-CM

## 2025-03-18 DIAGNOSIS — R74.8 ELEVATED LIVER ENZYMES: ICD-10-CM

## 2025-03-18 DIAGNOSIS — K76.0 FATTY LIVER: ICD-10-CM

## 2025-03-18 DIAGNOSIS — K74.00 LIVER FIBROSIS: ICD-10-CM

## 2025-03-18 PROCEDURE — 74181 MRI ABDOMEN W/O CONTRAST: CPT

## 2025-03-21 ENCOUNTER — OFFICE VISIT (OUTPATIENT)
Dept: FAMILY MEDICINE CLINIC | Facility: CLINIC | Age: 75
End: 2025-03-21
Payer: MEDICARE

## 2025-03-21 ENCOUNTER — PATIENT ROUNDING (BHMG ONLY) (OUTPATIENT)
Dept: FAMILY MEDICINE CLINIC | Facility: CLINIC | Age: 75
End: 2025-03-21
Payer: MEDICARE

## 2025-03-21 VITALS
BODY MASS INDEX: 37.28 KG/M2 | HEIGHT: 64 IN | DIASTOLIC BLOOD PRESSURE: 72 MMHG | HEART RATE: 75 BPM | SYSTOLIC BLOOD PRESSURE: 134 MMHG | WEIGHT: 218.4 LBS | OXYGEN SATURATION: 99 %

## 2025-03-21 DIAGNOSIS — G90.522 COMPLEX REGIONAL PAIN SYNDROME TYPE 1 OF LEFT LOWER EXTREMITY: Primary | ICD-10-CM

## 2025-03-21 DIAGNOSIS — K76.0 FATTY LIVER: ICD-10-CM

## 2025-03-21 DIAGNOSIS — N75.0 BARTHOLIN GLAND CYST: ICD-10-CM

## 2025-03-21 DIAGNOSIS — E03.9 ACQUIRED HYPOTHYROIDISM: ICD-10-CM

## 2025-03-21 DIAGNOSIS — N28.1 RENAL CYST, RIGHT: ICD-10-CM

## 2025-03-21 DIAGNOSIS — R79.89 ELEVATED FERRITIN: ICD-10-CM

## 2025-03-21 RX ORDER — AMITRIPTYLINE HYDROCHLORIDE 10 MG/1
20 TABLET ORAL NIGHTLY
Qty: 60 TABLET | Refills: 0 | Status: SHIPPED | OUTPATIENT
Start: 2025-03-21

## 2025-03-21 NOTE — PROGRESS NOTES
Cristina Complaint  Prediabetes, Hyperlipidemia, Hypothyroidism, and Hypertension    Subjective      Hannah Maki presents to [facility]  History of Present Illness  The patient is a 74-year-old female who is here today for follow-up.    She reports no new health concerns since her last visit. She underwent an MRI on Tuesday, as requested by her hematologist, Dr. Raven York, to assess her liver iron levels. The patient has been informed that she is a carrier of hemochromatosis but does not have the condition herself. She is scheduled to see Dr. York next month for blood tests and a follow-up appointment. She also has an upcoming appointment with Dr. Gomez for her fatty liver disease.    The patient continues to experience pain in her left leg, which she attributes to a fall in July where she hit her left knee. The pain extends from her left knee downwards. She has been diagnosed with complex regional pain syndrome following surgery on a severed tendon in her ankle. Her current medication regimen includes amitriptyline 10 mg in the evening and gabapentin in the morning, which she reports as ineffective in managing her pain.    The patient had an MRI approximately 4 years ago, during which a cyst was identified on her kidney. She was informed that this is a common finding in individuals of her age group. She has not consulted a nephrologist and reports no renal symptoms or issues. Her kidney function tests have consistently shown normal results.    The patient has a blocked sweat gland that is becoming painful and would like to see a gynecologist. She used to see Dr. Bauer for years, but he retired. Dr. Mortensen referred her to a gynecologist at [facility] about 3 or 4 years ago, but she did not like him and would prefer to see a female gynecologist.    FAMILY HISTORY  Her sister  of pancreatic cancer, having had colon cancer first.    MEDICATIONS  Current: amitriptyline,  "gabapentin      Objective   Vital Signs:  /72   Pulse 75   Ht 162.6 cm (64.02\")   Wt 99.1 kg (218 lb 6.4 oz)   SpO2 99%   BMI 37.47 kg/m²   Estimated body mass index is 37.47 kg/m² as calculated from the following:    Height as of this encounter: 162.6 cm (64.02\").    Weight as of this encounter: 99.1 kg (218 lb 6.4 oz).            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   This is not associated with increased risk to develop clinical symptoms of Hereditary Hemochromatosis. In symptomatic individuals, other causes of iron overload should be evaluated.   -Individuals with heterozygosity for an HFE mutation who have a high ferritin level due to another condition.  Other causes of high ferritin include metabolic syndrome, inflammatory states, acute or chronic hepatitis, alcoholic liver disease, and others. Ideally, these individuals are treated for the underlying cause of their high ferritin and an improvement is documented. However, If these individuals have iron overload (as evidenced by iron studies and/or MRI), they are then diagnosed with HH and are treated as such.      -Pt has co-morbidities of Hypothyroidism, Prediabetes (A1C 6.3), Obesity, Fatty Liver Disease.  These inflammatory states could increase hepcidin and subsequently Ferritin.     -I will order MRI of abdomen to quantify iron.    -As Ferritin is < 1000, If no iron deposits are found, we would continue observation and encourage pt to work on weight loss, and decreasing inflammation.    - If iron deposits are found, we will consider therapeutic phlebotomy to decrease iron burden.        3. Fatty liver disease / Fibrosis   -2019 Metavir score:  F-1   - Recommend weight loss to reduce liver fat  - Continue follow-up with " gastroenterologist, Dr. Orona       4. Hypothyroidism.  - Continue Synthroid 100 mcg daily per PCP    4. Prediabetes.  - Recommend lifestyle modifications including diet and exercise to manage blood sugar levels    5. Chronic regional pain syndrome (CRPS).  - Continue gabapentin and amitriptyline  - Monitor effectiveness and side effects of medications    6. Recent COVID-19 infection.  - Monitor ongoing sinus issues and fatigue         Follow-up  - Follow up in 6 weeks to discuss MRI results and subsequent treatment plan       Latest Reference Range & Units 02/26/25 07:57   Sodium 136 - 145 mmol/L 139   Potassium 3.5 - 5.2 mmol/L 4.2   Chloride 98 - 107 mmol/L 104   CO2 22.0 - 29.0 mmol/L 24.0   Anion Gap 5.0 - 15.0 mmol/L 11.0   BUN 8 - 23 mg/dL 9   Creatinine 0.57 - 1.00 mg/dL 0.58   BUN/Creatinine Ratio 7.0 - 25.0  15.5   eGFR >60.0 mL/min/1.73 96.3   Glucose 65 - 99 mg/dL 132 (H)   Calcium 8.6 - 10.5 mg/dL 9.3   Alkaline Phosphatase 39 - 117 U/L 170 (H)   Total Protein 6.0 - 8.5 g/dL 7.4   Albumin 3.5 - 5.2 g/dL 3.6   Globulin gm/dL 3.8   A/G Ratio g/dL 0.9   AST (SGOT) 1 - 32 U/L 43 (H)   ALT (SGPT) 1 - 33 U/L 37 (H)   Total Bilirubin 0.0 - 1.2 mg/dL 1.3 (H)   Iron 37 - 145 mcg/dL 126   Ferritin 13.00 - 150.00 ng/mL 229.80 (H)   Iron Saturation (TSAT) 20 - 50 % 36   Transferrin 200 - 360 mg/dL 238   TIBC 298 - 536 mcg/dL 355   (H): Data is abnormally high    XAM: MRI ABDOMEN IRON QUANTIFICATION WO CONTRAST-     INDICATION: Elevated Ferritin; Z14.8-Genetic carrier of other disease;  R79.89-Other specified abnormal findings of blood chemistry;  R74.8-Abnormal levels of other serum enzymes; K76.0-Fatty (change of)  liver, not elsewhere classified; K74.00-Hepatic fibrosis, unspecified       TECHNIQUE: Multisequence multiplanar MR images was obtained of the  abdomen without intravenous contrast.       COMPARISON: Ultrasound 5/7/2019.     FINDINGS:     Lower Chest: Unremarkable.     Liver: The liver iron  concentration is 0.949 mg/g with a normal  reference range of 0.17-1.8. Mild hepatic steatosis with a fat fraction  of 5.6%. Mildly nodular liver contour.     Biliary Tree: Prior cholecystectomy.     Spleen: Mild splenomegaly.     Pancreas: A few tiny pancreatic cyst measuring up to 6 mm in the  pancreatic tail. No pancreatic duct dilation.     Adrenal Glands: Unremarkable.     Kidneys/Upper Ureters: 3.5 cm right renal cyst.     Gastrointestinal Tract: Unremarkable.      Lymphatics: Unremarkable.     Vasculature: Unremarkable.      Peritoneum/Retroperitoneum: Trace perihepatic ascites.     Abdominal Wall/Soft Tissues: Unremarkable.     Osseous Structures: Mild degenerative changes.     IMPRESSION:     Cirrhotic liver morphology with mild splenomegaly and trace perihepatic  ascites.     Liver iron concentration is 0.949 mg/g with a normal reference range of  0.17-1.8.     Mild hepatic steatosis.     Tiny pancreatic cyst measuring up to 6 mm. 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   DLP: 1700 mGy cm Automated exposure control was utilized to minimize   patient radiation dose.   TECHNIQUE: Following the oral ingestion and intravenous administration   of contrast, helical CT tomographic images of the abdomen and pelvis   were acquired. Coronal reformatted images were also provided for   review.   FINDINGS:   The lung bases and base of the heart are unremarkable.   LIVER: No focal liver lesion. The hepatic vasculature is patent.   BILIARY SYSTEM: The gallbladder is surgically absent.   PANCREAS: No focal pancreatic lesion.   SPLEEN: Unremarkable.   KIDNEYS AND ADRENALS: The adrenal glands are visualized.   The renal contours demonstrate uniform and symmetric excretion of   contrast. A 28 mm cyst is present upper pole the right kidney.. The   ureters are decompressed and normal in appearance.   RETROPERITONEUM: No mass,  lymphadenopathy or hemorrhage.   GI TRACT: Contrast is present. There is no evidence of obstruction.   Diverticula are noted in the distal descending and sigmoid colon..       Assessment      Diagnoses and all orders for this visit:    1. Complex regional pain syndrome type 1 of left lower extremity (Primary)  -     CBC & Differential; Future  -     Comprehensive Metabolic Panel; Future  -     Lipid Panel; Future  -     TSH Rfx On Abnormal To Free T4; Future  -     Vitamin B12 & Folate; Future  -     Iron Profile; Future  -     Ferritin; Future    2. Acquired hypothyroidism  -     CBC & Differential; Future  -     Comprehensive Metabolic Panel; Future  -     Lipid Panel; Future  -     TSH Rfx On Abnormal To Free T4; Future  -     Vitamin B12 & Folate; Future  -     Iron Profile; Future  -     Ferritin; Future    3. Fatty liver  -     CBC & Differential; Future  -     Comprehensive Metabolic Panel; Future  -     Lipid Panel; Future  -     TSH Rfx On Abnormal To Free T4; Future  -     Vitamin B12 & Folate; Future  -     Iron Profile; Future  -     Ferritin; Future    4. Elevated ferritin  -     CBC & Differential; Future  -     Comprehensive Metabolic Panel; Future  -     Lipid Panel; Future  -     TSH Rfx On Abnormal To Free T4; Future  -     Vitamin B12 & Folate; Future  -     Iron Profile; Future  -     Ferritin; Future    5. Renal cyst, right  -     Ambulatory Referral to Nephrology  -     CBC & Differential; Future  -     Comprehensive Metabolic Panel; Future  -     Lipid Panel; Future  -     TSH Rfx On Abnormal To Free T4; Future  -     Vitamin B12 & Folate; Future  -     Iron Profile; Future  -     Ferritin; Future    6. Bartholin gland cyst  -     Ambulatory Referral to Gynecology    Other orders  -     amitriptyline (ELAVIL) 10 MG tablet; Take 2 tablets by mouth Every Night.  Dispense: 60 tablet; Refill: 0             Assessment & Plan  1. Complex regional pain syndrome.  The dosage of amitriptyline will be  increased to 20 mg, to be taken at night until side effects are experienced. Potential side effects include dry mouth, dizziness, lightheadedness, and sleepiness. If side effects occur, the dosage will be reduced back to 10 mg. Gabapentin will continue to be taken in the mornings if it provides relief; otherwise, it can be taken as needed.    2. Hypothyroidism.  TSH levels are within the normal range, indicating well-controlled hypothyroidism. The current treatment plan with levothyroxine will be maintained.    3. Fatty liver disease.  The MRI results do not indicate elevated iron levels within the liver, suggesting that hemochromatosis is unlikely. The elevated ferritin levels could be attributed to a chronic inflammatory condition. Dietary modifications, exercise, and weight loss are recommended to manage the fatty liver disease. Follow-up with hematology is advised for further recommendations regarding the ferritin levels.    4. Renal cyst.  The renal cyst has increased in size from 28 mm to 35 mm. A referral to nephrology will be made for further evaluation.    5. Prediabetes.  The last A1c was 6.2. Dietary modifications and weight loss are recommended to manage prediabetes. Medication may be considered at a follow-up appointment if necessary.    6. Blocked sweat gland.  A referral to a female gynecologist will be made for further evaluation of the blocked sweat gland.    Follow-up  The patient will follow up in 6 months for a Medicare wellness visit.    PROCEDURE  The patient underwent surgery on a severed tendon in her ankle.    Orders Placed This Encounter   Procedures    Comprehensive Metabolic Panel     Standing Status:   Future     Expected Date:   9/21/2025     Expiration Date:   3/21/2026     Release to patient:   Routine Release [[id number]]    Lipid Panel     Standing Status:   Future     Expected Date:   9/21/2025     Expiration Date:   3/21/2026     Release to patient:   Routine Release [[id number]]     TSH Rfx On Abnormal To Free T4     Standing Status:   Future     Expected Date:   8/21/2025     Expiration Date:   3/21/2026     Release to patient:   Routine Release [[id number]]    Vitamin B12 & Folate     Standing Status:   Future     Expected Date:   9/21/2025     Expiration Date:   3/21/2026     Release to patient:   Routine Release [[id number]]    Iron Profile     Standing Status:   Future     Expected Date:   9/21/2025     Expiration Date:   3/21/2026     Release to patient:   Routine Release [[id number]]    Ferritin     Standing Status:   Future     Expected Date:   8/21/2025     Expiration Date:   6/21/2026     Release to patient:   Routine Release [[id number]]    Ambulatory Referral to Nephrology     Referral Priority:   Routine     Referral Type:   Consultation     Referral Reason:   Specialty Services Required     Requested Specialty:   Nephrology     Number of Visits Requested:   1    Ambulatory Referral to Gynecology     Referral Priority:   Routine     Referral Type:   Consultation     Referral Reason:   Specialty Services Required     Requested Specialty:   Gynecology     Number of Visits Requested:   1    CBC & Differential     Standing Status:   Future     Expected Date:   9/21/2025     Expiration Date:   3/21/2026     Manual Differential:   No     Release to patient:   Routine Release [[id number]]       Follow Up   Return in about 6 months (around 9/21/2025) for Medicare Wellness, elevated ferritin, renal cyst post nephro, Fatty liver, prediabtes, lab review.  Patient was given instructions and counseling regarding her condition or for health maintenance advice. Please see specific information pulled into the AVS if appropriate.         Patient or patient representative verbalized consent for the use of Ambient Listening during the visit with  Lew Palacios MD for chart documentation. 3/21/2025  09:59 CDT

## 2025-03-21 NOTE — PROGRESS NOTES
March 21, 2025    Hello, may I speak with Hannah Maki?    My name is Do      I am  with [facility]  [address] [facility], [place] [id number] [phone].    Before we get started may I verify your date of birth? 1950    I am calling to officially welcome you to our practice and ask about your recent visit. Is this a good time to talk? yes    Tell me about your visit with us. What things went well?  visit went well       We're always looking for ways to make our patients' experiences even better. Do you have recommendations on ways we may improve?  no    Overall were you satisfied with your first visit to our practice? yes       I appreciate you taking the time to speak with me today. Is there anything else I can do for you? no      Thank you, and have a great day.

## 2025-03-25 ENCOUNTER — OFFICE VISIT (OUTPATIENT)
Age: 75
End: 2025-03-25
Payer: MEDICARE

## 2025-03-25 VITALS
DIASTOLIC BLOOD PRESSURE: 74 MMHG | SYSTOLIC BLOOD PRESSURE: 134 MMHG | WEIGHT: 213.3 LBS | HEIGHT: 64 IN | BODY MASS INDEX: 36.41 KG/M2

## 2025-03-25 DIAGNOSIS — Z78.9 NON-SMOKER: ICD-10-CM

## 2025-03-25 DIAGNOSIS — N75.0 BARTHOLIN'S CYST: Primary | ICD-10-CM

## 2025-03-25 PROCEDURE — 3078F DIAST BP <80 MM HG: CPT | Performed by: OBSTETRICS & GYNECOLOGY

## 2025-03-25 PROCEDURE — 3075F SYST BP GE 130 - 139MM HG: CPT | Performed by: OBSTETRICS & GYNECOLOGY

## 2025-03-25 PROCEDURE — 99214 OFFICE O/P EST MOD 30 MIN: CPT | Performed by: OBSTETRICS & GYNECOLOGY

## 2025-03-25 RX ORDER — CEPHALEXIN 500 MG/1
500 CAPSULE ORAL 4 TIMES DAILY
Qty: 40 CAPSULE | Refills: 0 | Status: SHIPPED | OUTPATIENT
Start: 2025-03-25 | End: 2025-04-04

## 2025-03-25 NOTE — PROGRESS NOTES
"Chief Complaint  Bartholin's Cyst (Pt here as new gyn referral from Dr. Palacios for bartholin cyst, she reports this has worsened over several years, she reports it is always there but does not ever go away, she has never done abx prior for this, she is unsure of how big it is but reports it is painful)    Subjective        Hannah Maki presents to [facility]  History of Present Illness    History of Present Illness  The patient is a 74-year-old female here for a referral from Dr. Palacios, a primary care family medicine doctor in [place], [place], for the possibility of a Bartholin cyst.    She has been experiencing a lump for several years, which was previously evaluated by her gynecologist, Dr. Man, who has since retired. He suggested that the lump might spontaneously rupture, but this has not occurred. The lump exhibits intermittent pain, predominantly on the left side. She has not received any antibiotic treatment for this condition. She recalls a potential instance of drainage or bleeding from the lump following a consultation with Dr. Man, but the details are unclear due to the passage of time.    Supplemental Information  She had surgery on her ankle 18 months ago for a severed tendon and got CRPS.    ALLERGIES  The patient has a long list of antibiotic allergies.    Objective   Vital Signs:  /74 (BP Location: Left arm, Patient Position: Sitting, Cuff Size: Large Adult)   Ht 162.6 cm (64.02\")   Wt 96.8 kg (213 lb 4.8 oz)   BMI 36.59 kg/m²   Estimated body mass index is 36.59 kg/m² as calculated from the following:    Height as of this encounter: 162.6 cm (64.02\").    Weight as of this encounter: 96.8 kg (213 lb 4.8 oz).    Physical Exam  Constitutional:       General: She is not in acute distress.     Appearance: Normal appearance. She is not toxic-appearing.   HENT:      Head: Normocephalic and atraumatic.      Nose: Nose normal.   Genitourinary:      Neurological:      " General: No focal deficit present.      Mental Status: She is alert.   Psychiatric:         Mood and Affect: Mood normal.         Behavior: Behavior normal.         Thought Content: Thought content normal.         Judgment: Judgment normal.        Result Review :                Assessment and Plan   Diagnoses and all orders for this visit:    1. Bartholin's cyst (Primary)  -     cephalexin (KEFLEX) 500 MG capsule; Take 1 capsule by mouth 4 (Four) Times a Day for 10 days.  Dispense: 40 capsule; Refill: 0    2. Non-smoker        Assessment & Plan  1. Potential Bartholin cyst.  Upon examination, the gland appears slightly enlarged but does not present a significant size to warrant drainage. A regimen of Keflex 500 mg, to be taken 4 times daily for 10 days, has been prescribed. She has been advised to complete the full course of antibiotics. The prescription will be sent to [facility] by the [facility]. If the cyst recurs after completing the antibiotic course, further treatment options will be considered.    Follow-up  The patient will follow up in 1 month.    PROCEDURE  The patient underwent ankle surgery for a severed tendon 18 months ago.         Follow Up   Return in about 1 month (around 4/25/2025) for with me.  Patient was given instructions and counseling regarding her condition or for health maintenance advice. Please see specific information pulled into the AVS if appropriate.         Jean Carlos Aparicio MD    Patient or patient representative verbalized consent for the use of Ambient Listening during the visit with  Jean Carlos Aparicio MD for chart documentation. 3/25/2025  11:23 CDT

## 2025-03-30 DIAGNOSIS — J30.89 PERENNIAL ALLERGIC RHINITIS: ICD-10-CM

## 2025-03-30 DIAGNOSIS — R09.82 POST-NASAL DRIP: ICD-10-CM

## 2025-03-31 ENCOUNTER — TELEPHONE (OUTPATIENT)
Dept: OBSTETRICS AND GYNECOLOGY | Age: 75
End: 2025-03-31

## 2025-03-31 RX ORDER — MONTELUKAST SODIUM 10 MG/1
10 TABLET ORAL NIGHTLY
Qty: 30 TABLET | Refills: 0 | Status: SHIPPED | OUTPATIENT
Start: 2025-03-31

## 2025-03-31 NOTE — TELEPHONE ENCOUNTER
Caller: Hannah Maki I    Relationship: Self    Best call back number: [phone]    What was the call regarding: PT STATED SINCE TAKING cephalexin (KEFLEX) 500 MG capsule PT IS EXPERIENCING ITCHING AND BURNING.   PT REQUESTING MEDICATION TO HELP. CONFIRMED THE PHARMACY ON FILE.     PT WOULD LIKE A CALL BACK.

## 2025-03-31 NOTE — TELEPHONE ENCOUNTER
Called and spoke to pt to see where the itching and burning was and she informed me it was vaginal.  I recommended she use Monistat OTC to see if this improved her sx and let us know if it didn't, pt voiced understanding.

## 2025-04-01 DIAGNOSIS — E83.119 HEMOCHROMATOSIS, UNSPECIFIED HEMOCHROMATOSIS TYPE: Primary | ICD-10-CM

## 2025-04-02 ENCOUNTER — LAB (OUTPATIENT)
Dept: LAB | Facility: HOSPITAL | Age: 75
End: 2025-04-02
Payer: MEDICARE

## 2025-04-02 DIAGNOSIS — E83.119 HEMOCHROMATOSIS, UNSPECIFIED HEMOCHROMATOSIS TYPE: ICD-10-CM

## 2025-04-02 LAB
ALBUMIN SERPL-MCNC: 3.6 G/DL (ref 3.5–5.2)
ALBUMIN/GLOB SERPL: 1 G/DL
ALP SERPL-CCNC: 154 U/L (ref 39–117)
ALT SERPL W P-5'-P-CCNC: 37 U/L (ref 1–33)
ANION GAP SERPL CALCULATED.3IONS-SCNC: 9 MMOL/L (ref 5–15)
AST SERPL-CCNC: 46 U/L (ref 1–32)
BASOPHILS # BLD AUTO: 0.07 10*3/MM3 (ref 0–0.2)
BASOPHILS NFR BLD AUTO: 1.2 % (ref 0–1.5)
BILIRUB SERPL-MCNC: 1.2 MG/DL (ref 0–1.2)
BUN SERPL-MCNC: 12 MG/DL (ref 8–23)
BUN/CREAT SERPL: 18.5 (ref 7–25)
CALCIUM SPEC-SCNC: 9 MG/DL (ref 8.6–10.5)
CHLORIDE SERPL-SCNC: 105 MMOL/L (ref 98–107)
CO2 SERPL-SCNC: 24 MMOL/L (ref 22–29)
CREAT SERPL-MCNC: 0.65 MG/DL (ref 0.57–1)
DEPRECATED RDW RBC AUTO: 47 FL (ref 37–54)
EGFRCR SERPLBLD CKD-EPI 2021: 93.7 ML/MIN/1.73
EOSINOPHIL # BLD AUTO: 0.19 10*3/MM3 (ref 0–0.4)
EOSINOPHIL NFR BLD AUTO: 3.4 % (ref 0.3–6.2)
ERYTHROCYTE [DISTWIDTH] IN BLOOD BY AUTOMATED COUNT: 13.8 % (ref 12.3–15.4)
FERRITIN SERPL-MCNC: 166.2 NG/ML (ref 13–150)
GLOBULIN UR ELPH-MCNC: 3.7 GM/DL
GLUCOSE SERPL-MCNC: 116 MG/DL (ref 65–99)
HCT VFR BLD AUTO: 42.4 % (ref 34–46.6)
HGB BLD-MCNC: 14.3 G/DL (ref 12–15.9)
IMM GRANULOCYTES # BLD AUTO: 0.02 10*3/MM3 (ref 0–0.05)
IMM GRANULOCYTES NFR BLD AUTO: 0.4 % (ref 0–0.5)
IRON 24H UR-MRATE: 78 MCG/DL (ref 37–145)
IRON SATN MFR SERPL: 20 % (ref 20–50)
LYMPHOCYTES # BLD AUTO: 2.42 10*3/MM3 (ref 0.7–3.1)
LYMPHOCYTES NFR BLD AUTO: 42.7 % (ref 19.6–45.3)
MCH RBC QN AUTO: 31.2 PG (ref 26.6–33)
MCHC RBC AUTO-ENTMCNC: 33.7 G/DL (ref 31.5–35.7)
MCV RBC AUTO: 92.4 FL (ref 79–97)
MONOCYTES # BLD AUTO: 0.65 10*3/MM3 (ref 0.1–0.9)
MONOCYTES NFR BLD AUTO: 11.5 % (ref 5–12)
NEUTROPHILS NFR BLD AUTO: 2.32 10*3/MM3 (ref 1.7–7)
NEUTROPHILS NFR BLD AUTO: 40.8 % (ref 42.7–76)
PLATELET # BLD AUTO: 127 10*3/MM3 (ref 140–450)
PMV BLD AUTO: 10.3 FL (ref 6–12)
POTASSIUM SERPL-SCNC: 4.9 MMOL/L (ref 3.5–5.2)
PROT SERPL-MCNC: 7.3 G/DL (ref 6–8.5)
RBC # BLD AUTO: 4.59 10*6/MM3 (ref 3.77–5.28)
SODIUM SERPL-SCNC: 138 MMOL/L (ref 136–145)
TIBC SERPL-MCNC: 381 MCG/DL (ref 298–536)
TRANSFERRIN SERPL-MCNC: 256 MG/DL (ref 200–360)
WBC NRBC COR # BLD AUTO: 5.67 10*3/MM3 (ref 3.4–10.8)

## 2025-04-02 PROCEDURE — 83540 ASSAY OF IRON: CPT

## 2025-04-02 PROCEDURE — 85025 COMPLETE CBC W/AUTO DIFF WBC: CPT

## 2025-04-02 PROCEDURE — 82728 ASSAY OF FERRITIN: CPT

## 2025-04-02 PROCEDURE — 84466 ASSAY OF TRANSFERRIN: CPT

## 2025-04-02 PROCEDURE — 36415 COLL VENOUS BLD VENIPUNCTURE: CPT

## 2025-04-02 PROCEDURE — 80053 COMPREHEN METABOLIC PANEL: CPT

## 2025-04-09 ENCOUNTER — OFFICE VISIT (OUTPATIENT)
Dept: ONCOLOGY | Facility: CLINIC | Age: 75
End: 2025-04-09
Payer: MEDICARE

## 2025-04-09 VITALS
SYSTOLIC BLOOD PRESSURE: 122 MMHG | HEART RATE: 73 BPM | DIASTOLIC BLOOD PRESSURE: 78 MMHG | RESPIRATION RATE: 16 BRPM | TEMPERATURE: 97.6 F | BODY MASS INDEX: 37.15 KG/M2 | HEIGHT: 64 IN | WEIGHT: 217.6 LBS | OXYGEN SATURATION: 97 %

## 2025-04-09 DIAGNOSIS — K74.60 CIRRHOSIS OF LIVER WITH ASCITES, UNSPECIFIED HEPATIC CIRRHOSIS TYPE: ICD-10-CM

## 2025-04-09 DIAGNOSIS — R79.89 ELEVATED FERRITIN: ICD-10-CM

## 2025-04-09 DIAGNOSIS — R18.8 CIRRHOSIS OF LIVER WITH ASCITES, UNSPECIFIED HEPATIC CIRRHOSIS TYPE: ICD-10-CM

## 2025-04-09 DIAGNOSIS — Z14.8 CARRIER OF HEMOCHROMATOSIS HFE GENE MUTATION: Primary | ICD-10-CM

## 2025-04-09 NOTE — PROGRESS NOTES
[facility]ATOLOGY & ONCOLOGY  [address]  [facility] [address] [phone]    Patient Name: Hannah Maki  Encounter Date: 04/09/2025   YOB: 1950  Patient Number: [id number]    Hematology / Oncology Note    HPI / REASON FOR VISIT: Hannah Maki is a 74 y.o. female was referred to this office by Dr. Palacios for Elevated Ferritin.    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        History of Present Illness    The patient presents for continued follow up of elevated ferritin.    She has been under observation for elevated ferritin levels, with an MRI conducted on 03/18/2025. She is currently under the care of Dr. Orona for cirrhosis, a condition she was previously unaware of. She has a scheduled appointment with him later this month. She recalls having stage 1 Fibrosis but not Cirrhosis.      She is scheduled to consult with a nephrologist next month due to the presence of a kidney cyst.        LABS    Lab Results - Last 18 Months   Lab Units 04/02/25  0907 02/26/25  0757 11/18/24  0749 05/20/24  0831 01/22/24  0858   HEMOGLOBIN g/dL 14.3 14.8 15.8 16.2* 16.5*   HEMATOCRIT % 42.4 44.8 46.2 47.4* 46.2   MCV fL 92.4 92.6 90.4 92.2 92.2   WBC 10*3/mm3 5.67 6.16 6.73 8.38 9.18   RDW % 13.8 13.4 12.4 13.0 12.9   MPV fL 10.3 10.5  --   --   --    PLATELETS 10*3/mm3 127* 144 164 200 221   IMM GRAN % % 0.4 0.2  --   --   --    NEUTROS ABS 10*3/mm3 2.32 2.54 2.93 4.52 4.51   LYMPHS ABS 10*3/mm3 2.42 2.76 2.86 2.96 3.58*   MONOS ABS 10*3/mm3 0.65 0.57 0.66 0.62 0.74   EOS ABS 10*3/mm3 0.19 0.21 0.20 0.21 0.19   BASOS ABS 10*3/mm3 0.07 0.07 0.07 0.04 0.10   IMMATURE GRANS (ABS) 10*3/mm3 0.02 0.01  --   --   --    NRBC /100 WBC  --  0.0 0.0 0.0 0.0       Lab Results - Last 18 Months   Lab Units 04/02/25  0907 02/26/25  0757 11/18/24  0749 05/20/24  0831 01/22/24  0858   GLUCOSE mg/dL  "116* 132* 122* 116* 116*   SODIUM mmol/L 138 139 142 142 139   POTASSIUM mmol/L 4.9 4.2 4.8 4.5 5.0   CO2 mmol/L 24.0 24.0 26.1 26.3 27.5   CHLORIDE mmol/L 105 104 106 103 102   ANION GAP mmol/L 9.0 11.0  --   --   --    CREATININE mg/dL 0.65 0.58 0.73 0.81 0.90   BUN mg/dL 12 9 12 12 13   BUN / CREAT RATIO  18.5 15.5 16.4 14.8 14.4   CALCIUM mg/dL 9.0 9.3 9.6 9.8 10.2   ALK PHOS U/L 154* 170* 173* 189* 183*   TOTAL PROTEIN g/dL 7.3 7.4 7.2 7.2 7.4   ALT (SGPT) U/L 37* 37* 51* 66* 59*   AST (SGOT) U/L 46* 43* 47* 54* 43*   BILIRUBIN mg/dL 1.2 1.3* 1.2 1.3* 0.9   ALBUMIN g/dL 3.6 3.6 4.2 4.3 4.4   GLOBULIN gm/dL 3.7 3.8  --   --   --    GLOBULINREF gm/dL  --   --  3.0 2.9 3.0       No results for input(s): \"MSPIKE\", \"KAPPALAMB\", \"IGLFLC\", \"URICACID\", \"FREEKAPPAL\", \"CEA\", \"LDH\", \"REFLABREPO\" in the last [id number] hours.    Lab Results - Last 18 Months   Lab Units 04/02/25  0907 02/26/25  0757 11/18/24  0749 05/20/24  0831 01/22/24  0858   IRON mcg/dL 78 126  --   --   --    TIBC mcg/dL 381 355 414  --   --    IRON SATURATION %  --   --  35  --   --    IRON SATURATION (TSAT) % 20 36  --   --   --    FERRITIN ng/mL 166.20* 229.80* 210.00*  --   --    TSH uIU/mL  --   --   --  0.828 2.620         PAST MEDICAL HISTORY:  ALLERGIES:  Allergies   Allergen Reactions    Adhesive Tape Other (See Comments)     \"Pulls skin off\"    Augmentin [Amoxicillin-Pot Clavulanate] Rash     Pt unsure if she has taken po keflex     Azithromycin Nausea Only    Bactrim [Sulfamethoxazole-Trimethoprim] Nausea And Vomiting    Cefuroxime Nausea Only    Codeine GI Intolerance    Demerol [Meperidine] Nausea And Vomiting    Erythromycin Rash    Tequin [Gatifloxacin] Nausea Only    Tetracyclines & Related Nausea And Vomiting     CURRENT MEDICATIONS:  Outpatient Encounter Medications as of 4/9/2025   Medication Sig Dispense Refill    acetaminophen (TYLENOL) 500 MG tablet Take 2 tablets by mouth Every 6 (Six) Hours As Needed for Mild Pain. Indications: " Fever, Pain      amitriptyline (ELAVIL) 10 MG tablet Take 2 tablets by mouth Every Night. 60 tablet 0    brompheniramine-pseudoephedrine-DM 30-2-10 MG/5ML syrup Take 10 mL by mouth 4 (Four) Times a Day As Needed for Congestion or Cough. 250 mL 0    dicyclomine (Bentyl) 10 MG capsule Take 1 capsule by mouth 3 (Three) Times a Day As Needed (abdominal discomfort). 90 capsule 11    esomeprazole (NexIUM) 20 MG capsule Take 1 capsule by mouth Every Morning Before Breakfast. Indications: Gastroesophageal Reflux Disease, Heartburn      gabapentin (NEURONTIN) 300 MG capsule Take 1 capsule by mouth Every Night.      levothyroxine (SYNTHROID, LEVOTHROID) 100 MCG tablet Take 1 tablet by mouth once daily 90 tablet 0    meclizine (ANTIVERT) 25 MG tablet Take 1 tablet by mouth 3 (Three) Times a Day As Needed for Dizziness (vertigo). Take one by mouth three times a day as needed for vertigo  Indications: Sensation of Spinning or Whirling 60 tablet 3    montelukast (SINGULAIR) 10 MG tablet TAKE 1 TABLET BY MOUTH ONCE DAILY AT NIGHT 30 tablet 0    ondansetron (ZOFRAN) 4 MG tablet Take 1 tablet by mouth As Needed.      valsartan (DIOVAN) 320 MG tablet Take 1 tablet by mouth once daily 90 tablet 0     No facility-administered encounter medications on file as of 4/9/2025.     <no information>    SOCIAL HISTORY:  Social History     Socioeconomic History    Marital status:    Tobacco Use    Smoking status: Never    Smokeless tobacco: Never   Vaping Use    Vaping status: Never Used   Substance and Sexual Activity    Alcohol use: Never    Drug use: Never    Sexual activity: Not Currently     Partners: Male     Birth control/protection: Hysterectomy       REVIEW OF SYSTEMS:  Review of Systems   Constitutional:  Positive for fatigue. Negative for fever.   HENT:  Negative for trouble swallowing.    Respiratory:  Negative for cough and shortness of breath.         Recent Covid infection   Cardiovascular:  Negative for chest pain,  "palpitations and leg swelling.   Gastrointestinal:  Negative for nausea and vomiting.        Fatty Liver   Endocrine:        Hypothyroidism, Prediabetic    Genitourinary:  Negative for hematuria.            Musculoskeletal:  Positive for arthralgias, joint swelling and myalgias.   Skin:  Negative for rash, skin lesions and wound.        Reports itching   Neurological:  Positive for speech difficulty (Studder). Negative for dizziness, syncope, memory problem and confusion.   Psychiatric/Behavioral:  Negative for suicidal ideas and depressed mood. The patient is nervous/anxious.        /78   Pulse 73   Temp 97.6 °F (36.4 °C)   Resp 16   Ht 162.6 cm (64\")   Wt 98.7 kg (217 lb 9.6 oz)   LMP 10/04/1981 Comment: age 31  SpO2 97%   BMI 37.35 kg/m²  Body surface area is 2.03 meters squared.    Pain Score    04/09/25 0903   PainSc: 0-No pain        Physical Exam  Constitutional:       Appearance: Normal appearance. She is obese.   HENT:      Head: Normocephalic and atraumatic.   Cardiovascular:      Rate and Rhythm: Normal rate and regular rhythm.   Pulmonary:      Effort: Pulmonary effort is normal.      Breath sounds: Normal breath sounds.   Abdominal:      General: Bowel sounds are normal.      Palpations: Abdomen is soft.   Musculoskeletal:      Right lower leg: No edema.      Left lower leg: No edema.   Skin:     General: Skin is warm and dry.   Neurological:      Mental Status: She is alert and oriented to person, place, and time.   Psychiatric:         Attention and Perception: Attention normal.         Mood and Affect: Mood normal.         Judgment: Judgment normal.         Hannah Maki reports a pain score of 0.  Given her pain assessment as noted, treatment options were discussed and the following options were decided upon as a follow-up plan to address the patient's pain: continuation of current treatment plan for pain and Takes Gabapentin and Amytriptyline .        ASSESSMENT / PLAN    1. Carrier " of hemochromatosis HFE gene mutation    2. Elevated ferritin    3. Cirrhosis of liver with ascites, unspecified hepatic cirrhosis type            Assessment & Plan     1. Carrier of hemochromatosis gene.  This condition does not pose an increased risk for the development of clinical symptoms. Her ferritin levels have shown improvement, with the most recent reading being 166. An MRI of the abdomen conducted on 03/18/2025 revealed no iron deposition in the liver or spleen, indicating stability and no need for intervention at this time.    2. Cirrhosis.  She is scheduled to consult with Dr. Orona later this month. A copy of her MRI results will be provided for her to take to the appointment. The MRI showed cirrhotic liver morphology with splenomegaly and some ascites.    3. Thrombocytopenia.  Her platelet count was recorded at 127, which is likely a consequence of the cirrhosis and splenomegaly. Given that the count is above 100, there are no immediate concerns. She reports no instances of acute bleeding or bruising. This condition will continue to be monitored.    4. Right renal cyst.  A 3.5 cm right renal cyst was noted on the MRI. She is scheduled to see a kidney specialist next month for further evaluation.    Follow-up: The patient will follow up in 6 months with laboratory tests to be conducted one week prior to the office visit.  Patient voices understanding and agrees to this treatment plan.     Care discussed with patient.  Understanding expressed.  Patient agreeable with plan.  \      FADI James   04/09/2025     Patient or patient representative verbalized consent for the use of Ambient Listening during the visit with  FADI James for chart documentation. 4/9/2025  09:00 CST

## 2025-04-10 RX ORDER — AMITRIPTYLINE HYDROCHLORIDE 10 MG/1
10 TABLET ORAL NIGHTLY
Qty: 30 TABLET | Refills: 0 | Status: SHIPPED | OUTPATIENT
Start: 2025-04-10

## 2025-04-21 ENCOUNTER — OFFICE VISIT (OUTPATIENT)
Age: 75
End: 2025-04-21
Payer: MEDICARE

## 2025-04-21 VITALS — HEIGHT: 64 IN | BODY MASS INDEX: 36.7 KG/M2 | WEIGHT: 215 LBS

## 2025-04-21 VITALS
HEIGHT: 64 IN | WEIGHT: 213.2 LBS | SYSTOLIC BLOOD PRESSURE: 124 MMHG | DIASTOLIC BLOOD PRESSURE: 70 MMHG | BODY MASS INDEX: 36.4 KG/M2

## 2025-04-21 DIAGNOSIS — B37.31 YEAST VAGINITIS: ICD-10-CM

## 2025-04-21 DIAGNOSIS — M79.89 PAIN AND SWELLING OF LEFT LOWER EXTREMITY: ICD-10-CM

## 2025-04-21 DIAGNOSIS — Z78.9 NON-SMOKER: ICD-10-CM

## 2025-04-21 DIAGNOSIS — G90.522 COMPLEX REGIONAL PAIN SYNDROME TYPE 1 OF LEFT LOWER EXTREMITY: Primary | ICD-10-CM

## 2025-04-21 DIAGNOSIS — N75.0 BARTHOLIN'S CYST: Primary | ICD-10-CM

## 2025-04-21 DIAGNOSIS — M79.605 PAIN AND SWELLING OF LEFT LOWER EXTREMITY: ICD-10-CM

## 2025-04-21 PROCEDURE — 1123F ACP DISCUSS/DSCN MKR DOCD: CPT | Performed by: PODIATRIST

## 2025-04-21 PROCEDURE — 3017F COLORECTAL CA SCREEN DOC REV: CPT | Performed by: PODIATRIST

## 2025-04-21 PROCEDURE — 1090F PRES/ABSN URINE INCON ASSESS: CPT | Performed by: PODIATRIST

## 2025-04-21 PROCEDURE — 1159F MED LIST DOCD IN RCRD: CPT | Performed by: PODIATRIST

## 2025-04-21 PROCEDURE — G8427 DOCREV CUR MEDS BY ELIG CLIN: HCPCS | Performed by: PODIATRIST

## 2025-04-21 PROCEDURE — 99213 OFFICE O/P EST LOW 20 MIN: CPT | Performed by: PODIATRIST

## 2025-04-21 PROCEDURE — G8417 CALC BMI ABV UP PARAM F/U: HCPCS | Performed by: PODIATRIST

## 2025-04-21 PROCEDURE — G8399 PT W/DXA RESULTS DOCUMENT: HCPCS | Performed by: PODIATRIST

## 2025-04-21 PROCEDURE — 1036F TOBACCO NON-USER: CPT | Performed by: PODIATRIST

## 2025-04-21 RX ORDER — FLUCONAZOLE 100 MG/1
100 TABLET ORAL DAILY
Qty: 3 TABLET | Refills: 0 | Status: SHIPPED | OUTPATIENT
Start: 2025-04-21 | End: 2025-04-24

## 2025-04-21 RX ORDER — GABAPENTIN 300 MG/1
300 CAPSULE ORAL NIGHTLY
Qty: 30 CAPSULE | Refills: 4 | Status: SHIPPED | OUTPATIENT
Start: 2025-04-21 | End: 2025-09-18

## 2025-04-21 NOTE — PROGRESS NOTES
"Chief Complaint  Follow-up (Pt is here for 1m f/u on Bartholin's Cyst on the left. She states she now has one on the right side. Pt did not tolerate the antibiotic well, she said she was having vaginal itching and burning. No improvement.)    Subjective        Hannah Maki presents to [facility]  History of Present Illness    History of Present Illness  The patient is a 74-year-old female here for follow-up on a Bartholin's abscess. Previous exam showed a left-sided enlargement of the Bartholin's gland, but no abscess or anything requiring drainage at that time. A prescription for Keflex was given, and a follow-up appointment was scheduled.    Irritation from the antibiotic is reported, which she typically manages with Monistat. This treatment has been partially effective in alleviating the irritation. However, the irritation has extended to the perineal region. A persistent painful spot on the left side is noted. A light brown stain on panty liners has been observed for approximately a year, located on the upper right side. Similar symptoms are suspected on the right side. No fevers or bleeding are reported, except for minor blood spots when wiping the irritated area, described as scab-like. Allergies to Bactrim and sulfa are confirmed.    Objective   Vital Signs:  /70 (BP Location: Left arm, Patient Position: Sitting, Cuff Size: Adult)   Ht 162.6 cm (64.02\")   Wt 96.7 kg (213 lb 3.2 oz)   BMI 36.58 kg/m²   Estimated body mass index is 36.58 kg/m² as calculated from the following:    Height as of this encounter: 162.6 cm (64.02\").    Weight as of this encounter: 96.7 kg (213 lb 3.2 oz).    Physical Exam  Genitourinary:     Comments: No lesions appreciated.  No Bartholin's enlargement.  Evidence of yeast infection noted.       Result Review :                Assessment and Plan   Diagnoses and all orders for this visit:    1. Bartholin's cyst (Primary)    2. Yeast vaginitis  -     " fluconazole (Diflucan) 100 MG tablet; Take 1 tablet by mouth Daily for 3 days.  Dispense: 3 tablet; Refill: 0    3. Non-smoker        Assessment & Plan  1. Bartholin's abscess     - Symptoms suggest a possible yeast infection rather than an abscess.     - No swelling or enlargement of the Bartholin's gland upon examination.     - Prescribe antifungal medication to be taken daily for 3 days.     - Send prescription to [facility] [address].     - Advise to contact the office to report progress.         Follow Up   No follow-ups on file.  Patient was given instructions and counseling regarding her condition or for health maintenance advice. Please see specific information pulled into the AVS if appropriate.         Jean Carlos Aparicio MD    Patient or patient representative verbalized consent for the use of Ambient Listening during the visit with  Jean Carlos Aparicio MD for chart documentation. 4/21/2025  11:54 CDT

## 2025-04-21 NOTE — PROGRESS NOTES
[facility]  [facility]  [address]  [facility] [id number] [phone]     Patient: Leslie Ramírez   YOB: 1950   Date: 4/21/2025   Visit Type:  Follow up 4 month   Last office visit 12-    Body Part:  left ankle    If over 55, have you burger an Osteoporosis Screening in the last 2 years? No    History of Present Illness  Chief Complaint   Patient presents with    Follow-up     4 month        This is a 74 y.o. female  presents today complaining of of left lower extremity pain and swelling.  She relates she is somewhat better than her last visit.  She is taking amitriptyline at night and gabapentin 300 mg in the morning.  Denies any side effects with that.    Review of Systems  System  Neg/Pos  Details  Constitutional  Negative  Chills, Fatigue, Fever and Night Sweats  Respiratory  Negative  Chest Pain, Cough and Dyspnea  Cardio   Negative  Leg Swelling  GI   Negative  Abdominal Pain, Constipation, Nausea and Vomiting     Negative  Urinary Incontinence   Endocrine  Negative  Weight Gain and Weight Loss  MS   Negative  Except as noted in HPI and Chief Complaint    Past Medical History:   Diagnosis Date    Asthma     Fatty liver     GERD (gastroesophageal reflux disease)     Hyperlipidemia     Hypertension     Hypothyroidism     Osteoarthritis     Pre-diabetes     Seasonal allergic rhinitis     Sinus problem     Thyroid disease     Wears glasses       Past Surgical History:   Procedure Laterality Date    ARTERY SURGERY      ligation- due to nose bleed    BREAST BIOPSY Left 2014    fibroadenoma    CHOLECYSTECTOMY      HEMORRHOID SURGERY      HYSTERECTOMY (CERVIX STATUS UNKNOWN)  1981    partial-age 31    NECK SURGERY      fusion of disc    OVARY REMOVAL Bilateral 1990    age 40      Social History     Socioeconomic History    Marital status:      Spouse name: None    Number of children: None    Years of education:

## 2025-04-23 RX ORDER — AMITRIPTYLINE HYDROCHLORIDE 10 MG/1
TABLET ORAL
Qty: 30 TABLET | Refills: 0 | Status: SHIPPED | OUTPATIENT
Start: 2025-04-23

## 2025-04-24 ENCOUNTER — OFFICE VISIT (OUTPATIENT)
Dept: GASTROENTEROLOGY | Facility: CLINIC | Age: 75
End: 2025-04-24
Payer: MEDICARE

## 2025-04-24 ENCOUNTER — TELEPHONE (OUTPATIENT)
Dept: GASTROENTEROLOGY | Facility: CLINIC | Age: 75
End: 2025-04-24

## 2025-04-24 VITALS
TEMPERATURE: 97.8 F | HEART RATE: 77 BPM | HEIGHT: 64 IN | SYSTOLIC BLOOD PRESSURE: 138 MMHG | DIASTOLIC BLOOD PRESSURE: 68 MMHG | BODY MASS INDEX: 36.19 KG/M2 | WEIGHT: 212 LBS | OXYGEN SATURATION: 96 %

## 2025-04-24 DIAGNOSIS — D12.6 ADENOMATOUS POLYP OF COLON, UNSPECIFIED PART OF COLON: ICD-10-CM

## 2025-04-24 DIAGNOSIS — K76.0 FATTY LIVER: ICD-10-CM

## 2025-04-24 DIAGNOSIS — K74.60 CIRRHOSIS OF LIVER WITHOUT ASCITES, UNSPECIFIED HEPATIC CIRRHOSIS TYPE: Primary | ICD-10-CM

## 2025-04-24 DIAGNOSIS — D49.0 IPMN (INTRADUCTAL PAPILLARY MUCINOUS NEOPLASM): ICD-10-CM

## 2025-04-24 DIAGNOSIS — R79.89 ABNORMAL LFTS: ICD-10-CM

## 2025-04-24 PROCEDURE — 1160F RVW MEDS BY RX/DR IN RCRD: CPT | Performed by: INTERNAL MEDICINE

## 2025-04-24 PROCEDURE — 3075F SYST BP GE 130 - 139MM HG: CPT | Performed by: INTERNAL MEDICINE

## 2025-04-24 PROCEDURE — 99214 OFFICE O/P EST MOD 30 MIN: CPT | Performed by: INTERNAL MEDICINE

## 2025-04-24 PROCEDURE — 1159F MED LIST DOCD IN RCRD: CPT | Performed by: INTERNAL MEDICINE

## 2025-04-24 PROCEDURE — 3078F DIAST BP <80 MM HG: CPT | Performed by: INTERNAL MEDICINE

## 2025-04-24 NOTE — PROGRESS NOTES
[facility]    Chief Complaint   Patient presents with    Follow-up     Fatty liver       Subjective     HPI    Hannah Maki is a 74 y.o. female who presents with a chief complaint of a liver.    She comes in for an annual checkup.  She feels well.  She states she has complex regional pain syndrome involving her left leg.  She follows with podiatry regarding this.  She is now using a cane.  This has limited her mobility.    From a GI standpoint she denies symptoms although she had an MRI in March of this year ordered by hematology.  They were looking for excess iron which did not note that in the liver.  But it did note changes in her liver consistent with early cirrhosis.  She had mild splenomegaly.  No ascites or varices were reported.  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    She denies any swelling in her legs other than the left leg with the CRPS.  She states since her MRI she has changed her diet and she has been able to lose 5 pounds.  She tells me again when she was in high school when she graduated she weighed 89 pounds.  She was able to maintain low weight till she got to her late 40s early 50s when she started to gain weight.    Reviewing her most recent labs, her CBC and CMP were unremarkable for evidence of underlying cirrhosis.  Her transaminases as below remain mildly elevated consistent with underlying fatty liver.     Latest Reference Range & Units 04/02/25 09:07   AST (SGOT) 1 - 32 U/L 46 (H)   ALT (SGPT) 1 - 33 U/L 37 (H)   (H): Data is abnormally high  ======= copy of April 15, 2024 HPI==============  HPI     Hannah Maki is a 73 y.o. female who presents with a chief complaint of elevated LFTs and IBS     When she was here last March in 2023 she was doing well from a GI standpoint.  Her IBS was under control, reflux under control.  She underwent colonoscopy last April noting a couple small polyps.  We did talk about weight loss last year to help  control her fatty liver.  I reviewed her outside LFTs, see copy below from most recent today once and I can see     She comes in today for follow-up annual office visit.  She tells me she is doing well.  She had left foot surgery back in September.  She has had a difficult long road to recovery.  She still wearing her brace.  She just got back from physical therapy.  She states she has trouble ambulating still.  She is using a cane.  Because she was laid up, she did lose some weight initially but she is gaining weight back.  She has been avoiding NSAIDs.  She does not want to use NSAIDs for treatment because we have talked about side effects of NSAIDs in the past.  She did not take any pain medicines because they made her sick.  She does use occasional Tylenol.     She does use her Bentyl every once in a while.  She has been traveling to her grandchildren soccer matches and been to [place] for an [employer] tournament.  She will go weeks without having to use the dicyclomine then she will have to use it for 3 times in a day.  It usually works well for her.  She states, she does not like to take medications.             Latest Reference Range & Units 07/17/23 08:56 01/22/24 08:58   AST (SGOT) 1 - 32 U/L 49 (H) 43 (H)   ALT (SGPT) 1 - 33 U/L 62 (H) 59 (H)   (H): Data is abnormally high  ========== copy of March 6, 2023 HPI============  HPI     Hannah BARBI Maki is a 72 y.o. female who presents with a chief complaint of IBS, history of polyps and fatty liver.     She tells me she is doing well.  She still has occasional loose stools.  Still takes Bentyl.  Works well for her.  She has not been able to lose weight.  She has tried.  She has not been successful.  She continues to try.  She did have labs done in January.  See copy of LFTs below.  Her weight is the same as it was when she was here last July.  She tells me her reflux is under control.  Everything is going well for her.  She has a 50-year wedding anniversary on  the 16th of this month.                Latest Reference Range & Units 07/12/21 08:26 01/10/22 08:12 07/11/22 08:17 01/12/23 07:31   Alkaline Phosphatase 39 - 117 U/L 140 (H) 162 (H) 177 (H) 165 (H)   Total Protein 6.0 - 8.5 g/dL 7.3 7.4 7.6 7.6   ALT (SGPT) 1 - 33 U/L 94 (H) 94 (H) 85 (H) 71 (H)   AST (SGOT) 1 - 32 U/L 76 (H) 51 (H) 61 (H) 64 (H)   Total Bilirubin 0.0 - 1.2 mg/dL 0.6 0.8 0.8 0.8   (H): Data is abnormally high  ======= copy of July 11, 2022 HPI=================================  HPI     Hannah Maki is a 71 y.o. female who presents with a chief complaint of heartburn, fatty liver and IBS.     She comes in for an annual checkup.  She tells me she is doing well.  She unfortunately has not lost any weight since last year.  She states that she really does not eat much.  She still is swimming routinely.  She states she has arthralgias which keeps her from doing much activity but she is enjoying swimming especially with her grandchildren.     Some of her GI complaints is occasional breakthrough heartburn.  She takes Nexium every day and that works well for her.  She still gets intermittent abdominal cramping and bloating.  Usually depends on if she eats something with gluten or other foods.  She will take a Bentyl as needed and that helps relieve her symptoms.        =========== copy July 8, 2021 HPI====================================  =   HPI     Hannah Maki is a 70 y.o. female who presents with a chief complaint of fatty liver.     She comes in for an annual checkup.  I did review her labs that she had about 6 months ago.  Her liver transaminases remain elevated.  ALT 78 AST 52.  That is about where she runs.  I note that her cholesterol is elevated.  She tells me her PCP did place her on a statin drug.  She stopped that recently on her own because she has significant muscle cramps.  When she stopped that the muscle cramps went away.  She is due to follow-up with her PCP again in a couple weeks.   Regarding weight loss she states she really has not had significant strides in losing weight.  I note that she is down a few pounds from when she was here last year.  As discussed, she needs to lose more.  She states she tries but she is always been skinny until she got in her 50s and now she is had trouble losing weight.     Her heartburn is under control.  She states she still notes that if she eats gluten products she has more heartburn and indigestion.  Last year we tested her for celiac and she had negative antibodies.  She states as long as she avoids gluten type product she feels pretty well so she is going to continue to avoid it.  She notes that she has symptoms right away if she eats something which would not be consistent with celiac disease but more of an intolerance.  She does have cramps at times and Bentyl has been prescribed she states that works well for her.  She may take it once a week and then some days she may take it 3 times.     Everything else is going well        ============== July 2020 HPI===========================================  HPI     Hannah Maki is a 69 y.o. female who presents with a chief complaint of fatty liver.     She presents for follow-up of her liver.  She tells me she is not been able to lose weight.  She injured her knee gardening and since then she has not been able to exercise or walk much.  She states she did get a steroid injection in her knee that did not help.  She did take meloxicam and that helped take care of the swelling.  She tolerated it well but only took it for 2 weeks.  She was concerned about the side effects of NSAIDs with her knee and liver.  She had a colonoscopy in March and went over that report with her.  Everything else is going well.        -=================== March 2020 HPI=======================  HPI     Hannah Maki is a 69 y.o. female who presents as a referral for preventative maintenance. She has no complaints of nausea or vomiting. No  change in bowels. No wt loss. No BRBPR. No melena. No abdominal pain.          Last colonoscopy was 4/2017 noting 2 polyps ( one retrieved with path hyperplastic) and diverticulosis. The patient does have history of colon polyps. The patient does not have history of colon cancer.  There is family history of colon cancer sister in her 50's, another sister in her 50's.     She also history of fatty liver.  Was seen here last year .  Ultrasound elastography score was F1.  Last labs October 2019 and LFTs had improved from previous.  She did lose a little weight but has gained some back.  Does not drink alcohol.  She is not diabetic.        ====================================================================================  Ov  8-5-19  Hannah Maki is a 68 y.o. female who presents with a chief complaint of abnormal LFTs.     She was here 3 months ago.  We felt that she had fatty liver.  She underwent liver serologies which all came back unremarkable.  She had an ultrasound of her liver which did show steatosis.  She had F1 fibrosis.  I advised her to lose 5 to 10 pounds.  She comes in today 6 pounds lighter.     She tells me she has been feeling well.  She does tell me that she has some chronic intermittent abdominal cramping and diarrhea.  She states her son is gluten intolerant.  She thinks he has celiac disease.  She herself is gone on a gluten-free diet and she is felt much better since going on gluten-free diet.  She is never had testing done before.  She has been on a gluten-free diet for about a year.  She does note that when she eats gluten she will have some cramps for about a week.  She states she ate Chinese food and Mexican food this weekend which was supposedly gluten-free.  But she has had some cramps and diarrhea since then.  The diarrhea is cleared except for after she eats she will just have a little bit of loose stools.  It has subsided over the last 3 days.  She was noted to have a little bit of an  elevated temperature to 99 degrees at her visit today.  She denies any other symptoms other than some chronic sinus drainage which she attributes to allergies.  She denies dysuria or shortness of breath.     She also tells me she has heartburn.  She is on over-the-counter Nexium daily.  She will have breakthrough symptoms once in a while.  Is not every day.  She asked what she could take for breakthrough symptoms.          Past Medical History:   Diagnosis Date    Abnormal liver enzymes     Achilles bursitis     Allergic     Anxiety     Arthralgia     Arthritis     Asthma     pt states very mild - does not do anything for it    Chronic pansinusitis 10/14/2016    Cirrhosis     Colon polyp     COVID     Diverticulosis     Dizziness     Fatty liver     GERD (gastroesophageal reflux disease)     Hyperlipidemia     Hypertension     Hypothyroidism     Kidney cysts     Kidney stones     Osteoarthritis     Pancreatic cyst     Perennial allergic rhinitis 10/14/2016    PONV (postoperative nausea and vomiting)     Rhinitis     Spleen enlarged     Tinnitus     Urinary frequency     Vertigo        Past Surgical History:   Procedure Laterality Date    ANKLE ARTHROSCOPY WITH FUSION Left 9/28/2023    Procedure: ANKLE ARTHROSCOPY WITH EXTENSIVE DEBRIDEMENT, PERONEUS BREVIS TENDON REPAIR, LATERAL ANKLE STABILIZATION WITH REPAIR OF ANTERIOR TALOFIBULAR  AND CALCANEOFIBULAR LIGAMENT, LEFT ANKLE;  Surgeon: Sandor Hedrick DPM;  Location:  [facility];  Service: Podiatry;  Laterality: Left;    ANKLE LIGAMENT RECONSTRUCTION Left 9/28/2023    Procedure: REPAIR OF ANTERIOR TALOFIBULAR AND CALCANEOFIBULAR LIGAMENT, LEFT ANKLE;  Surgeon: Sandor Hedrick DPM;  Location:  [facility];  Service: Podiatry;  Laterality: Left;    ANKLE TENDON REPAIR Left 9/28/2023    Procedure: PERONEUS BREVIS TENDON REPAIR;  Surgeon: Sandor Hedrick DPM;  Location:  [facility];  Service: Podiatry;  Laterality: Left;    APPENDECTOMY      BILATERAL OOPHORECTOMY       BREAST LUMPECTOMY Left 2014    benign    CATARACT EXTRACTION WITH INTRAOCULAR LENS IMPLANT      CERVICAL FUSION      CHOLECYSTECTOMY      COLONOSCOPY  11/18/2013     six polyps removed or destroyed, Diverticulosis  Recall 3 years    COLONOSCOPY  11/18/2013    COLONOSCOPY N/A 04/04/2017    Procedure: COLONOSCOPY WITH ANESTHESIA;  Surgeon: Lew Orona MD;  Location:  [facility];  Service:     COLONOSCOPY N/A 03/16/2020    Procedure: COLONOSCOPY WITH ANESTHESIA;  Surgeon: Lew Orona MD;  Location:  [facility];  Service: Gastroenterology;  Laterality: N/A;  pre op: hx polyps  Post op:polyps  PCP: Deni Henry MD    COLONOSCOPY N/A 04/25/2023    Procedure: COLONOSCOPY;  Surgeon: Lew Orona MD;  Location:  [facility];  Service: Gastroenterology;  Laterality: N/A;  Pre: Colon polyp, Family hx of colon cancer  Post: Polyp  Deni Henry MD    EYE SURGERY  11/2018    Cataract Removal    HEMORRHOIDECTOMY      HYSTERECTOMY      NOSE SURGERY      ligation of maxillary artery    OTHER SURGICAL HISTORY      ingrown toe nail    RECTAL FISSURE INCISION AND DRAINAGE           Current Outpatient Medications:     amitriptyline (ELAVIL) 10 MG tablet, Take 2 tablet by mouth Every Night, Disp: 30 tablet, Rfl: 0    dicyclomine (Bentyl) 10 MG capsule, Take 1 capsule by mouth 3 (Three) Times a Day As Needed (abdominal discomfort)., Disp: 90 capsule, Rfl: 11    esomeprazole (NexIUM) 20 MG capsule, Take 1 capsule by mouth Every Morning Before Breakfast. Indications: Gastroesophageal Reflux Disease, Heartburn, Disp: , Rfl:     gabapentin (NEURONTIN) 300 MG capsule, Take 1 capsule by mouth Every Night., Disp: , Rfl:     levothyroxine (SYNTHROID, LEVOTHROID) 100 MCG tablet, Take 1 tablet by mouth once daily, Disp: 90 tablet, Rfl: 0    meclizine (ANTIVERT) 25 MG tablet, Take 1 tablet by mouth 3 (Three) Times a Day As Needed for Dizziness (vertigo). Take one by mouth three times a day as  "needed for vertigo  Indications: Sensation of Spinning or Whirling, Disp: 60 tablet, Rfl: 3    montelukast (SINGULAIR) 10 MG tablet, TAKE 1 TABLET BY MOUTH ONCE DAILY AT NIGHT, Disp: 30 tablet, Rfl: 0    valsartan (DIOVAN) 320 MG tablet, Take 1 tablet by mouth once daily, Disp: 90 tablet, Rfl: 0    acetaminophen (TYLENOL) 500 MG tablet, Take 2 tablets by mouth Every 6 (Six) Hours As Needed for Mild Pain. Indications: Fever, Pain (Patient not taking: Reported on 4/24/2025), Disp: , Rfl:     brompheniramine-pseudoephedrine-DM 30-2-10 MG/5ML syrup, Take 10 mL by mouth 4 (Four) Times a Day As Needed for Congestion or Cough. (Patient not taking: Reported on 4/24/2025), Disp: 250 mL, Rfl: 0    fluconazole (Diflucan) 100 MG tablet, Take 1 tablet by mouth Daily for 3 days. (Patient not taking: Reported on 4/24/2025), Disp: 3 tablet, Rfl: 0    ondansetron (ZOFRAN) 4 MG tablet, Take 1 tablet by mouth As Needed. (Patient not taking: Reported on 4/24/2025), Disp: , Rfl:     Allergies   Allergen Reactions    Adhesive Tape Other (See Comments)     \"Pulls skin off\"    Augmentin [Amoxicillin-Pot Clavulanate] Rash     Pt unsure if she has taken po keflex     Azithromycin Nausea Only    Bactrim [Sulfamethoxazole-Trimethoprim] Nausea And Vomiting    Cefuroxime Nausea Only    Codeine GI Intolerance    Demerol [Meperidine] Nausea And Vomiting    Erythromycin Rash    Tequin [Gatifloxacin] Nausea Only    Tetracyclines & Related Nausea And Vomiting       Social History     Socioeconomic History    Marital status:    Tobacco Use    Smoking status: Never    Smokeless tobacco: Never   Vaping Use    Vaping status: Never Used   Substance and Sexual Activity    Alcohol use: Never    Drug use: Never    Sexual activity: Not Currently     Partners: Male     Birth control/protection: Hysterectomy       Family History   Problem Relation Age of Onset    Heart disease Father     Heart attack Father     Arthritis Mother     Diabetes Brother     " Heart disease Brother     Colon cancer Sister 57    Cancer Sister     Colon cancer Sister 55    Cancer Sister     Breast cancer Maternal Grandmother     Breast cancer Maternal Aunt     Breast cancer Maternal Aunt     Breast cancer Maternal Aunt     Ovarian cancer Neg Hx     Uterine cancer Neg Hx        Review of Systems  No abdominal discomfort, no change in bowel habits    Objective     Vitals:    04/24/25 1427   BP: 138/68   Pulse: 77   Temp: 97.8 °F (36.6 °C)   SpO2: 96%       Physical Exam  Physical Exam  Vitals reviewed.   Constitutional:       Appearance: Normal appearance. He is not ill-appearing or diaphoretic.   Pulmonary:      Effort: Pulmonary effort is normal.   Neurological:      Mental Status: He is alert.   Psychiatric:         Thought Content: Thought content normal.         Judgment: Judgment normal.             Assessment & Plan   Problem List Items Addressed This Visit          Gastrointestinal Abdominal     Fatty liver    Overview   U/s 5/2019 F1 fibrosis.          Abnormal LFTs    Colon polyp    Overview   2 diminutive polyps were destroyed and colonoscopy March 2020.  Based on history of adenomatous polyps and strong family history of colon cancer involving multiple first-degree relatives surveillance colonoscopy recommended again approximately March 2023.  Colonoscopy April 2023 revealed 2 small polyps and AVM.  Surveillance colonoscopy recommended again approximately April 2026         Cirrhosis of liver without ascites - Primary    Overview   Has history of fatty liver, MRI March 2025 noted early changes consistent with cirrhosis            Hematology and Neoplasia    IPMN (intraductal papillary mucinous neoplasm)    Overview   Small sidebranch IPMN noted in the tail of the pancreas on MRI March 2025              First we had a long discussion about her MRI.  We talked about her liver.  We talked that she is entering the early stages of cirrhosis.  This would be consistent with Gilberto class  a.  She is asymptomatic.  This was felt to be secondary to fatty liver longstanding.  We have been working on her for years to lose weight.  Still the treatment of choice at this point is weight loss.  She has already started to try to lose weight and has lost 5 pounds in the last month.  We set a goal of trying to lose 30 pounds over the next 1 year.  I discussed with her that weight loss can help stop or at least slow the progression of deterioration of her liver.  Continue to carry extra weight can continue to irritate the liver.  She could develop worsening cirrhosis and liver disease where could be detrimental to her overall health in the next 10 years, maybe never or even over next year or 2.  No one knows for certain how fast her of her liver will deteriorate, but we do know that helping to get the fat out of the liver by losing weight can decrease the chance that the liver will deteriorate any further.  Thus the importance of the weight loss.  I suggest a Mediterranean diet.  Exercises best her ability.  Try to take in less calories then she burns off.  A food diet log has been shown to be beneficial.  I also suggested, since she had a slightly elevated hemoglobin A1c in December, that if she is not successful with dietary measures and exercise and losing weight, then she consider talking to her primary care provider about whether she would be a candidate for medication such as Ozempic etc. that would assist in her prediabetic condition and have the benefit of weight loss some theoretically some benefit to treating her liver.  She does not want to go that route but that is an option for her to discuss with her PCP I suggested.    Regarding the pancreatic sidebranch IPMN, I discussed that these can be considered precancerous lesion.  Is not a high chance of cancer and worst-case scenario could be up to a 20% estimate but probably much less than that.  It is felt that intervention with surgical resection of  these has a much greater risk to her health and morbidity in the chance that he is going to cancer.  Thus intervention is not recommended at this time.  I do recommend a repeat MRI in 1 year which can look at the pancreas and her liver.    She has a family history of colon cancer as well as a history of polyps.  She will be due for surveillance colonoscopy in 1 year.    Will have her come back and see us in the office in 1 year.    Continue ongoing management by primary care provider and other specialists.     Body mass index is 36.39 kg/m².  Elevated BMI, weight loss advised as above      EMR Dragon/transcription disclaimer:  Much of this encounter note is electronic transcription/translation of spoken language to printed text.  The electronic translation of spoken language may be erroneous, or at times, nonsensical words or phrases may be inadvertently transcribed.  Although I have reviewed the note for such errors, some may still exist.    Lew Orona MD  16:21 CDT  04/24/25

## 2025-05-03 DIAGNOSIS — J30.89 PERENNIAL ALLERGIC RHINITIS: ICD-10-CM

## 2025-05-03 DIAGNOSIS — R09.82 POST-NASAL DRIP: ICD-10-CM

## 2025-05-05 RX ORDER — MONTELUKAST SODIUM 10 MG/1
10 TABLET ORAL NIGHTLY
Qty: 30 TABLET | Refills: 0 | Status: SHIPPED | OUTPATIENT
Start: 2025-05-05

## 2025-05-05 NOTE — TELEPHONE ENCOUNTER
Caller: Hannah Maki    Relationship: Self    Best call back number: [phone]    What is the best time to reach you: ANYTIME    Who are you requesting to speak with (clinical staff, provider,  specific staff member): PROVIDER OR CLINICAL    What was the call regarding: PATIENT STATED SHE HAS BEEN TAKING amitriptyline (ELAVIL) 10 MG tablet TWICE A NIGHT AS DIRECTED BY DR. DIALLO. SHE STATED WHEN WENT TO PICK HER REFILL LAST WEEK THE INSTRUCTION STATED TO TAKE ONLY ONE AT NIGHT TIME.    PATIENT STATED SHE REALLY NEEDS TO TAKE TWO. SHE TOOK ONE AS DIRECTED LAST TIME AND SHE IS FEELING MORE PAIN THAN USUAL THIS MORNING.     SHOULD PATIENT TAKE TWO A NIGHT AS SHE WAS DOING PREVIOUSLY OR ONE LIKE HER CURRENT PRESCRIPTION STATES? IF IT SHOULD BE TWO A NIGHT PLEASE SEND ADDITIONAL REFILL TO PHARMACY AND GIVE PERMISSION TO FILL.    PLEASE CALL TO CLARIFY.

## 2025-05-06 RX ORDER — AMITRIPTYLINE HYDROCHLORIDE 10 MG/1
TABLET ORAL
Qty: 60 TABLET | Refills: 3 | Status: SHIPPED | OUTPATIENT
Start: 2025-05-06

## 2025-05-08 ENCOUNTER — OFFICE VISIT (OUTPATIENT)
Dept: PAIN MANAGEMENT | Age: 75
End: 2025-05-08

## 2025-05-08 VITALS
OXYGEN SATURATION: 96 % | TEMPERATURE: 98.3 F | RESPIRATION RATE: 16 BRPM | DIASTOLIC BLOOD PRESSURE: 71 MMHG | BODY MASS INDEX: 35.17 KG/M2 | WEIGHT: 206 LBS | HEART RATE: 73 BPM | HEIGHT: 64 IN | SYSTOLIC BLOOD PRESSURE: 115 MMHG

## 2025-05-08 DIAGNOSIS — G90.522 COMPLEX REGIONAL PAIN SYNDROME TYPE 1 OF LEFT LOWER EXTREMITY: Primary | ICD-10-CM

## 2025-05-08 DIAGNOSIS — M79.605 CHRONIC PAIN OF LEFT LOWER EXTREMITY: ICD-10-CM

## 2025-05-08 DIAGNOSIS — G89.29 CHRONIC PAIN OF LEFT LOWER EXTREMITY: ICD-10-CM

## 2025-05-08 RX ORDER — ACETAMINOPHEN 500 MG
500 TABLET ORAL EVERY 6 HOURS PRN
COMMUNITY

## 2025-05-08 RX ORDER — ONDANSETRON 4 MG/1
TABLET, ORALLY DISINTEGRATING ORAL
COMMUNITY

## 2025-05-08 ASSESSMENT — PATIENT HEALTH QUESTIONNAIRE - PHQ9
SUM OF ALL RESPONSES TO PHQ QUESTIONS 1-9: 0
SUM OF ALL RESPONSES TO PHQ QUESTIONS 1-9: 0
1. LITTLE INTEREST OR PLEASURE IN DOING THINGS: NOT AT ALL
SUM OF ALL RESPONSES TO PHQ QUESTIONS 1-9: 0
SUM OF ALL RESPONSES TO PHQ QUESTIONS 1-9: 0
2. FEELING DOWN, DEPRESSED OR HOPELESS: NOT AT ALL

## 2025-05-08 NOTE — PROGRESS NOTES
week? 6   What number best describes how, during the past week, pain has interfered with your enjoyment of life? 7   What number best describes how, during the past week, pain has interfered with your general activity? 7   PEG Pain Total Score 6.67      ORT Score: Alcohol: No  Illegal drugs: No  Prescription drugs: No  Alcohol: No  Illegal drugs: No  Prescription drugs: No  Age between 16 - 45: No  History of preadolescent sexual abuse: no  ADD, OCD, Bipolar, Schizophrenia: No  Depression: No  Opioid Risk Score: 0      PHQ-9 Score: PHQ-9 Total Score: 0 (5/8/2025  1:48 PM)     Current Pain Assessment  Pain Assessment  Location of Pain: Leg  Location Modifiers: Left  Severity of Pain: 10  Quality of Pain: Aching, Dull, Sharp  Duration of Pain: Persistent  Frequency of Pain: Constant  Aggravating Factors: Standing, Walking, Stairs     Employment: Retired    Previous Injury: Yes    Physical Therapy In the last 6 months? No    Did Physical Therapy make the pain better?  No, worsened pain    Nerve Conduction Study / EMG: Yes    Injections in the past? Yes, bilateral knees    Did the injections help relieve the pain? Yes    Past Medical History  Past Medical History:   Diagnosis Date    Asthma     CRPS (complex regional pain syndrome), lower limb     Fatty liver     GERD (gastroesophageal reflux disease)     Hyperlipidemia     Hypertension     Hypothyroidism     Obesity (BMI 35.0-39.9 without comorbidity)     Osteoarthritis     Pre-diabetes     Seasonal allergic rhinitis     Sinus problem     Thyroid disease     Wears glasses      Current Medications  Current Outpatient Medications   Medication Sig Dispense Refill    ondansetron (ZOFRAN-ODT) 4 MG disintegrating tablet 1 tablet on the tongue and allow to dissolve Orally Once a day      acetaminophen (TYLENOL) 500 MG tablet Take 1 tablet by mouth every 6 hours as needed      gabapentin (NEURONTIN) 300 MG capsule Take 1 capsule by mouth nightly for 150 days. Max Daily Amount:

## 2025-05-10 ASSESSMENT — ENCOUNTER SYMPTOMS
SHORTNESS OF BREATH: 0
ABDOMINAL PAIN: 0
DIARRHEA: 0
BACK PAIN: 1
EYES NEGATIVE: 1
ALLERGIC/IMMUNOLOGIC NEGATIVE: 1
APNEA: 0
CONSTIPATION: 0
CHEST TIGHTNESS: 0
BLOOD IN STOOL: 0

## 2025-05-16 RX ORDER — AMITRIPTYLINE HYDROCHLORIDE 10 MG/1
10 TABLET ORAL NIGHTLY
Qty: 30 TABLET | Refills: 0 | OUTPATIENT
Start: 2025-05-16

## 2025-06-02 DIAGNOSIS — J30.89 PERENNIAL ALLERGIC RHINITIS: ICD-10-CM

## 2025-06-02 DIAGNOSIS — R09.82 POST-NASAL DRIP: ICD-10-CM

## 2025-06-02 RX ORDER — MONTELUKAST SODIUM 10 MG/1
10 TABLET ORAL NIGHTLY
Qty: 30 TABLET | Refills: 0 | Status: SHIPPED | OUTPATIENT
Start: 2025-06-02

## 2025-06-05 RX ORDER — VALSARTAN 320 MG/1
320 TABLET ORAL DAILY
Qty: 90 TABLET | Refills: 0 | Status: SHIPPED | OUTPATIENT
Start: 2025-06-05

## 2025-06-05 RX ORDER — LEVOTHYROXINE SODIUM 100 UG/1
100 TABLET ORAL DAILY
Qty: 90 TABLET | Refills: 0 | Status: SHIPPED | OUTPATIENT
Start: 2025-06-05

## 2025-06-16 ENCOUNTER — TELEPHONE (OUTPATIENT)
Dept: FAMILY MEDICINE CLINIC | Facility: CLINIC | Age: 75
End: 2025-06-16

## 2025-06-16 NOTE — TELEPHONE ENCOUNTER
Caller: Glynn Hannah I    Relationship: Self    Best call back number: [phone]     Requested Prescriptions:      mupirocin (BACTROBAN) 2 % ointment     Pharmacy where request should be sent: [address] [phone] [facility] [phone] [facility]     Last office visit with prescribing clinician: 3/21/2025   Last telemedicine visit with prescribing clinician: Visit date not found   Next office visit with prescribing clinician: 9/22/2025     Additional details provided by patient: PATIENT HASN'T HAD THIS PRESCRIBED IN A WHILE AND IT WAS BY DR CRISTINA, PATIENT NEEDS NEW PRESCRIPTION. SHE USES IT IN HER NOSE.     Does the patient have less than a 3 day supply:  [x] Yes  [] No    Would you like a call back once the refill request has been completed: [x] Yes [] No    If the office needs to give you a call back, can they leave a voicemail: [] Yes [] No    Tri Garner Rep   06/16/25 15:35 CDT

## 2025-06-20 ENCOUNTER — OFFICE VISIT (OUTPATIENT)
Dept: FAMILY MEDICINE CLINIC | Facility: CLINIC | Age: 75
End: 2025-06-20
Payer: MEDICARE

## 2025-06-20 VITALS
HEART RATE: 72 BPM | OXYGEN SATURATION: 95 % | WEIGHT: 210 LBS | BODY MASS INDEX: 35.85 KG/M2 | SYSTOLIC BLOOD PRESSURE: 134 MMHG | DIASTOLIC BLOOD PRESSURE: 80 MMHG | HEIGHT: 64 IN

## 2025-06-20 DIAGNOSIS — R09.82 POST-NASAL DRIP: ICD-10-CM

## 2025-06-20 DIAGNOSIS — E03.9 ACQUIRED HYPOTHYROIDISM: ICD-10-CM

## 2025-06-20 DIAGNOSIS — K74.60 CIRRHOSIS OF LIVER WITHOUT ASCITES, UNSPECIFIED HEPATIC CIRRHOSIS TYPE: ICD-10-CM

## 2025-06-20 DIAGNOSIS — J30.89 PERENNIAL ALLERGIC RHINITIS: ICD-10-CM

## 2025-06-20 DIAGNOSIS — I10 PRIMARY HYPERTENSION: ICD-10-CM

## 2025-06-20 DIAGNOSIS — G90.522 COMPLEX REGIONAL PAIN SYNDROME TYPE 1 OF LEFT LOWER EXTREMITY: ICD-10-CM

## 2025-06-20 DIAGNOSIS — E83.119 HEMOCHROMATOSIS, UNSPECIFIED HEMOCHROMATOSIS TYPE: Primary | ICD-10-CM

## 2025-06-20 PROBLEM — E66.01 MORBIDLY OBESE: Status: RESOLVED | Noted: 2020-07-13 | Resolved: 2025-06-20

## 2025-06-20 RX ORDER — MUPIROCIN 20 MG/G
1 OINTMENT TOPICAL EVERY 12 HOURS SCHEDULED
Qty: 22 G | Refills: 3 | Status: SHIPPED | OUTPATIENT
Start: 2025-06-20

## 2025-06-20 RX ORDER — MECLIZINE HYDROCHLORIDE 25 MG/1
25 TABLET ORAL 3 TIMES DAILY PRN
Qty: 60 TABLET | Refills: 3 | Status: SHIPPED | OUTPATIENT
Start: 2025-06-20

## 2025-06-20 RX ORDER — MUPIROCIN 20 MG/G
1 OINTMENT TOPICAL EVERY 12 HOURS SCHEDULED
COMMUNITY
End: 2025-06-20 | Stop reason: SDUPTHER

## 2025-06-20 RX ORDER — CLOBETASOL PROPIONATE 0.5 MG/ML
SOLUTION TOPICAL
COMMUNITY
Start: 2025-06-19

## 2025-06-20 NOTE — PROGRESS NOTES
Chief Complaint  Medication Problem (Discuss medication)    Subjective      Hannah Maki presents to [facility]  History of Present Illness  The patient is a 74-year-old female who presents today for a follow-up visit.    She has been under the care of a nephrologist, Dr. Funes, who has conducted several tests including urinalysis, blood test, and a genetic test for her kidneys. She is scheduled to see him on 07/03/2025. He also identified a cyst on her kidney, but she was reassured that it is not a cause for concern.    She is also under the care of a gastroenterologist due to findings related to her liver and pancreas. An MRI revealed 2 spots on her pancreas, which are being monitored. The MRI also confirmed the presence of cirrhosis. She had elevated iron levels, but they have since decreased. A hematologist, Dr. Raven York, ordered an MRI of her abdomen to check for excess iron in her liver, but the results were normal. She is scheduled for another MRI in 04/2026. Her last consultation with Dr. York was in 04/2025.    She has been diagnosed with complex regional pain syndrome (CRPS) by her orthopedic surgeon, Dr. Hedrick. She is scheduled to receive a nerve block for her CRPS on 07/16/2025 or 07/17/2025. She is currently on amitriptyline and gabapentin for this condition.    She has been using Bactroban ointment in her nose twice daily to manage nosebleeds and has requested a refill. She has been using this medication for several years and has not experienced any nosebleeds since her surgery.    She is currently on valsartan 30/20 for blood pressure management and levothyroxine 100 mcg for hypothyroidism. She occasionally takes meclizine for dizziness and has requested a refill.    She recently visited a dermatologist for a rash on her hand and arm and was prescribed clobetasol.    PAST SURGICAL HISTORY:  The patient reports a past surgery to address severe nosebleeds,  "involving the dissection of an artery in the face.      Objective   Vital Signs:  /80   Pulse 72   Ht 162.6 cm (64\")   Wt 95.3 kg (210 lb)   SpO2 95%   BMI 36.05 kg/m²   Estimated body mass index is 36.05 kg/m² as calculated from the following:    Height as of this encounter: 162.6 cm (64\").    Weight as of this encounter: 95.3 kg (210 lb).            Physical Exam     Physical Exam  Skin: Rash noted on hand and arm      Result Review :  The following labs/imaging/notes were reviewed and discussed with the patient by Lew Palacios MD on 06/20/2025:     Latest Reference Range & Units 02/26/25 07:57 04/02/25 09:07   Sodium 136 - 145 mmol/L 139 138   Potassium 3.5 - 5.2 mmol/L 4.2 4.9   Chloride 98 - 107 mmol/L 104 105   CO2 22.0 - 29.0 mmol/L 24.0 24.0   Anion Gap 5.0 - 15.0 mmol/L 11.0 9.0   BUN 8 - 23 mg/dL 9 12   Creatinine 0.57 - 1.00 mg/dL 0.58 0.65   BUN/Creatinine Ratio 7.0 - 25.0  15.5 18.5   eGFR >60.0 mL/min/1.73 96.3 93.7   Glucose 65 - 99 mg/dL 132 (H) 116 (H)   Calcium 8.6 - 10.5 mg/dL 9.3 9.0   Alkaline Phosphatase 39 - 117 U/L 170 (H) 154 (H)   Total Protein 6.0 - 8.5 g/dL 7.4 7.3   Albumin 3.5 - 5.2 g/dL 3.6 3.6   Globulin gm/dL 3.8 3.7   A/G Ratio g/dL 0.9 1.0   AST (SGOT) 1 - 32 U/L 43 (H) 46 (H)   ALT (SGPT) 1 - 33 U/L 37 (H) 37 (H)   Total Bilirubin 0.0 - 1.2 mg/dL 1.3 (H) 1.2   Iron 37 - 145 mcg/dL 126 78   Ferritin 13.00 - 150.00 ng/mL 229.80 (H) 166.20 (H)   Iron Saturation (TSAT) 20 - 50 % 36 20   Transferrin 200 - 360 mg/dL 238 256   TIBC 298 - 536 mcg/dL 355 381   WBC 3.40 - 10.80 10*3/mm3 6.16 5.67   RBC 3.77 - 5.28 10*6/mm3 4.84 4.59   Hemoglobin 12.0 - 15.9 g/dL 14.8 14.3   Hematocrit 34.0 - 46.6 % 44.8 42.4   Platelets 140 - 450 10*3/mm3 144 127 (L)   RDW 12.3 - 15.4 % 13.4 13.8   MCV 79.0 - 97.0 fL 92.6 92.4   MCH 26.6 - 33.0 pg 30.6 31.2   MCHC 31.5 - 35.7 g/dL 33.0 33.7   MPV 6.0 - 12.0 fL 10.5 10.3   RDW-SD 37.0 - 54.0 fl 45.4 47.0   Neutrophil Rel % 42.7 - 76.0 % 41.2 (L) " 40.8 (L)   Lymphocyte Rel % 19.6 - 45.3 % 44.8 42.7   Monocyte Rel % 5.0 - 12.0 % 9.3 11.5   Eosinophil Rel % 0.3 - 6.2 % 3.4 3.4   Basophil Rel % 0.0 - 1.5 % 1.1 1.2   Immature Granulocyte Rel % 0.0 - 0.5 % 0.2 0.4   Neutrophils Absolute 1.70 - 7.00 10*3/mm3 2.54 2.32   Lymphocytes Absolute 0.70 - 3.10 10*3/mm3 2.76 2.42   Monocytes Absolute 0.10 - 0.90 10*3/mm3 0.57 0.65   Eosinophils Absolute 0.00 - 0.40 10*3/mm3 0.21 0.19   Basophils Absolute 0.00 - 0.20 10*3/mm3 0.07 0.07   Immature Grans, Absolute 0.00 - 0.05 10*3/mm3 0.01 0.02   nRBC 0.0 - 0.2 /100 WBC 0.0    (H): Data is abnormally high  (L): Data is abnormally low          Assessment      Diagnoses and all orders for this visit:    1. Hemochromatosis, unspecified hemochromatosis type (Primary)    2. Perennial allergic rhinitis  -     meclizine (ANTIVERT) 25 MG tablet; Take 1 tablet by mouth 3 (Three) Times a Day As Needed for Dizziness (vertigo). Take one by mouth three times a day as needed for vertigo  Indications: Sensation of Spinning or Whirling  Dispense: 60 tablet; Refill: 3    3. Post-nasal drip  -     meclizine (ANTIVERT) 25 MG tablet; Take 1 tablet by mouth 3 (Three) Times a Day As Needed for Dizziness (vertigo). Take one by mouth three times a day as needed for vertigo  Indications: Sensation of Spinning or Whirling  Dispense: 60 tablet; Refill: 3    4. Cirrhosis of liver without ascites, unspecified hepatic cirrhosis type    5. Acquired hypothyroidism    6. Primary hypertension    7. Complex regional pain syndrome type 1 of left lower extremity    Other orders  -     mupirocin (BACTROBAN) 2 % ointment; Apply 1 Application topically to the appropriate area as directed Every 12 (Twelve) Hours.  Dispense: 22 g; Refill: 3             Assessment & Plan  1. Complex Regional Pain Syndrome (CRPS).  - Scheduled to receive a sympathetic nerve block on 07/16/2025 or 07/17/2025.  - Currently on amitriptyline and gabapentin for pain management.  -  Amitriptyline taken in the evening, and gabapentin taken in the morning to spread out dosing.  - Pain management discussed.    2. Elevated Ferritin Levels.  - Ferritin levels trending down, reassuring.  - Alk phos levels have come down slightly.  - Continued follow-up with hematology as needed.  - Inflammation noted as a potential cause for elevated levels.    3. Cirrhosis.  - MRI of the liver showed cirrhosis.  - Follow-up with GI specialist for ongoing monitoring.  - Another MRI scheduled for 04/2026.  - Elevated iron levels previously noted.    4. Pancreatic Lesions.  - MRI revealed 2 spots on the pancreas.  - Continued follow-up with GI specialist for monitoring.  - No immediate concern noted by the specialist.  - Monitoring plan discussed.    5. Hypertension.  - Currently on valsartan 30/20 mg for blood pressure management.  - Blood pressure medication reviewed.  - No changes in medication regimen.  - Continued monitoring of blood pressure.    6. Hypothyroidism.  - On levothyroxine 100 mcg for hypothyroidism.  - Thyroid medication reviewed.  - No changes in medication regimen.  - Continued monitoring of thyroid function.    7. Epistaxis.  - Uses Bactroban ointment in the nose twice a day to prevent nosebleeds.  - Refill for Bactroban provided.  - History of frequent nosebleeds discussed.  - Surgical history noted.    8. Dizziness.  - Takes meclizine as needed for dizziness.  - Refill for meclizine provided.  - Medication reviewed.  - No changes in medication regimen.    9. Dermatological Issues.  - Seen by a dermatologist for a rash on the hand and arm.  - Prescribed clobetasol.  - Dermatological treatment plan discussed.  - Follow-up with dermatologist pending.    10. Kidney Cyst.  - Cyst noted on the kidney by Dr. Funes, the nephrologist.  - Follow-up with nephrology on 07/03/2025 to review test results.  - Nephrology consultation discussed.  - Continued monitoring of kidney function.    Follow-up  -  Follow-up in September for a Medicare wellness visit.  - Labs to be obtained prior to the visit.  - Continued monitoring and management of conditions.    No orders of the defined types were placed in this encounter.      Follow Up   No follow-ups on file.  Patient was given instructions and counseling regarding her condition or for health maintenance advice. Please see specific information pulled into the AVS if appropriate.         Patient or patient representative verbalized consent for the use of Ambient Listening during the visit with  Lew Palacios MD for chart documentation. 6/22/2025  15:51 CDT

## 2025-06-24 NOTE — PROGRESS NOTES
[facility]  [facility]  [address]  [facility] [id number] [phone]     Patient: Leslie Ramírez   YOB: 1950   Date: 12/30/2024   Visit Type:  Follow up    Body Part: Ankle left    When did the symptoms being/Date of Onset or date of surgery? Pt was last seen in clinic on 09/09/24    Pt since last seen in clinic that her left ankle has become swollen more. Pt states that anytime she is up it becomes swollen more than usual. Pt Pain Rates Pain: 7/8 (Evening Time) Pt states the pain is a consistent at all time during her normal daily activities. Pt denies any home therapy.     If over 55, have you burger an Osteoporosis Screening in the last 2 years? Yes- [facility]     History of Present Illness  Chief Complaint   Patient presents with    Follow Up Left Ankle       This is a 74 y.o. female  presents today complaining of continued left lower extremity pain.  She cannot tolerate compression stockings due to her left lower extremity pain and sensitivity.  She has not yet seen pain management.      PCP added amitriptyline about a month ago.  That has helped.  Does cause drowsiness.    Review of Systems  System  Neg/Pos  Details  Constitutional  Negative  Chills, Fatigue, Fever and Night Sweats  Respiratory  Negative  Chest Pain, Cough and Dyspnea  Cardio   Negative  Leg Swelling  GI   Negative  Abdominal Pain, Constipation, Nausea and Vomiting     Negative  Urinary Incontinence   Endocrine  Negative  Weight Gain and Weight Loss  MS   Negative  Except as noted in HPI and Chief Complaint    Past Medical History:   Diagnosis Date    Asthma     Fatty liver     GERD (gastroesophageal reflux disease)     Hyperlipidemia     Hypertension     Hypothyroidism     Osteoarthritis     Pre-diabetes     Seasonal allergic rhinitis     Sinus problem     Thyroid disease     Wears glasses       Past Surgical History:   Procedure Laterality 
Quality 137: Melanoma: Continuity Of Care - Recall System: Patient information entered into a recall system that includes: target date for the next exam specified AND a process to follow up with patients regarding missed or unscheduled appointments
Quality 397: Melanoma: Reporting: Pathology report includes the pT Category, thickness, ulceration and mitotic rate, peripheral and deep margin status and presence or absence of microsatellitosis for invasive tumors.
Detail Level: Detailed

## 2025-06-30 DIAGNOSIS — J30.89 PERENNIAL ALLERGIC RHINITIS: ICD-10-CM

## 2025-06-30 DIAGNOSIS — R09.82 POST-NASAL DRIP: ICD-10-CM

## 2025-06-30 RX ORDER — MONTELUKAST SODIUM 10 MG/1
10 TABLET ORAL NIGHTLY
Qty: 30 TABLET | Refills: 0 | Status: SHIPPED | OUTPATIENT
Start: 2025-06-30

## 2025-07-16 ENCOUNTER — HOSPITAL ENCOUNTER (OUTPATIENT)
Dept: PAIN MANAGEMENT | Age: 75
Discharge: HOME | End: 2025-07-16
Payer: MEDICARE

## 2025-07-16 VITALS
SYSTOLIC BLOOD PRESSURE: 114 MMHG | RESPIRATION RATE: 16 BRPM | DIASTOLIC BLOOD PRESSURE: 36 MMHG | HEART RATE: 2 BPM | OXYGEN SATURATION: 99 %

## 2025-07-16 DIAGNOSIS — R52 PAIN MANAGEMENT: ICD-10-CM

## 2025-07-16 PROBLEM — G90.522 COMPLEX REGIONAL PAIN SYNDROME TYPE 1 OF LEFT LOWER EXTREMITY: Status: ACTIVE | Noted: 2025-07-16

## 2025-07-16 PROCEDURE — 64999 UNLISTED PX NERVOUS SYSTEM: CPT

## 2025-07-16 PROCEDURE — 6360000002 HC RX W HCPCS

## 2025-07-16 PROCEDURE — 6360000002 HC RX W HCPCS: Performed by: STUDENT IN AN ORGANIZED HEALTH CARE EDUCATION/TRAINING PROGRAM

## 2025-07-16 PROCEDURE — 6370000000 HC RX 637 (ALT 250 FOR IP): Performed by: STUDENT IN AN ORGANIZED HEALTH CARE EDUCATION/TRAINING PROGRAM

## 2025-07-16 PROCEDURE — 6370000000 HC RX 637 (ALT 250 FOR IP)

## 2025-07-16 RX ORDER — DEXAMETHASONE SODIUM PHOSPHATE 10 MG/ML
10 INJECTION, EMULSION INTRAMUSCULAR; INTRAVENOUS ONCE
Status: DISCONTINUED | OUTPATIENT
Start: 2025-07-16 | End: 2025-07-18 | Stop reason: HOSPADM

## 2025-07-16 RX ORDER — FLUCONAZOLE 150 MG/1
TABLET ORAL
COMMUNITY
Start: 2025-06-24

## 2025-07-16 RX ORDER — LIDOCAINE HYDROCHLORIDE AND EPINEPHRINE 10; 10 MG/ML; UG/ML
5 INJECTION, SOLUTION INFILTRATION; PERINEURAL ONCE
Status: DISCONTINUED | OUTPATIENT
Start: 2025-07-16 | End: 2025-07-18 | Stop reason: HOSPADM

## 2025-07-16 RX ORDER — ONDANSETRON 4 MG/1
4 TABLET, FILM COATED ORAL ONCE
Status: COMPLETED | OUTPATIENT
Start: 2025-07-16 | End: 2025-07-16

## 2025-07-16 RX ORDER — BUPIVACAINE HYDROCHLORIDE 2.5 MG/ML
9 INJECTION, SOLUTION EPIDURAL; INFILTRATION; INTRACAUDAL; PERINEURAL ONCE
Status: DISCONTINUED | OUTPATIENT
Start: 2025-07-16 | End: 2025-07-18 | Stop reason: HOSPADM

## 2025-07-16 RX ORDER — LIDOCAINE HYDROCHLORIDE 10 MG/ML
10 INJECTION, SOLUTION EPIDURAL; INFILTRATION; INTRACAUDAL; PERINEURAL ONCE
Status: DISCONTINUED | OUTPATIENT
Start: 2025-07-16 | End: 2025-07-18 | Stop reason: HOSPADM

## 2025-07-16 RX ADMIN — ONDANSETRON HYDROCHLORIDE 4 MG: 4 TABLET, FILM COATED ORAL at 15:23

## 2025-07-16 NOTE — PROCEDURES
confirmed the needle tip location in line with the facet column.  After negative aspiration, 0.5 mL of  Omnipaque contrast was injected to confirm appropriate placement. Patient received mixture of liocaine 1% with epinephrine 5 cc and was negative for intra-vascular. The contrast spread longitudinally along the anterolateral aspect of the vertebral bodies and there was no evidence of intravascular or intrathecal spread.   Then a mixture consisting of 10mg decadron and 9cc of .25% bupivicaine was injected for a total of volume of 10mL.   Needles were then removed.  Bandages were applied to needle sites and the patient was then transferred to the stretcher and taken to recovery in stable condition.    COMPLICATIONS: None. Patient was having some nausea in the recovery area that was treated with PO zofran 4mg.    The patient was monitored for at least 30 minutes prior to discharge. Vital signs remained stable throughout the procedure and in the recovery area. There were no immediate complications and the patient tolerated the procedure well. Sensory and motor exam was unchanged from baseline. The patient received written discharge instructions prior to discharge.    FOLLOW UP: As scheduled     Addendum: Patient noted pain to decrease in her left leg from 6/10 to 3/10. Patient was no longer experiencing nausea at discharge and stated she was going to get something to eat. Blood pressure was back to baseline.       Leann Montaño MD

## 2025-07-16 NOTE — DISCHARGE INSTRUCTIONS
[facility]  Post Procedure Discharge Instructions        YOU HAVE HAD THE FOLLOWING PROCEDURE:                                  [] Occipital Nerve Blocks  [] CTS wrist injection(s)  [] Knee Injection(s)         [] Shoulder Injection(s)   [] Elbow Injection(s)     [] Botox Injection  [] Cervical Trigger Point Injections    [] Thoracic Trigger Point Injections    [] Lumbar Trigger Point Injections  [] Piriformis Trigger Point Injections  [] SI Joint Injection(s)     [] Trochanteric Bursa Injection(s)       [] Ankle Injection(s)   [] Plantar Fasciitis   [x]  Lumbar sympathetic Injection(s) [] Botox []  Migraines [] Spasticity    YOU HAVE RECEIVED THE FOLLOWING MEDICATIONS IN YOUR INJECTION(s)  [x] Lidocaine [x] Bupivacaine   [] DepoMedrol (steroid) [] Decadron (steroid)  [x]  Kenalog (steroid)   [] Toradol  [] Supartz [] Zilretta    [] Botox        PATIENT INFORMATION:   You may experience the following symptoms after your procedure. These symptoms are normal and should not cause concern:    You may have an increase in your pain. This may last 24 - 48 hours after your procedure.  You may have no change in the pain that you had prior to your injection(s).  You may have weakness or numbness in your affected extremity. If this occurs, this may last until numbing the medication wears off.     REPORT THE FOLLOWING SYMPTOMS TO YOUR DOCTOR:  Redness, swelling or drainage at the injection site(s)  Unusual pain that interferes with your normal activities of daily living.    OTHER INSTRUCTIONS:    [x] I will apply ice to the injection site(s) for at least 24 hours after the procedure. I will rotate the ice on for 20 minutes and off for 20 minutes for at least 24 hours.    [x] I will not apply heat for at least 48 hours and I will not take a hot bath or shower for at least 24 hours.     [x] I understand that if Lidocaine or Bupivacaine was used in my injection(s) that the injection

## 2025-07-17 ENCOUNTER — HOSPITAL ENCOUNTER (OUTPATIENT)
Dept: WOMENS IMAGING | Age: 75
Discharge: HOME | End: 2025-07-17
Payer: MEDICARE

## 2025-07-17 ENCOUNTER — OFFICE VISIT (OUTPATIENT)
Dept: SURGERY | Age: 75
End: 2025-07-17
Payer: MEDICARE

## 2025-07-17 ENCOUNTER — TELEPHONE (OUTPATIENT)
Dept: PAIN MANAGEMENT | Age: 75
End: 2025-07-17

## 2025-07-17 VITALS — WEIGHT: 206 LBS | HEART RATE: 95 BPM | OXYGEN SATURATION: 98 % | BODY MASS INDEX: 35.17 KG/M2 | HEIGHT: 64 IN

## 2025-07-17 DIAGNOSIS — N60.11 FIBROCYSTIC BREAST CHANGES OF BOTH BREASTS: ICD-10-CM

## 2025-07-17 DIAGNOSIS — N60.12 FIBROCYSTIC BREAST CHANGES OF BOTH BREASTS: ICD-10-CM

## 2025-07-17 DIAGNOSIS — Z12.31 VISIT FOR SCREENING MAMMOGRAM: Primary | ICD-10-CM

## 2025-07-17 DIAGNOSIS — Z12.31 VISIT FOR SCREENING MAMMOGRAM: ICD-10-CM

## 2025-07-17 PROCEDURE — 1123F ACP DISCUSS/DSCN MKR DOCD: CPT | Performed by: PHYSICIAN ASSISTANT

## 2025-07-17 PROCEDURE — 3017F COLORECTAL CA SCREEN DOC REV: CPT | Performed by: PHYSICIAN ASSISTANT

## 2025-07-17 PROCEDURE — G8427 DOCREV CUR MEDS BY ELIG CLIN: HCPCS | Performed by: PHYSICIAN ASSISTANT

## 2025-07-17 PROCEDURE — 1159F MED LIST DOCD IN RCRD: CPT | Performed by: PHYSICIAN ASSISTANT

## 2025-07-17 PROCEDURE — 99213 OFFICE O/P EST LOW 20 MIN: CPT | Performed by: PHYSICIAN ASSISTANT

## 2025-07-17 PROCEDURE — 1036F TOBACCO NON-USER: CPT | Performed by: PHYSICIAN ASSISTANT

## 2025-07-17 PROCEDURE — 1090F PRES/ABSN URINE INCON ASSESS: CPT | Performed by: PHYSICIAN ASSISTANT

## 2025-07-17 PROCEDURE — G8399 PT W/DXA RESULTS DOCUMENT: HCPCS | Performed by: PHYSICIAN ASSISTANT

## 2025-07-17 PROCEDURE — 77063 BREAST TOMOSYNTHESIS BI: CPT

## 2025-07-17 PROCEDURE — G8417 CALC BMI ABV UP PARAM F/U: HCPCS | Performed by: PHYSICIAN ASSISTANT

## 2025-07-17 NOTE — PROGRESS NOTES
Leslie Ramírez comes today for her follow-up breast exam.  She has had no new breast complaints.  She reports no new palpable masses.  There is no skin or nipple changes.  There is no nipple discharge.  She has no appreciable evidence of supraclavicular or axillary adenopathy.    Patient Active Problem List    Diagnosis Date Noted    Complex regional pain syndrome type 1 of left lower extremity 07/16/2025    Varicose veins of leg with pain 06/07/2019    Chronic venous insufficiency 06/05/2019    Varicose veins with pain 05/01/2019       Current Outpatient Medications   Medication Sig Dispense Refill    fluconazole (DIFLUCAN) 150 MG tablet TAKE 1 TABLET BY MOUTH WEEKLY FOR 6 MONTHS      gabapentin (NEURONTIN) 300 MG capsule Take 1 capsule by mouth nightly for 150 days. Max Daily Amount: 300 mg 30 capsule 4    amitriptyline (ELAVIL) 10 MG tablet Take 2 tablets by mouth at bedtime      montelukast (SINGULAIR) 10 MG tablet Take 1 tablet by mouth nightly      valsartan (DIOVAN) 320 MG tablet TAKE 1 TABLET BY MOUTH ONCE DAILY      esomeprazole (NEXIUM) 20 MG delayed release capsule Take 1 capsule by mouth      levothyroxine (SYNTHROID) 100 MCG tablet Take 1 tablet by mouth      ondansetron (ZOFRAN-ODT) 4 MG disintegrating tablet 1 tablet on the tongue and allow to dissolve Orally Once a day (Patient not taking: Reported on 7/17/2025)      acetaminophen (TYLENOL) 500 MG tablet Take 1 tablet by mouth every 6 hours as needed (Patient not taking: Reported on 7/17/2025)      meclizine (ANTIVERT) 25 MG tablet Take 1 tablet by mouth (Patient not taking: Reported on 7/17/2025)       No current facility-administered medications for this visit.     Facility-Administered Medications Ordered in Other Visits   Medication Dose Route Frequency Provider Last Rate Last Admin    BUPivacaine (PF) (MARCAINE) 0.25 % injection 22.5 mg  9 mL IntraDERmal Once Leann Montaño MD        dexAMETHasone (PF) (DECADRON) Other 10 mg  10 mg Other Once

## 2025-07-17 NOTE — TELEPHONE ENCOUNTER
How much did the shot help?       0     %     What is your current pain level now?        6/10                   Has your activity level increased since the shot?   No.     Electronically signed by Giancarlo Power RN on 7/17/2025 at 2:23 PM

## 2025-07-18 ENCOUNTER — CLINICAL DOCUMENTATION (OUTPATIENT)
Dept: OTHER | Age: 75
End: 2025-07-18

## 2025-07-18 DIAGNOSIS — Z80.3 FAMILY HISTORY OF MALIGNANT NEOPLASM OF BREAST: ICD-10-CM

## 2025-07-18 DIAGNOSIS — Z80.0 FAMILY HISTORY OF COLON CANCER: ICD-10-CM

## 2025-07-18 DIAGNOSIS — Z80.0 FAMILY HISTORY OF PANCREATIC CANCER: ICD-10-CM

## 2025-07-18 DIAGNOSIS — Z85.528 PERSONAL HISTORY OF KIDNEY CANCER: ICD-10-CM

## 2025-07-18 NOTE — PROGRESS NOTES
[facility].  Paper requisition filled out by patient for [employer] genetic testing and blood collected at [facility] outpatient lab.  Epic order placed under Abner Velasquez PA-C.

## 2025-07-23 ENCOUNTER — TELEPHONE (OUTPATIENT)
Dept: PAIN MANAGEMENT | Age: 75
End: 2025-07-23

## 2025-07-23 NOTE — TELEPHONE ENCOUNTER
Placed follow up call regarding pt's left lumbar sympathetic nerve block administered on 7/16/25  How much did the shot help?          60  %   What is your current pain level now?     4/10                    Has your activity level increased since the shot? Yes  Electronically signed by Clementina Wayne RN on 7/23/2025 at 2:42 PM

## 2025-07-28 DIAGNOSIS — R09.82 POST-NASAL DRIP: ICD-10-CM

## 2025-07-28 DIAGNOSIS — J30.89 PERENNIAL ALLERGIC RHINITIS: ICD-10-CM

## 2025-07-28 RX ORDER — MONTELUKAST SODIUM 10 MG/1
10 TABLET ORAL NIGHTLY
Qty: 30 TABLET | Refills: 0 | Status: SHIPPED | OUTPATIENT
Start: 2025-07-28

## 2025-07-31 LAB
Lab: NEGATIVE
Lab: NORMAL

## 2025-08-29 DIAGNOSIS — J30.89 PERENNIAL ALLERGIC RHINITIS: ICD-10-CM

## 2025-08-29 DIAGNOSIS — R09.82 POST-NASAL DRIP: ICD-10-CM

## 2025-08-29 RX ORDER — MONTELUKAST SODIUM 10 MG/1
10 TABLET ORAL NIGHTLY
Qty: 30 TABLET | Refills: 0 | Status: SHIPPED | OUTPATIENT
Start: 2025-08-29

## (undated) DEVICE — BNDG CURAD ADHS 4IN WHT

## (undated) DEVICE — CUFF,BP,DISP,1 TUBE,ADULT,HP: Brand: MEDLINE

## (undated) DEVICE — SENSR O2 OXIMAX FNGR A/ 18IN NONSTR

## (undated) DEVICE — Y-KNOT RC DISPOSABLE DRILL BIT, 2.8 MM: Brand: Y-KNOT

## (undated) DEVICE — ANTIBACTERIAL UNDYED BRAIDED (POLYGLACTIN 910), SYNTHETIC ABSORBABLE SUTURE: Brand: COATED VICRYL

## (undated) DEVICE — TBG SMPL FLTR LINE NASL 02/C02 A/ BX/100

## (undated) DEVICE — YANKAUER,BULB TIP WITH VENT: Brand: ARGYLE

## (undated) DEVICE — THE SINGLE USE ETRAP – POLYP TRAP IS USED FOR SUCTION RETRIEVAL OF ENDOSCOPICALLY REMOVED POLYPS.: Brand: ETRAP

## (undated) DEVICE — CVR UNIV C/ARM

## (undated) DEVICE — Device: Brand: DEFENDO AIR/WATER/SUCTION AND BIOPSY VALVE

## (undated) DEVICE — UNDERCAST PADDING: Brand: DEROYAL

## (undated) DEVICE — SKIN PREP TRAY W/CHG: Brand: MEDLINE INDUSTRIES, INC.

## (undated) DEVICE — TRAP FLD MINIVAC MEGADYNE 100ML

## (undated) DEVICE — APPL DURAPREP IODOPHOR APL 26ML

## (undated) DEVICE — THE CHANNEL CLEANING BRUSH IS A NYLON FLEXI BRUSH ATTACHED TO A FLEXIBLE PLASTIC SHEATH DESIGNED TO SAFELY REMOVE DEBRIS FROM FLEXIBLE ENDOSCOPES.

## (undated) DEVICE — BAPTIST TURNOVER KIT: Brand: MEDLINE INDUSTRIES, INC.

## (undated) DEVICE — NDL HYPO PRECISIONGLIDE REG 25G 1 1/2

## (undated) DEVICE — MASK,OXYGEN,MED CONC,ADLT,7' TUB, UC: Brand: PENDING

## (undated) DEVICE — DISPOSABLE TOURNIQUET CUFF 34"X4", 1-LINE, BLUE, STERILE, 1EA/PK, 10PK/CS: Brand: ASP MEDICAL

## (undated) DEVICE — TUBING, SUCTION, 1/4" X 12', STRAIGHT: Brand: MEDLINE

## (undated) DEVICE — PK KN ARTHSCP 30

## (undated) DEVICE — SURGICAL SUCTION CONNECTING TUBE WITH MALE CONNECTOR AND SUCTION CLAMP, 2 FT. LONG (.6 M), 5 MM I.D.: Brand: CONMED

## (undated) DEVICE — FRAZIER SUCTION INSTRUMENT 10 FR W/CONTROL VENT & OBTURATOR: Brand: FRAZIER

## (undated) DEVICE — MSK O2 MD CONCENTR A/ LF 7FT 1P/U

## (undated) DEVICE — 4.0MM EGG BUR

## (undated) DEVICE — CVR BRD ARM 13X30

## (undated) DEVICE — PRECISION THIN (9.0 X 0.38 X 25.0MM)

## (undated) DEVICE — DRP C/ARMOR

## (undated) DEVICE — COVER,MAYO STAND,STERILE: Brand: MEDLINE

## (undated) DEVICE — SPNG GZ WOVN 4X4IN 12PLY 10/BX STRL

## (undated) DEVICE — SNAR POLYP SENSATION MICRO OVL 13 240X40

## (undated) DEVICE — 4-PORT MANIFOLD: Brand: NEPTUNE 2

## (undated) DEVICE — BNDG ELAS W/CLIP 6IN 10YD LF STRL

## (undated) DEVICE — SUCTION MAT (LOW PROFILE), 50X34: Brand: NEPTUNE

## (undated) DEVICE — BANDAGE,GAUZE,BULKEE II,4.5"X4.1YD,STRL: Brand: MEDLINE

## (undated) DEVICE — SUT ETHLN 3/0 FS1 30IN 669H

## (undated) DEVICE — PK EXTRM 30

## (undated) DEVICE — SUT PROLN 4/0 RB1 D/A 36IN 8557H

## (undated) DEVICE — INTENDED FOR TISSUE SEPARATION, AND OTHER PROCEDURES THAT REQUIRE A SHARP SURGICAL BLADE TO PUNCTURE OR CUT.: Brand: BARD-PARKER ® STAINLESS STEEL BLADES

## (undated) DEVICE — TBG ARTHRO FLOWSTEADY/ST DISP

## (undated) DEVICE — GLV SURG SENSICARE PI ORTHO SZ8 LF STRL

## (undated) DEVICE — DRSNG GZ CURAD XEROFORM NONADHS 5X9IN STRL

## (undated) DEVICE — ENDOGATOR AUXILIARY WATER JET CONNECTOR: Brand: ENDOGATOR